# Patient Record
Sex: MALE | Race: WHITE | Employment: OTHER | URBAN - METROPOLITAN AREA
[De-identification: names, ages, dates, MRNs, and addresses within clinical notes are randomized per-mention and may not be internally consistent; named-entity substitution may affect disease eponyms.]

---

## 2017-04-26 PROBLEM — R07.9 CHEST PAIN: Status: ACTIVE | Noted: 2017-04-26

## 2017-11-14 PROBLEM — R94.31 ABNORMAL ECG: Status: ACTIVE | Noted: 2017-11-14

## 2018-04-26 LAB — DIABETIC RETINOPATHY: NORMAL

## 2018-09-18 LAB
AVERAGE GLUCOSE: NORMAL
CHOLESTEROL, TOTAL: NORMAL
CHOLESTEROL/HDL RATIO: NORMAL
HBA1C MFR BLD: 8.7 %
HDLC SERPL-MCNC: NORMAL MG/DL
LDL CHOLESTEROL CALCULATED: NORMAL
TRIGL SERPL-MCNC: NORMAL MG/DL
VLDLC SERPL CALC-MCNC: NORMAL MG/DL

## 2018-09-26 ENCOUNTER — HOSPITAL ENCOUNTER (EMERGENCY)
Age: 68
Discharge: HOME | End: 2018-09-26
Payer: MEDICARE

## 2018-09-26 VITALS
HEART RATE: 97 BPM | WEIGHT: 290 LBS | HEIGHT: 73 IN | OXYGEN SATURATION: 95 % | SYSTOLIC BLOOD PRESSURE: 135 MMHG | DIASTOLIC BLOOD PRESSURE: 89 MMHG | RESPIRATION RATE: 14 BRPM | TEMPERATURE: 98.7 F | BODY MASS INDEX: 38.43 KG/M2

## 2018-09-26 DIAGNOSIS — S61.209A AVULSION OF SKIN OF FINGER, INITIAL ENCOUNTER: Primary | ICD-10-CM

## 2018-09-26 PROCEDURE — 6360000002 HC RX W HCPCS: Performed by: PHYSICIAN ASSISTANT

## 2018-09-26 PROCEDURE — 90471 IMMUNIZATION ADMIN: CPT | Performed by: PHYSICIAN ASSISTANT

## 2018-09-26 PROCEDURE — 90715 TDAP VACCINE 7 YRS/> IM: CPT | Performed by: PHYSICIAN ASSISTANT

## 2018-09-26 PROCEDURE — 99282 EMERGENCY DEPT VISIT SF MDM: CPT

## 2018-09-26 RX ORDER — CEPHALEXIN 500 MG/1
500 CAPSULE ORAL 3 TIMES DAILY
Qty: 30 CAPSULE | Refills: 0 | Status: SHIPPED | OUTPATIENT
Start: 2018-09-26 | End: 2018-11-29 | Stop reason: ALTCHOICE

## 2018-09-26 RX ADMIN — TETANUS TOXOID, REDUCED DIPHTHERIA TOXOID AND ACELLULAR PERTUSSIS VACCINE, ADSORBED 0.5 ML: 5; 2.5; 8; 8; 2.5 SUSPENSION INTRAMUSCULAR at 18:53

## 2018-09-26 ASSESSMENT — PAIN DESCRIPTION - ORIENTATION: ORIENTATION: LEFT

## 2018-09-26 NOTE — ED PROVIDER NOTES
HPI:  9/26/18, Time: 6:21 PM         Toby Wright is a 80 y.o. male presenting to the ED for right 3rd fingertip avulsion from a mandolin, beginning just prior to arrival.  Patient states that this occurred approximately 30 minutes prior to arrival. Patient's tetanus status is not current. Patient really denies any pain in the finger, and certainly in the finger, dizziness, or any other acute change change of condition. Review of Systems:   Pertinent positives and negatives are stated within HPI, all other systems reviewed and are negative.          --------------------------------------------- PAST HISTORY ---------------------------------------------  Past Medical History:  has a past medical history of Diabetes mellitus ([facility] [address] Hyperlipidemia; Hypertension; Sleep apnea; Type 2 diabetes mellitus without complication ([facility] [address] and Ulceration. Past Surgical History:  has a past surgical history that includes hernia repair; Colonoscopy; and joint replacement (Left). Social History:  reports that he has quit smoking. His smoking use included Cigarettes. He has a 14.00 pack-year smoking history. He has never used smokeless tobacco. He reports that he drinks alcohol. He reports that he does not use drugs. Family History: family history includes Cancer in his mother and sister. The patients home medications have been reviewed. Allergies: Patient has no known allergies. -------------------------------------------------- RESULTS -------------------------------------------------  All laboratory and radiology results have been personally reviewed by myself   LABS:  No results found for this visit on 09/26/18. RADIOLOGY:  Interpreted by Radiologist.  No orders to display       ------------------------- NURSING NOTES AND VITALS REVIEWED ---------------------------   The nursing notes within the ED encounter and vital signs as below have been reviewed.    /89   Pulse 97   Temp 98.7 °F and prognosis. Questions are answered at this time and they are agreeable with the plan.      --------------------------------- IMPRESSION AND DISPOSITION ---------------------------------    IMPRESSION  1. Avulsion of skin of finger, initial encounter        DISPOSITION  Disposition: Discharge to home  Patient condition is good      NOTE: This report was transcribed using voice recognition software.  Every effort was made to ensure accuracy; however, inadvertent computerized transcription errors may be present       Brandon Mosley  09/26/18 1829

## 2018-11-13 ENCOUNTER — HOSPITAL ENCOUNTER (OUTPATIENT)
Dept: DIABETES SERVICES | Age: 68
Setting detail: THERAPIES SERIES
Discharge: HOME | End: 2018-11-13
Payer: MEDICARE

## 2018-11-13 PROCEDURE — G0109 DIAB MANAGE TRN IND/GROUP: HCPCS

## 2018-11-13 ASSESSMENT — PATIENT HEALTH QUESTIONNAIRE - PHQ9: SUM OF ALL RESPONSES TO PHQ QUESTIONS 1-9: 11

## 2018-11-14 ENCOUNTER — HOSPITAL ENCOUNTER (OUTPATIENT)
Dept: DIABETES SERVICES | Age: 68
Setting detail: THERAPIES SERIES
Discharge: HOME | End: 2018-11-14
Payer: MEDICARE

## 2018-11-14 PROCEDURE — G0109 DIAB MANAGE TRN IND/GROUP: HCPCS | Performed by: DIETITIAN, REGISTERED

## 2018-11-14 NOTE — PROGRESS NOTES
Diabetes Self-Management Education Record    Participant Name: Stephany Baugh  Referring Provider: Thaddeus Gunter DO  Assessment/Evaluation Ratings:  1=Needs Instruction   4=Demonstrates Understanding/Competency  2=Needs Review   NC=Not Covered    3=Comprehends Key Points  N/A=Not Applicable  Topics/Learning Objectives Pre-session Assess Date:   Instr. Date Reinforce Date Post- session Eval Comments   Diabetes disease process & Treatment process: Define diabetes & pre-diabetes; Identify own type of diabetes; role of the pancreas; signs/symptoms; diagnostic criteria; prevention & treatment options; contributing factors. Incorporating nutritional management into lifestyle: Describe effect of type, amount & timing of food on blood glucose; Describe basic meal planning techniques & current nutrition guidelines;Correctly read food labels & demonstrate CHO counting & portion control with personalized meal plan. Identify dining out strategies, & dietary sick day guidelines. 1 11/14/18 RREM  4 Pt attended Session 2. Participated in group activities on Diabetes Plate Method and Better for You food choices. Demonstrated understanding of carb counting using serving sizes and reading food labels correctly. Followed along and took notes. Incorporating physical activity into lifestyle:   Verbalize effect of exercise on blood glucose levels; benefits of regular exercise; safety considerations; contraindications; maintenance of activity. Using medications safely:  Identify effects of diabetes medicines on blood glucose levels; List diabetes medication taken, action & side effects; appropriate injection sites; proper storage; supplies needed; proper technique; safe needle disposal guidelines. Monitoring blood glucose, interpreting and using results:  Identify recommended & personal blood glucose targets; importance of testing; testing supplies; HgbA1C target levels;  Factors affecting blood glucose;

## 2018-11-15 ENCOUNTER — HOSPITAL ENCOUNTER (OUTPATIENT)
Dept: DIABETES SERVICES | Age: 68
Setting detail: THERAPIES SERIES
Discharge: HOME | End: 2018-11-15
Payer: MEDICARE

## 2018-11-15 PROCEDURE — 97804 MEDICAL NUTRITION GROUP: CPT

## 2018-11-15 RX ORDER — INSULIN ASPART 100 [IU]/ML
INJECTION, SOLUTION INTRAVENOUS; SUBCUTANEOUS
COMMUNITY

## 2018-11-15 NOTE — LETTER
[address] - Diabetes Education    11/15/2018       Re:     Zuhair Lowry         :  1950  Dear Dr. Garrett Gay:                    Thank you for referring your patient, Zuhair Lowry, for diabetes education sessions.     Your patient attended classes the week of 18, that addressed the following topics:    Nursing/Medical [x]     Nutrition [x]  · Describing the diabetes disease process/ treatment options  · Incorporating physical activity into lifestyle  · Using medication safely & for maximum therapeutic effectiveness  · Monitoring blood glucose & other parameters:  Interpreting and using results for self-management & decision making  · Preventing, detecting, & treating acute complications  · Preventing, detecting & treating chronic complications  · Developing personal strategies to address psychosocial issues and concerns  · Developing personal strategies to promote health & behavior change (risk reduction) · Incorporating nutrition management into lifestyle    · Nutrition for diabetes prevention & diabetes  · Relationship among nutrition, exercise, medication & blood glucose levels  · Food Groups/carbohydrate & non- carbohydrate foods  · Whole grain/high fiber foods & needs  · Fluid needs   · Label reading  · Carbohydrate consistent meal planning/ techniques  · Weighing/measuring portions  · Heart healthy guidelines: fat, sodium & cholesterol  · Alcohol  · Free foods/nutritive and non-nutritive sweeteners  · Dining out tips and recipe modification guidelines  · Sick day guidelines     [x]  Instructed on carbohydrate consistent meal plan with  2100 calories, and the following number of carbohydrate servings per meal or snack (15 gm/serving):   Breakfast:  4,  Lunch: 4, Dinner:  4     AM Snack:  1,  PM Snack:  0,  HS Snack:  2    Upon completion of these sessions the diabetes team made the following evaluation of your patients progress:  [x] Attended class alone

## 2018-11-15 NOTE — PROGRESS NOTES
Diabetes Self-Management Education Record    Participant Name: Phil Carlson  Referring Provider: Lona Carreno 117, DO  Assessment/Evaluation Ratings:  1=Needs Instruction   4=Demonstrates Understanding/Competency  2=Needs Review   NC=Not Covered    3=Comprehends Key Points  N/A=Not Applicable  Topics/Learning Objectives Pre-session Assess Date:  11/13/18 BDK Instr. Date Reinforce Date Post- session Eval Comments   Diabetes disease process & Treatment process: Define diabetes & pre-diabetes; Identify own type of diabetes; role of the pancreas; signs/symptoms; diagnostic criteria; prevention & treatment options; contributing factors. 1 11/13/18  BDK  3 Type 2   Incorporating nutritional management into lifestyle: Describe effect of type, amount & timing of food on blood glucose; Describe basic meal planning techniques & current nutrition guidelines;Correctly read food labels & demonstrate CHO counting & portion control with personalized meal plan. Identify dining out strategies, & dietary sick day guidelines. 1 11/14/18 RREM  4 Pt attended Session 2. Participated in group activities on Diabetes Plate Method and Better for You food choices. Demonstrated understanding of carb counting using serving sizes and reading food labels correctly. Followed along and took notes. Incorporating physical activity into lifestyle:   Verbalize effect of exercise on blood glucose levels; benefits of regular exercise; safety considerations; contraindications; maintenance of activity. 1 11/13/18  BDK  3 none   Using medications safely:  Identify effects of diabetes medicines on blood glucose levels; List diabetes medication taken, action & side effects; appropriate injection sites; proper storage; supplies needed; proper technique; safe needle disposal guidelines.  3 11/13/18  BDK  4 Metformin  Glipizide  novolog  basaglar   Monitoring blood glucose, interpreting and using results:  Identify recommended & personal blood glucose End Time Comments No Show Dates   Assessment 11/13/18 BDK 0900 0930   [x]In Person  []Telephone    Session 1 11/13/18 BDK 0930 1200      Session 2 11/14/18 REM  11/14/18 BDK 0945  0900 1130  0945     Session 3         Individual MNT         Gestational MNT         Shared Med Appt         Yearly Follow-up        Meter Instrx        Insulin Instrx      []Pen  []Vial & Syringe      Additional Comments: 11/13/18  Lisa Sampson attended session one alone. Attentive to class materials. He signed registration papers to not be able to contact him for a follow up. He stated that was \"okay\". When I said we would not be able to see how he is doing after classes, he again stated that was \"okay\". 11/15/18  Lisa Sampson has now changed his mind about contacting him after classes. New registration papers signed to go along with his change of mind.  [address] [facility]    DSMS Support Plan:  Follow-up plan/Date: 2/2019  Contact Post Class Regarding:   Goal Attainment   Fasting Blood Sugar   HgbA1C   Weight   Hypertension/Follow-up with Physician   Self-Foot Exam Frequency   Monitoring Frequency   Exercise Routine  Post Education Referrals:      []WIC   []PAP   []Wound Care   []Social Service   [] [facility] [address] Specialist   [][facility]   []Sleep Lab   []Other

## 2018-11-29 ENCOUNTER — OFFICE VISIT (OUTPATIENT)
Dept: CARDIOLOGY CLINIC | Age: 68
End: 2018-11-29
Payer: MEDICARE

## 2018-11-29 VITALS
HEART RATE: 94 BPM | SYSTOLIC BLOOD PRESSURE: 128 MMHG | WEIGHT: 301 LBS | DIASTOLIC BLOOD PRESSURE: 78 MMHG | HEIGHT: 73 IN | RESPIRATION RATE: 12 BRPM | BODY MASS INDEX: 39.89 KG/M2

## 2018-11-29 DIAGNOSIS — R06.09 DOE (DYSPNEA ON EXERTION): ICD-10-CM

## 2018-11-29 DIAGNOSIS — R94.31 ABNORMAL ECG: Primary | ICD-10-CM

## 2018-11-29 DIAGNOSIS — Z01.810 PREOPERATIVE CARDIOVASCULAR EXAMINATION: ICD-10-CM

## 2018-11-29 PROCEDURE — 93000 ELECTROCARDIOGRAM COMPLETE: CPT | Performed by: INTERNAL MEDICINE

## 2018-11-29 PROCEDURE — 4040F PNEUMOC VAC/ADMIN/RCVD: CPT | Performed by: INTERNAL MEDICINE

## 2018-11-29 PROCEDURE — 1036F TOBACCO NON-USER: CPT | Performed by: INTERNAL MEDICINE

## 2018-11-29 PROCEDURE — G8417 CALC BMI ABV UP PARAM F/U: HCPCS | Performed by: INTERNAL MEDICINE

## 2018-11-29 PROCEDURE — G8484 FLU IMMUNIZE NO ADMIN: HCPCS | Performed by: INTERNAL MEDICINE

## 2018-11-29 PROCEDURE — G8427 DOCREV CUR MEDS BY ELIG CLIN: HCPCS | Performed by: INTERNAL MEDICINE

## 2018-11-29 PROCEDURE — 1101F PT FALLS ASSESS-DOCD LE1/YR: CPT | Performed by: INTERNAL MEDICINE

## 2018-11-29 PROCEDURE — 3017F COLORECTAL CA SCREEN DOC REV: CPT | Performed by: INTERNAL MEDICINE

## 2018-11-29 PROCEDURE — 99214 OFFICE O/P EST MOD 30 MIN: CPT | Performed by: INTERNAL MEDICINE

## 2018-11-29 PROCEDURE — 1123F ACP DISCUSS/DSCN MKR DOCD: CPT | Performed by: INTERNAL MEDICINE

## 2018-11-29 RX ORDER — FENOFIBRATE 54 MG/1
54 TABLET ORAL DAILY
COMMUNITY
End: 2019-02-18

## 2018-12-26 ENCOUNTER — APPOINTMENT (OUTPATIENT)
Dept: GENERAL RADIOLOGY | Age: 68
End: 2018-12-26
Payer: MEDICARE

## 2018-12-26 ENCOUNTER — HOSPITAL ENCOUNTER (EMERGENCY)
Age: 68
Discharge: HOME | End: 2018-12-26
Attending: EMERGENCY MEDICINE
Payer: MEDICARE

## 2018-12-26 VITALS
TEMPERATURE: 98.9 F | BODY MASS INDEX: 39.1 KG/M2 | HEART RATE: 83 BPM | HEIGHT: 73 IN | WEIGHT: 295 LBS | DIASTOLIC BLOOD PRESSURE: 51 MMHG | RESPIRATION RATE: 16 BRPM | OXYGEN SATURATION: 97 % | SYSTOLIC BLOOD PRESSURE: 126 MMHG

## 2018-12-26 DIAGNOSIS — R07.9 CHEST PAIN, UNSPECIFIED TYPE: Primary | ICD-10-CM

## 2018-12-26 LAB
ANION GAP SERPL CALCULATED.3IONS-SCNC: 11 MMOL/L (ref 7–16)
BUN BLDV-MCNC: 19 MG/DL (ref 8–23)
CALCIUM SERPL-MCNC: 9.3 MG/DL (ref 8.6–10.2)
CHLORIDE BLD-SCNC: 102 MMOL/L (ref 98–107)
CO2: 26 MMOL/L (ref 22–29)
CREAT SERPL-MCNC: 0.7 MG/DL (ref 0.7–1.2)
EKG ATRIAL RATE: 90 BPM
EKG P AXIS: 37 DEGREES
EKG P-R INTERVAL: 226 MS
EKG Q-T INTERVAL: 394 MS
EKG QRS DURATION: 94 MS
EKG QTC CALCULATION (BAZETT): 481 MS
EKG R AXIS: -49 DEGREES
EKG T AXIS: 50 DEGREES
EKG VENTRICULAR RATE: 90 BPM
GFR AFRICAN AMERICAN: >60
GFR NON-AFRICAN AMERICAN: >60 ML/MIN/1.73
GLUCOSE BLD-MCNC: 253 MG/DL (ref 74–99)
HCT VFR BLD CALC: 26.5 % (ref 37–54)
HEMOGLOBIN: 8.8 G/DL (ref 12.5–16.5)
MCH RBC QN AUTO: 30.3 PG (ref 26–35)
MCHC RBC AUTO-ENTMCNC: 33.2 % (ref 32–34.5)
MCV RBC AUTO: 91.4 FL (ref 80–99.9)
PDW BLD-RTO: 13.7 FL (ref 11.5–15)
PLATELET # BLD: 431 E9/L (ref 130–450)
PMV BLD AUTO: 8.6 FL (ref 7–12)
POTASSIUM SERPL-SCNC: 4 MMOL/L (ref 3.5–5)
RBC # BLD: 2.9 E12/L (ref 3.8–5.8)
SODIUM BLD-SCNC: 139 MMOL/L (ref 132–146)
TROPONIN: <0.01 NG/ML (ref 0–0.03)
TROPONIN: <0.01 NG/ML (ref 0–0.03)
WBC # BLD: 10.2 E9/L (ref 4.5–11.5)

## 2018-12-26 PROCEDURE — 99285 EMERGENCY DEPT VISIT HI MDM: CPT

## 2018-12-26 PROCEDURE — 80048 BASIC METABOLIC PNL TOTAL CA: CPT

## 2018-12-26 PROCEDURE — 36415 COLL VENOUS BLD VENIPUNCTURE: CPT

## 2018-12-26 PROCEDURE — 71045 X-RAY EXAM CHEST 1 VIEW: CPT

## 2018-12-26 PROCEDURE — 85027 COMPLETE CBC AUTOMATED: CPT

## 2018-12-26 PROCEDURE — 84484 ASSAY OF TROPONIN QUANT: CPT

## 2018-12-26 ASSESSMENT — PAIN DESCRIPTION - ORIENTATION
ORIENTATION: RIGHT
ORIENTATION: LEFT

## 2018-12-26 ASSESSMENT — PAIN DESCRIPTION - LOCATION: LOCATION: CHEST

## 2018-12-26 ASSESSMENT — PAIN DESCRIPTION - PAIN TYPE
TYPE: ACUTE PAIN
TYPE: ACUTE PAIN

## 2018-12-26 ASSESSMENT — PAIN DESCRIPTION - FREQUENCY
FREQUENCY: CONTINUOUS
FREQUENCY: CONTINUOUS

## 2018-12-26 ASSESSMENT — PAIN SCALES - GENERAL
PAINLEVEL_OUTOF10: 1
PAINLEVEL_OUTOF10: 7

## 2018-12-26 ASSESSMENT — PAIN DESCRIPTION - DESCRIPTORS: DESCRIPTORS: PATIENT UNABLE TO DESCRIBE

## 2018-12-26 ASSESSMENT — PAIN DESCRIPTION - PROGRESSION: CLINICAL_PROGRESSION: NOT CHANGED

## 2018-12-27 NOTE — ED PROVIDER NOTES
parasthesia  Psych: no hallucinations, no depression  Heme: no bruising, no bleeding  Other relevant systems reviewed and negative    Physical brief exam: See HPI for other details  Details in electronic record may supersede details below    Vital signs reviewed, please refer to nurses note  Constitutionall: No distress, warm, dry, well nourished, nontoxic  HENT:  NC AT, no deformity, no bleeding of gums  Eyes:  EOMI, nl lids and conjunctiva   Resp:  normal resp rate, no resp distress, no stridor  CVS:  no JVD, no visible heave  GI:  no outward sign peritonitis or splinting, non distended  Musc-Skel:  full range of motion, normal appearing, no deformities/edema/ ecchymosis  Skin:  warm and dry, normal turgor, no rashes   Neurologic: awake and alert, cooperative, Cranial Nerves II-XII grossly intact, motor& sensory grossly intact x4   Psychiatric: orientated x3, answers questions appropriately, judgment & affect grossly appropriate  Heme/ Lymph: no visible adenopathy, ecchymosis, pettichiae    Procedures/Radiology:  CXR negative    ------------------------ NURSING NOTES AND VITALS REVIEWED ---------------------------  Date / Time Roomed:  12/26/2018  5:27 PM  ED Bed Assignment:  16/16    The nursing notes within the ED encounter and vital signs as below have been reviewed. Patient Vitals for the past 24 hrs:   BP Temp Temp src Pulse Resp SpO2 Height Weight   12/26/18 1910 (!) 126/51 - - 83 16 97 % - -   12/26/18 1735 (!) 141/78 98.9 °F (37.2 °C) Oral 90 14 97 % 6' 1\" (1.854 m) 295 lb (133.8 kg)       Oxygen Saturation Interpretation: Normal      Assessment and Plan: Pt is asymptomatic here, CE x 2 negative, pt to [place] home and f/u with pcp, given strict return precautions for any new or worsening symptoms      Impression: Chest Pain      I have reviewed the patient's medications, vital signs, and diagnostic studies available to me in the medical record at the time of the patient encounter.     12/26/18, 8:19 PM. This note is prepared by Adela Chew, acting as Scribe for Ally Dunlap MD.    Ally Dunlap MD:  The scribe's documentation has been prepared under my direction and personally reviewed by me in its entirety. I confirm that the note above accurately reflects all work, treatment, procedures, and medical decision making performed by me.          Ally Dunlap MD  12/26/18 2028

## 2019-02-19 ENCOUNTER — HOSPITAL ENCOUNTER (OUTPATIENT)
Dept: DIABETES SERVICES | Age: 69
Setting detail: THERAPIES SERIES
Discharge: HOME | End: 2019-02-19
Payer: MEDICARE

## 2019-03-11 ENCOUNTER — HOSPITAL ENCOUNTER (OUTPATIENT)
Age: 69
Discharge: HOME | End: 2019-03-13
Payer: MEDICARE

## 2019-03-11 ENCOUNTER — HOSPITAL ENCOUNTER (OUTPATIENT)
Dept: GENERAL RADIOLOGY | Age: 69
Discharge: HOME | End: 2019-03-13
Payer: MEDICARE

## 2019-03-11 DIAGNOSIS — S33.5XXA SPRAIN OF LIGAMENTS OF LUMBAR SPINE, INITIAL ENCOUNTER: ICD-10-CM

## 2019-03-11 PROCEDURE — 72110 X-RAY EXAM L-2 SPINE 4/>VWS: CPT

## 2019-04-22 ENCOUNTER — HOSPITAL ENCOUNTER (OUTPATIENT)
Dept: MRI IMAGING | Age: 69
Discharge: HOME | End: 2019-04-24
Payer: MEDICARE

## 2019-04-22 DIAGNOSIS — M51.16 INTERVERTEBRAL DISC DISORDER WITH RADICULOPATHY OF LUMBAR REGION: ICD-10-CM

## 2019-04-22 PROCEDURE — 72148 MRI LUMBAR SPINE W/O DYE: CPT

## 2019-05-03 ENCOUNTER — APPOINTMENT (OUTPATIENT)
Dept: CT IMAGING | Age: 69
End: 2019-05-03
Payer: MEDICARE

## 2019-05-03 ENCOUNTER — HOSPITAL ENCOUNTER (EMERGENCY)
Age: 69
Discharge: HOME | End: 2019-05-03
Attending: EMERGENCY MEDICINE
Payer: MEDICARE

## 2019-05-03 VITALS
SYSTOLIC BLOOD PRESSURE: 161 MMHG | HEART RATE: 72 BPM | TEMPERATURE: 97.7 F | DIASTOLIC BLOOD PRESSURE: 73 MMHG | OXYGEN SATURATION: 92 % | RESPIRATION RATE: 16 BRPM

## 2019-05-03 DIAGNOSIS — R42 VERTIGO: Primary | ICD-10-CM

## 2019-05-03 LAB
ALBUMIN SERPL-MCNC: 4.6 G/DL (ref 3.5–5.2)
ALP BLD-CCNC: 55 U/L (ref 40–129)
ALT SERPL-CCNC: 49 U/L (ref 0–40)
ANION GAP SERPL CALCULATED.3IONS-SCNC: 13 MMOL/L (ref 7–16)
AST SERPL-CCNC: 42 U/L (ref 0–39)
BASOPHILS ABSOLUTE: 0.03 E9/L (ref 0–0.2)
BASOPHILS RELATIVE PERCENT: 0.5 % (ref 0–2)
BILIRUB SERPL-MCNC: 0.4 MG/DL (ref 0–1.2)
BUN BLDV-MCNC: 26 MG/DL (ref 8–23)
CALCIUM SERPL-MCNC: 9.5 MG/DL (ref 8.6–10.2)
CHLORIDE BLD-SCNC: 104 MMOL/L (ref 98–107)
CO2: 24 MMOL/L (ref 22–29)
CREAT SERPL-MCNC: 0.7 MG/DL (ref 0.7–1.2)
EKG ATRIAL RATE: 73 BPM
EKG P AXIS: 43 DEGREES
EKG P-R INTERVAL: 232 MS
EKG Q-T INTERVAL: 422 MS
EKG QRS DURATION: 98 MS
EKG QTC CALCULATION (BAZETT): 464 MS
EKG R AXIS: -69 DEGREES
EKG T AXIS: 66 DEGREES
EKG VENTRICULAR RATE: 73 BPM
EOSINOPHILS ABSOLUTE: 0.06 E9/L (ref 0.05–0.5)
EOSINOPHILS RELATIVE PERCENT: 1 % (ref 0–6)
GFR AFRICAN AMERICAN: >60
GFR NON-AFRICAN AMERICAN: >60 ML/MIN/1.73
GLUCOSE BLD-MCNC: 229 MG/DL (ref 74–99)
HCT VFR BLD CALC: 39.9 % (ref 37–54)
HEMOGLOBIN: 13.4 G/DL (ref 12.5–16.5)
IMMATURE GRANULOCYTES #: 0.03 E9/L
IMMATURE GRANULOCYTES %: 0.5 % (ref 0–5)
LYMPHOCYTES ABSOLUTE: 1.44 E9/L (ref 1.5–4)
LYMPHOCYTES RELATIVE PERCENT: 22.9 % (ref 20–42)
MCH RBC QN AUTO: 28.8 PG (ref 26–35)
MCHC RBC AUTO-ENTMCNC: 33.6 % (ref 32–34.5)
MCV RBC AUTO: 85.6 FL (ref 80–99.9)
MONOCYTES ABSOLUTE: 0.48 E9/L (ref 0.1–0.95)
MONOCYTES RELATIVE PERCENT: 7.6 % (ref 2–12)
NEUTROPHILS ABSOLUTE: 4.25 E9/L (ref 1.8–7.3)
NEUTROPHILS RELATIVE PERCENT: 67.5 % (ref 43–80)
PDW BLD-RTO: 16 FL (ref 11.5–15)
PLATELET # BLD: 238 E9/L (ref 130–450)
PMV BLD AUTO: 9.2 FL (ref 7–12)
POTASSIUM SERPL-SCNC: 4.6 MMOL/L (ref 3.5–5)
RBC # BLD: 4.66 E12/L (ref 3.8–5.8)
SODIUM BLD-SCNC: 141 MMOL/L (ref 132–146)
TOTAL PROTEIN: 7.6 G/DL (ref 6.4–8.3)
TROPONIN: <0.01 NG/ML (ref 0–0.03)
WBC # BLD: 6.3 E9/L (ref 4.5–11.5)

## 2019-05-03 PROCEDURE — 84484 ASSAY OF TROPONIN QUANT: CPT

## 2019-05-03 PROCEDURE — 99284 EMERGENCY DEPT VISIT MOD MDM: CPT

## 2019-05-03 PROCEDURE — 36415 COLL VENOUS BLD VENIPUNCTURE: CPT

## 2019-05-03 PROCEDURE — 93005 ELECTROCARDIOGRAM TRACING: CPT | Performed by: EMERGENCY MEDICINE

## 2019-05-03 PROCEDURE — 80053 COMPREHEN METABOLIC PANEL: CPT

## 2019-05-03 PROCEDURE — 85025 COMPLETE CBC W/AUTO DIFF WBC: CPT

## 2019-05-03 PROCEDURE — 70450 CT HEAD/BRAIN W/O DYE: CPT

## 2019-05-03 PROCEDURE — 6370000000 HC RX 637 (ALT 250 FOR IP): Performed by: STUDENT IN AN ORGANIZED HEALTH CARE EDUCATION/TRAINING PROGRAM

## 2019-05-03 PROCEDURE — 93010 ELECTROCARDIOGRAM REPORT: CPT | Performed by: INTERNAL MEDICINE

## 2019-05-03 RX ORDER — MECLIZINE HCL 12.5 MG/1
12.5 TABLET ORAL ONCE
Status: COMPLETED | OUTPATIENT
Start: 2019-05-03 | End: 2019-05-03

## 2019-05-03 RX ORDER — MECLIZINE HYDROCHLORIDE 25 MG/1
25 TABLET ORAL 3 TIMES DAILY PRN
Qty: 30 TABLET | Refills: 0 | Status: SHIPPED | OUTPATIENT
Start: 2019-05-03 | End: 2019-05-13

## 2019-05-03 RX ADMIN — MECLIZINE 12.5 MG: 12.5 TABLET ORAL at 13:27

## 2019-05-03 ASSESSMENT — ENCOUNTER SYMPTOMS
COUGH: 0
VOMITING: 0
WHEEZING: 0
SORE THROAT: 0
EYE PAIN: 0
DIARRHEA: 0
TROUBLE SWALLOWING: 0
ABDOMINAL DISTENTION: 0
BLOOD IN STOOL: 0
PHOTOPHOBIA: 0
EYE REDNESS: 0
CHEST TIGHTNESS: 0
NAUSEA: 1
ABDOMINAL PAIN: 0
SINUS PRESSURE: 0
CONSTIPATION: 0
BACK PAIN: 0
RHINORRHEA: 0
SHORTNESS OF BREATH: 0

## 2019-05-03 ASSESSMENT — PAIN DESCRIPTION - PAIN TYPE: TYPE: ACUTE PAIN

## 2019-05-03 ASSESSMENT — PAIN DESCRIPTION - LOCATION: LOCATION: HEAD

## 2019-05-03 ASSESSMENT — PAIN DESCRIPTION - FREQUENCY: FREQUENCY: CONTINUOUS

## 2019-05-03 ASSESSMENT — PAIN SCALES - GENERAL: PAINLEVEL_OUTOF10: 7

## 2019-05-03 ASSESSMENT — PAIN DESCRIPTION - DESCRIPTORS: DESCRIPTORS: THROBBING;SQUEEZING;STABBING

## 2019-05-03 NOTE — ED NOTES
FIRST PROVIDER CONTACT ASSESSMENT NOTE      Department of Emergency Medicine   5/3/19  12:29 PM    Chief Complaint: Dizziness (states he is experiencing vertigo) and Nausea (denies emesis, diabetic,  at home)      History of Present Illness:    Domonique Maguire is a 80 y.o. male who presents to the ED by private car for dizziness has had vertigo in the past.  Headache in the back of his head. Focused Screening Exam:  Constitutional:  Alert, appears stated age and is in no distress.       *ALLERGIES*     Lisinopril     ED Triage Vitals   BP Temp Temp src Pulse Resp SpO2 Height Weight   -- -- -- -- -- -- -- --        Initial Plan of Care:  Initiate Treatment-Testing, Proceed toTreatment Area When Bed Available for ED Attending/MLP to Continue Care    -----------------END OF FIRST PROVIDER CONTACT ASSESSMENT NOTE--------------  Electronically signed by JOSSUE Woods   DD: 5/3/19       JOSSUE Huerta  05/03/19 1543

## 2019-05-03 NOTE — ED PROVIDER NOTES
Patient is a 20-year-old male who presents to ED for evaluation of dizziness. Onset of symptoms was last p.m. Patient notes that symptoms worsened with movement of the head and improved with rest. Patient notes a significant history of BPPV, previously prescribed meclizine outpatient. Most recent episode approximately one year prior to arrival that resolved with medications-- meclizine. Patient denies unilateral weakness, numbness, paresthesias. Patient denies facial asymmetry or dysarthria. Patient was accompanied by wife, who states the patient has been acting appropriately. Patient has associated nausea without emesis when symptoms are present. Patient denies previously being evaluated by ENT. Review of Systems   Constitutional: Negative for chills, diaphoresis, fatigue and fever. HENT: Negative for congestion, ear pain, rhinorrhea, sinus pressure, sneezing, sore throat and trouble swallowing. Eyes: Negative for photophobia, pain and redness. Respiratory: Negative for cough, chest tightness, shortness of breath and wheezing. Cardiovascular: Negative for chest pain and palpitations. Gastrointestinal: Positive for nausea. Negative for abdominal distention, abdominal pain, blood in stool, constipation, diarrhea and vomiting. Endocrine: Negative for polydipsia and polyuria. Genitourinary: Negative for difficulty urinating, dysuria, flank pain, hematuria and urgency. Musculoskeletal: Negative for arthralgias, back pain, myalgias and neck pain. Skin: Negative for rash and wound. Neurological: Positive for dizziness. Negative for weakness, light-headedness, numbness and headaches. Psychiatric/Behavioral: Negative for agitation and confusion. The patient is not nervous/anxious and is not hyperactive. Physical Exam   Constitutional: He is oriented to person, place, and time. He appears well-developed and well-nourished. No distress. HENT:   Head: Normocephalic and atraumatic. Right Ear: External ear normal.   Left Ear: External ear normal.   Mouth/Throat: Oropharynx is clear and moist. No oropharyngeal exudate. Eyes: Pupils are equal, round, and reactive to light. Conjunctivae and EOM are normal. Right eye exhibits no discharge. Left eye exhibits no discharge. Neck: Normal range of motion. Neck supple. No thyromegaly present. Cardiovascular: Normal rate, regular rhythm and normal heart sounds. No murmur heard. Pulmonary/Chest: Effort normal and breath sounds normal. No respiratory distress. He has no wheezes. He has no rales. He exhibits no tenderness. Abdominal: Soft. He exhibits no distension and no mass. There is no tenderness. There is no rebound and no guarding. Musculoskeletal: Normal range of motion. He exhibits no tenderness. Neurological: He is alert and oriented to person, place, and time. No focal motor or sensory deficits to the upper or lower extremities. No facial asymmetry or dysarthria. Unilateral fatigable nystagmus noted with head impulse. Skin: Skin is warm and dry. No rash noted. He is not diaphoretic. Psychiatric: He has a normal mood and affect. His behavior is normal. Judgment and thought content normal.       Procedures    MDM  Number of Diagnoses or Management Options  Vertigo:   Diagnosis management comments: Patient is a 41-year-old male who presented to ED for evaluation of dizziness described as vertigo, with associated nausea. On arrival to ED, physical exam significant for no focal motor or sensory deficits noted to the upper or lower extremities, HINTS exam negative for central vertigo, unilateral fatigable nystagmus seen on exam. Mike-Hallpike maneuver was performed with improvement in symptoms. CT of the head with no acute intracranial abnormality. Labs reviewed and were unremarkable. Decision was made to discharge patient home with instruction to follow up with primary care physician calling their office repeat evaluation.  Strict return instructions to ED with given the patient prior to discharge. On repeat evaluation prior to discharge, patient asymptomatic, resting comfortably in bed with wife at bedside and in no apparent distress. No new complaints prior to discharge and patient is agreeable to plan.      --------------------------------------------- PAST HISTORY ---------------------------------------------  Past Medical History:  has a past medical history of Diabetes mellitus ([facility] [address] Hyperlipidemia, Hypertension, Sleep apnea, Type 2 diabetes mellitus without complication ([facility] [address] and Ulceration. Past Surgical History:  has a past surgical history that includes hernia repair; Colonoscopy; joint replacement (Left); and Revision total knee arthroplasty (Right, 12/14/2018). Social History:  reports that he quit smoking about 39 years ago. His smoking use included cigarettes. He has a 14.00 pack-year smoking history. He has never used smokeless tobacco. He reports that he drinks alcohol. He reports that he does not use drugs. Family History: family history includes Cancer in his mother and sister. The patients home medications have been reviewed.     Allergies: Lisinopril    -------------------------------------------------- RESULTS -------------------------------------------------  Labs:  Results for orders placed or performed during the hospital encounter of 05/03/19   CBC Auto Differential   Result Value Ref Range    WBC 6.3 4.5 - 11.5 E9/L    RBC 4.66 3.80 - 5.80 E12/L    Hemoglobin 13.4 12.5 - 16.5 g/dL    Hematocrit 39.9 37.0 - 54.0 %    MCV 85.6 80.0 - 99.9 fL    MCH 28.8 26.0 - 35.0 pg    MCHC 33.6 32.0 - 34.5 %    RDW 16.0 (H) 11.5 - 15.0 fL    Platelets 644 574 - 903 E9/L    MPV 9.2 7.0 - 12.0 fL    Neutrophils % 67.5 43.0 - 80.0 %    Immature Granulocytes % 0.5 0.0 - 5.0 %    Lymphocytes % 22.9 20.0 - 42.0 %    Monocytes % 7.6 2.0 - 12.0 %    Eosinophils % 1.0 0.0 - 6.0 %    Basophils % 0.5 0.0 - 2.0 %    Neutrophils # 4. 25 1.80 - 7.30 E9/L    Immature Granulocytes # 0.03 E9/L    Lymphocytes # 1.44 (L) 1.50 - 4.00 E9/L    Monocytes # 0.48 0.10 - 0.95 E9/L    Eosinophils # 0.06 0.05 - 0.50 E9/L    Basophils # 0.03 0.00 - 0.20 E9/L   Comprehensive Metabolic Panel   Result Value Ref Range    Sodium 141 132 - 146 mmol/L    Potassium 4.6 3.5 - 5.0 mmol/L    Chloride 104 98 - 107 mmol/L    CO2 24 22 - 29 mmol/L    Anion Gap 13 7 - 16 mmol/L    Glucose 229 (H) 74 - 99 mg/dL    BUN 26 (H) 8 - 23 mg/dL    CREATININE 0.7 0.7 - 1.2 mg/dL    GFR Non-African American >60 >=60 mL/min/1.73    GFR African American >60     Calcium 9.5 8.6 - 10.2 mg/dL    Total Protein 7.6 6.4 - 8.3 g/dL    Alb 4.6 3.5 - 5.2 g/dL    Total Bilirubin 0.4 0.0 - 1.2 mg/dL    Alkaline Phosphatase 55 40 - 129 U/L    ALT 49 (H) 0 - 40 U/L    AST 42 (H) 0 - 39 U/L   Troponin   Result Value Ref Range    Troponin <0.01 0.00 - 0.03 ng/mL   EKG 12 Lead   Result Value Ref Range    Ventricular Rate 73 BPM    Atrial Rate 73 BPM    P-R Interval 232 ms    QRS Duration 98 ms    Q-T Interval 422 ms    QTc Calculation (Bazett) 464 ms    P Axis 43 degrees    R Axis -69 degrees    T Axis 66 degrees       Radiology:  CT Head WO Contrast   Final Result   Negative noncontrast CT study for acute or interval findings. Landy Boateng Specifically, there is no evidence of intracranial hemorrhage or mass   effect, and no calvarial traumatic findings are noted. A subcentimeter   old lacunar infarct in the insular region of the right basal nuclei is   unchanged. ------------------------- NURSING NOTES AND VITALS REVIEWED ---------------------------  Date / Time Roomed:  5/3/2019 12:45 PM  ED Bed Assignment:  14/14    The nursing notes within the ED encounter and vital signs as below have been reviewed.    BP (!) 161/73   Pulse 72   Temp 97.7 °F (36.5 °C) (Oral)   Resp 16   SpO2 92%   Oxygen Saturation Interpretation: Normal      ------------------------------------------ PROGRESS NOTES ------------------------------------------  3:04 PM  I have spoken with the patient and discussed todays results, in addition to providing specific details for the plan of care and counseling regarding the diagnosis and prognosis. Their questions are answered at this time and they are agreeable with the plan. I discussed at length with them reasons for immediate return here for re evaluation. They will followup with their primary care physician by calling their office on Monday.      --------------------------------- ADDITIONAL PROVIDER NOTES ---------------------------------  At this time the patient is without objective evidence of an acute process requiring hospitalization or inpatient management. They have remained hemodynamically stable throughout their entire ED visit and are stable for discharge with outpatient follow-up. The plan has been discussed in detail and they are aware of the specific conditions for emergent return, as well as the importance of follow-up. New Prescriptions    MECLIZINE (ANTIVERT) 25 MG TABLET    Take 1 tablet by mouth 3 times daily as needed for Dizziness       Diagnosis:  1. Vertigo        Disposition:  Patient's disposition: Discharge to home  Patient's condition is stable.          Noah Fragoso DO  Resident  05/03/19 8673

## 2019-07-16 ENCOUNTER — HOSPITAL ENCOUNTER (OUTPATIENT)
Dept: GENERAL RADIOLOGY | Age: 69
Discharge: HOME | End: 2019-07-18
Payer: MEDICARE

## 2019-07-16 ENCOUNTER — HOSPITAL ENCOUNTER (OUTPATIENT)
Age: 69
Discharge: HOME | End: 2019-07-18
Payer: MEDICARE

## 2019-07-16 DIAGNOSIS — M25.561 RIGHT KNEE PAIN, UNSPECIFIED CHRONICITY: ICD-10-CM

## 2019-07-16 PROCEDURE — 73564 X-RAY EXAM KNEE 4 OR MORE: CPT

## 2019-07-24 ENCOUNTER — HOSPITAL ENCOUNTER (OUTPATIENT)
Dept: NUCLEAR MEDICINE | Age: 69
Discharge: HOME | End: 2019-07-24
Payer: MEDICARE

## 2019-07-24 DIAGNOSIS — M25.461 KNEE EFFUSION, RIGHT: ICD-10-CM

## 2019-07-24 PROCEDURE — A9503 TC99M MEDRONATE: HCPCS | Performed by: INTERNAL MEDICINE

## 2019-07-24 PROCEDURE — 3430000000 HC RX DIAGNOSTIC RADIOPHARMACEUTICAL: Performed by: INTERNAL MEDICINE

## 2019-07-24 PROCEDURE — 78306 BONE IMAGING WHOLE BODY: CPT

## 2019-07-24 RX ORDER — TC 99M MEDRONATE 20 MG/10ML
25 INJECTION, POWDER, LYOPHILIZED, FOR SOLUTION INTRAVENOUS
Status: COMPLETED | OUTPATIENT
Start: 2019-07-24 | End: 2019-07-24

## 2019-07-24 RX ADMIN — TC 99M MEDRONATE 25 MILLICURIE: 20 INJECTION, POWDER, LYOPHILIZED, FOR SOLUTION INTRAVENOUS at 13:53

## 2019-08-08 RX ORDER — FENOFIBRATE 54 MG/1
TABLET ORAL
COMMUNITY
Start: 2018-07-23 | End: 2019-08-08 | Stop reason: SDUPTHER

## 2019-08-09 RX ORDER — FENOFIBRATE 54 MG/1
TABLET ORAL
Qty: 90 TABLET | Refills: 5 | Status: SHIPPED | OUTPATIENT
Start: 2019-08-09

## 2019-12-09 ENCOUNTER — HOSPITAL ENCOUNTER (EMERGENCY)
Age: 69
Discharge: HOME | End: 2019-12-09
Payer: MEDICARE

## 2019-12-09 ENCOUNTER — APPOINTMENT (OUTPATIENT)
Dept: GENERAL RADIOLOGY | Age: 69
End: 2019-12-09
Payer: MEDICARE

## 2019-12-09 VITALS
DIASTOLIC BLOOD PRESSURE: 65 MMHG | OXYGEN SATURATION: 95 % | HEIGHT: 73 IN | RESPIRATION RATE: 16 BRPM | BODY MASS INDEX: 37.77 KG/M2 | WEIGHT: 285 LBS | SYSTOLIC BLOOD PRESSURE: 123 MMHG | TEMPERATURE: 98 F | HEART RATE: 97 BPM

## 2019-12-09 DIAGNOSIS — M54.12 CERVICAL RADICULOPATHY: Primary | ICD-10-CM

## 2019-12-09 DIAGNOSIS — M47.22 OSTEOARTHRITIS OF SPINE WITH RADICULOPATHY, CERVICAL REGION: ICD-10-CM

## 2019-12-09 LAB
ALBUMIN SERPL-MCNC: 4.6 G/DL (ref 3.5–5.2)
ALP BLD-CCNC: 61 U/L (ref 40–129)
ALT SERPL-CCNC: 28 U/L (ref 0–40)
ANION GAP SERPL CALCULATED.3IONS-SCNC: 14 MMOL/L (ref 7–16)
AST SERPL-CCNC: 26 U/L (ref 0–39)
BASOPHILS ABSOLUTE: 0.05 E9/L (ref 0–0.2)
BASOPHILS RELATIVE PERCENT: 0.7 % (ref 0–2)
BILIRUB SERPL-MCNC: 0.3 MG/DL (ref 0–1.2)
BUN BLDV-MCNC: 23 MG/DL (ref 8–23)
CALCIUM SERPL-MCNC: 9.8 MG/DL (ref 8.6–10.2)
CHLORIDE BLD-SCNC: 102 MMOL/L (ref 98–107)
CO2: 23 MMOL/L (ref 22–29)
CREAT SERPL-MCNC: 0.8 MG/DL (ref 0.7–1.2)
EKG ATRIAL RATE: 95 BPM
EKG P AXIS: 63 DEGREES
EKG P-R INTERVAL: 228 MS
EKG Q-T INTERVAL: 356 MS
EKG QRS DURATION: 96 MS
EKG QTC CALCULATION (BAZETT): 447 MS
EKG R AXIS: -78 DEGREES
EKG T AXIS: 75 DEGREES
EKG VENTRICULAR RATE: 95 BPM
EOSINOPHILS ABSOLUTE: 0.17 E9/L (ref 0.05–0.5)
EOSINOPHILS RELATIVE PERCENT: 2.2 % (ref 0–6)
GFR AFRICAN AMERICAN: >60
GFR NON-AFRICAN AMERICAN: >60 ML/MIN/1.73
GLUCOSE BLD-MCNC: 112 MG/DL (ref 74–99)
HCT VFR BLD CALC: 42.1 % (ref 37–54)
HEMOGLOBIN: 14.1 G/DL (ref 12.5–16.5)
IMMATURE GRANULOCYTES #: 0.03 E9/L
IMMATURE GRANULOCYTES %: 0.4 % (ref 0–5)
LYMPHOCYTES ABSOLUTE: 2.46 E9/L (ref 1.5–4)
LYMPHOCYTES RELATIVE PERCENT: 32.2 % (ref 20–42)
MCH RBC QN AUTO: 30.2 PG (ref 26–35)
MCHC RBC AUTO-ENTMCNC: 33.5 % (ref 32–34.5)
MCV RBC AUTO: 90.1 FL (ref 80–99.9)
MONOCYTES ABSOLUTE: 0.65 E9/L (ref 0.1–0.95)
MONOCYTES RELATIVE PERCENT: 8.5 % (ref 2–12)
NEUTROPHILS ABSOLUTE: 4.28 E9/L (ref 1.8–7.3)
NEUTROPHILS RELATIVE PERCENT: 56 % (ref 43–80)
PDW BLD-RTO: 14.7 FL (ref 11.5–15)
PLATELET # BLD: 275 E9/L (ref 130–450)
PMV BLD AUTO: 9.3 FL (ref 7–12)
POTASSIUM SERPL-SCNC: 4 MMOL/L (ref 3.5–5)
RBC # BLD: 4.67 E12/L (ref 3.8–5.8)
SODIUM BLD-SCNC: 139 MMOL/L (ref 132–146)
TOTAL PROTEIN: 7.8 G/DL (ref 6.4–8.3)
TROPONIN: <0.01 NG/ML (ref 0–0.03)
WBC # BLD: 7.6 E9/L (ref 4.5–11.5)

## 2019-12-09 PROCEDURE — 73030 X-RAY EXAM OF SHOULDER: CPT

## 2019-12-09 PROCEDURE — 99284 EMERGENCY DEPT VISIT MOD MDM: CPT

## 2019-12-09 PROCEDURE — 93005 ELECTROCARDIOGRAM TRACING: CPT | Performed by: PHYSICIAN ASSISTANT

## 2019-12-09 PROCEDURE — 85025 COMPLETE CBC W/AUTO DIFF WBC: CPT

## 2019-12-09 PROCEDURE — 72040 X-RAY EXAM NECK SPINE 2-3 VW: CPT

## 2019-12-09 PROCEDURE — 84484 ASSAY OF TROPONIN QUANT: CPT

## 2019-12-09 PROCEDURE — 71046 X-RAY EXAM CHEST 2 VIEWS: CPT

## 2019-12-09 PROCEDURE — 80053 COMPREHEN METABOLIC PANEL: CPT

## 2019-12-09 RX ORDER — METHYLPREDNISOLONE 4 MG/1
TABLET ORAL
Qty: 21 TABLET | Status: SHIPPED | OUTPATIENT
Start: 2019-12-09 | End: 2019-12-15

## 2019-12-09 RX ORDER — CYCLOBENZAPRINE HCL 10 MG
10 TABLET ORAL 3 TIMES DAILY PRN
Qty: 12 TABLET | Refills: 0 | Status: SHIPPED | OUTPATIENT
Start: 2019-12-09 | End: 2019-12-13

## 2019-12-09 ASSESSMENT — PAIN - FUNCTIONAL ASSESSMENT: PAIN_FUNCTIONAL_ASSESSMENT: ACTIVITIES ARE NOT PREVENTED

## 2019-12-09 ASSESSMENT — PAIN DESCRIPTION - LOCATION: LOCATION: SHOULDER

## 2019-12-09 ASSESSMENT — PAIN SCALES - GENERAL: PAINLEVEL_OUTOF10: 4

## 2019-12-09 ASSESSMENT — PAIN DESCRIPTION - PAIN TYPE: TYPE: ACUTE PAIN

## 2019-12-09 ASSESSMENT — PAIN DESCRIPTION - FREQUENCY: FREQUENCY: CONTINUOUS

## 2019-12-09 ASSESSMENT — PAIN DESCRIPTION - ORIENTATION: ORIENTATION: LEFT

## 2019-12-09 ASSESSMENT — PAIN DESCRIPTION - DESCRIPTORS: DESCRIPTORS: DULL

## 2019-12-23 RX ORDER — ALBUTEROL SULFATE 90 UG/1
2 AEROSOL, METERED RESPIRATORY (INHALATION) 4 TIMES DAILY PRN
COMMUNITY

## 2020-02-18 ENCOUNTER — HOSPITAL ENCOUNTER (OUTPATIENT)
Dept: MRI IMAGING | Age: 70
Discharge: HOME | End: 2020-02-20
Payer: MEDICARE

## 2020-02-18 PROCEDURE — 72141 MRI NECK SPINE W/O DYE: CPT

## 2020-10-05 ENCOUNTER — HOSPITAL ENCOUNTER (OUTPATIENT)
Dept: DIABETES SERVICES | Age: 70
Setting detail: THERAPIES SERIES
Discharge: HOME | End: 2020-10-05
Payer: MEDICARE

## 2020-10-05 NOTE — LETTER
[facility]) - Diabetes Education    10/5/2020    Re:     Claudette Holm  :  1950    Dear Dr. Destinee Farmer    Thank you for referring your patient, Claudette Holm, to Diabetes Education. We were unable to provide the prescribed diabetes education due to the following reason:    []  They refused to make an appointment at this time, stating:   []  They have not called us back after 3 phone attempts. []  They cancelled their scheduled appointment and did not call back to reschedule. []  They did not show up for their appointment on the following date:       [x]  Other:  Pt came to appointment today,( states he already attended education in 2018, which he did), but thought it was an appointment for an endocrinologist, declined appointment today, Pt wants appointment with Dr Rebecca Perez    This letter is for your records. If we can be of any further assistance with this patient, please contact us at:  ? Shai Herring [facility]:  [phone]    ? Mu:  [phone]   ?  [address] [place] [address]:  [phone]        Sincerely,    Diabetes Educators:     [] Amanda Sorto RN CDCES      [] Quinn Morgan RN BSN CDCES    [x] RAJWINDER Brown CDCES         [] RAJWINDER Myers CDCES  [] Carrie Davila, MS RAJWINDER LD  [] RAJWINDER Rubio

## 2020-10-05 NOTE — PROGRESS NOTES
[facility]) - Diabetes Education    10/5/2020    Re:     Sue Dinero  :  1950    Dear Dr. Mallika Rosenbaum; Thank you for referring your patient, Sue Dinero, to Diabetes Education. We were unable to provide the prescribed diabetes education due to the following reason:    []  They refused to make an appointment at this time, stating:   []  They have not called us back after 3 phone attempts. []  They cancelled their scheduled appointment and did not call back to reschedule. []  They did not show up for their appointment on the following date:       [x]  Other: Pt came into appointment,but thought it was an appointment for an endocrinologist, he already attended diabetes education in 2018, declined appointment today, wants an appointment with Dr Brandan Pastrana    This letter is for your records.     If we can be of any further assistance with this patient, please contact us at:  [facility]:  [address] [place] [address]:  [place] [facility]. 285:  [phone]        Sincerely,    Diabetes Educators:     [] Alondra Manley RN CDCES      [] Tony Alonso RN BSN CDCES    [x] RAJWINDER Villeda CDCES         [] RAJWINDER Pacheco CDCES  [] Sandip Landrum, MS RAJWINDER LD  [] RAJWINDER Rizzo

## 2020-11-04 ENCOUNTER — HOSPITAL ENCOUNTER (OUTPATIENT)
Age: 70
Discharge: HOME | End: 2020-11-06

## 2020-11-04 LAB
ABO/RH: NORMAL
ANTIBODY SCREEN: NORMAL

## 2020-11-04 PROCEDURE — 86850 RBC ANTIBODY SCREEN: CPT

## 2020-11-04 PROCEDURE — 86901 BLOOD TYPING SEROLOGIC RH(D): CPT

## 2020-11-04 PROCEDURE — 86900 BLOOD TYPING SEROLOGIC ABO: CPT

## 2020-11-09 ENCOUNTER — HOSPITAL ENCOUNTER (OUTPATIENT)
Age: 70
Discharge: HOME | End: 2020-11-11

## 2020-11-09 LAB
ANION GAP SERPL CALCULATED.3IONS-SCNC: 11 MMOL/L (ref 7–16)
BUN BLDV-MCNC: 19 MG/DL (ref 8–23)
CALCIUM SERPL-MCNC: 8.6 MG/DL (ref 8.6–10.2)
CHLORIDE BLD-SCNC: 107 MMOL/L (ref 98–107)
CO2: 21 MMOL/L (ref 22–29)
CREAT SERPL-MCNC: 0.8 MG/DL (ref 0.7–1.2)
GFR AFRICAN AMERICAN: >60
GFR NON-AFRICAN AMERICAN: >60 ML/MIN/1.73
GLUCOSE BLD-MCNC: 152 MG/DL (ref 74–99)
HCT VFR BLD CALC: 41.2 % (ref 37–54)
HEMOGLOBIN: 13.6 G/DL (ref 12.5–16.5)
MAGNESIUM: 2.1 MG/DL (ref 1.6–2.6)
MCH RBC QN AUTO: 30.3 PG (ref 26–35)
MCHC RBC AUTO-ENTMCNC: 33 % (ref 32–34.5)
MCV RBC AUTO: 91.8 FL (ref 80–99.9)
PDW BLD-RTO: 13.7 FL (ref 11.5–15)
PLATELET # BLD: 282 E9/L (ref 130–450)
PMV BLD AUTO: 9.7 FL (ref 7–12)
POTASSIUM SERPL-SCNC: 4.5 MMOL/L (ref 3.5–5)
RBC # BLD: 4.49 E12/L (ref 3.8–5.8)
SODIUM BLD-SCNC: 139 MMOL/L (ref 132–146)
TROPONIN: <0.01 NG/ML (ref 0–0.03)
WBC # BLD: 13.2 E9/L (ref 4.5–11.5)

## 2020-11-09 PROCEDURE — 84484 ASSAY OF TROPONIN QUANT: CPT

## 2020-11-09 PROCEDURE — 85027 COMPLETE CBC AUTOMATED: CPT

## 2020-11-09 PROCEDURE — 80048 BASIC METABOLIC PNL TOTAL CA: CPT

## 2020-11-09 PROCEDURE — 83735 ASSAY OF MAGNESIUM: CPT

## 2020-11-10 ENCOUNTER — HOSPITAL ENCOUNTER (OUTPATIENT)
Age: 70
Discharge: HOME | End: 2020-11-12

## 2020-11-10 LAB
ANION GAP SERPL CALCULATED.3IONS-SCNC: 12 MMOL/L (ref 7–16)
BUN BLDV-MCNC: 18 MG/DL (ref 8–23)
CALCIUM SERPL-MCNC: 8.6 MG/DL (ref 8.6–10.2)
CHLORIDE BLD-SCNC: 108 MMOL/L (ref 98–107)
CO2: 22 MMOL/L (ref 22–29)
CREAT SERPL-MCNC: 0.7 MG/DL (ref 0.7–1.2)
GFR AFRICAN AMERICAN: >60
GFR NON-AFRICAN AMERICAN: >60 ML/MIN/1.73
GLUCOSE BLD-MCNC: 144 MG/DL (ref 74–99)
HCT VFR BLD CALC: 39.1 % (ref 37–54)
HEMOGLOBIN: 12.6 G/DL (ref 12.5–16.5)
MCH RBC QN AUTO: 30.1 PG (ref 26–35)
MCHC RBC AUTO-ENTMCNC: 32.2 % (ref 32–34.5)
MCV RBC AUTO: 93.3 FL (ref 80–99.9)
PDW BLD-RTO: 13.8 FL (ref 11.5–15)
PLATELET # BLD: 256 E9/L (ref 130–450)
PMV BLD AUTO: 9.7 FL (ref 7–12)
POTASSIUM SERPL-SCNC: 4.1 MMOL/L (ref 3.5–5)
RBC # BLD: 4.19 E12/L (ref 3.8–5.8)
SODIUM BLD-SCNC: 142 MMOL/L (ref 132–146)
WBC # BLD: 11.3 E9/L (ref 4.5–11.5)

## 2020-11-10 PROCEDURE — 85027 COMPLETE CBC AUTOMATED: CPT

## 2020-11-10 PROCEDURE — 80048 BASIC METABOLIC PNL TOTAL CA: CPT

## 2020-11-11 ENCOUNTER — HOSPITAL ENCOUNTER (OUTPATIENT)
Age: 70
Discharge: HOME | End: 2020-11-13

## 2020-11-11 LAB
ANION GAP SERPL CALCULATED.3IONS-SCNC: 10 MMOL/L (ref 7–16)
BUN BLDV-MCNC: 22 MG/DL (ref 8–23)
CALCIUM SERPL-MCNC: 8.8 MG/DL (ref 8.6–10.2)
CHLORIDE BLD-SCNC: 108 MMOL/L (ref 98–107)
CO2: 25 MMOL/L (ref 22–29)
CREAT SERPL-MCNC: 0.8 MG/DL (ref 0.7–1.2)
GFR AFRICAN AMERICAN: >60
GFR NON-AFRICAN AMERICAN: >60 ML/MIN/1.73
GLUCOSE BLD-MCNC: 114 MG/DL (ref 74–99)
HCT VFR BLD CALC: 37.6 % (ref 37–54)
HEMOGLOBIN: 12 G/DL (ref 12.5–16.5)
MCH RBC QN AUTO: 30.5 PG (ref 26–35)
MCHC RBC AUTO-ENTMCNC: 31.9 % (ref 32–34.5)
MCV RBC AUTO: 95.4 FL (ref 80–99.9)
PDW BLD-RTO: 14.3 FL (ref 11.5–15)
PLATELET # BLD: 256 E9/L (ref 130–450)
PMV BLD AUTO: 9.9 FL (ref 7–12)
POTASSIUM SERPL-SCNC: 3.9 MMOL/L (ref 3.5–5)
RBC # BLD: 3.94 E12/L (ref 3.8–5.8)
SODIUM BLD-SCNC: 143 MMOL/L (ref 132–146)
WBC # BLD: 10.4 E9/L (ref 4.5–11.5)

## 2020-11-11 PROCEDURE — 80048 BASIC METABOLIC PNL TOTAL CA: CPT

## 2020-11-11 PROCEDURE — 85027 COMPLETE CBC AUTOMATED: CPT

## 2020-11-12 ENCOUNTER — HOSPITAL ENCOUNTER (OUTPATIENT)
Age: 70
Discharge: HOME | End: 2020-11-14

## 2020-11-12 LAB
ANION GAP SERPL CALCULATED.3IONS-SCNC: 11 MMOL/L (ref 7–16)
BUN BLDV-MCNC: 20 MG/DL (ref 8–23)
CALCIUM SERPL-MCNC: 8.6 MG/DL (ref 8.6–10.2)
CHLORIDE BLD-SCNC: 108 MMOL/L (ref 98–107)
CO2: 25 MMOL/L (ref 22–29)
CREAT SERPL-MCNC: 0.7 MG/DL (ref 0.7–1.2)
GFR AFRICAN AMERICAN: >60
GFR NON-AFRICAN AMERICAN: >60 ML/MIN/1.73
GLUCOSE BLD-MCNC: 77 MG/DL (ref 74–99)
HCT VFR BLD CALC: 38 % (ref 37–54)
HEMOGLOBIN: 12.5 G/DL (ref 12.5–16.5)
MCH RBC QN AUTO: 30.9 PG (ref 26–35)
MCHC RBC AUTO-ENTMCNC: 32.9 % (ref 32–34.5)
MCV RBC AUTO: 94.1 FL (ref 80–99.9)
PDW BLD-RTO: 14.2 FL (ref 11.5–15)
PLATELET # BLD: 252 E9/L (ref 130–450)
PMV BLD AUTO: 9.6 FL (ref 7–12)
POTASSIUM SERPL-SCNC: 3.8 MMOL/L (ref 3.5–5)
RBC # BLD: 4.04 E12/L (ref 3.8–5.8)
SODIUM BLD-SCNC: 144 MMOL/L (ref 132–146)
WBC # BLD: 7.7 E9/L (ref 4.5–11.5)

## 2020-11-12 PROCEDURE — 80048 BASIC METABOLIC PNL TOTAL CA: CPT

## 2020-11-12 PROCEDURE — 85027 COMPLETE CBC AUTOMATED: CPT

## 2021-02-11 ENCOUNTER — OFFICE VISIT (OUTPATIENT)
Dept: ENDOCRINOLOGY | Age: 71
End: 2021-02-11
Payer: MEDICARE

## 2021-02-11 VITALS
HEIGHT: 73 IN | OXYGEN SATURATION: 98 % | WEIGHT: 289 LBS | HEART RATE: 92 BPM | SYSTOLIC BLOOD PRESSURE: 142 MMHG | DIASTOLIC BLOOD PRESSURE: 78 MMHG | BODY MASS INDEX: 38.3 KG/M2 | TEMPERATURE: 97.5 F

## 2021-02-11 DIAGNOSIS — E55.9 VITAMIN D DEFICIENCY: ICD-10-CM

## 2021-02-11 DIAGNOSIS — E11.69 TYPE 2 DIABETES MELLITUS WITH OTHER SPECIFIED COMPLICATION, UNSPECIFIED WHETHER LONG TERM INSULIN USE (HCC): Primary | ICD-10-CM

## 2021-02-11 LAB — HBA1C MFR BLD: 5.7 %

## 2021-02-11 PROCEDURE — 99204 OFFICE O/P NEW MOD 45 MIN: CPT | Performed by: INTERNAL MEDICINE

## 2021-02-11 PROCEDURE — 2022F DILAT RTA XM EVC RTNOPTHY: CPT | Performed by: INTERNAL MEDICINE

## 2021-02-11 PROCEDURE — G8427 DOCREV CUR MEDS BY ELIG CLIN: HCPCS | Performed by: INTERNAL MEDICINE

## 2021-02-11 PROCEDURE — 3044F HG A1C LEVEL LT 7.0%: CPT | Performed by: INTERNAL MEDICINE

## 2021-02-11 PROCEDURE — 1123F ACP DISCUSS/DSCN MKR DOCD: CPT | Performed by: INTERNAL MEDICINE

## 2021-02-11 PROCEDURE — G8484 FLU IMMUNIZE NO ADMIN: HCPCS | Performed by: INTERNAL MEDICINE

## 2021-02-11 PROCEDURE — 4040F PNEUMOC VAC/ADMIN/RCVD: CPT | Performed by: INTERNAL MEDICINE

## 2021-02-11 PROCEDURE — 83036 HEMOGLOBIN GLYCOSYLATED A1C: CPT | Performed by: INTERNAL MEDICINE

## 2021-02-11 PROCEDURE — 3017F COLORECTAL CA SCREEN DOC REV: CPT | Performed by: INTERNAL MEDICINE

## 2021-02-11 PROCEDURE — 1036F TOBACCO NON-USER: CPT | Performed by: INTERNAL MEDICINE

## 2021-02-11 PROCEDURE — G8417 CALC BMI ABV UP PARAM F/U: HCPCS | Performed by: INTERNAL MEDICINE

## 2021-02-11 RX ORDER — EMPAGLIFLOZIN 10 MG/1
TABLET, FILM COATED ORAL
COMMUNITY
Start: 2020-08-06 | End: 2021-02-11

## 2021-02-11 RX ORDER — LANCETS 28 GAUGE
EACH MISCELLANEOUS
COMMUNITY
Start: 2021-01-28 | End: 2021-10-19

## 2021-02-11 RX ORDER — CELECOXIB 100 MG/1
CAPSULE ORAL
COMMUNITY
End: 2022-05-23 | Stop reason: ALTCHOICE

## 2021-02-11 RX ORDER — GLIPIZIDE 10 MG/1
10 TABLET ORAL
Qty: 180 TABLET | Refills: 3
Start: 2021-02-11

## 2021-02-11 RX ORDER — CYCLOBENZAPRINE HCL 10 MG
TABLET ORAL
COMMUNITY
End: 2022-05-23 | Stop reason: ALTCHOICE

## 2021-02-11 RX ORDER — PEN NEEDLE, DIABETIC 32GX 5/32"
NEEDLE, DISPOSABLE MISCELLANEOUS
COMMUNITY
Start: 2021-02-04 | End: 2021-10-19

## 2021-02-11 RX ORDER — DIAZEPAM 5 MG/1
TABLET ORAL
COMMUNITY
Start: 2020-12-03 | End: 2022-05-23 | Stop reason: ALTCHOICE

## 2021-02-11 RX ORDER — DEXAMETHASONE 2 MG/1
TABLET ORAL
COMMUNITY
Start: 2020-12-03 | End: 2022-05-23 | Stop reason: ALTCHOICE

## 2021-02-11 RX ORDER — OXYCODONE HYDROCHLORIDE 5 MG/1
TABLET ORAL
COMMUNITY
End: 2022-05-23 | Stop reason: ALTCHOICE

## 2021-02-11 RX ORDER — BLOOD SUGAR DIAGNOSTIC
STRIP MISCELLANEOUS
COMMUNITY
Start: 2021-02-03 | End: 2021-10-19

## 2021-02-11 RX ORDER — HYDROCODONE BITARTRATE AND ACETAMINOPHEN 10; 325 MG/1; MG/1
TABLET ORAL
COMMUNITY
Start: 2021-01-28 | End: 2022-05-23 | Stop reason: ALTCHOICE

## 2021-02-11 NOTE — PROGRESS NOTES
[address] [place] [address] Department of Endocrinology Diabetes and Metabolism   [address] [facility] [address]   Phone: [phone]  Fax: [phone]    Date of Service: 2/11/2021    Primary Care Physician: Kj Jack MD  Provider: Zion Haro MD     Reason for the visit:  DM type 2     History of Present Illness: The history is provided by the patient. No  was used. Accuracy of the patient data is excellent.   Jaime Byrnes is a very pleasant 72 y.o. male seen today for diabetes management     Jaime Byrnes was diagnosed with type 2 DM > 10 years ago and currently on Basaglar 60 units BID, Novolog 8u:50>110, Glyxambi 10/5 mg daily, Metformin 1000 mg BID, Glipizide 10 mg BID   The patient has been checking blood sugar 3 times a day and most readings at goal   A1c today was 5.7% and he denied any hypoglycemia   The patient has been mindful of what has been eating and following diabetic diet as encouraged  I reviewed current medications and the patient has no issues with diabetes medications  Jaime Byrnes is up to date with eye exam and denied any history of diabetic retinopathy   The patient performs his own feet care  Microvascular complications:  No Retinopathy, Nephropathy + Neuropathy   Macrovascular complications: no CAD, PVD, or Stroke    PAST MEDICAL HISTORY   Past Medical History:   Diagnosis Date    Anxiety     Depression     Diabetes mellitus ([address]     Hyperlipidemia     Hypertension     Obesity     Sleep apnea     Type 2 diabetes mellitus without complication ([address]     Ulceration 5/2012    right foot       PAST SURGICAL HISTORY   Past Surgical History:   Procedure Laterality Date    BACK SURGERY  10/2019    COLONOSCOPY      HERNIA REPAIR      JOINT REPLACEMENT Left     left knee surgery x5    REVISION TOTAL KNEE ARTHROPLASTY Right 12/14/2018       SOCIAL HISTORY Tobacco:   reports that he quit smoking about 41 years ago. His smoking use included cigarettes. He has a 14.00 pack-year smoking history. He has never used smokeless tobacco.  Alcohol:   reports current alcohol use. Drugs:   reports no history of drug use.     FAMILY HISTORY   Family History   Problem Relation Age of Onset    Cancer Mother     Cancer Sister        ALLERGIES AND DRUG REACTIONS   Allergies   Allergen Reactions    Seasonal     Lisinopril        CURRENT MEDICATIONS   Current Outpatient Medications   Medication Sig Dispense Refill    BD PEN NEEDLE DIAMANTE 2ND GEN 32G X 4 MM MISC       glipiZIDE (GLUCOTROL) 10 MG tablet Take 1 tablet by mouth 2 times daily (before meals) 180 tablet 3    albuterol sulfate HFA (PROAIR HFA) 108 (90 Base) MCG/ACT inhaler Inhale 2 puffs into the lungs 4 times daily as needed for Wheezing      Empagliflozin-linaGLIPtin (GLYXAMBI) 10-5 MG TABS Take 10 mg by mouth daily      fenofibrate (TRICOR) 54 MG tablet Take one daily 90 tablet 5    insulin aspart (NOVOLOG FLEXPEN) 100 UNIT/ML injection pen Inject into the skin 3 times daily (before meals)       BASAGLAR KWIKPEN 100 UNIT/ML injection pen 2 times daily 66 units BiD      metFORMIN (GLUCOPHAGE) 1000 MG tablet Take 1,000 mg by mouth 2 times daily (with meals)      DULoxetine (CYMBALTA) 30 MG extended release capsule Take 30 mg by mouth daily      Cholecalciferol (VITAMIN D3) 2000 UNITS CAPS Take 2,000 Units by mouth daily      tamsulosin (FLOMAX) 0.4 MG capsule Take 0.4 mg by mouth daily Indications: take 2 /daily      fluticasone (FLONASE) 50 MCG/ACT nasal spray 1 spray by Nasal route 2 times daily as needed for Rhinitis      budesonide-formoterol (SYMBICORT) 160-4.5 MCG/ACT AERO Inhale 2 puffs into the lungs 2 times daily      losartan (COZAAR) 50 MG tablet Take 50 mg by mouth daily      Coenzyme Q10 (CO Q 10) 100 MG CAPS Take by mouth daily  celecoxib (CELEBREX) 100 MG capsule celecoxib 100 mg capsule      cyclobenzaprine (FLEXERIL) 10 MG tablet cyclobenzaprine 10 mg tablet      dexamethasone (DECADRON) 2 MG tablet take 1 tablet by mouth four times a day for 3 days then 1 tablet . ..  (REFER TO PRESCRIPTION NOTES).  diazePAM (VALIUM) 5 MG tablet take 1 tablet by mouth every 8 hours if needed for SPASM      PRODIGY NO CODING BLOOD GLUC strip use 1 TEST STRIP to TEST BLOOD SUGAR twice a day      HYDROcodone-acetaminophen (NORCO)  MG per tablet take 1 to 2 tablets by mouth every 4 hours if needed for pain      hydrocortisone 2.5 % cream hydrocortisone 2.5 % topical cream      FreeStyle Lancets MISC use 1 LANCET to TEST BLOOD SUGAR three times a day      oxyCODONE (ROXICODONE) 5 MG immediate release tablet oxycodone 5 mg tablet      gabapentin (NEURONTIN) 100 MG capsule Take 100 mg by mouth 3 times daily.  Multiple Vitamin (MULTI VITAMIN PO) Take by mouth daily      pravastatin (PRAVACHOL) 80 MG tablet Take 80 mg by mouth daily      aspirin 81 MG chewable tablet Take 81 mg by mouth daily. No current facility-administered medications for this visit. Review of Systems  Constitutional: No fever, no chills, no diaphoresis, no generalized weakness. HEENT: No blurred vision, No sore throat, no ear pain, no hair loss  Neck: denied any neck swelling, difficulty swallowing,   Cardio-pulmonary: No CP, SOB or palpitation, No orthopnea or PND. No cough or wheezing. GI: No N/V/D, no constipation, No abdominal pain, no melena or hematochezia   : Denied any dysuria, hematuria, flank pain, discharge, or incontinence. Skin: denied any rash, ulcer, Hirsute, or hyperpigmentation. MSK: denied any joint deformity, joint pain/swelling, muscle pain, or back pain.   Neuro: no numbness, no tingling, no weakness, _    OBJECTIVE BP (!) 142/78   Pulse 92   Temp 97.5 °F (36.4 °C) (Temporal)   Ht 6' 1\" (1.854 m)   Wt 289 lb (131.1 kg)   SpO2 98%   BMI 38.13 kg/m²   BP Readings from Last 4 Encounters:   02/11/21 (!) 142/78   02/19/20 121/78   12/09/19 123/65   08/20/19 (!) 143/73     Wt Readings from Last 6 Encounters:   02/11/21 289 lb (131.1 kg)   02/19/20 296 lb 12.8 oz (134.6 kg)   12/09/19 285 lb (129.3 kg)   08/20/19 290 lb 6.4 oz (131.7 kg)   02/18/19 294 lb 9.6 oz (133.6 kg)   12/26/18 295 lb (133.8 kg)       Physical examination:  General: awake alert, oriented x3, no abnormal position or movements. HEENT: normocephalic non-traumatic, no exophthalmos   Neck: supple, no thyroid tenderness  Pulm: Clear equal air entry no added sounds, no wheezing or rhonchi    CVS: S1 + S2, no murmur, no heave  Abd: soft lax, no tenderness, no organomegaly, audible bowel sounds.    Skin: warm, no lesions, no rash  Musculoskeletal: No back tenderness, no kyphosis/scoliosis    Neuro: CN intact, muscle power normal  Psych: normal mood, and affect    Review of Laboratory Data:  I personally reviewed the following lab:  Lab Results   Component Value Date/Time    WBC 7.7 11/12/2020 03:30 AM    RBC 4.04 11/12/2020 03:30 AM    HGB 12.5 11/12/2020 03:30 AM    HCT 38.0 11/12/2020 03:30 AM    MCV 94.1 11/12/2020 03:30 AM    MCH 30.9 11/12/2020 03:30 AM    MCHC 32.9 11/12/2020 03:30 AM    RDW 14.2 11/12/2020 03:30 AM     11/12/2020 03:30 AM    MPV 9.6 11/12/2020 03:30 AM      Lab Results   Component Value Date/Time     11/12/2020 03:30 AM    K 3.8 11/12/2020 03:30 AM    CO2 25 11/12/2020 03:30 AM    BUN 20 11/12/2020 03:30 AM    CREATININE 0.7 11/12/2020 03:30 AM    CALCIUM 8.6 11/12/2020 03:30 AM    LABGLOM >60 11/12/2020 03:30 AM    GFRAA >60 11/12/2020 03:30 AM      No results found for: TSH, T4FREE, T5BWHFZ, FT3, I2FIJWH, TSI, TPOABS, THGAB  Lab Results   Component Value Date    LABA1C 5.7 02/11/2021    GLUCOSE 77 11/12/2020 1. Type 2 diabetes mellitus with other specified complication, unspecified whether long term insulin use (HCC)  POCT glycosylated hemoglobin (Hb A1C)    Basic Metabolic Panel    Hemoglobin A1C    Lipid Panel    Microalbumin / Creatinine Urine Ratio   2. Vitamin D deficiency  Vitamin D 25 Hydroxy   Liliane Moran MD  Endocrinologist, [facility]   [address] [place], [place] [address]   Phone: [phone]  Fax: [phone]  --------------------------------------------  An electronic signature was used to authenticate this note.  Robin Tiwari MD on 2/11/2021 at 11:05 AM

## 2021-02-24 RX ORDER — FLASH GLUCOSE SENSOR
KIT MISCELLANEOUS
Qty: 3 EACH | Refills: 11 | Status: SHIPPED | OUTPATIENT
Start: 2021-02-24 | End: 2021-03-16 | Stop reason: SDUPTHER

## 2021-03-16 DIAGNOSIS — E11.69 TYPE 2 DIABETES MELLITUS WITH OTHER SPECIFIED COMPLICATION, UNSPECIFIED WHETHER LONG TERM INSULIN USE (HCC): Primary | ICD-10-CM

## 2021-03-16 RX ORDER — FLASH GLUCOSE SENSOR
KIT MISCELLANEOUS
Qty: 3 EACH | Refills: 11 | Status: SHIPPED
Start: 2021-03-16 | End: 2021-10-19

## 2021-03-29 ENCOUNTER — HOSPITAL ENCOUNTER (OUTPATIENT)
Dept: GENERAL RADIOLOGY | Age: 71
Discharge: HOME | End: 2021-03-31
Payer: MEDICARE

## 2021-03-29 ENCOUNTER — HOSPITAL ENCOUNTER (OUTPATIENT)
Age: 71
Discharge: HOME | End: 2021-03-31
Payer: MEDICARE

## 2021-03-29 DIAGNOSIS — R10.32 LLQ ABDOMINAL PAIN: ICD-10-CM

## 2021-03-29 PROCEDURE — 74018 RADEX ABDOMEN 1 VIEW: CPT

## 2021-04-06 ENCOUNTER — TELEPHONE (OUTPATIENT)
Dept: ENDOCRINOLOGY | Age: 71
End: 2021-04-06

## 2021-06-22 ENCOUNTER — HOSPITAL ENCOUNTER (OUTPATIENT)
Dept: INTERVENTIONAL RADIOLOGY/VASCULAR | Age: 71
Discharge: HOME | End: 2021-06-24
Payer: MEDICARE

## 2021-06-22 DIAGNOSIS — I99.9 UNSPECIFIED DISORDER OF CIRCULATORY SYSTEM: ICD-10-CM

## 2021-06-22 DIAGNOSIS — I70.229 EXTREMITY ATHEROSCLEROSIS WITH RESTING PAIN (HCC): ICD-10-CM

## 2021-06-22 DIAGNOSIS — I77.1 STRICTURE OF ARTERY (HCC): ICD-10-CM

## 2021-06-22 DIAGNOSIS — I70.219 EXTREMITY ATHEROSCLEROSIS WITH INTERMITTENT CLAUDICATION (HCC): ICD-10-CM

## 2021-06-22 PROCEDURE — 93923 UPR/LXTR ART STDY 3+ LVLS: CPT

## 2021-08-03 ENCOUNTER — TELEPHONE (OUTPATIENT)
Dept: CARDIOLOGY | Age: 71
End: 2021-08-03

## 2021-08-03 NOTE — TELEPHONE ENCOUNTER
08/03/21 4629  Spoke with Rochelle Mark Reminder Call for Maurice Dumas stress test @ 08/05/21  included Pre procedure stress instructions and COVID check list.   Mary Kay Zamora not to take Diabetic medications morning of the stress test, check glucose and bring inhalers. He acknowledged understanding.

## 2021-08-17 ENCOUNTER — TELEPHONE (OUTPATIENT)
Dept: CARDIOLOGY | Age: 71
End: 2021-08-17

## 2021-08-17 NOTE — TELEPHONE ENCOUNTER
Spoke with patient and confirmed Lexiscan stress test appointment on August 19, 2021 at 1000. Instructions for test and COVID-19 preprocedure checklist reviewed with patient, questions answered.

## 2021-08-18 ENCOUNTER — HOSPITAL ENCOUNTER (OUTPATIENT)
Dept: CARDIOLOGY | Age: 71
Discharge: HOME | End: 2021-08-18
Payer: MEDICARE

## 2021-08-18 VITALS
HEART RATE: 80 BPM | BODY MASS INDEX: 38.3 KG/M2 | WEIGHT: 289 LBS | SYSTOLIC BLOOD PRESSURE: 114 MMHG | HEIGHT: 73 IN | DIASTOLIC BLOOD PRESSURE: 72 MMHG | RESPIRATION RATE: 16 BRPM

## 2021-08-18 PROCEDURE — 93017 CV STRESS TEST TRACING ONLY: CPT

## 2021-08-18 PROCEDURE — 6360000002 HC RX W HCPCS: Performed by: FAMILY MEDICINE

## 2021-08-18 PROCEDURE — A9500 TC99M SESTAMIBI: HCPCS | Performed by: INTERNAL MEDICINE

## 2021-08-18 PROCEDURE — 3430000000 HC RX DIAGNOSTIC RADIOPHARMACEUTICAL: Performed by: INTERNAL MEDICINE

## 2021-08-18 PROCEDURE — 78452 HT MUSCLE IMAGE SPECT MULT: CPT

## 2021-08-18 PROCEDURE — 2580000003 HC RX 258: Performed by: INTERNAL MEDICINE

## 2021-08-18 RX ORDER — SODIUM CHLORIDE 0.9 % (FLUSH) 0.9 %
10 SYRINGE (ML) INJECTION PRN
Status: DISCONTINUED | OUTPATIENT
Start: 2021-08-18 | End: 2021-08-19 | Stop reason: HOSPADM

## 2021-08-18 RX ADMIN — Medication 30.9 MILLICURIE: at 13:04

## 2021-08-18 RX ADMIN — SODIUM CHLORIDE, PRESERVATIVE FREE 10 ML: 5 INJECTION INTRAVENOUS at 13:05

## 2021-08-18 RX ADMIN — REGADENOSON 0.4 MG: 0.08 INJECTION, SOLUTION INTRAVENOUS at 13:04

## 2021-08-18 RX ADMIN — SODIUM CHLORIDE, PRESERVATIVE FREE 10 ML: 5 INJECTION INTRAVENOUS at 09:57

## 2021-08-18 RX ADMIN — Medication 10 MILLICURIE: at 09:58

## 2021-08-18 RX ADMIN — SODIUM CHLORIDE, PRESERVATIVE FREE 10 ML: 5 INJECTION INTRAVENOUS at 13:04

## 2021-08-18 NOTE — PROCEDURES
[address] and [facility] [address] [address] [address]  [phone]                Pharmacologic Stress Nuclear Gated SPECT Study    Name: Tasia Barba miguel Account Number: [de-identified]    :  1950          Sex: male         Date of Study:  2021    Height: 6' 1\" (185.4 cm)         Weight: 289 lb (131.1 kg)     Ordering Provider:Vy Moreno MD       PCP: Fahad Gonzalez MD      Cardiologist: None             Interpreting Physician: Farnaz Henderson MD  _________________________________________________________________________________    Indication:   Detecting the presence and location of coronary artery disease    Clinical History:   Patient has no known history of coronary artery disease. Procedure:   Pharmacologic stress testing was performed with regadenoson 0.4 mg for 15 seconds. The heart rate was 86 at baseline and jayde to 115 beats during the infusion. This corresponds to 77% of maximum predicted heart rate. The blood pressure at baseline was 114/72 and blood pressure at the end of infusion was 120/68. Blood pressure response was normal during the stress procedure. The patient tolerated the infusion well. ECG during the infusion did not change. IMAGING: Myocardial perfusion imaging was performed at rest 30-35 minutes following the intravenous injection of 10.0 mCi of (Tc-Sestamibi) followed by 10 ml of Normal Saline. As per infusion protocol, the patient was injected intravenously with 30.9 mCi of (Tc-Sestamibi) followed by 10 ml of Normal Saline. Gated post-stress tomographic imaging was performed 20-25 minutes after stress. FINDINGS: The overall quality of the study was excellent. Left ventricular cavity size was noted to be normal.    Rotational analog analysis demonstrated no patient motion or abnormal extracardiac radioactivity.     The gated SPECT stress imaging in the short, vertical long, and horizontal long axis demonstrated normal homogeneous tracer distribution throughout the myocardium. The resting images show no change. Gated SPECT left ventricular ejection fraction was calculated to be 69%, with normal myocardial thickening and wall motion. Impression:    1. ECG during the infusion did not change. 2. The myocardial perfusion imaging was normal.    3. Overall left ventricular systolic function was normal without regional wall motion abnormalities. 4. Low risk general pharmacologic nuclear stress test.    Thank you for sending your patient to this [employer].      Electronically signed by Dianna Salamanca MD on 8/18/21 at 2:01 PM EDT

## 2021-10-19 PROBLEM — R26.2 DISABILITY OF WALKING: Status: ACTIVE | Noted: 2017-10-23

## 2022-07-28 ENCOUNTER — HOSPITAL ENCOUNTER (OUTPATIENT)
Dept: GENERAL RADIOLOGY | Age: 72
Discharge: HOME | End: 2022-07-30
Payer: MEDICARE

## 2022-07-28 DIAGNOSIS — K21.9 GASTROESOPHAGEAL REFLUX DISEASE WITHOUT ESOPHAGITIS: ICD-10-CM

## 2022-07-28 DIAGNOSIS — R13.10 DYSPHAGIA, UNSPECIFIED TYPE: ICD-10-CM

## 2022-07-28 PROCEDURE — 74240 X-RAY XM UPR GI TRC 1CNTRST: CPT

## 2022-10-07 ENCOUNTER — HOSPITAL ENCOUNTER (OUTPATIENT)
Age: 72
Discharge: HOME | End: 2022-10-09
Payer: MEDICARE

## 2022-10-07 ENCOUNTER — HOSPITAL ENCOUNTER (OUTPATIENT)
Dept: GENERAL RADIOLOGY | Age: 72
Discharge: HOME | End: 2022-10-09
Payer: MEDICARE

## 2022-10-07 DIAGNOSIS — M25.552 LEFT HIP PAIN: ICD-10-CM

## 2022-10-07 DIAGNOSIS — M25.551 RIGHT HIP PAIN: ICD-10-CM

## 2022-10-07 PROCEDURE — 73502 X-RAY EXAM HIP UNI 2-3 VIEWS: CPT

## 2022-10-28 ENCOUNTER — HOSPITAL ENCOUNTER (OUTPATIENT)
Age: 72
Discharge: HOME | End: 2022-10-30

## 2022-10-28 PROCEDURE — 87081 CULTURE SCREEN ONLY: CPT

## 2022-10-28 PROCEDURE — 88305 TISSUE EXAM BY PATHOLOGIST: CPT

## 2022-10-29 LAB — CLOTEST: NORMAL

## 2022-11-09 ENCOUNTER — HOSPITAL ENCOUNTER (OUTPATIENT)
Age: 72
Discharge: HOME | End: 2022-11-11

## 2022-11-09 LAB
ABO/RH: NORMAL
ANTIBODY SCREEN: NORMAL

## 2022-11-09 PROCEDURE — 86901 BLOOD TYPING SEROLOGIC RH(D): CPT

## 2022-11-09 PROCEDURE — 86900 BLOOD TYPING SEROLOGIC ABO: CPT

## 2022-11-09 PROCEDURE — 86850 RBC ANTIBODY SCREEN: CPT

## 2022-11-15 ENCOUNTER — HOSPITAL ENCOUNTER (OUTPATIENT)
Age: 72
Discharge: HOME | End: 2022-11-17

## 2022-11-15 LAB
ANION GAP SERPL CALCULATED.3IONS-SCNC: 11 MMOL/L (ref 7–16)
BUN BLDV-MCNC: 19 MG/DL (ref 6–23)
CALCIUM SERPL-MCNC: 8.7 MG/DL (ref 8.6–10.2)
CHLORIDE BLD-SCNC: 105 MMOL/L (ref 98–107)
CO2: 27 MMOL/L (ref 22–29)
CREAT SERPL-MCNC: 0.9 MG/DL (ref 0.7–1.2)
GFR SERPL CREATININE-BSD FRML MDRD: >60 ML/MIN/1.73
GLUCOSE BLD-MCNC: 160 MG/DL (ref 74–99)
HCT VFR BLD CALC: 38 % (ref 37–54)
HEMOGLOBIN: 12.3 G/DL (ref 12.5–16.5)
MCH RBC QN AUTO: 31.1 PG (ref 26–35)
MCHC RBC AUTO-ENTMCNC: 32.4 % (ref 32–34.5)
MCV RBC AUTO: 96 FL (ref 80–99.9)
PDW BLD-RTO: 13.8 FL (ref 11.5–15)
PLATELET # BLD: 250 E9/L (ref 130–450)
PMV BLD AUTO: 9.8 FL (ref 7–12)
POTASSIUM SERPL-SCNC: 4.2 MMOL/L (ref 3.5–5)
RBC # BLD: 3.96 E12/L (ref 3.8–5.8)
SODIUM BLD-SCNC: 143 MMOL/L (ref 132–146)
WBC # BLD: 9.7 E9/L (ref 4.5–11.5)

## 2022-11-15 PROCEDURE — 80048 BASIC METABOLIC PNL TOTAL CA: CPT

## 2022-11-15 PROCEDURE — 85027 COMPLETE CBC AUTOMATED: CPT

## 2022-11-16 ENCOUNTER — HOSPITAL ENCOUNTER (OUTPATIENT)
Age: 72
Discharge: HOME | End: 2022-11-18

## 2022-11-16 LAB
ANION GAP SERPL CALCULATED.3IONS-SCNC: 8 MMOL/L (ref 7–16)
BUN BLDV-MCNC: 25 MG/DL (ref 6–23)
CALCIUM SERPL-MCNC: 8.8 MG/DL (ref 8.6–10.2)
CHLORIDE BLD-SCNC: 104 MMOL/L (ref 98–107)
CO2: 29 MMOL/L (ref 22–29)
CREAT SERPL-MCNC: 0.9 MG/DL (ref 0.7–1.2)
GFR SERPL CREATININE-BSD FRML MDRD: >60 ML/MIN/1.73
GLUCOSE BLD-MCNC: 163 MG/DL (ref 74–99)
HCT VFR BLD CALC: 34.4 % (ref 37–54)
HEMOGLOBIN: 10.8 G/DL (ref 12.5–16.5)
MCH RBC QN AUTO: 29.8 PG (ref 26–35)
MCHC RBC AUTO-ENTMCNC: 31.4 % (ref 32–34.5)
MCV RBC AUTO: 95 FL (ref 80–99.9)
PDW BLD-RTO: 14.1 FL (ref 11.5–15)
PLATELET # BLD: 254 E9/L (ref 130–450)
PMV BLD AUTO: 10 FL (ref 7–12)
POTASSIUM SERPL-SCNC: 4.4 MMOL/L (ref 3.5–5)
RBC # BLD: 3.62 E12/L (ref 3.8–5.8)
SODIUM BLD-SCNC: 141 MMOL/L (ref 132–146)
WBC # BLD: 8.5 E9/L (ref 4.5–11.5)

## 2022-11-16 PROCEDURE — 80048 BASIC METABOLIC PNL TOTAL CA: CPT

## 2022-11-16 PROCEDURE — 85027 COMPLETE CBC AUTOMATED: CPT

## 2022-11-17 ENCOUNTER — HOSPITAL ENCOUNTER (OUTPATIENT)
Age: 72
Discharge: HOME | End: 2022-11-19

## 2022-11-17 LAB
ANION GAP SERPL CALCULATED.3IONS-SCNC: 11 MMOL/L (ref 7–16)
BASOPHILS ABSOLUTE: 0.04 E9/L (ref 0–0.2)
BASOPHILS RELATIVE PERCENT: 0.5 % (ref 0–2)
BUN BLDV-MCNC: 18 MG/DL (ref 6–23)
CALCIUM SERPL-MCNC: 8.8 MG/DL (ref 8.6–10.2)
CHLORIDE BLD-SCNC: 104 MMOL/L (ref 98–107)
CO2: 26 MMOL/L (ref 22–29)
CREAT SERPL-MCNC: 0.8 MG/DL (ref 0.7–1.2)
EOSINOPHILS ABSOLUTE: 0.11 E9/L (ref 0.05–0.5)
EOSINOPHILS RELATIVE PERCENT: 1.4 % (ref 0–6)
GFR SERPL CREATININE-BSD FRML MDRD: >60 ML/MIN/1.73
GLUCOSE BLD-MCNC: 84 MG/DL (ref 74–99)
HCT VFR BLD CALC: 35.6 % (ref 37–54)
HEMOGLOBIN: 11.7 G/DL (ref 12.5–16.5)
IMMATURE GRANULOCYTES #: 0.05 E9/L
IMMATURE GRANULOCYTES %: 0.7 % (ref 0–5)
LYMPHOCYTES ABSOLUTE: 2.37 E9/L (ref 1.5–4)
LYMPHOCYTES RELATIVE PERCENT: 30.9 % (ref 20–42)
MCH RBC QN AUTO: 31 PG (ref 26–35)
MCHC RBC AUTO-ENTMCNC: 32.9 % (ref 32–34.5)
MCV RBC AUTO: 94.2 FL (ref 80–99.9)
MONOCYTES ABSOLUTE: 1.03 E9/L (ref 0.1–0.95)
MONOCYTES RELATIVE PERCENT: 13.4 % (ref 2–12)
NEUTROPHILS ABSOLUTE: 4.07 E9/L (ref 1.8–7.3)
NEUTROPHILS RELATIVE PERCENT: 53.1 % (ref 43–80)
PDW BLD-RTO: 14 FL (ref 11.5–15)
PLATELET # BLD: 262 E9/L (ref 130–450)
PMV BLD AUTO: 9.8 FL (ref 7–12)
POTASSIUM SERPL-SCNC: 3.8 MMOL/L (ref 3.5–5)
RBC # BLD: 3.78 E12/L (ref 3.8–5.8)
SODIUM BLD-SCNC: 141 MMOL/L (ref 132–146)
WBC # BLD: 7.7 E9/L (ref 4.5–11.5)

## 2022-11-17 PROCEDURE — 85025 COMPLETE CBC W/AUTO DIFF WBC: CPT

## 2022-11-17 PROCEDURE — 80048 BASIC METABOLIC PNL TOTAL CA: CPT

## 2022-11-18 ENCOUNTER — HOSPITAL ENCOUNTER (OUTPATIENT)
Age: 72
Discharge: HOME | End: 2022-11-20

## 2022-11-18 LAB
ANION GAP SERPL CALCULATED.3IONS-SCNC: 10 MMOL/L (ref 7–16)
BUN BLDV-MCNC: 17 MG/DL (ref 6–23)
CALCIUM SERPL-MCNC: 9 MG/DL (ref 8.6–10.2)
CHLORIDE BLD-SCNC: 104 MMOL/L (ref 98–107)
CO2: 25 MMOL/L (ref 22–29)
CREAT SERPL-MCNC: 0.8 MG/DL (ref 0.7–1.2)
GFR SERPL CREATININE-BSD FRML MDRD: >60 ML/MIN/1.73
GLUCOSE BLD-MCNC: 156 MG/DL (ref 74–99)
HCT VFR BLD CALC: 35.6 % (ref 37–54)
HEMOGLOBIN: 11.4 G/DL (ref 12.5–16.5)
MCH RBC QN AUTO: 29.9 PG (ref 26–35)
MCHC RBC AUTO-ENTMCNC: 32 % (ref 32–34.5)
MCV RBC AUTO: 93.4 FL (ref 80–99.9)
PDW BLD-RTO: 13.6 FL (ref 11.5–15)
PLATELET # BLD: 268 E9/L (ref 130–450)
PMV BLD AUTO: 9.7 FL (ref 7–12)
POTASSIUM SERPL-SCNC: 3.7 MMOL/L (ref 3.5–5)
RBC # BLD: 3.81 E12/L (ref 3.8–5.8)
SODIUM BLD-SCNC: 139 MMOL/L (ref 132–146)
WBC # BLD: 6.9 E9/L (ref 4.5–11.5)

## 2022-11-18 PROCEDURE — 80048 BASIC METABOLIC PNL TOTAL CA: CPT

## 2022-11-18 PROCEDURE — 85027 COMPLETE CBC AUTOMATED: CPT

## 2022-11-23 ENCOUNTER — APPOINTMENT (OUTPATIENT)
Dept: GENERAL RADIOLOGY | Age: 72
End: 2022-11-23
Payer: MEDICARE

## 2022-11-23 ENCOUNTER — HOSPITAL ENCOUNTER (EMERGENCY)
Age: 72
Discharge: HOME | End: 2022-11-23
Attending: EMERGENCY MEDICINE
Payer: MEDICARE

## 2022-11-23 ENCOUNTER — APPOINTMENT (OUTPATIENT)
Dept: CT IMAGING | Age: 72
End: 2022-11-23
Payer: MEDICARE

## 2022-11-23 VITALS
WEIGHT: 295 LBS | BODY MASS INDEX: 39.1 KG/M2 | DIASTOLIC BLOOD PRESSURE: 68 MMHG | HEART RATE: 78 BPM | TEMPERATURE: 98 F | OXYGEN SATURATION: 94 % | RESPIRATION RATE: 16 BRPM | SYSTOLIC BLOOD PRESSURE: 115 MMHG | HEIGHT: 73 IN

## 2022-11-23 DIAGNOSIS — T50.905A MEDICATION SIDE EFFECT, INITIAL ENCOUNTER: Primary | ICD-10-CM

## 2022-11-23 DIAGNOSIS — R41.0 CONFUSION: ICD-10-CM

## 2022-11-23 DIAGNOSIS — G89.18 POSTOPERATIVE PAIN: ICD-10-CM

## 2022-11-23 LAB
ALBUMIN SERPL-MCNC: 4 G/DL (ref 3.5–5.2)
ALP BLD-CCNC: 52 U/L (ref 40–129)
ALT SERPL-CCNC: 42 U/L (ref 0–40)
ANION GAP SERPL CALCULATED.3IONS-SCNC: 11 MMOL/L (ref 7–16)
AST SERPL-CCNC: 41 U/L (ref 0–39)
BACTERIA: ABNORMAL /HPF
BASOPHILS ABSOLUTE: 0.01 E9/L (ref 0–0.2)
BASOPHILS RELATIVE PERCENT: 0.1 % (ref 0–2)
BILIRUB SERPL-MCNC: 0.4 MG/DL (ref 0–1.2)
BILIRUBIN URINE: NEGATIVE
BLOOD, URINE: ABNORMAL
BUN BLDV-MCNC: 19 MG/DL (ref 6–23)
CALCIUM SERPL-MCNC: 9.4 MG/DL (ref 8.6–10.2)
CHLORIDE BLD-SCNC: 103 MMOL/L (ref 98–107)
CLARITY: CLEAR
CO2: 25 MMOL/L (ref 22–29)
COLOR: YELLOW
CREAT SERPL-MCNC: 0.8 MG/DL (ref 0.7–1.2)
EKG ATRIAL RATE: 91 BPM
EKG P AXIS: 56 DEGREES
EKG P-R INTERVAL: 218 MS
EKG Q-T INTERVAL: 376 MS
EKG QRS DURATION: 86 MS
EKG QTC CALCULATION (BAZETT): 462 MS
EKG R AXIS: -54 DEGREES
EKG T AXIS: 21 DEGREES
EKG VENTRICULAR RATE: 91 BPM
EOSINOPHILS ABSOLUTE: 0.19 E9/L (ref 0.05–0.5)
EOSINOPHILS RELATIVE PERCENT: 2.4 % (ref 0–6)
GFR SERPL CREATININE-BSD FRML MDRD: >60 ML/MIN/1.73
GLUCOSE BLD-MCNC: 103 MG/DL (ref 74–99)
GLUCOSE URINE: >=1000 MG/DL
HCT VFR BLD CALC: 36.6 % (ref 37–54)
HEMOGLOBIN: 12.2 G/DL (ref 12.5–16.5)
IMMATURE GRANULOCYTES #: 0.03 E9/L
IMMATURE GRANULOCYTES %: 0.4 % (ref 0–5)
KETONES, URINE: NEGATIVE MG/DL
LACTIC ACID, SEPSIS: 1.3 MMOL/L (ref 0.5–1.9)
LEUKOCYTE ESTERASE, URINE: NEGATIVE
LYMPHOCYTES ABSOLUTE: 1.33 E9/L (ref 1.5–4)
LYMPHOCYTES RELATIVE PERCENT: 16.6 % (ref 20–42)
MAGNESIUM: 2 MG/DL (ref 1.6–2.6)
MCH RBC QN AUTO: 30.6 PG (ref 26–35)
MCHC RBC AUTO-ENTMCNC: 33.3 % (ref 32–34.5)
MCV RBC AUTO: 91.7 FL (ref 80–99.9)
MONOCYTES ABSOLUTE: 0.92 E9/L (ref 0.1–0.95)
MONOCYTES RELATIVE PERCENT: 11.5 % (ref 2–12)
NEUTROPHILS ABSOLUTE: 5.51 E9/L (ref 1.8–7.3)
NEUTROPHILS RELATIVE PERCENT: 69 % (ref 43–80)
NITRITE, URINE: NEGATIVE
PDW BLD-RTO: 13.5 FL (ref 11.5–15)
PH UA: 6 (ref 5–9)
PLATELET # BLD: 328 E9/L (ref 130–450)
PMV BLD AUTO: 9.1 FL (ref 7–12)
POTASSIUM SERPL-SCNC: 4.1 MMOL/L (ref 3.5–5)
PROTEIN UA: NEGATIVE MG/DL
RBC # BLD: 3.99 E12/L (ref 3.8–5.8)
RBC UA: ABNORMAL /HPF (ref 0–2)
RENAL EPITHELIAL, UA: ABNORMAL /HPF
SODIUM BLD-SCNC: 139 MMOL/L (ref 132–146)
SPECIFIC GRAVITY UA: 1.02 (ref 1–1.03)
TOTAL PROTEIN: 6.8 G/DL (ref 6.4–8.3)
UROBILINOGEN, URINE: 1 E.U./DL
WBC # BLD: 8 E9/L (ref 4.5–11.5)
WBC UA: ABNORMAL /HPF (ref 0–5)

## 2022-11-23 PROCEDURE — 80053 COMPREHEN METABOLIC PANEL: CPT

## 2022-11-23 PROCEDURE — 83605 ASSAY OF LACTIC ACID: CPT

## 2022-11-23 PROCEDURE — 70450 CT HEAD/BRAIN W/O DYE: CPT

## 2022-11-23 PROCEDURE — 93005 ELECTROCARDIOGRAM TRACING: CPT | Performed by: EMERGENCY MEDICINE

## 2022-11-23 PROCEDURE — 87040 BLOOD CULTURE FOR BACTERIA: CPT

## 2022-11-23 PROCEDURE — 2580000003 HC RX 258: Performed by: EMERGENCY MEDICINE

## 2022-11-23 PROCEDURE — 36415 COLL VENOUS BLD VENIPUNCTURE: CPT

## 2022-11-23 PROCEDURE — 93010 ELECTROCARDIOGRAM REPORT: CPT | Performed by: INTERNAL MEDICINE

## 2022-11-23 PROCEDURE — 85025 COMPLETE CBC W/AUTO DIFF WBC: CPT

## 2022-11-23 PROCEDURE — 83735 ASSAY OF MAGNESIUM: CPT

## 2022-11-23 PROCEDURE — 81001 URINALYSIS AUTO W/SCOPE: CPT

## 2022-11-23 PROCEDURE — 71045 X-RAY EXAM CHEST 1 VIEW: CPT

## 2022-11-23 PROCEDURE — 99285 EMERGENCY DEPT VISIT HI MDM: CPT

## 2022-11-23 RX ORDER — TRAMADOL HYDROCHLORIDE 50 MG/1
50 TABLET ORAL EVERY 6 HOURS PRN
Qty: 12 TABLET | Refills: 0 | Status: SHIPPED | OUTPATIENT
Start: 2022-11-23 | End: 2022-11-26

## 2022-11-23 RX ORDER — 0.9 % SODIUM CHLORIDE 0.9 %
500 INTRAVENOUS SOLUTION INTRAVENOUS ONCE
Status: COMPLETED | OUTPATIENT
Start: 2022-11-23 | End: 2022-11-23

## 2022-11-23 RX ADMIN — SODIUM CHLORIDE 500 ML: 9 INJECTION, SOLUTION INTRAVENOUS at 13:21

## 2022-11-23 NOTE — ED PROVIDER NOTES
Department of Emergency Medicine   ED  Provider Note  Admit Date/RoomTime: 11/23/2022 11:44 AM  ED Room: 03/03          History of Present Illness:  11/23/22, Time: 2:15 PM EST  Chief Complaint   Patient presents with    Altered Mental Status     States had back surgery last week, had a \"fall, if you can even call it that\" yesterday, and \"the doctor thinks I should be here because I don't know what day it is tomorrow\"                Toyin Florian is a 71 y.o. male presenting to the ED for altered mentation and weakness, beginning approximately 1 week ago. The complaint has been intermittent, moderate in severity, and worsened by nothing. Patient had surgery last week with neurosurgery for L5/S1 fusion. Patient was placed on Percocet for pain wife states that patient has been having episodes where he will appear confused, sometimes somewhat aggressive. Patient had a slip and fall yesterday, states he did not strike his head. Patient is having ongoing confusion and wife made appointment with his neurosurgeon, evaluated today. Patient appeared confused in the office and was directed to ED for evaluation. Patient denies any lower back pain. Wife states that the incision has been looking well, no bleeding, no purulence. Patient states he is urinating more frequently than normal, denies any abdominal pain. There has been no nausea/vomiting/diarrhea. Patient denies any headaches or lightheadedness, no chest pain or shortness of breath.     Review of Systems:   Pertinent positives and negatives are stated within HPI, all other systems reviewed and are negative.        --------------------------------------------- PAST HISTORY ---------------------------------------------  Past Medical History:  has a past medical history of Anxiety, Depression, Diabetes mellitus ([address] Hyperlipidemia, Hypertension, Moderate persistent asthma without complication, Obesity, Sleep apnea, Type 2 diabetes mellitus without complication ([address] and Ulceration. Past Surgical History:  has a past surgical history that includes hernia repair; Colonoscopy; joint replacement (Left); Revision total knee arthroplasty (Right, 12/14/2018); and back surgery (10/2019). Social History:  reports that he quit smoking about 43 years ago. His smoking use included cigarettes. He started smoking about 50 years ago. He has a 25.00 pack-year smoking history. He has never used smokeless tobacco. He reports current alcohol use. He reports that he does not use drugs. Family History: family history includes Cancer in his mother and sister. . Unless otherwise noted, family history is non contributory    The patients home medications have been reviewed. Allergies: Seasonal and Lisinopril        ---------------------------------------------------PHYSICAL EXAM--------------------------------------    Constitutional/General: Awake and alert, no acute distress  Head: Normocephalic and atraumatic  Eyes: PERRL, EOMI, sclera non icteric  Mouth: Oropharynx clear, handling secretions, no trismus, no asymmetry of the posterior oropharynx or uvular edema  Neck: Supple, full ROM, no stridor, no meningeal signs  Respiratory: Lungs clear to auscultation bilaterally, no wheezes, rales, or rhonchi. Not in respiratory distress  Cardiovascular:  Regular rate. Regular rhythm. No murmurs, no aortic murmurs, no gallops, or rubs. 2+ distal pulses. Equal extremity pulses. Chest: No chest wall tenderness  GI:  Abdomen Soft, Non tender, Non distended. No rebound, guarding, or rigidity. No pulsatile masses. Musculoskeletal: Moves all extremities x 4. Warm and well perfused, no clubbing, cyanosis, or edema. Capillary refill <3 seconds. Incision to the lower midline back is clean and dry, no surrounding erythema, no fluctuance, no bleeding or purulence  Integument: skin warm and dry. No rashes.    Neurologic: GCS 14, no focal deficits, symmetric strength 5/5 in the upper and lower extremities bilaterally  Psychiatric: Normal Affect          -------------------------------------------------- RESULTS -------------------------------------------------  I have personally reviewed all laboratory and imaging results for this patient. Results are listed below.      LABS: (Lab results interpreted by me)  Results for orders placed or performed during the hospital encounter of 11/23/22   Lactate, Sepsis   Result Value Ref Range    Lactic Acid, Sepsis 1.3 0.5 - 1.9 mmol/L   Urinalysis with Microscopic   Result Value Ref Range    Color, UA Yellow Straw/Yellow    Clarity, UA Clear Clear    Glucose, Ur >=1000 (A) Negative mg/dL    Bilirubin Urine Negative Negative    Ketones, Urine Negative Negative mg/dL    Specific Gravity, UA 1.025 1.005 - 1.030    Blood, Urine LARGE (A) Negative    pH, UA 6.0 5.0 - 9.0    Protein, UA Negative Negative mg/dL    Urobilinogen, Urine 1.0 <2.0 E.U./dL    Nitrite, Urine Negative Negative    Leukocyte Esterase, Urine Negative Negative    WBC, UA NONE 0 - 5 /HPF    RBC, UA 5-10 (A) 0 - 2 /HPF    Renal Epithelial, UA NONE SEEN /HPF    Bacteria, UA NONE SEEN None Seen /HPF   CBC with Auto Differential   Result Value Ref Range    WBC 8.0 4.5 - 11.5 E9/L    RBC 3.99 3.80 - 5.80 E12/L    Hemoglobin 12.2 (L) 12.5 - 16.5 g/dL    Hematocrit 36.6 (L) 37.0 - 54.0 %    MCV 91.7 80.0 - 99.9 fL    MCH 30.6 26.0 - 35.0 pg    MCHC 33.3 32.0 - 34.5 %    RDW 13.5 11.5 - 15.0 fL    Platelets 463 830 - 163 E9/L    MPV 9.1 7.0 - 12.0 fL    Neutrophils % 69.0 43.0 - 80.0 %    Immature Granulocytes % 0.4 0.0 - 5.0 %    Lymphocytes % 16.6 (L) 20.0 - 42.0 %    Monocytes % 11.5 2.0 - 12.0 %    Eosinophils % 2.4 0.0 - 6.0 %    Basophils % 0.1 0.0 - 2.0 %    Neutrophils Absolute 5.51 1.80 - 7.30 E9/L    Immature Granulocytes # 0.03 E9/L    Lymphocytes Absolute 1.33 (L) 1.50 - 4.00 E9/L    Monocytes Absolute 0.92 0.10 - 0.95 E9/L    Eosinophils Absolute 0.19 0.05 - 0.50 E9/L    Basophils Absolute 0. 01 0.00 - 0.20 E9/L   CMP   Result Value Ref Range    Sodium 139 132 - 146 mmol/L    Potassium 4.1 3.5 - 5.0 mmol/L    Chloride 103 98 - 107 mmol/L    CO2 25 22 - 29 mmol/L    Anion Gap 11 7 - 16 mmol/L    Glucose 103 (H) 74 - 99 mg/dL    BUN 19 6 - 23 mg/dL    Creatinine 0.8 0.7 - 1.2 mg/dL    Est, Glom Filt Rate >60 >=60 mL/min/1.73    Calcium 9.4 8.6 - 10.2 mg/dL    Total Protein 6.8 6.4 - 8.3 g/dL    Albumin 4.0 3.5 - 5.2 g/dL    Total Bilirubin 0.4 0.0 - 1.2 mg/dL    Alkaline Phosphatase 52 40 - 129 U/L    ALT 42 (H) 0 - 40 U/L    AST 41 (H) 0 - 39 U/L   Magnesium   Result Value Ref Range    Magnesium 2.0 1.6 - 2.6 mg/dL   EKG 12 Lead   Result Value Ref Range    Ventricular Rate 91 BPM    Atrial Rate 91 BPM    P-R Interval 218 ms    QRS Duration 86 ms    Q-T Interval 376 ms    QTc Calculation (Bazett) 462 ms    P Axis 56 degrees    R Axis -54 degrees    T Axis 21 degrees   ,       RADIOLOGY:  Interpreted by Radiologist unless otherwise specified  CT Head W/O Contrast   Final Result   No acute intracranial abnormality. XR CHEST PORTABLE   Final Result   No acute process.                EKG Interpretation  Interpreted by emergency department physician, Dr. Tj Garcia    Date of EK22  Time: 1306    Rhythm: normal sinus   Rate: 91  Axis: left  Conduction: 1st degree AV block  ST Segments: no acute change  T Waves: no acute change    Clinical Impression: No findings suggestive of acute ischemia or injury  Comparison to prior EKG: stable as compared to patient's most recent EKG      ------------------------- NURSING NOTES AND VITALS REVIEWED ---------------------------   The nursing notes within the ED encounter and vital signs as below have been reviewed by myself  /68   Pulse 78   Temp 98 °F (36.7 °C) (Oral)   Resp 16   Ht 6' 1\" (1.854 m)   Wt 295 lb (133.8 kg)   SpO2 94%   BMI 38.92 kg/m²     Oxygen Saturation Interpretation: Normal    The patients available past medical records and past encounters were reviewed. ------------------------------ ED COURSE/MEDICAL DECISION MAKING----------------------  Medications   0.9 % sodium chloride bolus (500 mLs IntraVENous New Bag 11/23/22 1321)           The cardiac monitor revealed sinus rhythm with a heart rate in the 80s as interpreted by me. The cardiac monitor was ordered secondary to the patient's chest pain and to monitor for patient for dysrhythmia. CPT [id number]           Medical Decision Making:     I, Dr. Jagjit Mccormick, am the primary provider of record    71-year-old male presenting with episodes of confusion since surgery last week. Patient is on Percocet. He has been urinating more frequently. He arrives awake and alert, disoriented to place and time. He is otherwise neurologically intact, hemodynamically stable with no increased work of breathing or hypoxia. Concern for possible infection such as UTI, would also have concern for side effects of Percocet. Patient is 6 days postop, would have low suspicion of any lingering effects of anesthesia. EKG shows no findings suggestive of acute ischemia or injury, unchanged from prior EKG. CT head is unremarkable. Chest x-ray shows no acute process. No leukocytosis or anemia. Lactic acid 1.3. Urinalysis shows no signs of infection. Metabolic panel is within acceptable limits. Patient was reassessed with complete resolution of confusion. Wife states that he is acting normally, all symptoms that she was concerned about have resolved. Patient has not had a Percocet since very early this morning. This may be related to the medication use. He agreed to discontinue its use. Patient was encouraged to use ibuprofen or Tylenol for pain, will be given a short course of Ultram with the understanding that this may also cause delirium and confusion and if it does so, discontinue this use as well. Patient agreed to follow-up with PCP as well as neurosurgery as scheduled.   Instruction to return for any changes in condition or new symptoms. Re-Evaluations:  ED Course as of 11/23/22 1622   Wed Nov 23, 2022   1618 Patient is sitting in bed, completely oriented. Wife states that all of the concerning symptoms that she has been witnessing have resolved. Patient has not had a Percocet since 8 AM this morning. With his reassuring evaluation in the ED, this may be be related to Percocet use. He agreed to discontinue this use. [PB]      ED Course User Index  [PB] Truman Zamora DO           This patient's ED course included: a personal history and physicial examination, re-evaluation prior to disposition, multiple bedside re-evaluations, cardiac monitoring, and continuous pulse oximetry    This patient has remained hemodynamically stable, improved, and been closely monitored during their ED course. Counseling: The emergency provider has spoken with the patient and spouse/SO and discussed todays results, in addition to providing specific details for the plan of care and counseling regarding the diagnosis and prognosis. Questions are answered at this time and they are agreeable with the plan.       --------------------------------- IMPRESSION AND DISPOSITION ---------------------------------    IMPRESSION  1. Medication side effect, initial encounter    2. Confusion    3. Postoperative pain        DISPOSITION  Disposition: Discharge to home  Patient condition is stable        NOTE: This report was transcribed using voice recognition software.  Every effort was made to ensure accuracy; however, inadvertent computerized transcription errors may be present        Truman Zamora DO  11/23/22 1622

## 2022-11-23 NOTE — ED NOTES
Patient okay to have ginger ale pr Dr. Misa Seay. Patient given ginger ale.      Umang Quinones RN  11/23/22 [id number]

## 2023-07-24 ENCOUNTER — APPOINTMENT (OUTPATIENT)
Dept: GENERAL RADIOLOGY | Age: 73
End: 2023-07-24
Payer: MEDICARE

## 2023-07-24 ENCOUNTER — HOSPITAL ENCOUNTER (OUTPATIENT)
Age: 73
Setting detail: OBSERVATION
Discharge: ANOTHER HOSPITAL | End: 2023-07-26
Attending: EMERGENCY MEDICINE | Admitting: INTERNAL MEDICINE
Payer: MEDICARE

## 2023-07-24 DIAGNOSIS — R07.9 CHEST PAIN, UNSPECIFIED TYPE: Primary | ICD-10-CM

## 2023-07-24 LAB
ALBUMIN SERPL-MCNC: 4.2 G/DL (ref 3.5–5.2)
ALP SERPL-CCNC: 50 U/L (ref 40–129)
ALT SERPL-CCNC: 42 U/L (ref 0–40)
ANION GAP SERPL CALCULATED.3IONS-SCNC: 8 MMOL/L (ref 7–16)
AST SERPL-CCNC: 39 U/L (ref 0–39)
BASOPHILS # BLD: 0.04 K/UL (ref 0–0.2)
BASOPHILS NFR BLD: 1 % (ref 0–2)
BILIRUB SERPL-MCNC: 0.5 MG/DL (ref 0–1.2)
BUN SERPL-MCNC: 17 MG/DL (ref 6–23)
CALCIUM SERPL-MCNC: 9.5 MG/DL (ref 8.6–10.2)
CHLORIDE SERPL-SCNC: 107 MMOL/L (ref 98–107)
CO2 SERPL-SCNC: 28 MMOL/L (ref 22–29)
CREAT SERPL-MCNC: 0.7 MG/DL (ref 0.7–1.2)
EOSINOPHIL # BLD: 0.15 K/UL (ref 0.05–0.5)
EOSINOPHILS RELATIVE PERCENT: 3 % (ref 0–6)
ERYTHROCYTE [DISTWIDTH] IN BLOOD BY AUTOMATED COUNT: 13.2 % (ref 11.5–15)
GFR SERPL CREATININE-BSD FRML MDRD: >60 ML/MIN/1.73M2
GLUCOSE SERPL-MCNC: 134 MG/DL (ref 74–99)
HCT VFR BLD AUTO: 37.6 % (ref 37–54)
HGB BLD-MCNC: 13 G/DL (ref 12.5–16.5)
IMM GRANULOCYTES # BLD AUTO: <0.03 K/UL (ref 0–0.58)
IMM GRANULOCYTES NFR BLD: 0 % (ref 0–5)
LYMPHOCYTES NFR BLD: 1.62 K/UL (ref 1.5–4)
LYMPHOCYTES RELATIVE PERCENT: 31 % (ref 20–42)
MCH RBC QN AUTO: 30.9 PG (ref 26–35)
MCHC RBC AUTO-ENTMCNC: 34.6 G/DL (ref 32–34.5)
MCV RBC AUTO: 89.3 FL (ref 80–99.9)
METER GLUCOSE: 108 MG/DL (ref 74–99)
METER GLUCOSE: 98 MG/DL (ref 74–99)
MONOCYTES NFR BLD: 0.65 K/UL (ref 0.1–0.95)
MONOCYTES NFR BLD: 12 % (ref 2–12)
NEUTROPHILS NFR BLD: 54 % (ref 43–80)
NEUTS SEG NFR BLD: 2.84 K/UL (ref 1.8–7.3)
PLATELET # BLD AUTO: 254 K/UL (ref 130–450)
PMV BLD AUTO: 9.2 FL (ref 7–12)
POTASSIUM SERPL-SCNC: 4.1 MMOL/L (ref 3.5–5)
PROT SERPL-MCNC: 6.8 G/DL (ref 6.4–8.3)
RBC # BLD AUTO: 4.21 M/UL (ref 3.8–5.8)
SARS-COV-2 RDRP RESP QL NAA+PROBE: NOT DETECTED
SODIUM SERPL-SCNC: 143 MMOL/L (ref 132–146)
SPECIMEN DESCRIPTION: NORMAL
TROPONIN I SERPL HS-MCNC: 27 NG/L (ref 0–11)
TROPONIN I SERPL HS-MCNC: 27 NG/L (ref 0–11)
TROPONIN I SERPL HS-MCNC: 29 NG/L (ref 0–11)
TROPONIN I SERPL HS-MCNC: 29 NG/L (ref 0–11)
WBC OTHER # BLD: 5.3 K/UL (ref 4.5–11.5)

## 2023-07-24 PROCEDURE — 82947 ASSAY GLUCOSE BLOOD QUANT: CPT

## 2023-07-24 PROCEDURE — G0378 HOSPITAL OBSERVATION PER HR: HCPCS

## 2023-07-24 PROCEDURE — 93005 ELECTROCARDIOGRAM TRACING: CPT | Performed by: FAMILY MEDICINE

## 2023-07-24 PROCEDURE — 71045 X-RAY EXAM CHEST 1 VIEW: CPT

## 2023-07-24 PROCEDURE — 6370000000 HC RX 637 (ALT 250 FOR IP): Performed by: FAMILY MEDICINE

## 2023-07-24 PROCEDURE — 87635 SARS-COV-2 COVID-19 AMP PRB: CPT

## 2023-07-24 PROCEDURE — 2580000003 HC RX 258: Performed by: FAMILY MEDICINE

## 2023-07-24 PROCEDURE — 99285 EMERGENCY DEPT VISIT HI MDM: CPT

## 2023-07-24 PROCEDURE — 93005 ELECTROCARDIOGRAM TRACING: CPT | Performed by: EMERGENCY MEDICINE

## 2023-07-24 PROCEDURE — 80053 COMPREHEN METABOLIC PANEL: CPT

## 2023-07-24 PROCEDURE — 84484 ASSAY OF TROPONIN QUANT: CPT

## 2023-07-24 PROCEDURE — 85027 COMPLETE CBC AUTOMATED: CPT

## 2023-07-24 RX ORDER — INSULIN LISPRO 100 [IU]/ML
0-8 INJECTION, SOLUTION INTRAVENOUS; SUBCUTANEOUS
Status: DISCONTINUED | OUTPATIENT
Start: 2023-07-24 | End: 2023-07-26 | Stop reason: HOSPADM

## 2023-07-24 RX ORDER — GLIPIZIDE 5 MG/1
10 TABLET ORAL
Status: DISCONTINUED | OUTPATIENT
Start: 2023-07-24 | End: 2023-07-26 | Stop reason: HOSPADM

## 2023-07-24 RX ORDER — ALBUTEROL SULFATE 0.63 MG/3ML
0.63 SOLUTION RESPIRATORY (INHALATION) 4 TIMES DAILY PRN
Status: DISCONTINUED | OUTPATIENT
Start: 2023-07-24 | End: 2023-07-26 | Stop reason: HOSPADM

## 2023-07-24 RX ORDER — PRAVASTATIN SODIUM 20 MG
80 TABLET ORAL DAILY
Status: DISCONTINUED | OUTPATIENT
Start: 2023-07-24 | End: 2023-07-25

## 2023-07-24 RX ORDER — DULOXETIN HYDROCHLORIDE 30 MG/1
30 CAPSULE, DELAYED RELEASE ORAL DAILY
Status: DISCONTINUED | OUTPATIENT
Start: 2023-07-24 | End: 2023-07-26 | Stop reason: HOSPADM

## 2023-07-24 RX ORDER — INSULIN LISPRO 100 [IU]/ML
0-4 INJECTION, SOLUTION INTRAVENOUS; SUBCUTANEOUS NIGHTLY
Status: DISCONTINUED | OUTPATIENT
Start: 2023-07-24 | End: 2023-07-26 | Stop reason: HOSPADM

## 2023-07-24 RX ORDER — LOSARTAN POTASSIUM 50 MG/1
50 TABLET ORAL DAILY
Status: DISCONTINUED | OUTPATIENT
Start: 2023-07-24 | End: 2023-07-25

## 2023-07-24 RX ORDER — DEXTROSE MONOHYDRATE 100 MG/ML
INJECTION, SOLUTION INTRAVENOUS CONTINUOUS PRN
Status: DISCONTINUED | OUTPATIENT
Start: 2023-07-24 | End: 2023-07-26 | Stop reason: HOSPADM

## 2023-07-24 RX ORDER — ACETAMINOPHEN 650 MG/1
650 SUPPOSITORY RECTAL EVERY 6 HOURS PRN
Status: DISCONTINUED | OUTPATIENT
Start: 2023-07-24 | End: 2023-07-26 | Stop reason: HOSPADM

## 2023-07-24 RX ORDER — ONDANSETRON 4 MG/1
4 TABLET, ORALLY DISINTEGRATING ORAL EVERY 8 HOURS PRN
Status: DISCONTINUED | OUTPATIENT
Start: 2023-07-24 | End: 2023-07-26 | Stop reason: HOSPADM

## 2023-07-24 RX ORDER — ACETAMINOPHEN 325 MG/1
650 TABLET ORAL EVERY 6 HOURS PRN
Status: DISCONTINUED | OUTPATIENT
Start: 2023-07-24 | End: 2023-07-26 | Stop reason: HOSPADM

## 2023-07-24 RX ORDER — ONDANSETRON 2 MG/ML
4 INJECTION INTRAMUSCULAR; INTRAVENOUS EVERY 6 HOURS PRN
Status: DISCONTINUED | OUTPATIENT
Start: 2023-07-24 | End: 2023-07-26 | Stop reason: HOSPADM

## 2023-07-24 RX ORDER — TAMSULOSIN HYDROCHLORIDE 0.4 MG/1
0.4 CAPSULE ORAL DAILY
Status: DISCONTINUED | OUTPATIENT
Start: 2023-07-24 | End: 2023-07-26 | Stop reason: HOSPADM

## 2023-07-24 RX ORDER — ALBUTEROL SULFATE 90 UG/1
2 AEROSOL, METERED RESPIRATORY (INHALATION) 4 TIMES DAILY PRN
Status: DISCONTINUED | OUTPATIENT
Start: 2023-07-24 | End: 2023-07-24 | Stop reason: CLARIF

## 2023-07-24 RX ORDER — INSULIN GLARGINE 100 [IU]/ML
50 INJECTION, SOLUTION SUBCUTANEOUS 2 TIMES DAILY
Status: DISCONTINUED | OUTPATIENT
Start: 2023-07-24 | End: 2023-07-26 | Stop reason: HOSPADM

## 2023-07-24 RX ORDER — SODIUM CHLORIDE 9 MG/ML
INJECTION, SOLUTION INTRAVENOUS PRN
Status: DISCONTINUED | OUTPATIENT
Start: 2023-07-24 | End: 2023-07-26 | Stop reason: HOSPADM

## 2023-07-24 RX ORDER — SODIUM CHLORIDE 0.9 % (FLUSH) 0.9 %
10 SYRINGE (ML) INJECTION PRN
Status: DISCONTINUED | OUTPATIENT
Start: 2023-07-24 | End: 2023-07-26 | Stop reason: HOSPADM

## 2023-07-24 RX ORDER — SODIUM CHLORIDE 0.9 % (FLUSH) 0.9 %
5-40 SYRINGE (ML) INJECTION EVERY 12 HOURS SCHEDULED
Status: DISCONTINUED | OUTPATIENT
Start: 2023-07-24 | End: 2023-07-26 | Stop reason: HOSPADM

## 2023-07-24 RX ORDER — ENOXAPARIN SODIUM 100 MG/ML
30 INJECTION SUBCUTANEOUS 2 TIMES DAILY
Status: DISCONTINUED | OUTPATIENT
Start: 2023-07-24 | End: 2023-07-26 | Stop reason: HOSPADM

## 2023-07-24 RX ORDER — FLUTICASONE PROPIONATE 50 MCG
1 SPRAY, SUSPENSION (ML) NASAL 2 TIMES DAILY PRN
Status: DISCONTINUED | OUTPATIENT
Start: 2023-07-24 | End: 2023-07-26 | Stop reason: HOSPADM

## 2023-07-24 RX ORDER — FENOFIBRATE 54 MG/1
54 TABLET ORAL DAILY
Status: DISCONTINUED | OUTPATIENT
Start: 2023-07-24 | End: 2023-07-26 | Stop reason: HOSPADM

## 2023-07-24 RX ORDER — PANTOPRAZOLE SODIUM 40 MG/1
40 TABLET, DELAYED RELEASE ORAL DAILY
Status: ON HOLD | COMMUNITY
Start: 2023-05-16

## 2023-07-24 RX ORDER — GABAPENTIN 300 MG/1
300 CAPSULE ORAL 3 TIMES DAILY
Status: DISCONTINUED | OUTPATIENT
Start: 2023-07-24 | End: 2023-07-26 | Stop reason: HOSPADM

## 2023-07-24 RX ORDER — REGADENOSON 0.08 MG/ML
0.4 INJECTION, SOLUTION INTRAVENOUS
Status: COMPLETED | OUTPATIENT
Start: 2023-07-24 | End: 2023-07-25

## 2023-07-24 RX ORDER — ASPIRIN 81 MG/1
81 TABLET, CHEWABLE ORAL DAILY
Status: DISCONTINUED | OUTPATIENT
Start: 2023-07-25 | End: 2023-07-26 | Stop reason: HOSPADM

## 2023-07-24 RX ADMIN — METFORMIN HYDROCHLORIDE 1000 MG: 1000 TABLET ORAL at 17:28

## 2023-07-24 RX ADMIN — FENOFIBRATE 54 MG: 54 TABLET ORAL at 20:43

## 2023-07-24 RX ADMIN — TAMSULOSIN HYDROCHLORIDE 0.4 MG: 0.4 CAPSULE ORAL at 20:43

## 2023-07-24 RX ADMIN — INSULIN GLARGINE 50 UNITS: 100 INJECTION, SOLUTION SUBCUTANEOUS at 20:54

## 2023-07-24 RX ADMIN — GABAPENTIN 300 MG: 300 CAPSULE ORAL at 20:43

## 2023-07-24 RX ADMIN — PRAVASTATIN SODIUM 80 MG: 20 TABLET ORAL at 20:43

## 2023-07-24 RX ADMIN — GLIPIZIDE 10 MG: 5 TABLET ORAL at 17:28

## 2023-07-24 RX ADMIN — SODIUM CHLORIDE, PRESERVATIVE FREE 10 ML: 5 INJECTION INTRAVENOUS at 20:43

## 2023-07-24 ASSESSMENT — ENCOUNTER SYMPTOMS
SHORTNESS OF BREATH: 0
DIARRHEA: 0
ABDOMINAL PAIN: 0
CONSTIPATION: 0
VOMITING: 0
SORE THROAT: 0
WHEEZING: 0
NAUSEA: 0

## 2023-07-24 ASSESSMENT — PAIN DESCRIPTION - LOCATION: LOCATION: CHEST

## 2023-07-24 ASSESSMENT — PAIN DESCRIPTION - DESCRIPTORS: DESCRIPTORS: DISCOMFORT

## 2023-07-24 ASSESSMENT — PAIN DESCRIPTION - ORIENTATION: ORIENTATION: MID

## 2023-07-24 ASSESSMENT — PAIN SCALES - GENERAL: PAINLEVEL_OUTOF10: 5

## 2023-07-24 ASSESSMENT — LIFESTYLE VARIABLES
HOW OFTEN DO YOU HAVE A DRINK CONTAINING ALCOHOL: NEVER
HOW MANY STANDARD DRINKS CONTAINING ALCOHOL DO YOU HAVE ON A TYPICAL DAY: PATIENT DOES NOT DRINK

## 2023-07-24 NOTE — ED PROVIDER NOTES
[address] ENCOUNTER        Pt Name: Rosario Joel  MRN: [id number]  [address] [facility] [place] 1950  Date of evaluation: 7/24/2023  Provider: Ronny Fabian MD  PCP: Teresa Tripp DO  Note Started: 11:14 AM EDT 7/24/23    CHIEF COMPLAINT       Chief Complaint   Patient presents with    Chest Pain     Mid sternal chest pain since 830am. radiating to back, shoulder and arm. HISTORY OF PRESENT ILLNESS: 1 or more Elements   History From: Patient    Limitations to history : None    Rosario Joel is a 68 y.o. male who presents with left-sided chest pain since 830 this morning pain radiates from his left side of chest across his shoulder down and down to his arm as well as his back. He has not had anything like this before. He describes the pain is burning. There was some partial relief with the aspirin and nitro that was given in the EMS. He denies lifting or thing out of the ordinary yesterday. Nursing Notes were all reviewed and agreed with or any disagreements were addressed in the HPI. REVIEW OF SYSTEMS :      Review of Systems   Constitutional:  Negative for chills and fever. HENT:  Negative for sneezing and sore throat. Respiratory:  Negative for shortness of breath and wheezing. Cardiovascular:  Positive for chest pain. Negative for palpitations. Gastrointestinal:  Negative for abdominal pain, constipation, diarrhea, nausea and vomiting. Genitourinary:  Negative for difficulty urinating and dysuria. Neurological:  Negative for dizziness and headaches.        SURGICAL HISTORY     Past Surgical History:   Procedure Laterality Date    BACK SURGERY  10/2019    COLONOSCOPY      HERNIA REPAIR      JOINT REPLACEMENT Left     left knee surgery x5    REVISION TOTAL KNEE ARTHROPLASTY Right 12/14/2018       CURRENTMEDICATIONS       Previous Medications    ALBUTEROL SULFATE HFA (PROAIR HFA) 108 (90 BASE)

## 2023-07-24 NOTE — PROGRESS NOTES
4 Eyes Skin Assessment     NAME:  Lorena Wilson  YOB: 1950  MEDICAL RECORD NUMBER:  [id number]    The patient is being assessed for  Admission    I agree that at least one RN has performed a thorough Head to Toe Skin Assessment on the patient. ALL assessment sites listed below have been assessed. Areas assessed by both nurses:    Head, Face, Ears, Shoulders, Back, Chest, Arms, Elbows, Hands, Sacrum. Buttock, Coccyx, Ischium, Legs. Feet and Heels, and Under Medical Devices         Does the Patient have a Wound?  No noted wound(s) scabs on BLE       Bienvenido Prevention initiated by RN: No  Wound Care Orders initiated by RN: No    Pressure Injury (Stage 3,4, Unstageable, DTI, NWPT, and Complex wounds) if present, place Wound referral order by RN under : No    New Ostomies, if present place, Ostomy referral order under : No     Nurse 1 eSignature: Electronically signed by Carito Andersen RN on 7/24/23 at 5:03 PM EDT    **SHARE this note so that the co-signing nurse can place an eSignature**    Nurse 2 eSignature: Electronically signed by Ronal Elizondo RN on 7/24/23 at [address] PM EDT

## 2023-07-24 NOTE — PROGRESS NOTES
Database initiated pharmacy and medications verified with the patient. He is A&O from home with wife. He uses a walker and is RA at baseline but wears a c-pap at night. He has fallen a lot recently. He has been having diarrhea.

## 2023-07-25 ENCOUNTER — APPOINTMENT (OUTPATIENT)
Dept: NUCLEAR MEDICINE | Age: 73
End: 2023-07-25
Payer: MEDICARE

## 2023-07-25 ENCOUNTER — APPOINTMENT (OUTPATIENT)
Dept: NON INVASIVE DIAGNOSTICS | Age: 73
End: 2023-07-25
Payer: MEDICARE

## 2023-07-25 LAB
ALBUMIN SERPL-MCNC: 4 G/DL (ref 3.5–5.2)
ALP SERPL-CCNC: 49 U/L (ref 40–129)
ALT SERPL-CCNC: 41 U/L (ref 0–40)
ANION GAP SERPL CALCULATED.3IONS-SCNC: 10 MMOL/L (ref 7–16)
AST SERPL-CCNC: 42 U/L (ref 0–39)
BILIRUB SERPL-MCNC: 0.6 MG/DL (ref 0–1.2)
BNP SERPL-MCNC: 92 PG/ML (ref 0–125)
BUN SERPL-MCNC: 15 MG/DL (ref 6–23)
CALCIUM SERPL-MCNC: 9.2 MG/DL (ref 8.6–10.2)
CHLORIDE SERPL-SCNC: 103 MMOL/L (ref 98–107)
CHOLEST SERPL-MCNC: 148 MG/DL
CO2 SERPL-SCNC: 27 MMOL/L (ref 22–29)
CREAT SERPL-MCNC: 0.8 MG/DL (ref 0.7–1.2)
ERYTHROCYTE [DISTWIDTH] IN BLOOD BY AUTOMATED COUNT: 13.4 % (ref 11.5–15)
GFR SERPL CREATININE-BSD FRML MDRD: >60 ML/MIN/1.73M2
GLUCOSE SERPL-MCNC: 106 MG/DL (ref 74–99)
HBA1C MFR BLD: 6.7 % (ref 4–5.6)
HCT VFR BLD AUTO: 37.8 % (ref 37–54)
HDLC SERPL-MCNC: 31 MG/DL
HGB BLD-MCNC: 13 G/DL (ref 12.5–16.5)
LDLC SERPL CALC-MCNC: 69 MG/DL
LV EF: 71 %
LVEF MODALITY: NORMAL
MCH RBC QN AUTO: 31 PG (ref 26–35)
MCHC RBC AUTO-ENTMCNC: 34.4 G/DL (ref 32–34.5)
MCV RBC AUTO: 90.2 FL (ref 80–99.9)
METER GLUCOSE: 114 MG/DL (ref 74–99)
METER GLUCOSE: 122 MG/DL (ref 74–99)
METER GLUCOSE: 147 MG/DL (ref 74–99)
METER GLUCOSE: 176 MG/DL (ref 74–99)
METER GLUCOSE: 93 MG/DL (ref 74–99)
PLATELET # BLD AUTO: 262 K/UL (ref 130–450)
PMV BLD AUTO: 9.6 FL (ref 7–12)
POTASSIUM SERPL-SCNC: 3.7 MMOL/L (ref 3.5–5)
PROT SERPL-MCNC: 6.7 G/DL (ref 6.4–8.3)
RBC # BLD AUTO: 4.19 M/UL (ref 3.8–5.8)
SODIUM SERPL-SCNC: 140 MMOL/L (ref 132–146)
TRIGL SERPL-MCNC: 238 MG/DL
VLDLC SERPL CALC-MCNC: 48 MG/DL
WBC OTHER # BLD: 5.4 K/UL (ref 4.5–11.5)

## 2023-07-25 PROCEDURE — 97535 SELF CARE MNGMENT TRAINING: CPT

## 2023-07-25 PROCEDURE — 93017 CV STRESS TEST TRACING ONLY: CPT

## 2023-07-25 PROCEDURE — 80053 COMPREHEN METABOLIC PANEL: CPT

## 2023-07-25 PROCEDURE — 78452 HT MUSCLE IMAGE SPECT MULT: CPT

## 2023-07-25 PROCEDURE — 2580000003 HC RX 258: Performed by: FAMILY MEDICINE

## 2023-07-25 PROCEDURE — 6360000002 HC RX W HCPCS: Performed by: FAMILY MEDICINE

## 2023-07-25 PROCEDURE — 96372 THER/PROPH/DIAG INJ SC/IM: CPT

## 2023-07-25 PROCEDURE — 6370000000 HC RX 637 (ALT 250 FOR IP): Performed by: NURSE PRACTITIONER

## 2023-07-25 PROCEDURE — 97165 OT EVAL LOW COMPLEX 30 MIN: CPT

## 2023-07-25 PROCEDURE — 93018 CV STRESS TEST I&R ONLY: CPT | Performed by: INTERNAL MEDICINE

## 2023-07-25 PROCEDURE — 93016 CV STRESS TEST SUPVJ ONLY: CPT | Performed by: INTERNAL MEDICINE

## 2023-07-25 PROCEDURE — G0378 HOSPITAL OBSERVATION PER HR: HCPCS

## 2023-07-25 PROCEDURE — 99223 1ST HOSP IP/OBS HIGH 75: CPT | Performed by: INTERNAL MEDICINE

## 2023-07-25 PROCEDURE — 83880 ASSAY OF NATRIURETIC PEPTIDE: CPT

## 2023-07-25 PROCEDURE — 6370000000 HC RX 637 (ALT 250 FOR IP): Performed by: FAMILY MEDICINE

## 2023-07-25 PROCEDURE — 82947 ASSAY GLUCOSE BLOOD QUANT: CPT

## 2023-07-25 PROCEDURE — APPSS60 APP SPLIT SHARED TIME 46-60 MINUTES: Performed by: NURSE PRACTITIONER

## 2023-07-25 PROCEDURE — 80061 LIPID PANEL: CPT

## 2023-07-25 PROCEDURE — 3430000000 HC RX DIAGNOSTIC RADIOPHARMACEUTICAL: Performed by: RADIOLOGY

## 2023-07-25 PROCEDURE — 83036 HEMOGLOBIN GLYCOSYLATED A1C: CPT

## 2023-07-25 PROCEDURE — 85027 COMPLETE CBC AUTOMATED: CPT

## 2023-07-25 PROCEDURE — 78452 HT MUSCLE IMAGE SPECT MULT: CPT | Performed by: INTERNAL MEDICINE

## 2023-07-25 PROCEDURE — A9500 TC99M SESTAMIBI: HCPCS | Performed by: RADIOLOGY

## 2023-07-25 RX ORDER — TETRAKIS(2-METHOXYISOBUTYLISOCYANIDE)COPPER(I) TETRAFLUOROBORATE 1 MG/ML
10 INJECTION, POWDER, LYOPHILIZED, FOR SOLUTION INTRAVENOUS
Status: COMPLETED | OUTPATIENT
Start: 2023-07-25 | End: 2023-07-25

## 2023-07-25 RX ORDER — ATORVASTATIN CALCIUM 40 MG/1
40 TABLET, FILM COATED ORAL NIGHTLY
Status: DISCONTINUED | OUTPATIENT
Start: 2023-07-26 | End: 2023-07-26 | Stop reason: HOSPADM

## 2023-07-25 RX ORDER — TETRAKIS(2-METHOXYISOBUTYLISOCYANIDE)COPPER(I) TETRAFLUOROBORATE 1 MG/ML
30 INJECTION, POWDER, LYOPHILIZED, FOR SOLUTION INTRAVENOUS
Status: COMPLETED | OUTPATIENT
Start: 2023-07-25 | End: 2023-07-25

## 2023-07-25 RX ADMIN — INSULIN GLARGINE 50 UNITS: 100 INJECTION, SOLUTION SUBCUTANEOUS at 22:25

## 2023-07-25 RX ADMIN — METOPROLOL TARTRATE 25 MG: 25 TABLET, FILM COATED ORAL at 14:26

## 2023-07-25 RX ADMIN — GABAPENTIN 300 MG: 300 CAPSULE ORAL at 14:26

## 2023-07-25 RX ADMIN — ENOXAPARIN SODIUM 30 MG: 100 INJECTION SUBCUTANEOUS at 11:15

## 2023-07-25 RX ADMIN — DULOXETINE HYDROCHLORIDE 30 MG: 30 CAPSULE, DELAYED RELEASE ORAL at 11:14

## 2023-07-25 RX ADMIN — FENOFIBRATE 54 MG: 54 TABLET ORAL at 11:14

## 2023-07-25 RX ADMIN — SODIUM CHLORIDE, PRESERVATIVE FREE 10 ML: 5 INJECTION INTRAVENOUS at 11:19

## 2023-07-25 RX ADMIN — ENOXAPARIN SODIUM 30 MG: 100 INJECTION SUBCUTANEOUS at 22:25

## 2023-07-25 RX ADMIN — GLIPIZIDE 10 MG: 5 TABLET ORAL at 15:53

## 2023-07-25 RX ADMIN — GABAPENTIN 300 MG: 300 CAPSULE ORAL at 11:14

## 2023-07-25 RX ADMIN — PRAVASTATIN SODIUM 80 MG: 20 TABLET ORAL at 11:14

## 2023-07-25 RX ADMIN — Medication 30 MILLICURIE: at 08:30

## 2023-07-25 RX ADMIN — METOPROLOL TARTRATE 25 MG: 25 TABLET, FILM COATED ORAL at 22:25

## 2023-07-25 RX ADMIN — REGADENOSON 0.4 MG: 0.08 INJECTION, SOLUTION INTRAVENOUS at 10:10

## 2023-07-25 RX ADMIN — ASPIRIN 81 MG CHEWABLE TABLET 81 MG: 81 TABLET CHEWABLE at 11:14

## 2023-07-25 RX ADMIN — TAMSULOSIN HYDROCHLORIDE 0.4 MG: 0.4 CAPSULE ORAL at 11:14

## 2023-07-25 RX ADMIN — Medication 10 MILLICURIE: at 08:30

## 2023-07-25 RX ADMIN — LOSARTAN POTASSIUM 50 MG: 50 TABLET, FILM COATED ORAL at 11:14

## 2023-07-25 RX ADMIN — SODIUM CHLORIDE, PRESERVATIVE FREE 10 ML: 5 INJECTION INTRAVENOUS at 22:26

## 2023-07-25 RX ADMIN — ACETAMINOPHEN 650 MG: 325 TABLET ORAL at 00:57

## 2023-07-25 RX ADMIN — GABAPENTIN 300 MG: 300 CAPSULE ORAL at 22:25

## 2023-07-25 RX ADMIN — INSULIN GLARGINE 50 UNITS: 100 INJECTION, SOLUTION SUBCUTANEOUS at 11:16

## 2023-07-25 RX ADMIN — METFORMIN HYDROCHLORIDE 1000 MG: 1000 TABLET ORAL at 11:14

## 2023-07-25 ASSESSMENT — PAIN - FUNCTIONAL ASSESSMENT: PAIN_FUNCTIONAL_ASSESSMENT: PREVENTS OR INTERFERES SOME ACTIVE ACTIVITIES AND ADLS

## 2023-07-25 ASSESSMENT — PAIN DESCRIPTION - LOCATION: LOCATION: HEAD

## 2023-07-25 ASSESSMENT — PAIN DESCRIPTION - DESCRIPTORS: DESCRIPTORS: ACHING;DISCOMFORT;NAGGING

## 2023-07-25 ASSESSMENT — PAIN SCALES - GENERAL: PAINLEVEL_OUTOF10: 9

## 2023-07-25 NOTE — PROGRESS NOTES
Physical Therapy    PT order received and medical chart reviewed 7/25. Pt off unit for stress test. Will re-attempt as able. Thank you.     Darlean Nageotte, PT, DPT  [id number]

## 2023-07-25 NOTE — PLAN OF CARE
Problem: Discharge Planning  Goal: Discharge to home or other facility with appropriate resources  7/25/2023 1527 by Erinn Galarza RN  Outcome: Progressing  Flowsheets (Taken 7/25/2023 1200)  Discharge to home or other facility with appropriate resources:   Identify barriers to discharge with patient and caregiver   Arrange for needed discharge resources and transportation as appropriate   Identify discharge learning needs (meds, wound care, etc)     Problem: Pain  Goal: Verbalizes/displays adequate comfort level or baseline comfort level  7/25/2023 1527 by Erinn Galarza RN  Outcome: Progressing     Problem: Safety - Adult  Goal: Free from fall injury  7/25/2023 1527 by Erinn Galarza RN  Outcome: Progressing     Problem: Cardiovascular - Adult  Goal: Maintains optimal cardiac output and hemodynamic stability  7/25/2023 1527 by Erinn Galarza RN  Outcome: Progressing  Flowsheets (Taken 7/25/2023 1200)  Maintains optimal cardiac output and hemodynamic stability:   Monitor blood pressure and heart rate   Monitor urine output and notify Licensed Independent Practitioner for values outside of normal range   Administer fluid and/or volume expanders as ordered   Assess for signs of decreased cardiac output     Problem: Cardiovascular - Adult  Goal: Absence of cardiac dysrhythmias or at baseline  7/25/2023 1527 by Erinn Galarza RN  Outcome: Progressing  Flowsheets (Taken 7/25/2023 1200)  Absence of cardiac dysrhythmias or at baseline:   Monitor cardiac rate and rhythm   Assess for signs of decreased cardiac output   Administer antiarrhythmia medication and electrolyte replacement as ordered

## 2023-07-25 NOTE — PROCEDURES
[facility] Nuclear Stress Test:    Cardiologist: Dr. Jarad Gallardo MD    Date: 7/25/2023    Indications for study: Chest pain    No chest pain  No new arrhythmias  No EKG changes suggestive of stress induced ischemia  Nuclear images pending    Kyra Skiff, MD  [facility]) Cardiology

## 2023-07-25 NOTE — PROGRESS NOTES
OCCUPATIONAL THERAPY INITIAL EVALUATION    [address]   [address] [facility], [address] [place] [address] Drive        Date:2023                                                  Patient Name: Esmeralda Gitelman    MRN: [id number]    : 1950    Room: [address] [place] [address]     Evaluating OT: Aki Baeza OTR/L #[id number]    Referring Provider: Ivelisse Nance DO    Specific Provider Orders/Date: OT Eval and Treat, 23     Diagnosis:   1.  Chest pain, unspecified type         Surgery: None       Pertinent Medical History:       Past Medical History:   Diagnosis Date    Anxiety     Depression     Diabetes mellitus ([address])     Hyperlipidemia     Hypertension     Moderate persistent asthma without complication     Obesity     Sleep apnea     Type 2 diabetes mellitus without complication ([address])     Ulceration 2012    right foot         Past Surgical History:   Procedure Laterality Date    BACK SURGERY  10/2019    COLONOSCOPY      HERNIA REPAIR      JOINT REPLACEMENT Left     left knee surgery x5    REVISION TOTAL KNEE ARTHROPLASTY Right 2018      Precautions:  Fall Risk, falls and alarm     Assessment of current deficits    [x] Functional mobility  [x]ADLs  [x] Strength               []Cognition    [x] Functional transfers   [x] IADLs         [x] Safety Awareness   [x]Endurance    [] Fine Coordination              [x] Balance      [] Vision/perception   [x]Sensation     []Gross Motor Coordination  [] ROM  [] Delirium                   [] Motor Control     OT PLAN OF CARE   OT POC based on physician orders, patient diagnosis and results of clinical assessment    Frequency/Duration 1-3 days/wk for 2 weeks PRN     Specific OT Treatment Interventions to include:   * Instruction/training on adapted ADL techniques and AE recommendations to increase functional independence within precautions       * Training on energy conservation strategies, correct breathing pattern and

## 2023-07-25 NOTE — PLAN OF CARE
Problem: Discharge Planning  Goal: Discharge to home or other facility with appropriate resources  Outcome: Progressing  Flowsheets (Taken 7/24/2023 2050)  Discharge to home or other facility with appropriate resources:   Identify barriers to discharge with patient and caregiver   Arrange for needed discharge resources and transportation as appropriate   Refer to discharge planning if patient needs post-hospital services based on physician order or complex needs related to functional status, cognitive ability or social support system     Problem: Pain  Goal: Verbalizes/displays adequate comfort level or baseline comfort level  Outcome: Progressing     Problem: Safety - Adult  Goal: Free from fall injury  Outcome: Progressing     Problem: Cardiovascular - Adult  Goal: Maintains optimal cardiac output and hemodynamic stability  Outcome: Progressing     Problem: Cardiovascular - Adult  Goal: Absence of cardiac dysrhythmias or at baseline  Outcome: Progressing

## 2023-07-25 NOTE — PROGRESS NOTES
[facility] Quality Flow/Interdisciplinary Rounds Progress Note        Quality Flow Rounds held on July 25, 2023    Disciplines Attending:  Bedside Nurse, , , and Nursing Unit Leadership    Doretha Abebe was admitted on 7/24/2023 11:05 AM    Anticipated Discharge Date:       Disposition:    Bienevnido Score:  Bienvenido Scale Score: 20    Readmission Risk              Risk of Unplanned Readmission:  0           Discussed patient goal for the day, patient clinical progression, and barriers to discharge. The following Goal(s) of the Day/Commitment(s) have been identified:   Stress test completed- waiting on results, new cardiology consult, PT/OT evaluations, and discharge planning home vs [facility].       Omi Haas RN  July 25, 2023

## 2023-07-25 NOTE — H&P
was normal with   regional wall motion abnormalities. 5.  No transient ischemic dilatation. 6.  Intermediate risk general pharmacologic stress test.      Thank you for sending your patient to this [employer]. Jose Wilson MD.,[facility], FASE, [address] [facility] cardiology         XR CHEST PORTABLE   Final Result   No acute process. ASSESSMENT:    Active Hospital Problems    Diagnosis Date Noted    Chest pain [R07.9] 04/26/2017   II DM   HTN   NEUROPATHY      PLAN:  CARD   SSI  STRESS      DVT Prophylaxis: LOVENOX  Diet: ADULT DIET; Regular; 4 carb choices (60 gm/meal); Low Fat/Low Chol/High Fiber/BRAYDEN; No Caffeine  Diet NPO Exceptions are: Sips of Water with Meds  Code Status: Full Code    PT/OT Eval Status:  ORDERED    Dispo -  HOME    Electronically signed by Haley Bai DO on 7/25/2023 at 7:56 PM [address]       [facility] you [facility] for the opportunity to be involved in this patient's care.  If you have any questions or concerns please feel free to contact me at [phone]

## 2023-07-26 ENCOUNTER — HOSPITAL ENCOUNTER (INPATIENT)
Dept: CARDIAC CATH/INVASIVE PROCEDURES | Age: 73
LOS: 6 days | Discharge: NURSING, REHAB OR LONG-TERM CARE | DRG: 246 | End: 2023-08-01
Attending: INTERNAL MEDICINE | Admitting: INTERNAL MEDICINE
Payer: MEDICARE

## 2023-07-26 ENCOUNTER — APPOINTMENT (OUTPATIENT)
Dept: CT IMAGING | Age: 73
DRG: 246 | End: 2023-07-26
Attending: INTERNAL MEDICINE
Payer: MEDICARE

## 2023-07-26 ENCOUNTER — APPOINTMENT (OUTPATIENT)
Dept: GENERAL RADIOLOGY | Age: 73
DRG: 246 | End: 2023-07-26
Attending: INTERNAL MEDICINE
Payer: MEDICARE

## 2023-07-26 VITALS
WEIGHT: 297.1 LBS | HEART RATE: 72 BPM | TEMPERATURE: 98 F | SYSTOLIC BLOOD PRESSURE: 134 MMHG | RESPIRATION RATE: 18 BRPM | DIASTOLIC BLOOD PRESSURE: 72 MMHG | BODY MASS INDEX: 39.37 KG/M2 | OXYGEN SATURATION: 94 % | HEIGHT: 73 IN

## 2023-07-26 PROBLEM — I63.9 ACUTE STROKE DUE TO ISCHEMIA (HCC): Status: ACTIVE | Noted: 2023-07-26

## 2023-07-26 PROBLEM — I25.10 CAD IN NATIVE ARTERY: Status: ACTIVE | Noted: 2023-07-26

## 2023-07-26 PROBLEM — J96.01 ACUTE RESPIRATORY FAILURE WITH HYPOXIA (HCC): Status: ACTIVE | Noted: 2023-07-26

## 2023-07-26 LAB
AADO2: 368.1 MMHG
ABO + RH BLD: NORMAL
ACTIVATED CLOTTING TIME, LOW RANGE: 302 SEC
ANION GAP SERPL CALCULATED.3IONS-SCNC: 12 MMOL/L (ref 7–16)
ARM BAND NUMBER: NORMAL
B.E.: -0.8 MMOL/L (ref -3–3)
BASOPHILS # BLD: 0.03 K/UL (ref 0–0.2)
BASOPHILS NFR BLD: 1 % (ref 0–2)
BLOOD BANK SAMPLE EXPIRATION: NORMAL
BLOOD GROUP ANTIBODIES SERPL: NEGATIVE
BUN SERPL-MCNC: 15 MG/DL (ref 6–23)
CALCIUM SERPL-MCNC: 9.1 MG/DL (ref 8.6–10.2)
CHLORIDE SERPL-SCNC: 103 MMOL/L (ref 98–107)
CO2 SERPL-SCNC: 25 MMOL/L (ref 22–29)
COHB: 0.4 % (ref 0–1.5)
CREAT SERPL-MCNC: 0.9 MG/DL (ref 0.7–1.2)
CRITICAL: ABNORMAL
DATE ANALYZED: ABNORMAL
DATE OF COLLECTION: ABNORMAL
EKG ATRIAL RATE: 65 BPM
EKG ATRIAL RATE: 69 BPM
EKG ATRIAL RATE: 82 BPM
EKG P AXIS: 39 DEGREES
EKG P AXIS: 45 DEGREES
EKG P AXIS: 55 DEGREES
EKG P-R INTERVAL: 226 MS
EKG P-R INTERVAL: 246 MS
EKG P-R INTERVAL: 256 MS
EKG Q-T INTERVAL: 390 MS
EKG Q-T INTERVAL: 428 MS
EKG Q-T INTERVAL: 432 MS
EKG QRS DURATION: 100 MS
EKG QRS DURATION: 90 MS
EKG QRS DURATION: 96 MS
EKG QTC CALCULATION (BAZETT): 449 MS
EKG QTC CALCULATION (BAZETT): 455 MS
EKG QTC CALCULATION (BAZETT): 458 MS
EKG R AXIS: -44 DEGREES
EKG R AXIS: -45 DEGREES
EKG R AXIS: -46 DEGREES
EKG T AXIS: 48 DEGREES
EKG T AXIS: 57 DEGREES
EKG T AXIS: 65 DEGREES
EKG VENTRICULAR RATE: 65 BPM
EKG VENTRICULAR RATE: 69 BPM
EKG VENTRICULAR RATE: 82 BPM
EOSINOPHIL # BLD: 0.22 K/UL (ref 0.05–0.5)
EOSINOPHILS RELATIVE PERCENT: 4 % (ref 0–6)
ERYTHROCYTE [DISTWIDTH] IN BLOOD BY AUTOMATED COUNT: 13.3 % (ref 11.5–15)
FIO2: 100 %
GFR SERPL CREATININE-BSD FRML MDRD: >60 ML/MIN/1.73M2
GLUCOSE SERPL-MCNC: 114 MG/DL (ref 74–99)
HBA1C MFR BLD: 7 % (ref 4–5.6)
HCO3: 24.3 MMOL/L (ref 22–26)
HCT VFR BLD AUTO: 37.7 % (ref 37–54)
HGB BLD-MCNC: 13 G/DL (ref 12.5–16.5)
HHB: 0.9 % (ref 0–5)
IMM GRANULOCYTES # BLD AUTO: <0.03 K/UL (ref 0–0.58)
IMM GRANULOCYTES NFR BLD: 0 % (ref 0–5)
LAB: ABNORMAL
LYMPHOCYTES NFR BLD: 2.19 K/UL (ref 1.5–4)
LYMPHOCYTES RELATIVE PERCENT: 41 % (ref 20–42)
Lab: ABNORMAL
MCH RBC QN AUTO: 31 PG (ref 26–35)
MCHC RBC AUTO-ENTMCNC: 34.5 G/DL (ref 32–34.5)
MCV RBC AUTO: 89.8 FL (ref 80–99.9)
METER GLUCOSE: 100 MG/DL (ref 74–99)
METER GLUCOSE: 124 MG/DL (ref 74–99)
METER GLUCOSE: 125 MG/DL (ref 74–99)
METER GLUCOSE: 181 MG/DL (ref 74–99)
METHB: 0.5 % (ref 0–1.5)
MODE: AC
MONOCYTES NFR BLD: 0.53 K/UL (ref 0.1–0.95)
MONOCYTES NFR BLD: 10 % (ref 2–12)
NEUTROPHILS NFR BLD: 44 % (ref 43–80)
NEUTS SEG NFR BLD: 2.36 K/UL (ref 1.8–7.3)
O2 CONTENT: 22 ML/DL
O2 SATURATION: 99.1 % (ref 92–98.5)
O2HB: 98.2 % (ref 94–97)
OPERATOR ID: ABNORMAL
PARTIAL THROMBOPLASTIN TIME: 38.4 SEC (ref 24.5–35.1)
PATIENT TEMP: 37 C
PCO2: 41.9 MMHG (ref 35–45)
PEEP/CPAP: 8 CMH2O
PFO2: 2.78 MMHG/%
PH BLOOD GAS: 7.38 (ref 7.35–7.45)
PLATELET # BLD AUTO: 258 K/UL (ref 130–450)
PMV BLD AUTO: 9.4 FL (ref 7–12)
PO2: 278 MMHG (ref 75–100)
POTASSIUM SERPL-SCNC: 3.5 MMOL/L (ref 3.5–5)
RBC # BLD AUTO: 4.2 M/UL (ref 3.8–5.8)
RI(T): 1.32
RR MECHANICAL: 16 B/MIN
SODIUM SERPL-SCNC: 140 MMOL/L (ref 132–146)
SOURCE, BLOOD GAS: ABNORMAL
THB: 15.5 G/DL (ref 11.5–16.5)
TIME ANALYZED: 1900
VT MECHANICAL: 500 ML
WBC OTHER # BLD: 5.3 K/UL (ref 4.5–11.5)

## 2023-07-26 PROCEDURE — 6360000002 HC RX W HCPCS

## 2023-07-26 PROCEDURE — 2709999900 HC NON-CHARGEABLE SUPPLY

## 2023-07-26 PROCEDURE — 6370000000 HC RX 637 (ALT 250 FOR IP): Performed by: INTERNAL MEDICINE

## 2023-07-26 PROCEDURE — 6370000000 HC RX 637 (ALT 250 FOR IP)

## 2023-07-26 PROCEDURE — 85347 COAGULATION TIME ACTIVATED: CPT

## 2023-07-26 PROCEDURE — 99223 1ST HOSP IP/OBS HIGH 75: CPT | Performed by: PSYCHIATRY & NEUROLOGY

## 2023-07-26 PROCEDURE — 83036 HEMOGLOBIN GLYCOSYLATED A1C: CPT

## 2023-07-26 PROCEDURE — 2500000003 HC RX 250 WO HCPCS

## 2023-07-26 PROCEDURE — 82805 BLOOD GASES W/O2 SATURATION: CPT

## 2023-07-26 PROCEDURE — 2500000003 HC RX 250 WO HCPCS: Performed by: STUDENT IN AN ORGANIZED HEALTH CARE EDUCATION/TRAINING PROGRAM

## 2023-07-26 PROCEDURE — 93010 ELECTROCARDIOGRAM REPORT: CPT | Performed by: INTERNAL MEDICINE

## 2023-07-26 PROCEDURE — 6370000000 HC RX 637 (ALT 250 FOR IP): Performed by: STUDENT IN AN ORGANIZED HEALTH CARE EDUCATION/TRAINING PROGRAM

## 2023-07-26 PROCEDURE — 85730 THROMBOPLASTIN TIME PARTIAL: CPT

## 2023-07-26 PROCEDURE — C1874 STENT, COATED/COV W/DEL SYS: HCPCS

## 2023-07-26 PROCEDURE — 31500 INSERT EMERGENCY AIRWAY: CPT

## 2023-07-26 PROCEDURE — 92928 PRQ TCAT PLMT NTRAC ST 1 LES: CPT | Performed by: INTERNAL MEDICINE

## 2023-07-26 PROCEDURE — C1725 CATH, TRANSLUMIN NON-LASER: HCPCS

## 2023-07-26 PROCEDURE — 5A1945Z RESPIRATORY VENTILATION, 24-96 CONSECUTIVE HOURS: ICD-10-PCS | Performed by: INTERNAL MEDICINE

## 2023-07-26 PROCEDURE — 027034Z DILATION OF CORONARY ARTERY, ONE ARTERY WITH DRUG-ELUTING INTRALUMINAL DEVICE, PERCUTANEOUS APPROACH: ICD-10-PCS | Performed by: INTERNAL MEDICINE

## 2023-07-26 PROCEDURE — C9600 PERC DRUG-EL COR STENT SING: HCPCS

## 2023-07-26 PROCEDURE — 86850 RBC ANTIBODY SCREEN: CPT

## 2023-07-26 PROCEDURE — 70496 CT ANGIOGRAPHY HEAD: CPT

## 2023-07-26 PROCEDURE — 0042T CT BRAIN PERFUSION: CPT

## 2023-07-26 PROCEDURE — C1894 INTRO/SHEATH, NON-LASER: HCPCS

## 2023-07-26 PROCEDURE — 4A023N7 MEASUREMENT OF CARDIAC SAMPLING AND PRESSURE, LEFT HEART, PERCUTANEOUS APPROACH: ICD-10-PCS | Performed by: INTERNAL MEDICINE

## 2023-07-26 PROCEDURE — 2580000003 HC RX 258: Performed by: INTERNAL MEDICINE

## 2023-07-26 PROCEDURE — 85027 COMPLETE CBC AUTOMATED: CPT

## 2023-07-26 PROCEDURE — 82947 ASSAY GLUCOSE BLOOD QUANT: CPT

## 2023-07-26 PROCEDURE — B2111ZZ FLUOROSCOPY OF MULTIPLE CORONARY ARTERIES USING LOW OSMOLAR CONTRAST: ICD-10-PCS | Performed by: INTERNAL MEDICINE

## 2023-07-26 PROCEDURE — 93005 ELECTROCARDIOGRAM TRACING: CPT

## 2023-07-26 PROCEDURE — 36415 COLL VENOUS BLD VENIPUNCTURE: CPT

## 2023-07-26 PROCEDURE — C1887 CATHETER, GUIDING: HCPCS

## 2023-07-26 PROCEDURE — 6360000004 HC RX CONTRAST MEDICATION: Performed by: RADIOLOGY

## 2023-07-26 PROCEDURE — 94002 VENT MGMT INPAT INIT DAY: CPT

## 2023-07-26 PROCEDURE — 70450 CT HEAD/BRAIN W/O DYE: CPT

## 2023-07-26 PROCEDURE — G0378 HOSPITAL OBSERVATION PER HR: HCPCS

## 2023-07-26 PROCEDURE — 74018 RADEX ABDOMEN 1 VIEW: CPT

## 2023-07-26 PROCEDURE — C1769 GUIDE WIRE: HCPCS

## 2023-07-26 PROCEDURE — 93458 L HRT ARTERY/VENTRICLE ANGIO: CPT

## 2023-07-26 PROCEDURE — 86900 BLOOD TYPING SEROLOGIC ABO: CPT

## 2023-07-26 PROCEDURE — 51702 INSERT TEMP BLADDER CATH: CPT

## 2023-07-26 PROCEDURE — 80048 BASIC METABOLIC PNL TOTAL CA: CPT

## 2023-07-26 PROCEDURE — 70498 CT ANGIOGRAPHY NECK: CPT

## 2023-07-26 PROCEDURE — 93005 ELECTROCARDIOGRAM TRACING: CPT | Performed by: INTERNAL MEDICINE

## 2023-07-26 PROCEDURE — 99291 CRITICAL CARE FIRST HOUR: CPT | Performed by: SURGERY

## 2023-07-26 PROCEDURE — 86901 BLOOD TYPING SEROLOGIC RH(D): CPT

## 2023-07-26 PROCEDURE — 93458 L HRT ARTERY/VENTRICLE ANGIO: CPT | Performed by: INTERNAL MEDICINE

## 2023-07-26 PROCEDURE — 2000000000 HC ICU R&B

## 2023-07-26 PROCEDURE — 92950 HEART/LUNG RESUSCITATION CPR: CPT

## 2023-07-26 PROCEDURE — 2580000003 HC RX 258: Performed by: STUDENT IN AN ORGANIZED HEALTH CARE EDUCATION/TRAINING PROGRAM

## 2023-07-26 PROCEDURE — A4216 STERILE WATER/SALINE, 10 ML: HCPCS | Performed by: STUDENT IN AN ORGANIZED HEALTH CARE EDUCATION/TRAINING PROGRAM

## 2023-07-26 PROCEDURE — 0BH17EZ INSERTION OF ENDOTRACHEAL AIRWAY INTO TRACHEA, VIA NATURAL OR ARTIFICIAL OPENING: ICD-10-PCS | Performed by: INTERNAL MEDICINE

## 2023-07-26 RX ORDER — MINERAL OIL AND WHITE PETROLATUM 150; 830 MG/G; MG/G
OINTMENT OPHTHALMIC EVERY 4 HOURS
Status: DISCONTINUED | OUTPATIENT
Start: 2023-07-26 | End: 2023-07-28

## 2023-07-26 RX ORDER — MIDAZOLAM HYDROCHLORIDE 1 MG/ML
4 INJECTION INTRAMUSCULAR; INTRAVENOUS ONCE
Status: COMPLETED | OUTPATIENT
Start: 2023-07-26 | End: 2023-07-26

## 2023-07-26 RX ORDER — ASPIRIN 81 MG/1
81 TABLET ORAL DAILY
Status: DISCONTINUED | OUTPATIENT
Start: 2023-07-27 | End: 2023-08-01 | Stop reason: HOSPADM

## 2023-07-26 RX ORDER — ATORVASTATIN CALCIUM 40 MG/1
40 TABLET, FILM COATED ORAL NIGHTLY
Status: DISCONTINUED | OUTPATIENT
Start: 2023-07-26 | End: 2023-08-01 | Stop reason: HOSPADM

## 2023-07-26 RX ORDER — SODIUM CHLORIDE 0.9 % (FLUSH) 0.9 %
5-40 SYRINGE (ML) INJECTION PRN
Status: DISCONTINUED | OUTPATIENT
Start: 2023-07-26 | End: 2023-08-01 | Stop reason: HOSPADM

## 2023-07-26 RX ORDER — SODIUM CHLORIDE 9 MG/ML
INJECTION, SOLUTION INTRAVENOUS CONTINUOUS
Status: ACTIVE | OUTPATIENT
Start: 2023-07-26 | End: 2023-07-27

## 2023-07-26 RX ORDER — POTASSIUM CHLORIDE 20 MEQ/1
40 TABLET, EXTENDED RELEASE ORAL ONCE
Status: DISCONTINUED | OUTPATIENT
Start: 2023-07-26 | End: 2023-07-26 | Stop reason: HOSPADM

## 2023-07-26 RX ORDER — DEXTROSE MONOHYDRATE 100 MG/ML
INJECTION, SOLUTION INTRAVENOUS CONTINUOUS PRN
Status: DISCONTINUED | OUTPATIENT
Start: 2023-07-26 | End: 2023-08-01 | Stop reason: HOSPADM

## 2023-07-26 RX ORDER — POLYVINYL ALCOHOL 14 MG/ML
1 SOLUTION/ DROPS OPHTHALMIC EVERY 4 HOURS
Status: DISCONTINUED | OUTPATIENT
Start: 2023-07-26 | End: 2023-07-28

## 2023-07-26 RX ORDER — INSULIN LISPRO 100 [IU]/ML
0-8 INJECTION, SOLUTION INTRAVENOUS; SUBCUTANEOUS EVERY 4 HOURS
Status: DISCONTINUED | OUTPATIENT
Start: 2023-07-26 | End: 2023-07-30

## 2023-07-26 RX ORDER — SODIUM CHLORIDE 9 MG/ML
INJECTION, SOLUTION INTRAVENOUS PRN
Status: DISCONTINUED | OUTPATIENT
Start: 2023-07-26 | End: 2023-08-01 | Stop reason: HOSPADM

## 2023-07-26 RX ORDER — ACETAMINOPHEN 325 MG/1
650 TABLET ORAL EVERY 4 HOURS PRN
Status: DISCONTINUED | OUTPATIENT
Start: 2023-07-26 | End: 2023-08-01 | Stop reason: HOSPADM

## 2023-07-26 RX ORDER — LEVETIRACETAM 5 MG/ML
500 INJECTION INTRAVASCULAR 2 TIMES DAILY
Status: DISCONTINUED | OUTPATIENT
Start: 2023-07-26 | End: 2023-07-26 | Stop reason: CLARIF

## 2023-07-26 RX ORDER — CHLORHEXIDINE GLUCONATE 0.12 MG/ML
15 RINSE ORAL 2 TIMES DAILY
Status: DISCONTINUED | OUTPATIENT
Start: 2023-07-26 | End: 2023-07-28

## 2023-07-26 RX ORDER — SODIUM CHLORIDE 0.9 % (FLUSH) 0.9 %
5-40 SYRINGE (ML) INJECTION EVERY 12 HOURS SCHEDULED
Status: DISCONTINUED | OUTPATIENT
Start: 2023-07-26 | End: 2023-08-01 | Stop reason: HOSPADM

## 2023-07-26 RX ORDER — ASPIRIN 81 MG/1
81 TABLET, CHEWABLE ORAL ONCE
Status: COMPLETED | OUTPATIENT
Start: 2023-07-26 | End: 2023-07-26

## 2023-07-26 RX ADMIN — MINERAL OIL, PETROLATUM: 425; 568 OINTMENT OPHTHALMIC at 21:05

## 2023-07-26 RX ADMIN — SODIUM CHLORIDE, PRESERVATIVE FREE 20 MG: 5 INJECTION INTRAVENOUS at 21:04

## 2023-07-26 RX ADMIN — ATORVASTATIN CALCIUM 40 MG: 40 TABLET, FILM COATED ORAL at 21:05

## 2023-07-26 RX ADMIN — ASPIRIN 81 MG: 81 TABLET, CHEWABLE ORAL at 07:43

## 2023-07-26 RX ADMIN — IOPAMIDOL 120 ML: 755 INJECTION, SOLUTION INTRAVENOUS at 14:09

## 2023-07-26 RX ADMIN — SODIUM CHLORIDE, PRESERVATIVE FREE 10 ML: 5 INJECTION INTRAVENOUS at 21:05

## 2023-07-26 RX ADMIN — SODIUM CHLORIDE: 9 INJECTION, SOLUTION INTRAVENOUS at 21:22

## 2023-07-26 RX ADMIN — TICAGRELOR 90 MG: 90 TABLET ORAL at 21:05

## 2023-07-26 RX ADMIN — MIDAZOLAM HYDROCHLORIDE 4 MG: 1 INJECTION, SOLUTION INTRAMUSCULAR; INTRAVENOUS at 17:15

## 2023-07-26 RX ADMIN — SODIUM CHLORIDE: 9 INJECTION, SOLUTION INTRAVENOUS at 13:04

## 2023-07-26 RX ADMIN — CHLORHEXIDINE GLUCONATE 15 ML: 1.2 RINSE ORAL at 21:05

## 2023-07-26 RX ADMIN — ACETAMINOPHEN 650 MG: 325 TABLET ORAL at 21:24

## 2023-07-26 ASSESSMENT — PULMONARY FUNCTION TESTS
PIF_VALUE: 23
PIF_VALUE: 19
PIF_VALUE: 13
PIF_VALUE: 18
PIF_VALUE: 32
PIF_VALUE: 19
PIF_VALUE: 41
PIF_VALUE: 22
PIF_VALUE: 18

## 2023-07-26 ASSESSMENT — PAIN SCALES - GENERAL
PAINLEVEL_OUTOF10: 0

## 2023-07-26 NOTE — FLOWSHEET NOTE
4 Eyes Skin Assessment     NAME:  Navya Newsome  YOB: 1950  MEDICAL RECORD NUMBER:  [id number]    The patient is being assessed for  Transfer to New Unit    I agree that at least one RN has performed a thorough Head to Toe Skin Assessment on the patient. ALL assessment sites listed below have been assessed. Areas assessed by both nurses:    Head, Face, Ears, Shoulders, Back, Chest, Arms, Elbows, Hands, Sacrum. Buttock, Coccyx, Ischium, Legs. Feet and Heels, and Under Medical Devices         Does the Patient have a Wound?  No noted wound(s)       Bienvenido Prevention initiated by RN: Yes  Wound Care Orders initiated by RN: No    Pressure Injury (Stage 3,4, Unstageable, DTI, NWPT, and Complex wounds) if present, place Wound referral order by RN under : No    New Ostomies, if present place, Ostomy referral order under : No     Nurse 1 eSignature: Electronically signed by Loma Habermann, RN on 7/26/23 at 6:10 PM EDT    **SHARE this note so that the co-signing nurse can place an eSignature**    Nurse 2 eSignature: Electronically signed by Luigi Munoz RN on 7/26/23 at 6:10 PM EDT

## 2023-07-26 NOTE — CARE COORDINATION
07/26/23 Transition of Care: Patient is from [facility] for a heart cath. Cath completed with PCI/NOLA. He went to floor from [facility]vl and stroke alert called due to neuro changes on assessment. To CT scan for stat scans. Neurology at the bedside. He is on telemetry. He resides with his wife in a one story home. He has a wheeled walker, cane, shower chair, and elevated commode. He does drive. He does need assistance with ADLs. He uses the wheeled walker to ambulate. He has been falling over last couple months. He follows with Dr Kemar Oconnor and his pharmacy is [facility]. Will follow for plan of care. Will need PT. OT chon. Will speak to the wife when able.  Electronically signed by Sabrina Brothers RN CM on 7/26/2023 at 2:35 PM

## 2023-07-26 NOTE — ACP (ADVANCE CARE PLANNING)
Advance Care Planning   Healthcare Decision Maker:    Primary Decision Maker: Mir Lynch [facility] - [phone]

## 2023-07-26 NOTE — SIGNIFICANT EVENT
Code Blue Note    Code blue was called at 8520    On arrival, patient was breathing spontaneously and had ROSC    Initial rhythm was noted to be unable to be determined. Per staff, patient altered, had seizure like activity, pulses were then lost and not breathing spontaneously, board was placed under the patient to start CPR, but noted patient had ROSC. Patient was evaluated and not protecting airway initially, therefore he was intubated, total of 8 mg of versed administered. Shortly after intubation, patient opened eyes and started moving spontaneously although non purposefully, trying to reach for ETT, restraints were placed. Patient intubated and transferred to intensive care for further stabilization. Family, PCP, Neurology, and Intensivist were updated.     Kana Joel MD, PGY-3  7/26/2023  5:34 PM

## 2023-07-26 NOTE — FLOWSHEET NOTE
Santos catheter inserted using sterile technique per physician's order. Santos Catheter Indication: Critical Care Fluid Volume Management Upon insertion, 250 mL of urine returned. Securing device applied. Santos bag is hanging below the level of the bladder, safety clip attached to the bed sheet, tamper seal is intact, drainage bag is not on the floor, lack of dependent loop in tubing, and the drainage bag is less than half full.

## 2023-07-26 NOTE — PROCEDURES
[address], [address] [place] [address]                            CARDIAC CATHETERIZATION    PATIENT NAME: Weston Brown               :        1950  MED REC NO:   [id number]                            ROOM:       1362  ACCOUNT NO:   [de-identified]                           ADMIT DATE: 2023  PROVIDER:     Jeanette Hu MD    DATE OF PROCEDURE:  2023    REFERRING PHYSICIAN:  Claudell Antu, MD    PROCEDURE:  Left heart catheterization and PCI to OM1. INDICATION:  Chest pain and positive Lexiscan. DESCRIPTION OF PROCEDURE:  The patient was transferred from [facility] to undergo left heart catheterization. After obtaining a  signed consent from the patient, he was brought to the cardiac cath lab  in the usual fasting state. Under sterile condition and under local  anesthetic and using conscious sedation, a 6-[place] Terumo slender  sheath was inserted in his right radial artery. The patient received  5000 units of intravenous heparin. He also received 200 mcg of  intraarterial nitroglycerin and 2.5 mg of diluted verapamil via the  right radial sheath. Then a 5-[place] thick diagnostic catheter was  advanced over a J-tip wire to the ascending aorta with some difficulty  due to very angulated right subclavian artery. This catheter was able  to engage the left main coronary artery and angiogram was done. This  catheter failed to engage the right coronary. It was exchanged over a  guidewire to a 5-[place] AR-1 diagnostic catheter, which was able to  engage the right coronary artery and angiogram was done. This catheter  was exchanged over a guidewire to a 5-[place] pigtail, which was advanced  into the left ventricle without difficulty. The left ventricular  end-diastolic pressure was measured. Left ventriculogram was done.    There was no significant gradient across the aortic

## 2023-07-26 NOTE — FLOWSHEET NOTE
Patient attempting to pull on ET tube, unable to redirect at this time. Soft bilateral wrist restraints started for patients safety. Wife, Hugo Swan, notified. Will continue to reassess.

## 2023-07-26 NOTE — FLOWSHEET NOTE
Patient admitted to [facility] with the following belongings:  Pants, Shirt, Socks, Shoes, and Other CPAP machine . The following belongings admitted with the patient, None, were sent home with the patient's family.

## 2023-07-27 ENCOUNTER — APPOINTMENT (OUTPATIENT)
Dept: GENERAL RADIOLOGY | Age: 73
DRG: 246 | End: 2023-07-27
Attending: INTERNAL MEDICINE
Payer: MEDICARE

## 2023-07-27 PROBLEM — Z95.5 S/P PRIMARY ANGIOPLASTY WITH CORONARY STENT: Status: ACTIVE | Noted: 2023-07-27

## 2023-07-27 PROBLEM — I25.10 CORONARY ARTERY DISEASE DUE TO LIPID RICH PLAQUE: Status: ACTIVE | Noted: 2023-07-27

## 2023-07-27 PROBLEM — I25.83 CORONARY ARTERY DISEASE DUE TO LIPID RICH PLAQUE: Status: ACTIVE | Noted: 2023-07-27

## 2023-07-27 PROBLEM — R94.39 ABNORMAL NUCLEAR STRESS TEST: Status: ACTIVE | Noted: 2023-07-27

## 2023-07-27 LAB
AADO2: 241.6 MMHG
ALBUMIN SERPL-MCNC: 3.8 G/DL (ref 3.5–5.2)
ALP SERPL-CCNC: 46 U/L (ref 40–129)
ALT SERPL-CCNC: 57 U/L (ref 0–40)
ANION GAP SERPL CALCULATED.3IONS-SCNC: 16 MMOL/L (ref 7–16)
AST SERPL-CCNC: 72 U/L (ref 0–39)
B.E.: -1.3 MMOL/L (ref -3–3)
BASOPHILS # BLD: 0.03 K/UL (ref 0–0.2)
BASOPHILS NFR BLD: 0 % (ref 0–2)
BILIRUB SERPL-MCNC: 0.7 MG/DL (ref 0–1.2)
BUN SERPL-MCNC: 15 MG/DL (ref 6–23)
CA-I BLD-SCNC: 1.08 MMOL/L (ref 1.15–1.33)
CA-I BLD-SCNC: 1.22 MMOL/L (ref 1.15–1.33)
CALCIUM SERPL-MCNC: 8.4 MG/DL (ref 8.6–10.2)
CHLORIDE SERPL-SCNC: 106 MMOL/L (ref 98–107)
CO2 SERPL-SCNC: 19 MMOL/L (ref 22–29)
COHB: 0.2 % (ref 0–1.5)
CREAT SERPL-MCNC: 0.7 MG/DL (ref 0.7–1.2)
CRITICAL: ABNORMAL
DATE ANALYZED: ABNORMAL
DATE OF COLLECTION: ABNORMAL
EKG ATRIAL RATE: 90 BPM
EKG P AXIS: 57 DEGREES
EKG P-R INTERVAL: 208 MS
EKG Q-T INTERVAL: 396 MS
EKG QRS DURATION: 100 MS
EKG QTC CALCULATION (BAZETT): 484 MS
EKG R AXIS: -58 DEGREES
EKG T AXIS: 60 DEGREES
EKG VENTRICULAR RATE: 90 BPM
EOSINOPHIL # BLD: 0 K/UL (ref 0.05–0.5)
EOSINOPHILS RELATIVE PERCENT: 0 % (ref 0–6)
ERYTHROCYTE [DISTWIDTH] IN BLOOD BY AUTOMATED COUNT: 13.3 % (ref 11.5–15)
FIO2: 80 %
GFR SERPL CREATININE-BSD FRML MDRD: >60 ML/MIN/1.73M2
GLUCOSE SERPL-MCNC: 175 MG/DL (ref 74–99)
HCO3: 22 MMOL/L (ref 22–26)
HCT VFR BLD AUTO: 37.2 % (ref 37–54)
HGB BLD-MCNC: 12.7 G/DL (ref 12.5–16.5)
HHB: 0.9 % (ref 0–5)
IMM GRANULOCYTES # BLD AUTO: 0.05 K/UL (ref 0–0.58)
IMM GRANULOCYTES NFR BLD: 1 % (ref 0–5)
LAB: ABNORMAL
LYMPHOCYTES NFR BLD: 0.81 K/UL (ref 1.5–4)
LYMPHOCYTES RELATIVE PERCENT: 8 % (ref 20–42)
Lab: ABNORMAL
MAGNESIUM SERPL-MCNC: 1.9 MG/DL (ref 1.6–2.6)
MAGNESIUM SERPL-MCNC: 2.5 MG/DL (ref 1.6–2.6)
MCH RBC QN AUTO: 30.7 PG (ref 26–35)
MCHC RBC AUTO-ENTMCNC: 34.1 G/DL (ref 32–34.5)
MCV RBC AUTO: 89.9 FL (ref 80–99.9)
METER GLUCOSE: 166 MG/DL (ref 74–99)
METER GLUCOSE: 175 MG/DL (ref 74–99)
METER GLUCOSE: 176 MG/DL (ref 74–99)
METER GLUCOSE: 184 MG/DL (ref 74–99)
METER GLUCOSE: 204 MG/DL (ref 74–99)
METER GLUCOSE: 207 MG/DL (ref 74–99)
METHB: 0.3 % (ref 0–1.5)
MODE: AC
MONOCYTES NFR BLD: 0.83 K/UL (ref 0.1–0.95)
MONOCYTES NFR BLD: 8 % (ref 2–12)
NEUTROPHILS NFR BLD: 84 % (ref 43–80)
NEUTS SEG NFR BLD: 8.76 K/UL (ref 1.8–7.3)
O2 CONTENT: 20.9 ML/DL
O2 SATURATION: 99.1 % (ref 92–98.5)
O2HB: 98.6 % (ref 94–97)
OPERATOR ID: 7221
PATIENT TEMP: 37 C
PCO2: 33.2 MMHG (ref 35–45)
PEEP/CPAP: 8 CMH2O
PFO2: 3.42 MMHG/%
PH BLOOD GAS: 7.44 (ref 7.35–7.45)
PHOSPHATE SERPL-MCNC: 4.4 MG/DL (ref 2.5–4.5)
PLATELET # BLD AUTO: 250 K/UL (ref 130–450)
PMV BLD AUTO: 9.6 FL (ref 7–12)
PO2: 273.9 MMHG (ref 75–100)
POTASSIUM SERPL-SCNC: 3.9 MMOL/L (ref 3.5–5)
POTASSIUM SERPL-SCNC: 3.9 MMOL/L (ref 3.5–5)
PROT SERPL-MCNC: 6.7 G/DL (ref 6.4–8.3)
RBC # BLD AUTO: 4.14 M/UL (ref 3.8–5.8)
RI(T): 0.88
RR MECHANICAL: 16 B/MIN
SODIUM SERPL-SCNC: 141 MMOL/L (ref 132–146)
SOURCE, BLOOD GAS: ABNORMAL
THB: 14.6 G/DL (ref 11.5–16.5)
TIME ANALYZED: 410
VT MECHANICAL: 500 ML
WBC OTHER # BLD: 10.5 K/UL (ref 4.5–11.5)

## 2023-07-27 PROCEDURE — 6370000000 HC RX 637 (ALT 250 FOR IP): Performed by: INTERNAL MEDICINE

## 2023-07-27 PROCEDURE — 84132 ASSAY OF SERUM POTASSIUM: CPT

## 2023-07-27 PROCEDURE — 80053 COMPREHEN METABOLIC PANEL: CPT

## 2023-07-27 PROCEDURE — 6370000000 HC RX 637 (ALT 250 FOR IP): Performed by: STUDENT IN AN ORGANIZED HEALTH CARE EDUCATION/TRAINING PROGRAM

## 2023-07-27 PROCEDURE — 6360000002 HC RX W HCPCS: Performed by: NURSE PRACTITIONER

## 2023-07-27 PROCEDURE — 2580000003 HC RX 258

## 2023-07-27 PROCEDURE — 85027 COMPLETE CBC AUTOMATED: CPT

## 2023-07-27 PROCEDURE — 71045 X-RAY EXAM CHEST 1 VIEW: CPT

## 2023-07-27 PROCEDURE — 99233 SBSQ HOSP IP/OBS HIGH 50: CPT | Performed by: NURSE PRACTITIONER

## 2023-07-27 PROCEDURE — 2500000003 HC RX 250 WO HCPCS: Performed by: STUDENT IN AN ORGANIZED HEALTH CARE EDUCATION/TRAINING PROGRAM

## 2023-07-27 PROCEDURE — 99232 SBSQ HOSP IP/OBS MODERATE 35: CPT | Performed by: INTERNAL MEDICINE

## 2023-07-27 PROCEDURE — 82947 ASSAY GLUCOSE BLOOD QUANT: CPT

## 2023-07-27 PROCEDURE — 84100 ASSAY OF PHOSPHORUS: CPT

## 2023-07-27 PROCEDURE — 2500000003 HC RX 250 WO HCPCS

## 2023-07-27 PROCEDURE — 36591 DRAW BLOOD OFF VENOUS DEVICE: CPT

## 2023-07-27 PROCEDURE — 94664 DEMO&/EVAL PT USE INHALER: CPT

## 2023-07-27 PROCEDURE — 99291 CRITICAL CARE FIRST HOUR: CPT | Performed by: SURGERY

## 2023-07-27 PROCEDURE — 2580000003 HC RX 258: Performed by: INTERNAL MEDICINE

## 2023-07-27 PROCEDURE — 2580000003 HC RX 258: Performed by: STUDENT IN AN ORGANIZED HEALTH CARE EDUCATION/TRAINING PROGRAM

## 2023-07-27 PROCEDURE — 82330 ASSAY OF CALCIUM: CPT

## 2023-07-27 PROCEDURE — 83735 ASSAY OF MAGNESIUM: CPT

## 2023-07-27 PROCEDURE — 82805 BLOOD GASES W/O2 SATURATION: CPT

## 2023-07-27 PROCEDURE — A4216 STERILE WATER/SALINE, 10 ML: HCPCS | Performed by: STUDENT IN AN ORGANIZED HEALTH CARE EDUCATION/TRAINING PROGRAM

## 2023-07-27 PROCEDURE — 94640 AIRWAY INHALATION TREATMENT: CPT

## 2023-07-27 PROCEDURE — 6360000002 HC RX W HCPCS: Performed by: STUDENT IN AN ORGANIZED HEALTH CARE EDUCATION/TRAINING PROGRAM

## 2023-07-27 PROCEDURE — 94003 VENT MGMT INPAT SUBQ DAY: CPT

## 2023-07-27 PROCEDURE — 93010 ELECTROCARDIOGRAM REPORT: CPT | Performed by: INTERNAL MEDICINE

## 2023-07-27 PROCEDURE — APPSS30 APP SPLIT SHARED TIME 16-30 MINUTES: Performed by: CLINICAL NURSE SPECIALIST

## 2023-07-27 PROCEDURE — 2000000000 HC ICU R&B

## 2023-07-27 RX ORDER — ALBUTEROL SULFATE 2.5 MG/3ML
2.5 SOLUTION RESPIRATORY (INHALATION) 4 TIMES DAILY PRN
Status: DISCONTINUED | OUTPATIENT
Start: 2023-07-27 | End: 2023-08-01 | Stop reason: HOSPADM

## 2023-07-27 RX ORDER — POTASSIUM CHLORIDE 7.45 MG/ML
10 INJECTION INTRAVENOUS
Status: COMPLETED | OUTPATIENT
Start: 2023-07-27 | End: 2023-07-27

## 2023-07-27 RX ORDER — GABAPENTIN 300 MG/1
600 CAPSULE ORAL 4 TIMES DAILY
Status: DISCONTINUED | OUTPATIENT
Start: 2023-07-27 | End: 2023-08-01 | Stop reason: HOSPADM

## 2023-07-27 RX ORDER — FLUTICASONE PROPIONATE 50 MCG
1 SPRAY, SUSPENSION (ML) NASAL 2 TIMES DAILY PRN
Status: DISCONTINUED | OUTPATIENT
Start: 2023-07-27 | End: 2023-08-01 | Stop reason: HOSPADM

## 2023-07-27 RX ORDER — LEVETIRACETAM 500 MG/5ML
500 INJECTION, SOLUTION, CONCENTRATE INTRAVENOUS EVERY 12 HOURS
Status: DISCONTINUED | OUTPATIENT
Start: 2023-07-27 | End: 2023-07-27

## 2023-07-27 RX ORDER — LOSARTAN POTASSIUM 50 MG/1
50 TABLET ORAL DAILY
Status: DISCONTINUED | OUTPATIENT
Start: 2023-07-27 | End: 2023-07-28

## 2023-07-27 RX ORDER — MAGNESIUM SULFATE IN WATER 40 MG/ML
2000 INJECTION, SOLUTION INTRAVENOUS ONCE
Status: COMPLETED | OUTPATIENT
Start: 2023-07-27 | End: 2023-07-27

## 2023-07-27 RX ORDER — PANTOPRAZOLE SODIUM 40 MG/1
40 TABLET, DELAYED RELEASE ORAL DAILY
Status: DISCONTINUED | OUTPATIENT
Start: 2023-07-27 | End: 2023-08-01 | Stop reason: HOSPADM

## 2023-07-27 RX ORDER — ARFORMOTEROL TARTRATE 15 UG/2ML
15 SOLUTION RESPIRATORY (INHALATION)
Status: DISCONTINUED | OUTPATIENT
Start: 2023-07-27 | End: 2023-08-01 | Stop reason: HOSPADM

## 2023-07-27 RX ORDER — BUDESONIDE 0.5 MG/2ML
0.5 INHALANT ORAL
Status: DISCONTINUED | OUTPATIENT
Start: 2023-07-27 | End: 2023-08-01 | Stop reason: HOSPADM

## 2023-07-27 RX ORDER — PRAVASTATIN SODIUM 20 MG
80 TABLET ORAL NIGHTLY
Status: DISCONTINUED | OUTPATIENT
Start: 2023-07-27 | End: 2023-07-27 | Stop reason: SDUPTHER

## 2023-07-27 RX ORDER — LEVETIRACETAM 500 MG/5ML
500 INJECTION, SOLUTION, CONCENTRATE INTRAVENOUS EVERY 12 HOURS
Status: DISCONTINUED | OUTPATIENT
Start: 2023-07-27 | End: 2023-08-01

## 2023-07-27 RX ADMIN — BUDESONIDE INHALATION 500 MCG: 0.5 SUSPENSION RESPIRATORY (INHALATION) at 20:08

## 2023-07-27 RX ADMIN — SODIUM CHLORIDE, PRESERVATIVE FREE 10 ML: 5 INJECTION INTRAVENOUS at 20:39

## 2023-07-27 RX ADMIN — MINERAL OIL, PETROLATUM: 425; 568 OINTMENT OPHTHALMIC at 08:21

## 2023-07-27 RX ADMIN — ASPIRIN 81 MG: 81 TABLET, COATED ORAL at 07:53

## 2023-07-27 RX ADMIN — CHLORHEXIDINE GLUCONATE 15 ML: 1.2 RINSE ORAL at 07:53

## 2023-07-27 RX ADMIN — TICAGRELOR 90 MG: 90 TABLET ORAL at 20:39

## 2023-07-27 RX ADMIN — MINERAL OIL, PETROLATUM: 425; 568 OINTMENT OPHTHALMIC at 20:25

## 2023-07-27 RX ADMIN — ARFORMOTEROL TARTRATE 15 MCG: 15 SOLUTION RESPIRATORY (INHALATION) at 20:08

## 2023-07-27 RX ADMIN — SODIUM CHLORIDE, PRESERVATIVE FREE 20 MG: 5 INJECTION INTRAVENOUS at 07:53

## 2023-07-27 RX ADMIN — INSULIN LISPRO 2 UNITS: 100 INJECTION, SOLUTION INTRAVENOUS; SUBCUTANEOUS at 08:24

## 2023-07-27 RX ADMIN — LEVETIRACETAM 500 MG: 100 INJECTION INTRAVENOUS at 11:30

## 2023-07-27 RX ADMIN — DEXMEDETOMIDINE 0.2 MCG/KG/HR: 100 INJECTION, SOLUTION INTRAVENOUS at 01:04

## 2023-07-27 RX ADMIN — GABAPENTIN 600 MG: 300 CAPSULE ORAL at 17:29

## 2023-07-27 RX ADMIN — MAGNESIUM SULFATE HEPTAHYDRATE 2000 MG: 40 INJECTION, SOLUTION INTRAVENOUS at 08:32

## 2023-07-27 RX ADMIN — SODIUM CHLORIDE, PRESERVATIVE FREE 10 ML: 5 INJECTION INTRAVENOUS at 08:25

## 2023-07-27 RX ADMIN — MINERAL OIL, PETROLATUM: 425; 568 OINTMENT OPHTHALMIC at 16:13

## 2023-07-27 RX ADMIN — SODIUM CHLORIDE, PRESERVATIVE FREE 20 MG: 5 INJECTION INTRAVENOUS at 20:39

## 2023-07-27 RX ADMIN — GABAPENTIN 600 MG: 300 CAPSULE ORAL at 20:39

## 2023-07-27 RX ADMIN — CHLORHEXIDINE GLUCONATE 15 ML: 1.2 RINSE ORAL at 20:40

## 2023-07-27 RX ADMIN — INSULIN LISPRO 2 UNITS: 100 INJECTION, SOLUTION INTRAVENOUS; SUBCUTANEOUS at 10:20

## 2023-07-27 RX ADMIN — SODIUM CHLORIDE: 9 INJECTION, SOLUTION INTRAVENOUS at 07:58

## 2023-07-27 RX ADMIN — ATORVASTATIN CALCIUM 40 MG: 40 TABLET, FILM COATED ORAL at 20:39

## 2023-07-27 RX ADMIN — POTASSIUM CHLORIDE 10 MEQ: 7.46 INJECTION, SOLUTION INTRAVENOUS at 09:33

## 2023-07-27 RX ADMIN — CALCIUM GLUCONATE 2000 MG: 98 INJECTION, SOLUTION INTRAVENOUS at 08:27

## 2023-07-27 RX ADMIN — GABAPENTIN 600 MG: 300 CAPSULE ORAL at 13:18

## 2023-07-27 RX ADMIN — DEXMEDETOMIDINE 0.4 MCG/KG/HR: 100 INJECTION, SOLUTION INTRAVENOUS at 22:42

## 2023-07-27 RX ADMIN — TICAGRELOR 90 MG: 90 TABLET ORAL at 07:53

## 2023-07-27 RX ADMIN — POTASSIUM CHLORIDE 10 MEQ: 7.46 INJECTION, SOLUTION INTRAVENOUS at 08:29

## 2023-07-27 ASSESSMENT — PULMONARY FUNCTION TESTS
PIF_VALUE: 17
PIF_VALUE: 18
PIF_VALUE: 18
PIF_VALUE: 16
PIF_VALUE: 19
PIF_VALUE: 19
PIF_VALUE: 18
PIF_VALUE: 19
PIF_VALUE: 19
PIF_VALUE: 18
PIF_VALUE: 16
PIF_VALUE: 19
PIF_VALUE: 19
PIF_VALUE: 22
PIF_VALUE: 16
PIF_VALUE: 15
PIF_VALUE: 19
PIF_VALUE: 20
PIF_VALUE: 19
PIF_VALUE: 18
PIF_VALUE: 17
PIF_VALUE: 19
PIF_VALUE: 18
PIF_VALUE: 20
PIF_VALUE: 19
PIF_VALUE: 18
PIF_VALUE: 18
PIF_VALUE: 22
PIF_VALUE: 17

## 2023-07-27 ASSESSMENT — PAIN - FUNCTIONAL ASSESSMENT
PAIN_FUNCTIONAL_ASSESSMENT: PREVENTS OR INTERFERES SOME ACTIVE ACTIVITIES AND ADLS

## 2023-07-27 ASSESSMENT — PAIN DESCRIPTION - DESCRIPTORS
DESCRIPTORS: ACHING

## 2023-07-27 ASSESSMENT — PAIN SCALES - GENERAL
PAINLEVEL_OUTOF10: 0

## 2023-07-27 ASSESSMENT — PAIN DESCRIPTION - ORIENTATION
ORIENTATION: MID

## 2023-07-27 ASSESSMENT — PAIN DESCRIPTION - LOCATION
LOCATION: HEAD

## 2023-07-27 NOTE — DISCHARGE SUMMARY
cerebral artery. No significant stenosis of the vertebral, basilar, or the left posterior cerebral arteries. No aneurysm. OTHER: No dural venous sinus thrombosis on this non-dedicated study. CT PERFUSION: EXAM QUALITY: The examination is diagnostic with appropriate arterial inflow and venous outflow curves, and diagnostic perfusion maps. CORE INFARCT: The total area of ischemic core is 0 mL (CBF<30% volume). TOTAL HYPOPERFUSION: The total area of hypoperfusion is 2 mL (Tmax>6s volume). PENUMBRA: The penumbra (mismatch) volume is 2 mL and is located in the left temporal lobe. The mismatch ratio is n/a. NONCONTRAST CT OF THE HEAD: 1.  No acute intracranial abnormality. CT PERFUSION OF THE HEAD: 1. Small, 2 cc focus of ischemia versus artifact in the left temporal lobe. 2. No evidence of core infarction. CTA OF THE HEAD: 1. Multiple focal moderate to severe grade stenoses in the proximal P2 segment of the right posterior cerebral artery. 2. The remainder of the major intracranial arteries are widely patent. CTA OF THE NECK: 1. No significant stenosis in the carotid or vertebral arteries. XR CHEST PORTABLE    Result Date: 7/24/2023  EXAMINATION: ONE XRAY VIEW OF THE CHEST 7/24/2023 12:48 pm COMPARISON: 07/23/2022 HISTORY: ORDERING SYSTEM PROVIDED HISTORY: cp TECHNOLOGIST PROVIDED HISTORY: Reason for exam:->cp FINDINGS: The lungs are without acute focal process. There is no effusion or pneumothorax. The cardiomediastinal silhouette is without acute process. The osseous structures are without acute process. Stable elevation right hemidiaphragm. No acute process.      CTA NECK W CONTRAST    Addendum Date: 7/26/2023    ADDENDUM: Findings of the study were conveyed to Dr. Justin Bellamy 2:43 p.m. on 7/26/2023by a member of the Z core support team.     Result Date: 7/26/2023  EXAMINATION: CTA OF THE HEAD WITH CONTRAST WITH PERFUSION; CTA OF THE HEAD WITH CONTRAST; CTA OF THE NECK; CT OF THE HEAD WITHOUT

## 2023-07-27 NOTE — H&P
[facility]  History and Physical      CHIEF COMPLAINT:    No chief complaint on file. Patient of [facility], DO presents with:  <principal problem not specified>    History of Present Illness:   Patient is a 69-year-old male with a past medical history of anxiety, DM, HLD, HTN who initially presented to [facility] for chest pain. Patient underwent a stress test that was abnormal and given his symptoms cardiology recommended transfer to [facility] for cardiac catheterization. Patient underwent cardiac catheterization on 7/26 in which PCI with NOLA to the proximal OM1 however shortly after procedure patient developed slurred speech with right facial droop in which a stroke alert was called. Patient underwent a series of CTs revealing a small 2 cc focus of ischemia in the left temporal lobe versus artifact with multiple foci of severe stenosis in the proximal P2 segment of the right posterior cerebral artery. Neurology was consulted at this time and after return from CAT scan patient began to have seizure-like activity and was found to be not breathing and pulseless in which a CODE BLUE was called. Patient spontaneously began breathing on his own and was found to have a pulse however was not protecting his airway and was intubated for airway protection. Patient was accepted to CVICU for closer monitoring and further work-up and treatment with neurology evaluation. On evaluation, he remains intubated and sedated on Precedex in ICU setting. No family is currently at bedside on my evaluation. He is off pressor support. Remains on Keppra IV along with dual antiplatelet therapy. REVIEW OF SYSTEMS:  Pertinent negatives are above in HPI. 10 point ROS otherwise negative.       Past Medical History:   Diagnosis Date    Acute stroke due to ischemia ([address]) 7/26/2023    Anxiety     CAD in native artery 7/26/2023    Depression     Diabetes mellitus ([address])     Hyperlipidemia

## 2023-07-27 NOTE — CARE COORDINATION
7/26 Care Coordination: Pt in [facility] after Stroke Alert. Patient is from [facility] for a heart cath. Cath completed with PCI/NOLA. He went to floor from [facility] and stroke alert called due to neuro changes. A CODE BLUE was called later in the afternoon with some reported of \"seizure-like\" activity. Dr. Delroy Doty did discuss with the rapid response team; they are planning to intubate and transfer patient to [address] [place] [address]. Pt remains Intubated. With Current status unclear on discharge needs. Prior to admit per previous CM He resides with his wife in a one story home. He has a wheeled walker, cane, shower chair, and elevated commode. He does drive. He does need assistance with ADLs. He uses the wheeled walker to ambulate. CM/SW will continue to follow for discharge planning.    Stacy MARTINEZ,RN--BC  [phone]

## 2023-07-28 ENCOUNTER — APPOINTMENT (OUTPATIENT)
Dept: GENERAL RADIOLOGY | Age: 73
DRG: 246 | End: 2023-07-28
Attending: INTERNAL MEDICINE
Payer: MEDICARE

## 2023-07-28 ENCOUNTER — APPOINTMENT (OUTPATIENT)
Dept: MRI IMAGING | Age: 73
DRG: 246 | End: 2023-07-28
Attending: INTERNAL MEDICINE
Payer: MEDICARE

## 2023-07-28 LAB
AADO2: 157.9 MMHG
ALBUMIN SERPL-MCNC: 3.8 G/DL (ref 3.5–5.2)
ALP SERPL-CCNC: 48 U/L (ref 40–129)
ALT SERPL-CCNC: 42 U/L (ref 0–40)
ANION GAP SERPL CALCULATED.3IONS-SCNC: 11 MMOL/L (ref 7–16)
AST SERPL-CCNC: 33 U/L (ref 0–39)
B.E.: 0.3 MMOL/L (ref -3–3)
BASOPHILS # BLD: 0.03 K/UL (ref 0–0.2)
BASOPHILS NFR BLD: 0 % (ref 0–2)
BILIRUB SERPL-MCNC: 1.5 MG/DL (ref 0–1.2)
BUN SERPL-MCNC: 21 MG/DL (ref 6–23)
CA-I BLD-SCNC: 1.2 MMOL/L (ref 1.15–1.33)
CALCIUM SERPL-MCNC: 9 MG/DL (ref 8.6–10.2)
CHLORIDE SERPL-SCNC: 104 MMOL/L (ref 98–107)
CO2 SERPL-SCNC: 22 MMOL/L (ref 22–29)
COHB: 0.7 % (ref 0–1.5)
CREAT SERPL-MCNC: 0.7 MG/DL (ref 0.7–1.2)
CRITICAL: ABNORMAL
DATE ANALYZED: ABNORMAL
DATE OF COLLECTION: ABNORMAL
EOSINOPHIL # BLD: 0.1 K/UL (ref 0.05–0.5)
EOSINOPHILS RELATIVE PERCENT: 1 % (ref 0–6)
ERYTHROCYTE [DISTWIDTH] IN BLOOD BY AUTOMATED COUNT: 13.4 % (ref 11.5–15)
FIO2: 50 %
GFR SERPL CREATININE-BSD FRML MDRD: >60 ML/MIN/1.73M2
GLUCOSE SERPL-MCNC: 198 MG/DL (ref 74–99)
HCO3: 24.2 MMOL/L (ref 22–26)
HCT VFR BLD AUTO: 37.8 % (ref 37–54)
HGB BLD-MCNC: 12.8 G/DL (ref 12.5–16.5)
HHB: 1.5 % (ref 0–5)
IMM GRANULOCYTES # BLD AUTO: 0.04 K/UL (ref 0–0.58)
IMM GRANULOCYTES NFR BLD: 1 % (ref 0–5)
LAB: ABNORMAL
LV EF: 63 %
LVEF MODALITY: NORMAL
LYMPHOCYTES NFR BLD: 1.36 K/UL (ref 1.5–4)
LYMPHOCYTES RELATIVE PERCENT: 16 % (ref 20–42)
Lab: ABNORMAL
MAGNESIUM SERPL-MCNC: 2.1 MG/DL (ref 1.6–2.6)
MCH RBC QN AUTO: 30.7 PG (ref 26–35)
MCHC RBC AUTO-ENTMCNC: 33.9 G/DL (ref 32–34.5)
MCV RBC AUTO: 90.6 FL (ref 80–99.9)
METER GLUCOSE: 139 MG/DL (ref 74–99)
METER GLUCOSE: 169 MG/DL (ref 74–99)
METER GLUCOSE: 177 MG/DL (ref 74–99)
METER GLUCOSE: 186 MG/DL (ref 74–99)
METER GLUCOSE: 190 MG/DL (ref 74–99)
METHB: 0.3 % (ref 0–1.5)
MODE: AC
MONOCYTES NFR BLD: 0.9 K/UL (ref 0.1–0.95)
MONOCYTES NFR BLD: 11 % (ref 2–12)
NEUTROPHILS NFR BLD: 71 % (ref 43–80)
NEUTS SEG NFR BLD: 6.07 K/UL (ref 1.8–7.3)
O2 CONTENT: 19.4 ML/DL
O2 SATURATION: 98.5 % (ref 92–98.5)
O2HB: 97.5 % (ref 94–97)
OPERATOR ID: ABNORMAL
PATIENT TEMP: 37 C
PCO2: 36.8 MMHG (ref 35–45)
PEEP/CPAP: 8 CMH2O
PFO2: 2.89 MMHG/%
PH BLOOD GAS: 7.44 (ref 7.35–7.45)
PHOSPHATE SERPL-MCNC: 2.7 MG/DL (ref 2.5–4.5)
PLATELET # BLD AUTO: 241 K/UL (ref 130–450)
PMV BLD AUTO: 9.6 FL (ref 7–12)
PO2: 144.7 MMHG (ref 75–100)
POTASSIUM SERPL-SCNC: 4.1 MMOL/L (ref 3.5–5)
PROT SERPL-MCNC: 6.7 G/DL (ref 6.4–8.3)
RBC # BLD AUTO: 4.17 M/UL (ref 3.8–5.8)
RI(T): 1.09
RR MECHANICAL: 16 B/MIN
SODIUM SERPL-SCNC: 137 MMOL/L (ref 132–146)
SOURCE, BLOOD GAS: ABNORMAL
THB: 14 G/DL (ref 11.5–16.5)
TIME ANALYZED: 436
VT MECHANICAL: 500 ML
WBC OTHER # BLD: 8.5 K/UL (ref 4.5–11.5)

## 2023-07-28 PROCEDURE — 2700000000 HC OXYGEN THERAPY PER DAY

## 2023-07-28 PROCEDURE — 94640 AIRWAY INHALATION TREATMENT: CPT

## 2023-07-28 PROCEDURE — 70551 MRI BRAIN STEM W/O DYE: CPT

## 2023-07-28 PROCEDURE — 6370000000 HC RX 637 (ALT 250 FOR IP): Performed by: INTERNAL MEDICINE

## 2023-07-28 PROCEDURE — 99232 SBSQ HOSP IP/OBS MODERATE 35: CPT | Performed by: INTERNAL MEDICINE

## 2023-07-28 PROCEDURE — 2580000003 HC RX 258: Performed by: STUDENT IN AN ORGANIZED HEALTH CARE EDUCATION/TRAINING PROGRAM

## 2023-07-28 PROCEDURE — 6360000002 HC RX W HCPCS: Performed by: STUDENT IN AN ORGANIZED HEALTH CARE EDUCATION/TRAINING PROGRAM

## 2023-07-28 PROCEDURE — 93306 TTE W/DOPPLER COMPLETE: CPT

## 2023-07-28 PROCEDURE — 82947 ASSAY GLUCOSE BLOOD QUANT: CPT

## 2023-07-28 PROCEDURE — 80053 COMPREHEN METABOLIC PANEL: CPT

## 2023-07-28 PROCEDURE — APPSS30 APP SPLIT SHARED TIME 16-30 MINUTES: Performed by: CLINICAL NURSE SPECIALIST

## 2023-07-28 PROCEDURE — 6370000000 HC RX 637 (ALT 250 FOR IP): Performed by: CLINICAL NURSE SPECIALIST

## 2023-07-28 PROCEDURE — 83735 ASSAY OF MAGNESIUM: CPT

## 2023-07-28 PROCEDURE — 2500000003 HC RX 250 WO HCPCS: Performed by: STUDENT IN AN ORGANIZED HEALTH CARE EDUCATION/TRAINING PROGRAM

## 2023-07-28 PROCEDURE — 6360000002 HC RX W HCPCS: Performed by: NURSE PRACTITIONER

## 2023-07-28 PROCEDURE — 82805 BLOOD GASES W/O2 SATURATION: CPT

## 2023-07-28 PROCEDURE — A4216 STERILE WATER/SALINE, 10 ML: HCPCS | Performed by: STUDENT IN AN ORGANIZED HEALTH CARE EDUCATION/TRAINING PROGRAM

## 2023-07-28 PROCEDURE — 94003 VENT MGMT INPAT SUBQ DAY: CPT

## 2023-07-28 PROCEDURE — 93308 TTE F-UP OR LMTD: CPT

## 2023-07-28 PROCEDURE — 85027 COMPLETE CBC AUTOMATED: CPT

## 2023-07-28 PROCEDURE — 6370000000 HC RX 637 (ALT 250 FOR IP): Performed by: NURSE PRACTITIONER

## 2023-07-28 PROCEDURE — 82330 ASSAY OF CALCIUM: CPT

## 2023-07-28 PROCEDURE — 6370000000 HC RX 637 (ALT 250 FOR IP): Performed by: STUDENT IN AN ORGANIZED HEALTH CARE EDUCATION/TRAINING PROGRAM

## 2023-07-28 PROCEDURE — 2000000000 HC ICU R&B

## 2023-07-28 PROCEDURE — 6360000004 HC RX CONTRAST MEDICATION

## 2023-07-28 PROCEDURE — 6360000002 HC RX W HCPCS: Performed by: CLINICAL NURSE SPECIALIST

## 2023-07-28 PROCEDURE — 84100 ASSAY OF PHOSPHORUS: CPT

## 2023-07-28 PROCEDURE — 6360000004 HC RX CONTRAST MEDICATION: Performed by: INTERNAL MEDICINE

## 2023-07-28 PROCEDURE — 2580000003 HC RX 258: Performed by: INTERNAL MEDICINE

## 2023-07-28 PROCEDURE — 2580000003 HC RX 258: Performed by: CLINICAL NURSE SPECIALIST

## 2023-07-28 PROCEDURE — 99291 CRITICAL CARE FIRST HOUR: CPT | Performed by: SURGERY

## 2023-07-28 PROCEDURE — 71045 X-RAY EXAM CHEST 1 VIEW: CPT

## 2023-07-28 PROCEDURE — 93005 ELECTROCARDIOGRAM TRACING: CPT | Performed by: STUDENT IN AN ORGANIZED HEALTH CARE EDUCATION/TRAINING PROGRAM

## 2023-07-28 RX ORDER — SODIUM CHLORIDE 9 MG/ML
INJECTION, SOLUTION INTRAVENOUS CONTINUOUS
Status: DISCONTINUED | OUTPATIENT
Start: 2023-07-28 | End: 2023-07-29

## 2023-07-28 RX ORDER — 0.9 % SODIUM CHLORIDE 0.9 %
500 INTRAVENOUS SOLUTION INTRAVENOUS ONCE
Status: COMPLETED | OUTPATIENT
Start: 2023-07-28 | End: 2023-07-28

## 2023-07-28 RX ORDER — LORAZEPAM 2 MG/ML
2 INJECTION INTRAMUSCULAR ONCE
Status: COMPLETED | OUTPATIENT
Start: 2023-07-28 | End: 2023-07-28

## 2023-07-28 RX ORDER — SENNOSIDES A AND B 8.6 MG/1
1 TABLET, FILM COATED ORAL NIGHTLY
Status: DISCONTINUED | OUTPATIENT
Start: 2023-07-28 | End: 2023-08-01 | Stop reason: HOSPADM

## 2023-07-28 RX ORDER — MIDAZOLAM HYDROCHLORIDE 2 MG/2ML
2 INJECTION, SOLUTION INTRAMUSCULAR; INTRAVENOUS ONCE
Status: DISCONTINUED | OUTPATIENT
Start: 2023-07-28 | End: 2023-07-28

## 2023-07-28 RX ORDER — LOSARTAN POTASSIUM 50 MG/1
25 TABLET ORAL DAILY
Status: DISCONTINUED | OUTPATIENT
Start: 2023-07-28 | End: 2023-07-30

## 2023-07-28 RX ADMIN — LOSARTAN POTASSIUM 25 MG: 50 TABLET, FILM COATED ORAL at 14:38

## 2023-07-28 RX ADMIN — GABAPENTIN 600 MG: 300 CAPSULE ORAL at 20:41

## 2023-07-28 RX ADMIN — ARFORMOTEROL TARTRATE 15 MCG: 15 SOLUTION RESPIRATORY (INHALATION) at 07:33

## 2023-07-28 RX ADMIN — GABAPENTIN 600 MG: 300 CAPSULE ORAL at 10:10

## 2023-07-28 RX ADMIN — MINERAL OIL, PETROLATUM: 425; 568 OINTMENT OPHTHALMIC at 04:38

## 2023-07-28 RX ADMIN — ACETAMINOPHEN 650 MG: 325 TABLET ORAL at 20:41

## 2023-07-28 RX ADMIN — PERFLUTREN 2 ML: 6.52 INJECTION, SUSPENSION INTRAVENOUS at 11:15

## 2023-07-28 RX ADMIN — MINERAL OIL, PETROLATUM: 425; 568 OINTMENT OPHTHALMIC at 12:26

## 2023-07-28 RX ADMIN — ASPIRIN 81 MG: 81 TABLET, COATED ORAL at 10:10

## 2023-07-28 RX ADMIN — RACEPINEPHRINE HYDROCHLORIDE 0.5 ML: 11.25 SOLUTION RESPIRATORY (INHALATION) at 13:52

## 2023-07-28 RX ADMIN — GABAPENTIN 600 MG: 300 CAPSULE ORAL at 16:52

## 2023-07-28 RX ADMIN — TICAGRELOR 90 MG: 90 TABLET ORAL at 10:10

## 2023-07-28 RX ADMIN — LORAZEPAM 2 MG: 2 INJECTION INTRAMUSCULAR; INTRAVENOUS at 08:38

## 2023-07-28 RX ADMIN — SODIUM CHLORIDE, PRESERVATIVE FREE 10 ML: 5 INJECTION INTRAVENOUS at 21:50

## 2023-07-28 RX ADMIN — TICAGRELOR 90 MG: 90 TABLET ORAL at 20:41

## 2023-07-28 RX ADMIN — SODIUM CHLORIDE: 9 INJECTION, SOLUTION INTRAVENOUS at 18:54

## 2023-07-28 RX ADMIN — MINERAL OIL, PETROLATUM: 425; 568 OINTMENT OPHTHALMIC at 00:27

## 2023-07-28 RX ADMIN — MINERAL OIL, PETROLATUM: 425; 568 OINTMENT OPHTHALMIC at 08:25

## 2023-07-28 RX ADMIN — ATORVASTATIN CALCIUM 40 MG: 40 TABLET, FILM COATED ORAL at 20:41

## 2023-07-28 RX ADMIN — CHLORHEXIDINE GLUCONATE 15 ML: 1.2 RINSE ORAL at 10:10

## 2023-07-28 RX ADMIN — ARFORMOTEROL TARTRATE 15 MCG: 15 SOLUTION RESPIRATORY (INHALATION) at 19:59

## 2023-07-28 RX ADMIN — BUDESONIDE INHALATION 500 MCG: 0.5 SUSPENSION RESPIRATORY (INHALATION) at 19:59

## 2023-07-28 RX ADMIN — SODIUM CHLORIDE, PRESERVATIVE FREE 20 MG: 5 INJECTION INTRAVENOUS at 10:10

## 2023-07-28 RX ADMIN — SODIUM CHLORIDE, PRESERVATIVE FREE 10 ML: 5 INJECTION INTRAVENOUS at 10:09

## 2023-07-28 RX ADMIN — LEVETIRACETAM 500 MG: 100 INJECTION INTRAVENOUS at 23:26

## 2023-07-28 RX ADMIN — SODIUM CHLORIDE 500 ML: 9 INJECTION, SOLUTION INTRAVENOUS at 10:09

## 2023-07-28 RX ADMIN — SODIUM CHLORIDE: 9 INJECTION, SOLUTION INTRAVENOUS at 12:15

## 2023-07-28 RX ADMIN — GABAPENTIN 600 MG: 300 CAPSULE ORAL at 12:47

## 2023-07-28 RX ADMIN — LEVETIRACETAM 500 MG: 100 INJECTION INTRAVENOUS at 00:23

## 2023-07-28 RX ADMIN — BUDESONIDE INHALATION 500 MCG: 0.5 SUSPENSION RESPIRATORY (INHALATION) at 07:33

## 2023-07-28 RX ADMIN — LEVETIRACETAM 500 MG: 100 INJECTION INTRAVENOUS at 12:16

## 2023-07-28 ASSESSMENT — PULMONARY FUNCTION TESTS
PIF_VALUE: 25
PIF_VALUE: 14
PIF_VALUE: 19
PIF_VALUE: 19
PIF_VALUE: 15
PIF_VALUE: 19
PIF_VALUE: 19
PIF_VALUE: 23
PIF_VALUE: 15
PIF_VALUE: 15
PIF_VALUE: 20
PIF_VALUE: 16
PIF_VALUE: 19
PIF_VALUE: 24
PIF_VALUE: 19
PIF_VALUE: 15
PIF_VALUE: 29

## 2023-07-28 ASSESSMENT — PAIN SCALES - GENERAL
PAINLEVEL_OUTOF10: 0

## 2023-07-28 NOTE — FLOWSHEET NOTE
Patient reaches for endotracheal tube when unrestrained. Remains in two point soft wrist restraints for patient safety.

## 2023-07-28 NOTE — FLOWSHEET NOTE
Patient reaches for endotracheal tube when unrestrained.  Remains in two point soft wrist restraints for patient safety

## 2023-07-28 NOTE — FLOWSHEET NOTE
When restraints released, pt reaches for ETT despite verbal redirection. 2 pt soft wrist restraints cont'd for pt safety.

## 2023-07-29 PROBLEM — R56.9 SEIZURE-LIKE ACTIVITY (HCC): Status: ACTIVE | Noted: 2023-07-29

## 2023-07-29 LAB
ALBUMIN SERPL-MCNC: 3.6 G/DL (ref 3.5–5.2)
ALP SERPL-CCNC: 48 U/L (ref 40–129)
ALT SERPL-CCNC: 31 U/L (ref 0–40)
ANION GAP SERPL CALCULATED.3IONS-SCNC: 13 MMOL/L (ref 7–16)
ANION GAP SERPL CALCULATED.3IONS-SCNC: 9 MMOL/L (ref 7–16)
AST SERPL-CCNC: 24 U/L (ref 0–39)
BASOPHILS # BLD: 0.02 K/UL (ref 0–0.2)
BASOPHILS NFR BLD: 0 % (ref 0–2)
BILIRUB SERPL-MCNC: 1.3 MG/DL (ref 0–1.2)
BUN SERPL-MCNC: 14 MG/DL (ref 6–23)
BUN SERPL-MCNC: 14 MG/DL (ref 6–23)
CA-I BLD-SCNC: 1.15 MMOL/L (ref 1.15–1.33)
CA-I BLD-SCNC: 1.17 MMOL/L (ref 1.15–1.33)
CALCIUM SERPL-MCNC: 8.6 MG/DL (ref 8.6–10.2)
CALCIUM SERPL-MCNC: 9.1 MG/DL (ref 8.6–10.2)
CHLORIDE SERPL-SCNC: 104 MMOL/L (ref 98–107)
CHLORIDE SERPL-SCNC: 108 MMOL/L (ref 98–107)
CO2 SERPL-SCNC: 21 MMOL/L (ref 22–29)
CO2 SERPL-SCNC: 26 MMOL/L (ref 22–29)
CREAT SERPL-MCNC: 0.8 MG/DL (ref 0.7–1.2)
CREAT SERPL-MCNC: 0.8 MG/DL (ref 0.7–1.2)
EOSINOPHIL # BLD: 0.09 K/UL (ref 0.05–0.5)
EOSINOPHILS RELATIVE PERCENT: 1 % (ref 0–6)
ERYTHROCYTE [DISTWIDTH] IN BLOOD BY AUTOMATED COUNT: 13.2 % (ref 11.5–15)
GFR SERPL CREATININE-BSD FRML MDRD: >60 ML/MIN/1.73M2
GFR SERPL CREATININE-BSD FRML MDRD: >60 ML/MIN/1.73M2
GLUCOSE SERPL-MCNC: 154 MG/DL (ref 74–99)
GLUCOSE SERPL-MCNC: 174 MG/DL (ref 74–99)
HCT VFR BLD AUTO: 37.5 % (ref 37–54)
HGB BLD-MCNC: 12.5 G/DL (ref 12.5–16.5)
IMM GRANULOCYTES # BLD AUTO: <0.03 K/UL (ref 0–0.58)
IMM GRANULOCYTES NFR BLD: 0 % (ref 0–5)
LYMPHOCYTES NFR BLD: 1.19 K/UL (ref 1.5–4)
LYMPHOCYTES RELATIVE PERCENT: 18 % (ref 20–42)
MAGNESIUM SERPL-MCNC: 2 MG/DL (ref 1.6–2.6)
MAGNESIUM SERPL-MCNC: 2 MG/DL (ref 1.6–2.6)
MCH RBC QN AUTO: 30.5 PG (ref 26–35)
MCHC RBC AUTO-ENTMCNC: 33.3 G/DL (ref 32–34.5)
MCV RBC AUTO: 91.5 FL (ref 80–99.9)
METER GLUCOSE: 163 MG/DL (ref 74–99)
METER GLUCOSE: 174 MG/DL (ref 74–99)
METER GLUCOSE: 190 MG/DL (ref 74–99)
METER GLUCOSE: 207 MG/DL (ref 74–99)
METER GLUCOSE: 218 MG/DL (ref 74–99)
MONOCYTES NFR BLD: 0.76 K/UL (ref 0.1–0.95)
MONOCYTES NFR BLD: 11 % (ref 2–12)
NEUTROPHILS NFR BLD: 69 % (ref 43–80)
NEUTS SEG NFR BLD: 4.72 K/UL (ref 1.8–7.3)
PHOSPHATE SERPL-MCNC: 3.1 MG/DL (ref 2.5–4.5)
PHOSPHATE SERPL-MCNC: 3.5 MG/DL (ref 2.5–4.5)
PLATELET # BLD AUTO: 255 K/UL (ref 130–450)
PMV BLD AUTO: 9.5 FL (ref 7–12)
POTASSIUM SERPL-SCNC: 3.8 MMOL/L (ref 3.5–5)
POTASSIUM SERPL-SCNC: 4.2 MMOL/L (ref 3.5–5)
PROT SERPL-MCNC: 6.5 G/DL (ref 6.4–8.3)
RBC # BLD AUTO: 4.1 M/UL (ref 3.8–5.8)
SODIUM SERPL-SCNC: 139 MMOL/L (ref 132–146)
SODIUM SERPL-SCNC: 142 MMOL/L (ref 132–146)
WBC OTHER # BLD: 6.8 K/UL (ref 4.5–11.5)

## 2023-07-29 PROCEDURE — 80053 COMPREHEN METABOLIC PANEL: CPT

## 2023-07-29 PROCEDURE — 82947 ASSAY GLUCOSE BLOOD QUANT: CPT

## 2023-07-29 PROCEDURE — 2580000003 HC RX 258: Performed by: INTERNAL MEDICINE

## 2023-07-29 PROCEDURE — 82330 ASSAY OF CALCIUM: CPT

## 2023-07-29 PROCEDURE — APPSS30 APP SPLIT SHARED TIME 16-30 MINUTES: Performed by: CLINICAL NURSE SPECIALIST

## 2023-07-29 PROCEDURE — 6370000000 HC RX 637 (ALT 250 FOR IP): Performed by: INTERNAL MEDICINE

## 2023-07-29 PROCEDURE — 80048 BASIC METABOLIC PNL TOTAL CA: CPT

## 2023-07-29 PROCEDURE — 2700000000 HC OXYGEN THERAPY PER DAY

## 2023-07-29 PROCEDURE — 83735 ASSAY OF MAGNESIUM: CPT

## 2023-07-29 PROCEDURE — 94640 AIRWAY INHALATION TREATMENT: CPT

## 2023-07-29 PROCEDURE — 84100 ASSAY OF PHOSPHORUS: CPT

## 2023-07-29 PROCEDURE — 99291 CRITICAL CARE FIRST HOUR: CPT | Performed by: SURGERY

## 2023-07-29 PROCEDURE — 6360000002 HC RX W HCPCS: Performed by: STUDENT IN AN ORGANIZED HEALTH CARE EDUCATION/TRAINING PROGRAM

## 2023-07-29 PROCEDURE — 6370000000 HC RX 637 (ALT 250 FOR IP): Performed by: NURSE PRACTITIONER

## 2023-07-29 PROCEDURE — 2000000000 HC ICU R&B

## 2023-07-29 PROCEDURE — 99233 SBSQ HOSP IP/OBS HIGH 50: CPT | Performed by: NURSE PRACTITIONER

## 2023-07-29 PROCEDURE — 2500000003 HC RX 250 WO HCPCS: Performed by: CLINICAL NURSE SPECIALIST

## 2023-07-29 PROCEDURE — 6360000002 HC RX W HCPCS: Performed by: NURSE PRACTITIONER

## 2023-07-29 PROCEDURE — 6370000000 HC RX 637 (ALT 250 FOR IP): Performed by: STUDENT IN AN ORGANIZED HEALTH CARE EDUCATION/TRAINING PROGRAM

## 2023-07-29 PROCEDURE — 6370000000 HC RX 637 (ALT 250 FOR IP): Performed by: CLINICAL NURSE SPECIALIST

## 2023-07-29 PROCEDURE — 85027 COMPLETE CBC AUTOMATED: CPT

## 2023-07-29 RX ORDER — METOPROLOL TARTRATE 5 MG/5ML
INJECTION INTRAVENOUS
Status: DISPENSED
Start: 2023-07-29 | End: 2023-07-29

## 2023-07-29 RX ORDER — METOPROLOL TARTRATE 5 MG/5ML
5 INJECTION INTRAVENOUS ONCE
Status: COMPLETED | OUTPATIENT
Start: 2023-07-29 | End: 2023-07-29

## 2023-07-29 RX ADMIN — LEVETIRACETAM 500 MG: 100 INJECTION INTRAVENOUS at 22:46

## 2023-07-29 RX ADMIN — GABAPENTIN 600 MG: 300 CAPSULE ORAL at 17:31

## 2023-07-29 RX ADMIN — METOPROLOL TARTRATE 12.5 MG: 25 TABLET, FILM COATED ORAL at 19:40

## 2023-07-29 RX ADMIN — INSULIN LISPRO 2 UNITS: 100 INJECTION, SOLUTION INTRAVENOUS; SUBCUTANEOUS at 22:45

## 2023-07-29 RX ADMIN — ARFORMOTEROL TARTRATE 15 MCG: 15 SOLUTION RESPIRATORY (INHALATION) at 19:45

## 2023-07-29 RX ADMIN — TICAGRELOR 90 MG: 90 TABLET ORAL at 19:41

## 2023-07-29 RX ADMIN — GABAPENTIN 600 MG: 300 CAPSULE ORAL at 08:50

## 2023-07-29 RX ADMIN — LOSARTAN POTASSIUM 25 MG: 50 TABLET, FILM COATED ORAL at 08:50

## 2023-07-29 RX ADMIN — BUDESONIDE INHALATION 500 MCG: 0.5 SUSPENSION RESPIRATORY (INHALATION) at 07:54

## 2023-07-29 RX ADMIN — METOPROLOL TARTRATE 5 MG: 1 INJECTION, SOLUTION INTRAVENOUS at 10:29

## 2023-07-29 RX ADMIN — PANTOPRAZOLE SODIUM 40 MG: 40 TABLET, DELAYED RELEASE ORAL at 08:50

## 2023-07-29 RX ADMIN — GABAPENTIN 600 MG: 300 CAPSULE ORAL at 13:44

## 2023-07-29 RX ADMIN — ATORVASTATIN CALCIUM 40 MG: 40 TABLET, FILM COATED ORAL at 19:41

## 2023-07-29 RX ADMIN — ARFORMOTEROL TARTRATE 15 MCG: 15 SOLUTION RESPIRATORY (INHALATION) at 07:54

## 2023-07-29 RX ADMIN — SODIUM CHLORIDE, PRESERVATIVE FREE 10 ML: 5 INJECTION INTRAVENOUS at 19:42

## 2023-07-29 RX ADMIN — TICAGRELOR 90 MG: 90 TABLET ORAL at 08:50

## 2023-07-29 RX ADMIN — ASPIRIN 81 MG: 81 TABLET, COATED ORAL at 08:50

## 2023-07-29 RX ADMIN — BUDESONIDE INHALATION 500 MCG: 0.5 SUSPENSION RESPIRATORY (INHALATION) at 19:45

## 2023-07-29 RX ADMIN — LEVETIRACETAM 500 MG: 100 INJECTION INTRAVENOUS at 12:41

## 2023-07-29 RX ADMIN — GABAPENTIN 600 MG: 300 CAPSULE ORAL at 19:40

## 2023-07-29 RX ADMIN — INSULIN LISPRO 2 UNITS: 100 INJECTION, SOLUTION INTRAVENOUS; SUBCUTANEOUS at 12:40

## 2023-07-29 RX ADMIN — SODIUM CHLORIDE, PRESERVATIVE FREE 10 ML: 5 INJECTION INTRAVENOUS at 08:50

## 2023-07-29 RX ADMIN — SENNOSIDES 8.6 MG: 8.6 TABLET, FILM COATED ORAL at 19:41

## 2023-07-29 NOTE — FLOWSHEET NOTE
Patient heart rate 's, Mirna NP at bedside. Vagal maneuver performed, heart rate 80s NSR. See MAR for orders. Bilobed Flap Text: The defect edges were debeveled with a #15 scalpel blade.  Given the location of the defect and the proximity to free margins a bilobe flap was deemed most appropriate.  Using a sterile surgical marker, an appropriate bilobe flap drawn around the defect.    The area thus outlined was incised deep to adipose tissue with a #15 scalpel blade.  The skin margins were undermined to an appropriate distance in all directions utilizing iris scissors.

## 2023-07-30 LAB
ALBUMIN SERPL-MCNC: 4 G/DL (ref 3.5–5.2)
ALP SERPL-CCNC: 56 U/L (ref 40–129)
ALT SERPL-CCNC: 30 U/L (ref 0–40)
ANION GAP SERPL CALCULATED.3IONS-SCNC: 15 MMOL/L (ref 7–16)
AST SERPL-CCNC: 24 U/L (ref 0–39)
BASOPHILS # BLD: 0.03 K/UL (ref 0–0.2)
BASOPHILS NFR BLD: 0 % (ref 0–2)
BILIRUB SERPL-MCNC: 0.9 MG/DL (ref 0–1.2)
BUN SERPL-MCNC: 13 MG/DL (ref 6–23)
CA-I BLD-SCNC: 1.3 MMOL/L (ref 1.15–1.33)
CALCIUM SERPL-MCNC: 9.2 MG/DL (ref 8.6–10.2)
CHLORIDE SERPL-SCNC: 106 MMOL/L (ref 98–107)
CO2 SERPL-SCNC: 21 MMOL/L (ref 22–29)
CREAT SERPL-MCNC: 0.7 MG/DL (ref 0.7–1.2)
EOSINOPHIL # BLD: 0.2 K/UL (ref 0.05–0.5)
EOSINOPHILS RELATIVE PERCENT: 3 % (ref 0–6)
ERYTHROCYTE [DISTWIDTH] IN BLOOD BY AUTOMATED COUNT: 13.2 % (ref 11.5–15)
GFR SERPL CREATININE-BSD FRML MDRD: >60 ML/MIN/1.73M2
GLUCOSE SERPL-MCNC: 210 MG/DL (ref 74–99)
HCT VFR BLD AUTO: 39.5 % (ref 37–54)
HGB BLD-MCNC: 13.1 G/DL (ref 12.5–16.5)
IMM GRANULOCYTES # BLD AUTO: <0.03 K/UL (ref 0–0.58)
IMM GRANULOCYTES NFR BLD: 0 % (ref 0–5)
LYMPHOCYTES NFR BLD: 1.69 K/UL (ref 1.5–4)
LYMPHOCYTES RELATIVE PERCENT: 25 % (ref 20–42)
MAGNESIUM SERPL-MCNC: 1.9 MG/DL (ref 1.6–2.6)
MCH RBC QN AUTO: 30.5 PG (ref 26–35)
MCHC RBC AUTO-ENTMCNC: 33.2 G/DL (ref 32–34.5)
MCV RBC AUTO: 91.9 FL (ref 80–99.9)
METER GLUCOSE: 207 MG/DL (ref 74–99)
METER GLUCOSE: 225 MG/DL (ref 74–99)
METER GLUCOSE: 227 MG/DL (ref 74–99)
METER GLUCOSE: 243 MG/DL (ref 74–99)
METER GLUCOSE: 243 MG/DL (ref 74–99)
METER GLUCOSE: 248 MG/DL (ref 74–99)
METER GLUCOSE: 277 MG/DL (ref 74–99)
MONOCYTES NFR BLD: 0.86 K/UL (ref 0.1–0.95)
MONOCYTES NFR BLD: 13 % (ref 2–12)
NEUTROPHILS NFR BLD: 59 % (ref 43–80)
NEUTS SEG NFR BLD: 4.03 K/UL (ref 1.8–7.3)
PHOSPHATE SERPL-MCNC: 3.5 MG/DL (ref 2.5–4.5)
PLATELET # BLD AUTO: 274 K/UL (ref 130–450)
PMV BLD AUTO: 9.4 FL (ref 7–12)
POTASSIUM SERPL-SCNC: 3.6 MMOL/L (ref 3.5–5)
PROT SERPL-MCNC: 7.1 G/DL (ref 6.4–8.3)
RBC # BLD AUTO: 4.3 M/UL (ref 3.8–5.8)
SODIUM SERPL-SCNC: 142 MMOL/L (ref 132–146)
WBC OTHER # BLD: 6.8 K/UL (ref 4.5–11.5)

## 2023-07-30 PROCEDURE — S5553 INSULIN LONG ACTING 5 U: HCPCS | Performed by: SURGERY

## 2023-07-30 PROCEDURE — 84100 ASSAY OF PHOSPHORUS: CPT

## 2023-07-30 PROCEDURE — 6370000000 HC RX 637 (ALT 250 FOR IP): Performed by: INTERNAL MEDICINE

## 2023-07-30 PROCEDURE — 6360000002 HC RX W HCPCS: Performed by: NURSE PRACTITIONER

## 2023-07-30 PROCEDURE — 2580000003 HC RX 258: Performed by: INTERNAL MEDICINE

## 2023-07-30 PROCEDURE — 94640 AIRWAY INHALATION TREATMENT: CPT

## 2023-07-30 PROCEDURE — 6360000002 HC RX W HCPCS: Performed by: STUDENT IN AN ORGANIZED HEALTH CARE EDUCATION/TRAINING PROGRAM

## 2023-07-30 PROCEDURE — APPSS30 APP SPLIT SHARED TIME 16-30 MINUTES: Performed by: CLINICAL NURSE SPECIALIST

## 2023-07-30 PROCEDURE — 6370000000 HC RX 637 (ALT 250 FOR IP): Performed by: SURGERY

## 2023-07-30 PROCEDURE — 82947 ASSAY GLUCOSE BLOOD QUANT: CPT

## 2023-07-30 PROCEDURE — 82330 ASSAY OF CALCIUM: CPT

## 2023-07-30 PROCEDURE — 2000000000 HC ICU R&B

## 2023-07-30 PROCEDURE — 6370000000 HC RX 637 (ALT 250 FOR IP): Performed by: STUDENT IN AN ORGANIZED HEALTH CARE EDUCATION/TRAINING PROGRAM

## 2023-07-30 PROCEDURE — 99291 CRITICAL CARE FIRST HOUR: CPT | Performed by: SURGERY

## 2023-07-30 PROCEDURE — 6370000000 HC RX 637 (ALT 250 FOR IP): Performed by: CLINICAL NURSE SPECIALIST

## 2023-07-30 PROCEDURE — 80053 COMPREHEN METABOLIC PANEL: CPT

## 2023-07-30 PROCEDURE — 83735 ASSAY OF MAGNESIUM: CPT

## 2023-07-30 PROCEDURE — 85027 COMPLETE CBC AUTOMATED: CPT

## 2023-07-30 RX ORDER — INSULIN GLARGINE-YFGN 100 [IU]/ML
10 INJECTION, SOLUTION SUBCUTANEOUS NIGHTLY
Status: DISCONTINUED | OUTPATIENT
Start: 2023-07-30 | End: 2023-07-31

## 2023-07-30 RX ORDER — METOPROLOL TARTRATE 5 MG/5ML
5 INJECTION INTRAVENOUS ONCE
Status: DISCONTINUED | OUTPATIENT
Start: 2023-07-30 | End: 2023-08-01

## 2023-07-30 RX ORDER — INSULIN LISPRO 100 [IU]/ML
0-16 INJECTION, SOLUTION INTRAVENOUS; SUBCUTANEOUS EVERY 4 HOURS
Status: DISCONTINUED | OUTPATIENT
Start: 2023-07-30 | End: 2023-08-01 | Stop reason: HOSPADM

## 2023-07-30 RX ORDER — LOSARTAN POTASSIUM 50 MG/1
50 TABLET ORAL DAILY
Status: DISCONTINUED | OUTPATIENT
Start: 2023-07-30 | End: 2023-08-01 | Stop reason: HOSPADM

## 2023-07-30 RX ADMIN — INSULIN LISPRO 2 UNITS: 100 INJECTION, SOLUTION INTRAVENOUS; SUBCUTANEOUS at 06:53

## 2023-07-30 RX ADMIN — METOPROLOL TARTRATE 12.5 MG: 25 TABLET, FILM COATED ORAL at 09:18

## 2023-07-30 RX ADMIN — SENNOSIDES 8.6 MG: 8.6 TABLET, FILM COATED ORAL at 19:55

## 2023-07-30 RX ADMIN — INSULIN LISPRO 4 UNITS: 100 INJECTION, SOLUTION INTRAVENOUS; SUBCUTANEOUS at 14:20

## 2023-07-30 RX ADMIN — GABAPENTIN 600 MG: 300 CAPSULE ORAL at 15:25

## 2023-07-30 RX ADMIN — INSULIN LISPRO 2 UNITS: 100 INJECTION, SOLUTION INTRAVENOUS; SUBCUTANEOUS at 03:27

## 2023-07-30 RX ADMIN — ATORVASTATIN CALCIUM 40 MG: 40 TABLET, FILM COATED ORAL at 19:52

## 2023-07-30 RX ADMIN — TICAGRELOR 90 MG: 90 TABLET ORAL at 19:55

## 2023-07-30 RX ADMIN — TICAGRELOR 90 MG: 90 TABLET ORAL at 09:19

## 2023-07-30 RX ADMIN — INSULIN LISPRO 4 UNITS: 100 INJECTION, SOLUTION INTRAVENOUS; SUBCUTANEOUS at 21:36

## 2023-07-30 RX ADMIN — GABAPENTIN 600 MG: 300 CAPSULE ORAL at 09:19

## 2023-07-30 RX ADMIN — ARFORMOTEROL TARTRATE 15 MCG: 15 SOLUTION RESPIRATORY (INHALATION) at 07:46

## 2023-07-30 RX ADMIN — INSULIN GLARGINE-YFGN 10 UNITS: 100 INJECTION, SOLUTION SUBCUTANEOUS at 19:52

## 2023-07-30 RX ADMIN — BUDESONIDE INHALATION 500 MCG: 0.5 SUSPENSION RESPIRATORY (INHALATION) at 07:46

## 2023-07-30 RX ADMIN — SODIUM CHLORIDE, PRESERVATIVE FREE 10 ML: 5 INJECTION INTRAVENOUS at 09:19

## 2023-07-30 RX ADMIN — ASPIRIN 81 MG: 81 TABLET, COATED ORAL at 09:19

## 2023-07-30 RX ADMIN — METOPROLOL TARTRATE 12.5 MG: 25 TABLET, FILM COATED ORAL at 09:35

## 2023-07-30 RX ADMIN — METOPROLOL TARTRATE 25 MG: 25 TABLET, FILM COATED ORAL at 19:55

## 2023-07-30 RX ADMIN — LEVETIRACETAM 500 MG: 100 INJECTION INTRAVENOUS at 12:32

## 2023-07-30 RX ADMIN — INSULIN LISPRO 4 UNITS: 100 INJECTION, SOLUTION INTRAVENOUS; SUBCUTANEOUS at 23:28

## 2023-07-30 RX ADMIN — LEVETIRACETAM 500 MG: 100 INJECTION INTRAVENOUS at 23:21

## 2023-07-30 RX ADMIN — GABAPENTIN 600 MG: 300 CAPSULE ORAL at 19:52

## 2023-07-30 RX ADMIN — LOSARTAN POTASSIUM 50 MG: 50 TABLET, FILM COATED ORAL at 09:19

## 2023-07-30 RX ADMIN — INSULIN LISPRO 8 UNITS: 100 INJECTION, SOLUTION INTRAVENOUS; SUBCUTANEOUS at 18:59

## 2023-07-30 RX ADMIN — GABAPENTIN 600 MG: 300 CAPSULE ORAL at 18:59

## 2023-07-30 RX ADMIN — PANTOPRAZOLE SODIUM 40 MG: 40 TABLET, DELAYED RELEASE ORAL at 09:19

## 2023-07-30 RX ADMIN — SODIUM CHLORIDE, PRESERVATIVE FREE 10 ML: 5 INJECTION INTRAVENOUS at 19:52

## 2023-07-30 RX ADMIN — ARFORMOTEROL TARTRATE 15 MCG: 15 SOLUTION RESPIRATORY (INHALATION) at 20:04

## 2023-07-30 RX ADMIN — BUDESONIDE INHALATION 500 MCG: 0.5 SUSPENSION RESPIRATORY (INHALATION) at 20:04

## 2023-07-30 ASSESSMENT — PAIN SCALES - GENERAL
PAINLEVEL_OUTOF10: 0
PAINLEVEL_OUTOF10: 0

## 2023-07-30 NOTE — CONSULTS
Met with patient and discussed that their physician has ordered a referral to our outpatient Phase II Cardiac Rehabilitation program. Reviewed the benefits of cardiac rehabilitation based on their diagnosis and personal risk factors. Patient demonstrates mild interest in Cardiac Rehabilitation at this time. Cardiac Rehabilitation brochure provided to patient/family. The Cardiac Rehabilitation Program has been provided the patient's referral information and pertinent patient details and history. The patient may call [address] [place] [address] at [phone] for additional information or questions. Contact information for [address] [place] [address] and other choices close to the patient's residence have been provided in the discharge instructions so that the patient may call and schedule an appointment when cleared by their physician.  Thank you for the referral.

## 2023-07-30 NOTE — DISCHARGE INSTRUCTIONS
Cardiac Rehabilitation: Discharge instructions        Cardiac rehabilitation is a program for people who have a heart problem, such as a heart attack, coronary stent placed, heart failure, or a heart valve disease. The program includes exercise, lifestyle changes, education, and emotional support. Cardiac rehab can help you improve the quality of your life through better overall health. It can help you lose weight and feel better about yourself. On your cardiac rehab team, you may have your doctor, a nurse specialist, an exercise physiologist, and a dietitian. They will design your cardiac rehab program specifically for you. You will learn how to reduce your risk for heart problems, how to manage stress, and how to eat a heart-healthy diet. By the end of the program, you will be ready to maintain a healthier lifestyle on your own. Follow-up care is a key part of your treatment and safety. Be sure to make and go to all appointments, and call your doctor if you are having problems. It's also a good idea to know your test results and keep a list of the medicines you take. Please call to schedule your first appointment once you have been cleared by your cardiologist to attend Phase II Outpatient Cardiac Rehabilitation. Cardiac Rehabilitation Options:  [facility]  [address] [facility].                                                                                           Cardiology Services  [facility], [address]  Hours: M/W/F 7am-7pm & T/Th 7am-12pm                                                  [phone]

## 2023-07-31 LAB
ALBUMIN SERPL-MCNC: 4 G/DL (ref 3.5–5.2)
ALP SERPL-CCNC: 55 U/L (ref 40–129)
ALT SERPL-CCNC: 30 U/L (ref 0–40)
ANION GAP SERPL CALCULATED.3IONS-SCNC: 13 MMOL/L (ref 7–16)
AST SERPL-CCNC: 28 U/L (ref 0–39)
BASOPHILS # BLD: 0.04 K/UL (ref 0–0.2)
BASOPHILS NFR BLD: 1 % (ref 0–2)
BILIRUB SERPL-MCNC: 0.8 MG/DL (ref 0–1.2)
BUN SERPL-MCNC: 14 MG/DL (ref 6–23)
CA-I BLD-SCNC: 1.28 MMOL/L (ref 1.15–1.33)
CALCIUM SERPL-MCNC: 9.2 MG/DL (ref 8.6–10.2)
CHLORIDE SERPL-SCNC: 105 MMOL/L (ref 98–107)
CO2 SERPL-SCNC: 20 MMOL/L (ref 22–29)
CREAT SERPL-MCNC: 0.7 MG/DL (ref 0.7–1.2)
EKG ATRIAL RATE: 60 BPM
EKG Q-T INTERVAL: 330 MS
EKG QRS DURATION: 86 MS
EKG QTC CALCULATION (BAZETT): 488 MS
EKG R AXIS: -48 DEGREES
EKG T AXIS: 27 DEGREES
EKG VENTRICULAR RATE: 132 BPM
EOSINOPHIL # BLD: 0.27 K/UL (ref 0.05–0.5)
EOSINOPHILS RELATIVE PERCENT: 4 % (ref 0–6)
ERYTHROCYTE [DISTWIDTH] IN BLOOD BY AUTOMATED COUNT: 13.2 % (ref 11.5–15)
GFR SERPL CREATININE-BSD FRML MDRD: >60 ML/MIN/1.73M2
GLUCOSE SERPL-MCNC: 245 MG/DL (ref 74–99)
HCT VFR BLD AUTO: 41.3 % (ref 37–54)
HGB BLD-MCNC: 13.7 G/DL (ref 12.5–16.5)
IMM GRANULOCYTES # BLD AUTO: 0.03 K/UL (ref 0–0.58)
IMM GRANULOCYTES NFR BLD: 0 % (ref 0–5)
LYMPHOCYTES NFR BLD: 1.67 K/UL (ref 1.5–4)
LYMPHOCYTES RELATIVE PERCENT: 24 % (ref 20–42)
MAGNESIUM SERPL-MCNC: 1.9 MG/DL (ref 1.6–2.6)
MCH RBC QN AUTO: 30.4 PG (ref 26–35)
MCHC RBC AUTO-ENTMCNC: 33.2 G/DL (ref 32–34.5)
MCV RBC AUTO: 91.8 FL (ref 80–99.9)
METER GLUCOSE: 203 MG/DL (ref 74–99)
METER GLUCOSE: 226 MG/DL (ref 74–99)
METER GLUCOSE: 247 MG/DL (ref 74–99)
METER GLUCOSE: 248 MG/DL (ref 74–99)
METER GLUCOSE: 251 MG/DL (ref 74–99)
MONOCYTES NFR BLD: 0.76 K/UL (ref 0.1–0.95)
MONOCYTES NFR BLD: 11 % (ref 2–12)
NEUTROPHILS NFR BLD: 61 % (ref 43–80)
NEUTS SEG NFR BLD: 4.26 K/UL (ref 1.8–7.3)
PHOSPHATE SERPL-MCNC: 3.9 MG/DL (ref 2.5–4.5)
PLATELET # BLD AUTO: 294 K/UL (ref 130–450)
PMV BLD AUTO: 9.5 FL (ref 7–12)
POTASSIUM SERPL-SCNC: 4.1 MMOL/L (ref 3.5–5)
PROT SERPL-MCNC: 7.1 G/DL (ref 6.4–8.3)
RBC # BLD AUTO: 4.5 M/UL (ref 3.8–5.8)
SODIUM SERPL-SCNC: 138 MMOL/L (ref 132–146)
WBC OTHER # BLD: 7 K/UL (ref 4.5–11.5)

## 2023-07-31 PROCEDURE — 82947 ASSAY GLUCOSE BLOOD QUANT: CPT

## 2023-07-31 PROCEDURE — 6370000000 HC RX 637 (ALT 250 FOR IP): Performed by: CLINICAL NURSE SPECIALIST

## 2023-07-31 PROCEDURE — 6360000002 HC RX W HCPCS: Performed by: STUDENT IN AN ORGANIZED HEALTH CARE EDUCATION/TRAINING PROGRAM

## 2023-07-31 PROCEDURE — 97162 PT EVAL MOD COMPLEX 30 MIN: CPT

## 2023-07-31 PROCEDURE — 6370000000 HC RX 637 (ALT 250 FOR IP): Performed by: INTERNAL MEDICINE

## 2023-07-31 PROCEDURE — 97129 THER IVNTJ 1ST 15 MIN: CPT

## 2023-07-31 PROCEDURE — 80053 COMPREHEN METABOLIC PANEL: CPT

## 2023-07-31 PROCEDURE — 94640 AIRWAY INHALATION TREATMENT: CPT

## 2023-07-31 PROCEDURE — 85027 COMPLETE CBC AUTOMATED: CPT

## 2023-07-31 PROCEDURE — 84100 ASSAY OF PHOSPHORUS: CPT

## 2023-07-31 PROCEDURE — 6370000000 HC RX 637 (ALT 250 FOR IP): Performed by: STUDENT IN AN ORGANIZED HEALTH CARE EDUCATION/TRAINING PROGRAM

## 2023-07-31 PROCEDURE — 83735 ASSAY OF MAGNESIUM: CPT

## 2023-07-31 PROCEDURE — 97166 OT EVAL MOD COMPLEX 45 MIN: CPT

## 2023-07-31 PROCEDURE — 82330 ASSAY OF CALCIUM: CPT

## 2023-07-31 PROCEDURE — S5553 INSULIN LONG ACTING 5 U: HCPCS | Performed by: NURSE PRACTITIONER

## 2023-07-31 PROCEDURE — 99232 SBSQ HOSP IP/OBS MODERATE 35: CPT | Performed by: NURSE PRACTITIONER

## 2023-07-31 PROCEDURE — 6360000002 HC RX W HCPCS: Performed by: NURSE PRACTITIONER

## 2023-07-31 PROCEDURE — 97530 THERAPEUTIC ACTIVITIES: CPT

## 2023-07-31 PROCEDURE — 6370000000 HC RX 637 (ALT 250 FOR IP): Performed by: NURSE PRACTITIONER

## 2023-07-31 PROCEDURE — 2580000003 HC RX 258: Performed by: INTERNAL MEDICINE

## 2023-07-31 PROCEDURE — 97130 THER IVNTJ EA ADDL 15 MIN: CPT

## 2023-07-31 PROCEDURE — 2000000000 HC ICU R&B

## 2023-07-31 PROCEDURE — 93010 ELECTROCARDIOGRAM REPORT: CPT | Performed by: INTERNAL MEDICINE

## 2023-07-31 RX ORDER — METOPROLOL TARTRATE 50 MG/1
50 TABLET, FILM COATED ORAL 2 TIMES DAILY
Status: DISCONTINUED | OUTPATIENT
Start: 2023-07-31 | End: 2023-08-01 | Stop reason: HOSPADM

## 2023-07-31 RX ORDER — INSULIN GLARGINE-YFGN 100 [IU]/ML
15 INJECTION, SOLUTION SUBCUTANEOUS NIGHTLY
Status: DISCONTINUED | OUTPATIENT
Start: 2023-07-31 | End: 2023-08-01 | Stop reason: HOSPADM

## 2023-07-31 RX ADMIN — PANTOPRAZOLE SODIUM 40 MG: 40 TABLET, DELAYED RELEASE ORAL at 08:52

## 2023-07-31 RX ADMIN — GABAPENTIN 600 MG: 300 CAPSULE ORAL at 13:29

## 2023-07-31 RX ADMIN — INSULIN LISPRO 4 UNITS: 100 INJECTION, SOLUTION INTRAVENOUS; SUBCUTANEOUS at 23:42

## 2023-07-31 RX ADMIN — SODIUM CHLORIDE, PRESERVATIVE FREE 10 ML: 5 INJECTION INTRAVENOUS at 09:06

## 2023-07-31 RX ADMIN — TICAGRELOR 90 MG: 90 TABLET ORAL at 20:07

## 2023-07-31 RX ADMIN — SENNOSIDES 8.6 MG: 8.6 TABLET, FILM COATED ORAL at 20:07

## 2023-07-31 RX ADMIN — GABAPENTIN 600 MG: 300 CAPSULE ORAL at 20:07

## 2023-07-31 RX ADMIN — INSULIN LISPRO 4 UNITS: 100 INJECTION, SOLUTION INTRAVENOUS; SUBCUTANEOUS at 20:07

## 2023-07-31 RX ADMIN — INSULIN LISPRO 4 UNITS: 100 INJECTION, SOLUTION INTRAVENOUS; SUBCUTANEOUS at 14:02

## 2023-07-31 RX ADMIN — METOPROLOL TARTRATE 50 MG: 50 TABLET ORAL at 20:07

## 2023-07-31 RX ADMIN — INSULIN GLARGINE-YFGN 15 UNITS: 100 INJECTION, SOLUTION SUBCUTANEOUS at 20:07

## 2023-07-31 RX ADMIN — LEVETIRACETAM 500 MG: 100 INJECTION INTRAVENOUS at 13:29

## 2023-07-31 RX ADMIN — ARFORMOTEROL TARTRATE 15 MCG: 15 SOLUTION RESPIRATORY (INHALATION) at 07:50

## 2023-07-31 RX ADMIN — LOSARTAN POTASSIUM 50 MG: 50 TABLET, FILM COATED ORAL at 09:06

## 2023-07-31 RX ADMIN — METOPROLOL TARTRATE 25 MG: 25 TABLET, FILM COATED ORAL at 08:52

## 2023-07-31 RX ADMIN — GABAPENTIN 600 MG: 300 CAPSULE ORAL at 08:52

## 2023-07-31 RX ADMIN — GABAPENTIN 600 MG: 300 CAPSULE ORAL at 18:25

## 2023-07-31 RX ADMIN — LEVETIRACETAM 500 MG: 100 INJECTION INTRAVENOUS at 23:40

## 2023-07-31 RX ADMIN — TICAGRELOR 90 MG: 90 TABLET ORAL at 08:52

## 2023-07-31 RX ADMIN — ATORVASTATIN CALCIUM 40 MG: 40 TABLET, FILM COATED ORAL at 20:07

## 2023-07-31 RX ADMIN — SODIUM CHLORIDE, PRESERVATIVE FREE 10 ML: 5 INJECTION INTRAVENOUS at 20:13

## 2023-07-31 RX ADMIN — ASPIRIN 81 MG: 81 TABLET, COATED ORAL at 08:52

## 2023-07-31 RX ADMIN — INSULIN LISPRO 4 UNITS: 100 INJECTION, SOLUTION INTRAVENOUS; SUBCUTANEOUS at 18:25

## 2023-07-31 RX ADMIN — INSULIN LISPRO 8 UNITS: 100 INJECTION, SOLUTION INTRAVENOUS; SUBCUTANEOUS at 09:05

## 2023-07-31 RX ADMIN — BUDESONIDE INHALATION 500 MCG: 0.5 SUSPENSION RESPIRATORY (INHALATION) at 21:18

## 2023-07-31 RX ADMIN — INSULIN LISPRO 4 UNITS: 100 INJECTION, SOLUTION INTRAVENOUS; SUBCUTANEOUS at 05:02

## 2023-07-31 RX ADMIN — BUDESONIDE INHALATION 500 MCG: 0.5 SUSPENSION RESPIRATORY (INHALATION) at 07:50

## 2023-07-31 RX ADMIN — ARFORMOTEROL TARTRATE 15 MCG: 15 SOLUTION RESPIRATORY (INHALATION) at 21:18

## 2023-07-31 NOTE — CARE COORDINATION
7/31 Care Coordination:Pt had a Heart Cath 7/25. Following arrival to floor, patient had speech difficulties and confusion. Stroke alert called. Patient admitted to ICU following code blue with reported seizure like activity. 7/29 extubated. Spoke with Pt and his wife in the room. Discussed discharge needs. Pt has a Hx of PIER[facility] at [facility]. Reviewed discharge options, ARU vs PIERCE. Pt and wife want to go back to [facility], Referral called to [facility]. Hens started, Envelope in Soft Chart, facility can transport. CM/SW will continue to follow for discharge planning.    Swetha WILDERN,RN-CV-BC  [phone]

## 2023-07-31 NOTE — DISCHARGE INSTR - COC
Continuity of Care Form    Patient Name: Sugar Darden   :  1950  MRN:  [id number]    Admit date:  2023  Discharge date:  2023    Code Status Order: Full Code   Advance Directives:     Admitting Physician:  Yahaira Colon MD  PCP: Charles Roy DO    Discharging Nurse: Nirmala Farley, [facility] Unit/Room#: 7195/7929-Q  Discharging Unit Phone Number: [phone]    Emergency Contact:   Extended Emergency Contact Information  Primary Emergency Contact: Fariba Mark  Address: [address] [place] [address] [address] Phone: [phone]  Relation: Spouse    Past Surgical History:  Past Surgical History:   Procedure Laterality Date    BACK SURGERY  10/2019    COLONOSCOPY      HERNIA REPAIR      JOINT REPLACEMENT Left     left knee surgery x5    REVISION TOTAL KNEE ARTHROPLASTY Right 2018       Immunization History:   Immunization History   Administered Date(s) Administered    Influenza Vaccine, unspecified formulation 10/01/2012, 2013, 2014, 10/06/2015, 2016, 10/15/2017    Influenza Virus Vaccine 10/01/2012, 2013, 2014, 10/01/2015, 2016, 10/09/2017, 10/18/2018    Influenza, AFLURIA (age 1 yrs+), FLUZONE, (age 10 mo+), MDV, 0.5mL 10/01/2012, 2013, 10/06/2015    Influenza, FLUARIX, FLULAVAL, FLUZONE (age 10 mo+) AND AFLURIA, (age 1 y+), PF, 0.5mL 10/15/2017, 2020, 10/14/2021    Influenza, FLUZONE (age 72 y+), High Dose, 0.7mL 10/14/2021    Influenza, High Dose (Fluzone 65 yrs and older) 2018    Pneumococcal, PCV-13, PREVNAR 15, (age 6w+), IM, 0.5mL 2014, 2016    Pneumococcal, PPSV23, PNEUMOVAX 23, (age 2y+), SC/IM, 0.5mL 2020, 2022    TDaP, ADACEL (age 6y-58y), BOOSTRIX (age 10y+), IM, 0.5mL 04/10/2012, 2018       Active Problems:  Patient Active Problem List   Diagnosis Code    Obstructive sleep apnea syndrome G47.33    Chest pain R07.9    LANGFORD (dyspnea on

## 2023-08-01 ENCOUNTER — HOSPITAL ENCOUNTER (INPATIENT)
Age: 73
LOS: 17 days | Discharge: HOME | End: 2023-08-18
Attending: PHYSICAL MEDICINE & REHABILITATION | Admitting: PHYSICAL MEDICINE & REHABILITATION
Payer: MEDICARE

## 2023-08-01 VITALS
TEMPERATURE: 97 F | BODY MASS INDEX: 39.62 KG/M2 | WEIGHT: 298.94 LBS | RESPIRATION RATE: 16 BRPM | HEART RATE: 95 BPM | DIASTOLIC BLOOD PRESSURE: 90 MMHG | OXYGEN SATURATION: 94 % | HEIGHT: 73 IN | SYSTOLIC BLOOD PRESSURE: 148 MMHG

## 2023-08-01 PROBLEM — I63.9 ACUTE ISCHEMIC STROKE (HCC): Status: ACTIVE | Noted: 2023-08-01

## 2023-08-01 LAB
ALBUMIN SERPL-MCNC: 4 G/DL (ref 3.5–5.2)
ALP SERPL-CCNC: 64 U/L (ref 40–129)
ALT SERPL-CCNC: 42 U/L (ref 0–40)
ANION GAP SERPL CALCULATED.3IONS-SCNC: 14 MMOL/L (ref 7–16)
AST SERPL-CCNC: 47 U/L (ref 0–39)
BASOPHILS # BLD: 0.06 K/UL (ref 0–0.2)
BASOPHILS NFR BLD: 1 % (ref 0–2)
BILIRUB SERPL-MCNC: 0.8 MG/DL (ref 0–1.2)
BUN SERPL-MCNC: 16 MG/DL (ref 6–23)
CA-I BLD-SCNC: 1.22 MMOL/L (ref 1.15–1.33)
CALCIUM SERPL-MCNC: 9.4 MG/DL (ref 8.6–10.2)
CHLORIDE SERPL-SCNC: 105 MMOL/L (ref 98–107)
CO2 SERPL-SCNC: 20 MMOL/L (ref 22–29)
CREAT SERPL-MCNC: 0.7 MG/DL (ref 0.7–1.2)
EOSINOPHIL # BLD: 0.31 K/UL (ref 0.05–0.5)
EOSINOPHILS RELATIVE PERCENT: 5 % (ref 0–6)
ERYTHROCYTE [DISTWIDTH] IN BLOOD BY AUTOMATED COUNT: 13 % (ref 11.5–15)
GFR SERPL CREATININE-BSD FRML MDRD: >60 ML/MIN/1.73M2
GLUCOSE SERPL-MCNC: 278 MG/DL (ref 74–99)
HCT VFR BLD AUTO: 42.3 % (ref 37–54)
HGB BLD-MCNC: 14.4 G/DL (ref 12.5–16.5)
IMM GRANULOCYTES # BLD AUTO: 0.03 K/UL (ref 0–0.58)
IMM GRANULOCYTES NFR BLD: 0 % (ref 0–5)
LYMPHOCYTES NFR BLD: 1.5 K/UL (ref 1.5–4)
LYMPHOCYTES RELATIVE PERCENT: 22 % (ref 20–42)
MAGNESIUM SERPL-MCNC: 2 MG/DL (ref 1.6–2.6)
MCH RBC QN AUTO: 30.4 PG (ref 26–35)
MCHC RBC AUTO-ENTMCNC: 34 G/DL (ref 32–34.5)
MCV RBC AUTO: 89.4 FL (ref 80–99.9)
METER GLUCOSE: 247 MG/DL (ref 74–99)
METER GLUCOSE: 267 MG/DL (ref 74–99)
METER GLUCOSE: 333 MG/DL (ref 74–99)
METER GLUCOSE: 358 MG/DL (ref 74–99)
MONOCYTES NFR BLD: 0.78 K/UL (ref 0.1–0.95)
MONOCYTES NFR BLD: 11 % (ref 2–12)
NEUTROPHILS NFR BLD: 61 % (ref 43–80)
NEUTS SEG NFR BLD: 4.26 K/UL (ref 1.8–7.3)
PHOSPHATE SERPL-MCNC: 3.7 MG/DL (ref 2.5–4.5)
PLATELET # BLD AUTO: 332 K/UL (ref 130–450)
PMV BLD AUTO: 9.5 FL (ref 7–12)
POTASSIUM SERPL-SCNC: 4 MMOL/L (ref 3.5–5)
PROT SERPL-MCNC: 7.5 G/DL (ref 6.4–8.3)
RBC # BLD AUTO: 4.73 M/UL (ref 3.8–5.8)
SODIUM SERPL-SCNC: 139 MMOL/L (ref 132–146)
WBC OTHER # BLD: 6.9 K/UL (ref 4.5–11.5)

## 2023-08-01 PROCEDURE — 84100 ASSAY OF PHOSPHORUS: CPT

## 2023-08-01 PROCEDURE — 97535 SELF CARE MNGMENT TRAINING: CPT

## 2023-08-01 PROCEDURE — 6370000000 HC RX 637 (ALT 250 FOR IP): Performed by: NURSE PRACTITIONER

## 2023-08-01 PROCEDURE — 97130 THER IVNTJ EA ADDL 15 MIN: CPT

## 2023-08-01 PROCEDURE — 80053 COMPREHEN METABOLIC PANEL: CPT

## 2023-08-01 PROCEDURE — 97530 THERAPEUTIC ACTIVITIES: CPT

## 2023-08-01 PROCEDURE — 82947 ASSAY GLUCOSE BLOOD QUANT: CPT

## 2023-08-01 PROCEDURE — 6370000000 HC RX 637 (ALT 250 FOR IP): Performed by: CLINICAL NURSE SPECIALIST

## 2023-08-01 PROCEDURE — 83735 ASSAY OF MAGNESIUM: CPT

## 2023-08-01 PROCEDURE — 97129 THER IVNTJ 1ST 15 MIN: CPT

## 2023-08-01 PROCEDURE — 6360000002 HC RX W HCPCS: Performed by: PHYSICAL MEDICINE & REHABILITATION

## 2023-08-01 PROCEDURE — S5553 INSULIN LONG ACTING 5 U: HCPCS | Performed by: NURSE PRACTITIONER

## 2023-08-01 PROCEDURE — 6360000002 HC RX W HCPCS: Performed by: NURSE PRACTITIONER

## 2023-08-01 PROCEDURE — 6370000000 HC RX 637 (ALT 250 FOR IP): Performed by: INTERNAL MEDICINE

## 2023-08-01 PROCEDURE — 94640 AIRWAY INHALATION TREATMENT: CPT

## 2023-08-01 PROCEDURE — 82330 ASSAY OF CALCIUM: CPT

## 2023-08-01 PROCEDURE — 2580000003 HC RX 258: Performed by: NURSE PRACTITIONER

## 2023-08-01 PROCEDURE — 1280000000 HC REHAB R&B

## 2023-08-01 PROCEDURE — 85025 COMPLETE CBC W/AUTO DIFF WBC: CPT

## 2023-08-01 RX ORDER — LOSARTAN POTASSIUM 50 MG/1
50 TABLET ORAL DAILY
Status: DISCONTINUED | OUTPATIENT
Start: 2023-08-02 | End: 2023-08-18 | Stop reason: HOSPADM

## 2023-08-01 RX ORDER — LEVETIRACETAM 500 MG/1
500 TABLET ORAL 2 TIMES DAILY
Status: DISCONTINUED | OUTPATIENT
Start: 2023-08-01 | End: 2023-08-01 | Stop reason: HOSPADM

## 2023-08-01 RX ORDER — ASPIRIN 81 MG/1
81 TABLET ORAL DAILY
Status: DISCONTINUED | OUTPATIENT
Start: 2023-08-02 | End: 2023-08-18 | Stop reason: HOSPADM

## 2023-08-01 RX ORDER — METOPROLOL TARTRATE 50 MG/1
50 TABLET, FILM COATED ORAL 2 TIMES DAILY
Status: DISCONTINUED | OUTPATIENT
Start: 2023-08-01 | End: 2023-08-18 | Stop reason: HOSPADM

## 2023-08-01 RX ORDER — SENNOSIDES A AND B 8.6 MG/1
1 TABLET, FILM COATED ORAL NIGHTLY
Status: CANCELLED | OUTPATIENT
Start: 2023-08-01

## 2023-08-01 RX ORDER — ALBUTEROL SULFATE 2.5 MG/3ML
2.5 SOLUTION RESPIRATORY (INHALATION) 4 TIMES DAILY PRN
Status: CANCELLED | OUTPATIENT
Start: 2023-08-01

## 2023-08-01 RX ORDER — GABAPENTIN 300 MG/1
600 CAPSULE ORAL 4 TIMES DAILY
Status: DISCONTINUED | OUTPATIENT
Start: 2023-08-02 | End: 2023-08-18 | Stop reason: HOSPADM

## 2023-08-01 RX ORDER — PANTOPRAZOLE SODIUM 40 MG/1
40 TABLET, DELAYED RELEASE ORAL DAILY
Status: CANCELLED | OUTPATIENT
Start: 2023-08-02

## 2023-08-01 RX ORDER — ACETAMINOPHEN 325 MG/1
650 TABLET ORAL EVERY 4 HOURS PRN
Status: DISCONTINUED | OUTPATIENT
Start: 2023-08-01 | End: 2023-08-18 | Stop reason: HOSPADM

## 2023-08-01 RX ORDER — LOSARTAN POTASSIUM 50 MG/1
50 TABLET ORAL DAILY
Status: CANCELLED | OUTPATIENT
Start: 2023-08-02

## 2023-08-01 RX ORDER — INSULIN GLARGINE-YFGN 100 [IU]/ML
15 INJECTION, SOLUTION SUBCUTANEOUS NIGHTLY
Status: CANCELLED | OUTPATIENT
Start: 2023-08-01

## 2023-08-01 RX ORDER — BUDESONIDE 0.5 MG/2ML
0.5 INHALANT ORAL
Status: CANCELLED | OUTPATIENT
Start: 2023-08-01

## 2023-08-01 RX ORDER — LEVETIRACETAM 500 MG/1
500 TABLET ORAL 2 TIMES DAILY
Status: CANCELLED | OUTPATIENT
Start: 2023-08-01

## 2023-08-01 RX ORDER — GABAPENTIN 300 MG/1
600 CAPSULE ORAL 4 TIMES DAILY
Status: CANCELLED | OUTPATIENT
Start: 2023-08-01

## 2023-08-01 RX ORDER — INSULIN GLARGINE-YFGN 100 [IU]/ML
15 INJECTION, SOLUTION SUBCUTANEOUS NIGHTLY
Status: DISCONTINUED | OUTPATIENT
Start: 2023-08-01 | End: 2023-08-18 | Stop reason: HOSPADM

## 2023-08-01 RX ORDER — ARFORMOTEROL TARTRATE 15 UG/2ML
15 SOLUTION RESPIRATORY (INHALATION)
Status: CANCELLED | OUTPATIENT
Start: 2023-08-01

## 2023-08-01 RX ORDER — ENOXAPARIN SODIUM 100 MG/ML
30 INJECTION SUBCUTANEOUS 2 TIMES DAILY
Status: DISCONTINUED | OUTPATIENT
Start: 2023-08-01 | End: 2023-08-18 | Stop reason: HOSPADM

## 2023-08-01 RX ORDER — FLUTICASONE PROPIONATE 50 MCG
1 SPRAY, SUSPENSION (ML) NASAL 2 TIMES DAILY PRN
Status: CANCELLED | OUTPATIENT
Start: 2023-08-01

## 2023-08-01 RX ORDER — INSULIN LISPRO 100 [IU]/ML
0-16 INJECTION, SOLUTION INTRAVENOUS; SUBCUTANEOUS EVERY 4 HOURS
Status: DISCONTINUED | OUTPATIENT
Start: 2023-08-01 | End: 2023-08-03

## 2023-08-01 RX ORDER — FLUTICASONE PROPIONATE 50 MCG
1 SPRAY, SUSPENSION (ML) NASAL 2 TIMES DAILY PRN
Status: DISCONTINUED | OUTPATIENT
Start: 2023-08-01 | End: 2023-08-18 | Stop reason: HOSPADM

## 2023-08-01 RX ORDER — ASPIRIN 81 MG/1
81 TABLET ORAL DAILY
Status: CANCELLED | OUTPATIENT
Start: 2023-08-02

## 2023-08-01 RX ORDER — ARFORMOTEROL TARTRATE 15 UG/2ML
15 SOLUTION RESPIRATORY (INHALATION)
Status: DISCONTINUED | OUTPATIENT
Start: 2023-08-01 | End: 2023-08-18 | Stop reason: HOSPADM

## 2023-08-01 RX ORDER — METOPROLOL TARTRATE 50 MG/1
50 TABLET, FILM COATED ORAL 2 TIMES DAILY
Status: CANCELLED | OUTPATIENT
Start: 2023-08-01

## 2023-08-01 RX ORDER — BUDESONIDE 0.5 MG/2ML
0.5 INHALANT ORAL
Status: DISCONTINUED | OUTPATIENT
Start: 2023-08-01 | End: 2023-08-18 | Stop reason: HOSPADM

## 2023-08-01 RX ORDER — POLYETHYLENE GLYCOL 3350 17 G/17G
17 POWDER, FOR SOLUTION ORAL DAILY PRN
Status: DISCONTINUED | OUTPATIENT
Start: 2023-08-01 | End: 2023-08-18 | Stop reason: HOSPADM

## 2023-08-01 RX ORDER — ALBUTEROL SULFATE 2.5 MG/3ML
2.5 SOLUTION RESPIRATORY (INHALATION) 4 TIMES DAILY PRN
Status: DISCONTINUED | OUTPATIENT
Start: 2023-08-01 | End: 2023-08-18 | Stop reason: HOSPADM

## 2023-08-01 RX ORDER — PANTOPRAZOLE SODIUM 40 MG/1
40 TABLET, DELAYED RELEASE ORAL DAILY
Status: DISCONTINUED | OUTPATIENT
Start: 2023-08-02 | End: 2023-08-18 | Stop reason: HOSPADM

## 2023-08-01 RX ORDER — ATORVASTATIN CALCIUM 40 MG/1
40 TABLET, FILM COATED ORAL NIGHTLY
Status: DISCONTINUED | OUTPATIENT
Start: 2023-08-01 | End: 2023-08-18 | Stop reason: HOSPADM

## 2023-08-01 RX ORDER — ACETAMINOPHEN 325 MG/1
650 TABLET ORAL EVERY 4 HOURS PRN
Status: CANCELLED | OUTPATIENT
Start: 2023-08-01

## 2023-08-01 RX ORDER — LEVETIRACETAM 500 MG/1
500 TABLET ORAL 2 TIMES DAILY
Status: DISCONTINUED | OUTPATIENT
Start: 2023-08-01 | End: 2023-08-03

## 2023-08-01 RX ORDER — INSULIN LISPRO 100 [IU]/ML
0-16 INJECTION, SOLUTION INTRAVENOUS; SUBCUTANEOUS EVERY 4 HOURS
Status: CANCELLED | OUTPATIENT
Start: 2023-08-01

## 2023-08-01 RX ORDER — ATORVASTATIN CALCIUM 40 MG/1
40 TABLET, FILM COATED ORAL NIGHTLY
Status: CANCELLED | OUTPATIENT
Start: 2023-08-01

## 2023-08-01 RX ORDER — SENNOSIDES A AND B 8.6 MG/1
1 TABLET, FILM COATED ORAL NIGHTLY
Status: DISCONTINUED | OUTPATIENT
Start: 2023-08-01 | End: 2023-08-07

## 2023-08-01 RX ADMIN — METOPROLOL TARTRATE 50 MG: 50 TABLET ORAL at 10:05

## 2023-08-01 RX ADMIN — GABAPENTIN 600 MG: 300 CAPSULE ORAL at 13:21

## 2023-08-01 RX ADMIN — INSULIN LISPRO 4 UNITS: 100 INJECTION, SOLUTION INTRAVENOUS; SUBCUTANEOUS at 10:05

## 2023-08-01 RX ADMIN — ASPIRIN 81 MG: 81 TABLET, COATED ORAL at 10:04

## 2023-08-01 RX ADMIN — INSULIN LISPRO 12 UNITS: 100 INJECTION, SOLUTION INTRAVENOUS; SUBCUTANEOUS at 13:22

## 2023-08-01 RX ADMIN — ARFORMOTEROL TARTRATE 15 MCG: 15 SOLUTION RESPIRATORY (INHALATION) at 07:44

## 2023-08-01 RX ADMIN — INSULIN GLARGINE-YFGN 15 UNITS: 100 INJECTION, SOLUTION SUBCUTANEOUS at 22:30

## 2023-08-01 RX ADMIN — BUDESONIDE INHALATION 500 MCG: 0.5 SUSPENSION RESPIRATORY (INHALATION) at 21:51

## 2023-08-01 RX ADMIN — LEVETIRACETAM 500 MG: 500 TABLET, FILM COATED ORAL at 10:05

## 2023-08-01 RX ADMIN — BUDESONIDE INHALATION 500 MCG: 0.5 SUSPENSION RESPIRATORY (INHALATION) at 07:44

## 2023-08-01 RX ADMIN — INSULIN LISPRO 8 UNITS: 100 INJECTION, SOLUTION INTRAVENOUS; SUBCUTANEOUS at 05:23

## 2023-08-01 RX ADMIN — GABAPENTIN 600 MG: 300 CAPSULE ORAL at 10:05

## 2023-08-01 RX ADMIN — LEVETIRACETAM 500 MG: 500 TABLET, FILM COATED ORAL at 22:35

## 2023-08-01 RX ADMIN — ENOXAPARIN SODIUM 30 MG: 100 INJECTION SUBCUTANEOUS at 22:36

## 2023-08-01 RX ADMIN — LOSARTAN POTASSIUM 50 MG: 50 TABLET, FILM COATED ORAL at 10:04

## 2023-08-01 RX ADMIN — SODIUM CHLORIDE, PRESERVATIVE FREE 10 ML: 5 INJECTION INTRAVENOUS at 10:05

## 2023-08-01 RX ADMIN — TICAGRELOR 90 MG: 90 TABLET ORAL at 10:04

## 2023-08-01 RX ADMIN — ATORVASTATIN CALCIUM 40 MG: 40 TABLET, FILM COATED ORAL at 22:35

## 2023-08-01 RX ADMIN — METOPROLOL TARTRATE 50 MG: 50 TABLET, FILM COATED ORAL at 22:35

## 2023-08-01 RX ADMIN — INSULIN LISPRO 12 UNITS: 100 INJECTION, SOLUTION INTRAVENOUS; SUBCUTANEOUS at 22:30

## 2023-08-01 RX ADMIN — ARFORMOTEROL TARTRATE 15 MCG: 15 SOLUTION RESPIRATORY (INHALATION) at 21:51

## 2023-08-01 RX ADMIN — TICAGRELOR 90 MG: 90 TABLET ORAL at 22:35

## 2023-08-01 RX ADMIN — PANTOPRAZOLE SODIUM 40 MG: 40 TABLET, DELAYED RELEASE ORAL at 10:05

## 2023-08-01 ASSESSMENT — PAIN SCALES - GENERAL
PAINLEVEL_OUTOF10: 0
PAINLEVEL_OUTOF10: 0

## 2023-08-01 NOTE — CARE COORDINATION
Spoke with patient and his wife at the bedside ands gave them ARU room number as well as visiting hours.

## 2023-08-01 NOTE — CARE COORDINATION
Met with patient at the bedside. Discussed ARU. Patient is agreeable in coming to ARU but was concerned that his wife wanted him to go to [facility] since it was closer to their home. Patient and wife live in a 1 story home with 5 stairs to enter with 1 rails. Patient was independent PTA and ambulated with a [facility] inside the home and [facility] in the community. Spoke with wife Emelia Gamble who said that she is ok with patient coming to ARU as long as patient understands that she will not be able to visit him every day as she works. Will review with Dr. Donald Davis.

## 2023-08-01 NOTE — DISCHARGE SUMMARY
[facility]   Discharge summary   Patient ID:  Yasmani Eli  [id number]  54 y.o.  1950    Admit date: 7/26/2023    Discharge date and time: 8/1/2023    Admission Diagnoses:   Patient Active Problem List   Diagnosis    Obstructive sleep apnea syndrome    Chest pain    LANGFORD (dyspnea on exertion)    Abnormal ECG    Disability of walking    Generalized osteoarthritis    Moderate persistent asthma without complication    Uncontrolled type 2 diabetes mellitus    CAD in native artery    Acute stroke due to ischemia [facility])    Acute respiratory failure with hypoxia (HCC)    S/P primary angioplasty with coronary stent    Coronary artery disease due to lipid rich plaque    Abnormal nuclear stress test    Seizure-like activity [facility])       Discharge Diagnoses: cva, seizure     Consults: cardiology, pulmonary/intensive care, and neurology    Procedures: [address] Course:     Patient is a 78-year-old male admitted to ICU for  Acute CVA following cardiac catheterization with PCI  -Intubated and sedated-Vent management per ICU team, wean ventilator as able  -Monitor labs  -Check lipid panel and A1c  -EEG pending completion-await neurology recommendations, currently remains on IV Keppra for seizure prevention  -MRI brain pending  -Neurology following  -Cardiology following  -s/p [facility] PCI with [place]OL[place] to the prox OM1-continue dual antiplatelet therapy, beta-blocker and high intensity statin  -Echo pending completion      7/29/2023  Post heart cath stroke  Continue dual antiplatelet therapy, high intensity statin and beta-blocker post PCI  Extubated, alert awake oriented x4  Wife at bedside  Echocardiogram completed with ejection fraction 60 to 65%  No focal neurological deficits on my evaluation  Okay for out of ICU setting from medicine standpoint     7/30/2023  Okay for out of ICU from medicine standpoint  Blood sugar still significantly elevated-uptitrate diabetic regimen, continue insulin sliding

## 2023-08-01 NOTE — CARE COORDINATION
Patient remains on unit  s/p heart cath  and stent placement 7/26 with a CVA and intubation after cardiac arrest. This Cm approached by therapist regarding patient requesting an acute rehab referral met with patient at bedside regarding choices and he would like a referral to [facility]. Referral called to [facility] from ARU. Will await determination. Jaqueline Jaimes from [place] continues to follow. CM/SW to continue to assist with transition of care planning.

## 2023-08-01 NOTE — PLAN OF CARE
EKG performed by [facility] staff on 7/26/2023 during code with no epic order. Celeste served Dr. Joan Barber for order placement.   Spoke to Gregory that no repeat needed at this time with this order
Patient having MRI brain completed.
Problem: Chronic Conditions and Co-morbidities  Goal: Patient's chronic conditions and co-morbidity symptoms are monitored and maintained or improved  7/27/2023 0857 by Yazan Bassett RN  Outcome: Progressing     Problem: Safety - Adult  Goal: Free from fall injury  7/27/2023 0857 by Yazan Bassett RN  Outcome: Progressing     Problem: ABCDS Injury Assessment  Goal: Absence of physical injury  7/27/2023 0857 by Yazan Bassett RN  Outcome: Progressing
Problem: Chronic Conditions and Co-morbidities  Goal: Patient's chronic conditions and co-morbidity symptoms are monitored and maintained or improved  7/29/2023 2021 by Aly Dawn RN  Outcome: Progressing  Flowsheets (Taken 7/29/2023 2021)  Care Plan - Patient's Chronic Conditions and Co-Morbidity Symptoms are Monitored and Maintained or Improved:   Monitor and assess patient's chronic conditions and comorbid symptoms for stability, deterioration, or improvement   Collaborate with multidisciplinary team to address chronic and comorbid conditions and prevent exacerbation or deterioration   Update acute care plan with appropriate goals if chronic or comorbid symptoms are exacerbated and prevent overall improvement and discharge  7/29/2023 1012 by Jyoti Sawant RN  Outcome: Progressing     Problem: Respiratory - Adult  Goal: Achieves optimal ventilation and oxygenation  7/29/2023 2021 by Aly Dawn RN  Outcome: Progressing  Flowsheets (Taken 7/29/2023 2021)  Achieves optimal ventilation and oxygenation:   Assess for changes in respiratory status   Assess for changes in mentation and behavior   Position to facilitate oxygenation and minimize respiratory effort   Oxygen supplementation based on oxygen saturation or arterial blood gases   Encourage broncho-pulmonary hygiene including cough, deep breathe, incentive spirometry   Assess the need for suctioning and aspirate as needed   Assess and instruct to report shortness of breath or any respiratory difficulty   Respiratory therapy support as indicated  7/29/2023 1012 by Jyoti Sawant RN  Outcome: Progressing
Problem: Chronic Conditions and Co-morbidities  Goal: Patient's chronic conditions and co-morbidity symptoms are monitored and maintained or improved  Outcome: Progressing     Problem: Discharge Planning  Goal: Discharge to home or other facility with appropriate resources  Outcome: Progressing     Problem: ABCDS Injury Assessment  Goal: Absence of physical injury  Outcome: Progressing     Problem: Pain  Goal: Verbalizes/displays adequate comfort level or baseline comfort level  7/29/2023 1012 by Kendy Alegria RN  Outcome: Progressing     Problem: Skin/Tissue Integrity  Goal: Absence of new skin breakdown  Description: 1. Monitor for areas of redness and/or skin breakdown  2. Assess vascular access sites hourly  3. Every 4-6 hours minimum:  Change oxygen saturation probe site  4. Every 4-6 hours:  If on nasal continuous positive airway pressure, respiratory therapy assess nares and determine need for appliance change or resting period.   7/29/2023 1012 by Kendy Alegria RN  Outcome: Progressing     Problem: Respiratory - Adult  Goal: Achieves optimal ventilation and oxygenation  7/29/2023 1012 by Kendy Alegria RN  Outcome: Progressing
Problem: Chronic Conditions and Co-morbidities  Goal: Patient's chronic conditions and co-morbidity symptoms are monitored and maintained or improved  Outcome: Progressing     Problem: Safety - Adult  Goal: Free from fall injury  Outcome: Progressing     Problem: ABCDS Injury Assessment  Goal: Absence of physical injury  Outcome: Progressing     Problem: Safety - Medical Restraint  Goal: Remains free of injury from restraints (Restraint for Interference with Medical Device)  Description: INTERVENTIONS:  1. Determine that other, less restrictive measures have been tried or would not be effective before applying the restraint  2. Evaluate the patient's condition at the time of restraint application  3. Inform patient/family regarding the reason for restraint  4.  Q2H: Monitor safety, psychosocial status, comfort, nutrition and hydration  Outcome: Progressing  Flowsheets  Taken 7/26/2023 2000 by Marimar Meyer RN  Remains free of injury from restraints (restraint for interference with medical device): Every 2 hours: Monitor safety, psychosocial status, comfort, nutrition and hydration  Taken 7/26/2023 1800 by Ya Keene RN  Remains free of injury from restraints (restraint for interference with medical device): Evaluate the patient's condition at the time of restraint application  Taken 8/46/[id number] 1707 by Ya Keene RN  Remains free of injury from restraints (restraint for interference with medical device):   Evaluate the patient's condition at the time of restraint application   Every 2 hours: Monitor safety, psychosocial status, comfort, nutrition and hydration     Problem: Pain  Goal: Verbalizes/displays adequate comfort level or baseline comfort level  Outcome: Progressing  Flowsheets (Taken 7/26/2023 2000)  Verbalizes/displays adequate comfort level or baseline comfort level: Assess pain using appropriate pain scale     Problem: Discharge Planning  Goal: Discharge to home or other facility with
Problem: Neurosensory - Adult  Goal: Achieves maximal functionality and self care  7/30/2023 0743 by Familia Ramirez RN  Outcome: Progressing  Flowsheets (Taken 7/30/2023 9136)  Achieves maximal functionality and self care:   Monitor swallowing and airway patency with patient fatigue and changes in neurological status   Encourage and assist patient to increase activity and self care with guidance from physical therapy/occupational therapy   Encourage visually impaired, hearing impaired and aphasic patients to use assistive/communication devices     Problem: Chronic Conditions and Co-morbidities  Goal: Patient's chronic conditions and co-morbidity symptoms are monitored and maintained or improved  7/30/2023 2027 by Oliver Benavides RN  Outcome: Progressing  Flowsheets (Taken 7/30/2023 2027)  Care Plan - Patient's Chronic Conditions and Co-Morbidity Symptoms are Monitored and Maintained or Improved:   Monitor and assess patient's chronic conditions and comorbid symptoms for stability, deterioration, or improvement   Collaborate with multidisciplinary team to address chronic and comorbid conditions and prevent exacerbation or deterioration   Update acute care plan with appropriate goals if chronic or comorbid symptoms are exacerbated and prevent overall improvement and discharge  7/30/2023 0742 by Familia Ramirez RN  Outcome: Progressing  Flowsheets (Taken 7/30/2023 0530)  Care Plan - Patient's Chronic Conditions and Co-Morbidity Symptoms are Monitored and Maintained or Improved:   Monitor and assess patient's chronic conditions and comorbid symptoms for stability, deterioration, or improvement   Collaborate with multidisciplinary team to address chronic and comorbid conditions and prevent exacerbation or deterioration   Update acute care plan with appropriate goals if chronic or comorbid symptoms are exacerbated and prevent overall improvement and discharge     Problem: Neurosensory - Adult  Goal: Achieves stable
Problem: Neurosensory - Adult  Goal: Achieves stable or improved neurological status  Outcome: Not Progressing  Flowsheets (Taken 7/30/2023 0743)  Achieves stable or improved neurological status:   Assess for and report changes in neurological status   Maintain blood pressure and fluid volume within ordered parameters to optimize cerebral perfusion and minimize risk of hemorrhage   Monitor temperature, glucose, and sodium.  Initiate appropriate interventions as ordered   Initiate measures to prevent increased intracranial pressure     Problem: Neurosensory - Adult  Goal: Achieves maximal functionality and self care  Outcome: Not Progressing  Flowsheets (Taken 7/30/2023 0743)  Achieves maximal functionality and self care:   Monitor swallowing and airway patency with patient fatigue and changes in neurological status   Encourage and assist patient to increase activity and self care with guidance from physical therapy/occupational therapy   Encourage visually impaired, hearing impaired and aphasic patients to use assistive/communication devices     Problem: Chronic Conditions and Co-morbidities  Goal: Patient's chronic conditions and co-morbidity symptoms are monitored and maintained or improved  7/30/2023 0742 by Will Kimbrough RN  Outcome: Progressing  Flowsheets (Taken 7/30/2023 0949)  Care Plan - Patient's Chronic Conditions and Co-Morbidity Symptoms are Monitored and Maintained or Improved:   Monitor and assess patient's chronic conditions and comorbid symptoms for stability, deterioration, or improvement   Collaborate with multidisciplinary team to address chronic and comorbid conditions and prevent exacerbation or deterioration   Update acute care plan with appropriate goals if chronic or comorbid symptoms are exacerbated and prevent overall improvement and discharge     Problem: Discharge Planning  Goal: Discharge to home or other facility with appropriate resources  Outcome: Progressing  Flowsheets (Taken
Problem: Respiratory - Adult  Goal: Achieves optimal ventilation and oxygenation  Outcome: Progressing
Problem: Safety - Adult  Goal: Free from fall injury  Outcome: Progressing     Problem: ABCDS Injury Assessment  Goal: Absence of physical injury  Outcome: Progressing     Problem: Pain  Goal: Verbalizes/displays adequate comfort level or baseline comfort level  Outcome: Progressing
Problem: Safety - Adult  Goal: Free from fall injury  Outcome: Progressing  Flowsheets (Taken 7/28/2023 2253)  Free From Fall Injury:   Instruct family/caregiver on patient safety   Based on caregiver fall risk screen, instruct family/caregiver to ask for assistance with transferring infant if caregiver noted to have fall risk factors     Problem: Pain  Goal: Verbalizes/displays adequate comfort level or baseline comfort level  Outcome: Progressing  Flowsheets (Taken 7/28/2023 2253)  Verbalizes/displays adequate comfort level or baseline comfort level:   Encourage patient to monitor pain and request assistance   Assess pain using appropriate pain scale   Administer analgesics based on type and severity of pain and evaluate response   Implement non-pharmacological measures as appropriate and evaluate response   Consider cultural and social influences on pain and pain management   Notify Licensed Independent Practitioner if interventions unsuccessful or patient reports new pain     Problem: Skin/Tissue Integrity  Goal: Absence of new skin breakdown  Description: 1. Monitor for areas of redness and/or skin breakdown  2. Assess vascular access sites hourly  3. Every 4-6 hours minimum:  Change oxygen saturation probe site  4. Every 4-6 hours:  If on nasal continuous positive airway pressure, respiratory therapy assess nares and determine need for appliance change or resting period.   Outcome: Progressing     Problem: Respiratory - Adult  Goal: Achieves optimal ventilation and oxygenation  Outcome: Progressing  Flowsheets (Taken 7/28/2023 2253)  Achieves optimal ventilation and oxygenation:   Assess for changes in respiratory status   Assess for changes in mentation and behavior   Position to facilitate oxygenation and minimize respiratory effort   Oxygen supplementation based on oxygen saturation or arterial blood gases   Encourage broncho-pulmonary hygiene including cough, deep breathe, incentive spirometry
Problem: Safety - Medical Restraint  Goal: Remains free of injury from restraints (Restraint for Interference with Medical Device)  Description: INTERVENTIONS:  1. Determine that other, less restrictive measures have been tried or would not be effective before applying the restraint  2. Evaluate the patient's condition at the time of restraint application  3. Inform patient/family regarding the reason for restraint  4.  Q2H: Monitor safety, psychosocial status, comfort, nutrition and hydration  7/27/2023 0913 by Hugh Estrella RN  Outcome: Progressing  Flowsheets  Taken 7/27/2023 0913 by Hugh Estrella RN  Remains free of injury from restraints (restraint for interference with medical device):   Every 2 hours: Monitor safety, psychosocial status, comfort, nutrition and hydration   Inform patient/family regarding the reason for restraint  Taken 7/27/2023 0600 by Jairon Rahman RN  Remains free of injury from restraints (restraint for interference with medical device): Every 2 hours: Monitor safety, psychosocial status, comfort, nutrition and hydration  Taken 7/27/2023 0400 by Jairon Rahman RN  Remains free of injury from restraints (restraint for interference with medical device): Every 2 hours: Monitor safety, psychosocial status, comfort, nutrition and hydration  Taken 7/27/2023 0200 by Jairon Rahman RN  Remains free of injury from restraints (restraint for interference with medical device): Every 2 hours: Monitor safety, psychosocial status, comfort, nutrition and hydration  Taken 7/27/2023 0000 by Jairon Rahman RN  Remains free of injury from restraints (restraint for interference with medical device): Every 2 hours: Monitor safety, psychosocial status, comfort, nutrition and hydration  Taken 7/26/2023 2200 by Jairon Rahman RN  Remains free of injury from restraints (restraint for interference with medical device): Every 2 hours: Monitor safety, psychosocial status, comfort, nutrition and hydration
Problem: Safety - Medical Restraint  Goal: Remains free of injury from restraints (Restraint for Interference with Medical Device)  Description: INTERVENTIONS:  1. Determine that other, less restrictive measures have been tried or would not be effective before applying the restraint  2. Evaluate the patient's condition at the time of restraint application  3. Inform patient/family regarding the reason for restraint  4.  Q2H: Monitor safety, psychosocial status, comfort, nutrition and hydration  7/27/2023 2048 by Loli Torres RN  Outcome: Progressing
Problem: Safety - Medical Restraint  Goal: Remains free of injury from restraints (Restraint for Interference with Medical Device)  Description: INTERVENTIONS:  1. Determine that other, less restrictive measures have been tried or would not be effective before applying the restraint  2. Evaluate the patient's condition at the time of restraint application  3. Inform patient/family regarding the reason for restraint  4.  Q2H: Monitor safety, psychosocial status, comfort, nutrition and hydration  7/28/2023 0027 by Berry Hall RN  Outcome: Progressing
Problem: Safety - Medical Restraint  Goal: Remains free of injury from restraints (Restraint for Interference with Medical Device)  Description: INTERVENTIONS:  1. Determine that other, less restrictive measures have been tried or would not be effective before applying the restraint  2. Evaluate the patient's condition at the time of restraint application  3. Inform patient/family regarding the reason for restraint  4.  Q2H: Monitor safety, psychosocial status, comfort, nutrition and hydration  7/28/2023 0932 by Eunice Olguin RN  Outcome: Progressing
Problem: Safety - Medical Restraint  Goal: Remains free of injury from restraints (Restraint for Interference with Medical Device)  Description: INTERVENTIONS:  1. Determine that other, less restrictive measures have been tried or would not be effective before applying the restraint  2. Evaluate the patient's condition at the time of restraint application  3. Inform patient/family regarding the reason for restraint  4.  Q2H: Monitor safety, psychosocial status, comfort, nutrition and hydration  7/28/2023 1615 by Rina Carrasco RN  Outcome: Completed
Problem: Safety - Medical Restraint  Goal: Remains free of injury from restraints (Restraint for Interference with Medical Device)  Description: INTERVENTIONS:  1. Determine that other, less restrictive measures have been tried or would not be effective before applying the restraint  2. Evaluate the patient's condition at the time of restraint application  3. Inform patient/family regarding the reason for restraint  4.  Q2H: Monitor safety, psychosocial status, comfort, nutrition and hydration  Recent Flowsheet Documentation  Taken 7/26/2023 1707 by Lesli Chance RN  Remains free of injury from restraints (restraint for interference with medical device):   Evaluate the patient's condition at the time of restraint application   Every 2 hours: Monitor safety, psychosocial status, comfort, nutrition and hydration
Progressing  Flowsheets (Taken 7/31/2023 0719)  Free From Fall Injury:   Based on caregiver fall risk screen, instruct family/caregiver to ask for assistance with transferring infant if caregiver noted to have fall risk factors   Instruct family/caregiver on patient safety     Problem: ABCDS Injury Assessment  Goal: Absence of physical injury  Outcome: Progressing  Flowsheets (Taken 7/30/2023 0742)  Absence of Physical Injury: Implement safety measures based on patient assessment     Problem: Pain  Goal: Verbalizes/displays adequate comfort level or baseline comfort level  Outcome: Progressing  Flowsheets (Taken 7/31/2023 0719)  Verbalizes/displays adequate comfort level or baseline comfort level:   Encourage patient to monitor pain and request assistance   Assess pain using appropriate pain scale   Administer analgesics based on type and severity of pain and evaluate response     Problem: Skin/Tissue Integrity  Goal: Absence of new skin breakdown  Description: 1. Monitor for areas of redness and/or skin breakdown  2. Assess vascular access sites hourly  3. Every 4-6 hours minimum:  Change oxygen saturation probe site  4. Every 4-6 hours:  If on nasal continuous positive airway pressure, respiratory therapy assess nares and determine need for appliance change or resting period.   Outcome: Progressing     Problem: Respiratory - Adult  Goal: Achieves optimal ventilation and oxygenation  Outcome: Progressing  Flowsheets (Taken 7/31/2023 0719)  Achieves optimal ventilation and oxygenation:   Assess for changes in respiratory status   Assess for changes in mentation and behavior   Position to facilitate oxygenation and minimize respiratory effort   Oxygen supplementation based on oxygen saturation or arterial blood gases     Problem: Neurosensory - Adult  Goal: Achieves stable or improved neurological status  7/31/2023 0719 by Thevel Traore RN  Outcome: Progressing  Flowsheets (Taken 7/31/2023 0719)  Bernie Urban

## 2023-08-02 LAB
ALBUMIN SERPL-MCNC: 3.9 G/DL (ref 3.5–5.2)
ALP SERPL-CCNC: 74 U/L (ref 40–129)
ALT SERPL-CCNC: 53 U/L (ref 0–40)
ANION GAP SERPL CALCULATED.3IONS-SCNC: 15 MMOL/L (ref 7–16)
AST SERPL-CCNC: 61 U/L (ref 0–39)
BASOPHILS # BLD: 0.07 K/UL (ref 0–0.2)
BASOPHILS NFR BLD: 1 % (ref 0–2)
BILIRUB SERPL-MCNC: 0.8 MG/DL (ref 0–1.2)
BUN SERPL-MCNC: 21 MG/DL (ref 6–23)
CALCIUM SERPL-MCNC: 9.6 MG/DL (ref 8.6–10.2)
CHLORIDE SERPL-SCNC: 104 MMOL/L (ref 98–107)
CO2 SERPL-SCNC: 20 MMOL/L (ref 22–29)
CREAT SERPL-MCNC: 0.9 MG/DL (ref 0.7–1.2)
EOSINOPHIL # BLD: 0.26 K/UL (ref 0.05–0.5)
EOSINOPHILS RELATIVE PERCENT: 4 % (ref 0–6)
ERYTHROCYTE [DISTWIDTH] IN BLOOD BY AUTOMATED COUNT: 13.2 % (ref 11.5–15)
GFR SERPL CREATININE-BSD FRML MDRD: >60 ML/MIN/1.73M2
GLUCOSE SERPL-MCNC: 270 MG/DL (ref 74–99)
HCT VFR BLD AUTO: 46.2 % (ref 37–54)
HGB BLD-MCNC: 15.3 G/DL (ref 12.5–16.5)
IMM GRANULOCYTES # BLD AUTO: 0.05 K/UL (ref 0–0.58)
IMM GRANULOCYTES NFR BLD: 1 % (ref 0–5)
INR PPP: 1.3
LYMPHOCYTES NFR BLD: 1.76 K/UL (ref 1.5–4)
LYMPHOCYTES RELATIVE PERCENT: 24 % (ref 20–42)
MCH RBC QN AUTO: 30.4 PG (ref 26–35)
MCHC RBC AUTO-ENTMCNC: 33.1 G/DL (ref 32–34.5)
MCV RBC AUTO: 91.8 FL (ref 80–99.9)
METER GLUCOSE: 124 MG/DL (ref 74–99)
METER GLUCOSE: 206 MG/DL (ref 74–99)
METER GLUCOSE: 238 MG/DL (ref 74–99)
METER GLUCOSE: 243 MG/DL (ref 74–99)
METER GLUCOSE: 264 MG/DL (ref 74–99)
METER GLUCOSE: 296 MG/DL (ref 74–99)
METER GLUCOSE: 393 MG/DL (ref 74–99)
MONOCYTES NFR BLD: 0.71 K/UL (ref 0.1–0.95)
MONOCYTES NFR BLD: 10 % (ref 2–12)
NEUTROPHILS NFR BLD: 61 % (ref 43–80)
NEUTS SEG NFR BLD: 4.4 K/UL (ref 1.8–7.3)
PLATELET # BLD AUTO: 363 K/UL (ref 130–450)
PMV BLD AUTO: 9.5 FL (ref 7–12)
POTASSIUM SERPL-SCNC: 4.7 MMOL/L (ref 3.5–5)
PROT SERPL-MCNC: 8 G/DL (ref 6.4–8.3)
PROTHROMBIN TIME: 13.6 SEC (ref 9.3–12.4)
RBC # BLD AUTO: 5.03 M/UL (ref 3.8–5.8)
SODIUM SERPL-SCNC: 139 MMOL/L (ref 132–146)
WBC OTHER # BLD: 7.3 K/UL (ref 4.5–11.5)

## 2023-08-02 PROCEDURE — 36415 COLL VENOUS BLD VENIPUNCTURE: CPT

## 2023-08-02 PROCEDURE — S5553 INSULIN LONG ACTING 5 U: HCPCS | Performed by: NURSE PRACTITIONER

## 2023-08-02 PROCEDURE — 97110 THERAPEUTIC EXERCISES: CPT

## 2023-08-02 PROCEDURE — 80053 COMPREHEN METABOLIC PANEL: CPT

## 2023-08-02 PROCEDURE — 1280000000 HC REHAB R&B

## 2023-08-02 PROCEDURE — 97535 SELF CARE MNGMENT TRAINING: CPT

## 2023-08-02 PROCEDURE — 82947 ASSAY GLUCOSE BLOOD QUANT: CPT

## 2023-08-02 PROCEDURE — 6360000002 HC RX W HCPCS: Performed by: PHYSICAL MEDICINE & REHABILITATION

## 2023-08-02 PROCEDURE — 94640 AIRWAY INHALATION TREATMENT: CPT

## 2023-08-02 PROCEDURE — 97166 OT EVAL MOD COMPLEX 45 MIN: CPT

## 2023-08-02 PROCEDURE — 6370000000 HC RX 637 (ALT 250 FOR IP): Performed by: NURSE PRACTITIONER

## 2023-08-02 PROCEDURE — 92526 ORAL FUNCTION THERAPY: CPT | Performed by: SPEECH-LANGUAGE PATHOLOGIST

## 2023-08-02 PROCEDURE — 97530 THERAPEUTIC ACTIVITIES: CPT

## 2023-08-02 PROCEDURE — 97161 PT EVAL LOW COMPLEX 20 MIN: CPT

## 2023-08-02 PROCEDURE — 85610 PROTHROMBIN TIME: CPT

## 2023-08-02 PROCEDURE — 92610 EVALUATE SWALLOWING FUNCTION: CPT | Performed by: SPEECH-LANGUAGE PATHOLOGIST

## 2023-08-02 PROCEDURE — 97130 THER IVNTJ EA ADDL 15 MIN: CPT | Performed by: SPEECH-LANGUAGE PATHOLOGIST

## 2023-08-02 PROCEDURE — 85025 COMPLETE CBC W/AUTO DIFF WBC: CPT

## 2023-08-02 PROCEDURE — 97112 NEUROMUSCULAR REEDUCATION: CPT

## 2023-08-02 PROCEDURE — 6360000002 HC RX W HCPCS: Performed by: NURSE PRACTITIONER

## 2023-08-02 PROCEDURE — 92523 SPEECH SOUND LANG COMPREHEN: CPT | Performed by: SPEECH-LANGUAGE PATHOLOGIST

## 2023-08-02 PROCEDURE — 97129 THER IVNTJ 1ST 15 MIN: CPT | Performed by: SPEECH-LANGUAGE PATHOLOGIST

## 2023-08-02 RX ADMIN — INSULIN LISPRO 4 UNITS: 100 INJECTION, SOLUTION INTRAVENOUS; SUBCUTANEOUS at 04:00

## 2023-08-02 RX ADMIN — STANDARDIZED SENNA CONCENTRATE 8.6 MG: 8.6 TABLET ORAL at 21:32

## 2023-08-02 RX ADMIN — ARFORMOTEROL TARTRATE 15 MCG: 15 SOLUTION RESPIRATORY (INHALATION) at 07:40

## 2023-08-02 RX ADMIN — ENOXAPARIN SODIUM 30 MG: 100 INJECTION SUBCUTANEOUS at 21:29

## 2023-08-02 RX ADMIN — LOSARTAN POTASSIUM 50 MG: 50 TABLET, FILM COATED ORAL at 09:38

## 2023-08-02 RX ADMIN — GABAPENTIN 600 MG: 300 CAPSULE ORAL at 17:10

## 2023-08-02 RX ADMIN — METOPROLOL TARTRATE 50 MG: 50 TABLET, FILM COATED ORAL at 21:31

## 2023-08-02 RX ADMIN — TICAGRELOR 90 MG: 90 TABLET ORAL at 21:32

## 2023-08-02 RX ADMIN — GABAPENTIN 600 MG: 300 CAPSULE ORAL at 21:31

## 2023-08-02 RX ADMIN — ARFORMOTEROL TARTRATE 15 MCG: 15 SOLUTION RESPIRATORY (INHALATION) at 20:55

## 2023-08-02 RX ADMIN — METOPROLOL TARTRATE 50 MG: 50 TABLET, FILM COATED ORAL at 09:37

## 2023-08-02 RX ADMIN — TICAGRELOR 90 MG: 90 TABLET ORAL at 09:37

## 2023-08-02 RX ADMIN — ASPIRIN 81 MG: 81 TABLET, COATED ORAL at 09:37

## 2023-08-02 RX ADMIN — ENOXAPARIN SODIUM 30 MG: 100 INJECTION SUBCUTANEOUS at 09:38

## 2023-08-02 RX ADMIN — GABAPENTIN 600 MG: 300 CAPSULE ORAL at 12:56

## 2023-08-02 RX ADMIN — LEVETIRACETAM 500 MG: 500 TABLET, FILM COATED ORAL at 09:37

## 2023-08-02 RX ADMIN — ACETAMINOPHEN 650 MG: 325 TABLET ORAL at 21:31

## 2023-08-02 RX ADMIN — ATORVASTATIN CALCIUM 40 MG: 40 TABLET, FILM COATED ORAL at 21:32

## 2023-08-02 RX ADMIN — BUDESONIDE INHALATION 500 MCG: 0.5 SUSPENSION RESPIRATORY (INHALATION) at 07:40

## 2023-08-02 RX ADMIN — GABAPENTIN 600 MG: 300 CAPSULE ORAL at 09:37

## 2023-08-02 RX ADMIN — INSULIN LISPRO 8 UNITS: 100 INJECTION, SOLUTION INTRAVENOUS; SUBCUTANEOUS at 09:38

## 2023-08-02 RX ADMIN — LEVETIRACETAM 500 MG: 500 TABLET, FILM COATED ORAL at 21:32

## 2023-08-02 RX ADMIN — INSULIN LISPRO 4 UNITS: 100 INJECTION, SOLUTION INTRAVENOUS; SUBCUTANEOUS at 21:29

## 2023-08-02 RX ADMIN — PANTOPRAZOLE SODIUM 40 MG: 40 TABLET, DELAYED RELEASE ORAL at 09:38

## 2023-08-02 RX ADMIN — INSULIN LISPRO 4 UNITS: 100 INJECTION, SOLUTION INTRAVENOUS; SUBCUTANEOUS at 17:10

## 2023-08-02 RX ADMIN — INSULIN LISPRO 16 UNITS: 100 INJECTION, SOLUTION INTRAVENOUS; SUBCUTANEOUS at 11:56

## 2023-08-02 RX ADMIN — INSULIN GLARGINE-YFGN 15 UNITS: 100 INJECTION, SOLUTION SUBCUTANEOUS at 21:29

## 2023-08-02 RX ADMIN — BUDESONIDE INHALATION 500 MCG: 0.5 SUSPENSION RESPIRATORY (INHALATION) at 20:55

## 2023-08-02 ASSESSMENT — PAIN SCALES - GENERAL: PAINLEVEL_OUTOF10: 4

## 2023-08-02 ASSESSMENT — PAIN - FUNCTIONAL ASSESSMENT: PAIN_FUNCTIONAL_ASSESSMENT: ACTIVITIES ARE NOT PREVENTED

## 2023-08-02 ASSESSMENT — PAIN DESCRIPTION - DESCRIPTORS
DESCRIPTORS: DULL;HEAVINESS;DISCOMFORT
DESCRIPTORS: PATIENT UNABLE TO DESCRIBE

## 2023-08-02 ASSESSMENT — PAIN DESCRIPTION - LOCATION
LOCATION: SHOULDER
LOCATION: HEAD

## 2023-08-02 ASSESSMENT — PAIN SCALES - WONG BAKER: WONGBAKER_NUMERICALRESPONSE: 4

## 2023-08-02 ASSESSMENT — PAIN DESCRIPTION - ORIENTATION: ORIENTATION: LEFT

## 2023-08-02 NOTE — PROGRESS NOTES
Occupational Therapy  OCCUPATIONAL THERAPY DAILY NOTE    Date:2023  Patient Name: Bob Randolph  MRN: [id number]  : 1950  Room: [facility]     Referring Practitioner: Chris Sanchez MD  Diagnosis:  s/p cardiac cath & stent 23 & post op CVA- ischemic  Additional Pertinent Hx: CAD, DM, HLD, HTN, JOSÉ MIGUEL, DM, hx falls- per pt 7 falls recently, asthma, anxiety  Restrictions/Precautions: Fall Risk, seizure, SBP<180, LLE weak, cognitive deficits, diet- 5 carb- low fat/low cholesterol/BRAYDEN & high fiber & had sitter  Discharge Recommendations: Home with assist as needed      Functional Assessment:   Date Status AE  Comments   Feeding 23 Min A     Grooming 23 Min A        Oral Care 23 Min A     Bathing 23 Max A     UB Dressing 23 Min A     LB Dressing 23 Max A        Footwear 23 Dep     Toileting 23 Dep  A x2 required with one person assisting the patient to stand due to flexion at the B knees and second person assist for all parts of hygiene and clothing management. Patient attempted hygiene seated however unable. Vc's for safety and techniques to perform. Homemaking  TBD       Functional Transfers / Balance:   Date Status DME  Comments   Sit Balance 23 Min A/CGA     Stand Balance 23 Mod A     [] Tub  [] Shower   Transfer 23 TBD     Commode   Transfer 23 Max/Mod A Grab bar Sptrf with vc's for safety and techniques to perform with increased hands on assist for lift and lower and balance. Patient utilized the grab bar for UE balance support    Functional   Mobility 23 Mod A ww    Other: sit<>stand    EOB<wc    Supine<>sit 23 Mod/Min A    Mod A    Mod A       Functional Exercises / Activity:   Patient completed BUE weighted sanderbox and flexbar exercise programs to promote strengthening and endurance training for improved core and postural stability to perform functional standing and transfers.  Patient completed 3 sets of 10 x4

## 2023-08-02 NOTE — H&P
PM&R Admission History & Physical Examination    Patient: Emmanuel Mckeon  Age/sex: 68 y.o. male  Medical Record #: [id number]    Admission to Acute Rehab Unit: Date: 8/1/2023 Diagnosis: Acute ischemic stroke [facility]) [I63.9]  Admitting Physician: Chapis Chaves MD  Consulting physician: Dr. Kameron Isabel, Dr. Cathryn Simpson  Primary care provider: [facility],     Procedures performed on/related to this admission:  Stress test 7/24/2023 - negative for chest pain, new arrhythmia or EGH changes suggestive of stress induced ischemia. [facility] 7/26/2023:  IMPRESSION:  1.  90% ostial proximal OM1 stenosis, status post successful PCI using  drug-eluting stent. 2.  50-60% stenosis in the small lower medial branch of diagonal.  3.  30-40% proximal and distal RCA stenosis. 4.  LVEDP 50 mmHg. 5.  Ejection fraction 55% with probable catheter-induced mitral  regurgitation. RECOMMENDATIONS:  1. Continue DAPT. 2.  Aggressive medical therapy. 3.  Cardiac rehab post hospital discharge. Chief complaint:   Impairments and acitivities limitations Communication, Cognitive function, ADLs, IADLs, Transfers, and Mobility    HPI:   Emmanuel Mckeon is a 68 y.o. male with past medical history as documented who presented to [facility] on 7/25/2023 with chest pain. Did rule in for MI. He was transferred to [facility] and underwent cardiac cath with results as documented. Post procedure he developed speech issues and confusion and stroke alert was called. He was taken to CT scan and on return to the floor demonstrated seizure like activity and then had an acute cardiorespiratory arrest.  He had spontaneous return of respirations and pulse but was unable to protect his airway and was intubated. He was seen by neurology and started empirically on Keppra. He was recommended to continue DAPT and statin therapy from a cardiac standpoint. He was weaned off the vent and extubated on 7/28/2023.   He had several

## 2023-08-02 NOTE — PROGRESS NOTES
Physical Therapy  Initial Evaluation   Evaluating Therapist: Evelyn Calvillo PT, DPT [id number]      ROOM: [address] [place] [address]  DIAGNOSIS: acute ischemic stroke     PRECAUTIONS:  Falls, SBP <180, seizure, cognition, bed/chair alarm, L LE weakness > R LE, sitter      HPI: Patient initially presented to the ED complaining of chest pain on 07/26/2023. Patient underwent a stress test which was abnormal and with his chest pain, cardiology decided patient should undergo a cardiac catheterization. Patient underwent the cardiac catheterization on 07/26/2023 with stent placement. Shortly after the procedure, patient developed slurred speech and right facial droop for which a stroke alert was called. Patient underwent a series of CT exams which showed ischemia in the left temporal lobe with multiple foci of severe stenosis in the proximal P2 segment of the right posterior cerebral artery. Neurology was consulted at this time and after return from CT scan patient began to have seizure-like activity and was found to be not breathing and pulseless in which a CODE BLUE was called. Patient spontaneously began breathing on his own and was found to have a pulse however was not protecting his airway and was intubated for airway protection. MRI revealed a small 3mm infarction in the left temporal lobe. Patient extubated on 07/28/2023. Patient has a history of CAD, DM, hyperlipidemia, hypertension, sleep apnea, and asthma. Social: Pt lives with wife in a 1 story home with 4- 5 stairs to enter and 1 rail. Pt ambulated with WW inside the home and [facility] in the community independently PTA. Pt's wife works full time. Pt is retired. Pt reports history of 7 falls in the last 3 months. Pt reports he has struggled with B knee/LE weakness PTA. Fall contributed to B LE buckling and unsteadiness. Initial Evaluation  Date: 8/2/23 AM     PM    Short Term Goals Long Term Goals    Was pt agreeable to Eval/treatment?  Yes        Does pt have pain? L hip and L shoulder, not rated        Bed Mobility  Rolling: Janelle  Supine to sit: Jnaelle  Sit to supine: Janelle  Scooting: Janelle   Supervision  Modified Independent   Transfers Sit to stand: ModA  Stand to sit: ModA  Stand pivot: ModA with [employer]    5xSTS: NT   SBA  5xSTS: TBD Supervision VS Modified Independent    5xSTS: TBD   Ambulation    25 feet with [employer] with ModA    WC follow     10mWT: NT  6mWT: NT   150 feet with AAD with SBA    10mWT: >0.6 m/s   6mWT: 100 m  >250 feet with AAD with supervision VS Modified Independent    10mWT: > 0.8 m/s   6mWT: >200 m    Walking 10 feet on uneven surface NT   10 feet with AAD with Janelle 10 feet with AAD with supervision    Wheel Chair Mobility NT       Car Transfers NT   Janelle Supervision    Stair negotiation: ascended and descended  NT   4 steps with 1-2 rail with Janelle 8-12 steps with 1-2 rail with supervision    Curb Step:   ascended and descended NT   4 inch step with AAD and Janelle 4 inch step with AAD and supervision    Picking up object off the floor NT   Will  cone/shoe with reacher and support from AAD Janelle  Will  cone/shoe with reacher and support from AAD Modified Independent      BLE ROM WFL       BLE Strength L LE:  Hip 3+/5   Knee 3-/5   Ankle 4/5      R LE:   Hip 4/5   Knee 3+/5   Ankle 4/5       Balance  Static and dynamic sitting: SBA    Dynamic standing: at [employer] with ModA    BBS: NT  FGA: NT     BBS: >35/56  FGA: TBD   BBS: >45/56  FGA: TBD   Date Family Teach Completed        Is additional Family Teaching Needed? Y or N Y       Hindering Progress Debility, PLOF, B LE weakness, cognition        PT recommended ELOS 3-4 weeks        Team's Discharge Plan        Therapist at Team Meeting          Pt is alert and Oriented x 3 (self, place, time) Pt reports difficulty with word finding. States \"I start a sentence and then forget the word I need to say\". Pt presents with delayed processing and lacks insight to situation. Pt able to follow 2-step commands.

## 2023-08-02 NOTE — DISCHARGE INSTRUCTIONS
Your information:  Name: Luis Manuel Escobar  : 1950    Your instructions:  No driving until follow up with your primary care provider. Primary must approve driving. What to do after you leave the hospital:    Recommended diet: diabetic diet    Recommended activity: activity as tolerated        The following personal items were collected during your admission and were returned to you:    Belongings  Dental Appliances: None  Vision - Corrective Lenses: Eyeglasses  Hearing Aid: None  Clothing: Footwear, Pants, Shirt, Undergarments  Jewelry: None  Electronic Devices: Cell Phone, , Other (Comment) (Bible)  Weapons (Notify Protective Services/Security): None  Home Medications: None  Valuables Given To: Patient  Provide Name(s) of Who Valuable(s) Were Given To: Melani Esteban    Information obtained by:  By signing below, I understand that if any problems occur once I leave the hospital I am to contact Primary Care Physician. I understand and acknowledge receipt of the instructions indicated above.

## 2023-08-02 NOTE — PROGRESS NOTES
Speech Language Pathology  ACUTE REHABILITATION--DAILY PROGRESS NOTE            SWALLOWING:      Diet:  Regular consistency solids with thin liquids (IDDSI level 0)    Pt was seen for a functional mealtime assessment at lunch. During the session, pt required cues to set up the tray and begin eating. Pt consumed meatloaf, mashed potatoes, and green beans without any clinical signs or symptoms of pen/asp. Oral-     No oral containment issues were identified. Pt demonstrated adequate A-P propulsion while swallowing. Pt cued to take smaller bites as consuming very large pieces of meatloaf. Moreover, pt exhibited pocketing of meatloaf on the left side. Pharyngeal-                           Clear vocal quality was maintained throughout. No overt clinical indicators of aspiration were apparent. Completed education with the patient regarding type of swallowing impairment. Reviewed current solid/liquid consistency diet recommendations and reinforced need for consistent use of compensatory strategies to ensure safe PO intake. Reviewed aspiration precautions. Encouraged patient to engage SLP in unstructured Q&A session relative to identified deficit areas -- patient indicated understanding of all information provided via satisfactory verbal response. LANGUAGE:    Pt responses were very latent and pt required long pauses for information processing. COGNITION:    Pt was seen in  space in the  skilled cognitive therapy. Pt was oriented to person, place, but pt's knowledge of reason for hospitalization was limited. Pt was tasked with sequencing pictures of ADL's such as washing clothes in the washing machine or sequencing pictures of someone fishing. Pt needed maximal cueing such as verbal cues and extended time to complete activity. Pt could not independently verbalize the steps for the activities.   Pt benefited from

## 2023-08-02 NOTE — PROGRESS NOTES
Copy of disclosure statement and \"Privacy Act 79-01 [place] Records\" given to patient along with signed copy of admit Medicare Important Message.

## 2023-08-02 NOTE — PROGRESS NOTES
This nurse entered the room bed alarm was alarming. Patient sitting on side of bed. This nurse asked \"Do you need help with something\" Patient stated he was looking for his underwear. This nurse assisted patient and found no underwear, she then offered briefs. Patient agreed tot he brief, after assisting the patient with the brief and then attempting to get him sat back in bed patient stated he needed to get dressed. This nurse explained that it was night time and that therapy would be in early to assess the patient that he should try and get some rest. The patient still confused thinking he is leaving. After nurse explained again patient got angry for being so confused and blamed it on turning 73. Patient stated ever since he turned 68 his life has been terrible. This nurse told the patient it will get easier with time and with therapy. This nurse gave examples of some of the things that he would experience in the next few days.  Patient threw his hands up and said what choice do I have, not like you are going to let me get out of this bed or this room

## 2023-08-02 NOTE — PROGRESS NOTES
SPEECH/LANGUAGE PATHOLOGY  CLINICAL ASSESSMENT OF SWALLOWING FUNCTION   and PLAN OF CARE    PATIENT NAME:  Jennifer Hanna  (male)     MRN:  [id number]    :  1950  (68 y.o.)  STATUS:  Inpatient: Room 5507/5507-A    TODAY'S DATE:  2023  REFERRING PROVIDER: Dr. Nani Claros: SLP swallowing-dysphagia evaluation and treatment Date of order:  23  REASON FOR REFERRAL: Assess swallow function   EVALUATING THERAPIST: Nina Mohs, SLP, student clinician  SUPERVISING THERAPIST: Pita Johnson M.S. CCC-SLP                   RESULTS:    DYSPHAGIA DIAGNOSIS:   Clinical indicators of normal swallow function      DIET RECOMMENDATIONS:  Regular consistency solids (IDDSI level 7) with  thin liquids (IDDSI level 0)     FEEDING RECOMMENDATIONS:     Assistance level:  Supervision assistance is needed during all oral intake due to AMS, pocketing on left side      Compensatory strategies recommended: cues to take Small bites/sips, Alternate solids and liquids, and Check for oral pocketing      Discussed recommendations with nursing and/or faxed report to referring provider: No secondary to no diet/liquid change recommended     SPEECH THERAPY  PLAN OF CARE   The dysphagia POC is established based on physician order, dysphagia diagnosis and results of clinical assessment     Meal time assessment for 1-2 sessions to provide diet modification and compensatory strategy implementation due to need to ensure proper implementation of compensatory strategies during PO intake     Conditions Requiring Skilled Therapeutic Intervention for dysphagia:     Oral pocketing    Specific dysphagia interventions to include:     Meal time assessment for 1-2 sessions to provide monitoring for oral pocketing, taking small bites    Specific instructions for next treatment:  not applicable   Patient Treatment Goals:    Short Term Goals:  Pt will participate in meal time assessment for 1-2 sessions to provide diet reported. EDUCATION:   The Speech Language Pathologist (SLP) completed education regarding results of evaluation and that intervention is warranted at this time. Learner: Patient  Education: Reviewed results and recommendations of this evaluation  Evaluation of Education:  Jack Murillo understanding    This plan may be re-evaluated and revised as warranted. Evaluation Time includes thorough review of current medical information, gathering information on past medical history/social history and prior level of function, completion of standardized testing/informal observation of tasks, assessment of data and education on plan of care and goals. [x]The admitting diagnosis and active problem list, have been reviewed prior to initiation of this evaluation. ACTIVE PROBLEM LIST:   Patient Active Problem List   Diagnosis    Obstructive sleep apnea syndrome    Chest pain    LANGFORD (dyspnea on exertion)    Abnormal ECG    Disability of walking    Generalized osteoarthritis    Moderate persistent asthma without complication    Uncontrolled type 2 diabetes mellitus    CAD in native artery    Acute stroke due to ischemia [facility])    Acute respiratory failure with hypoxia (HCC)    S/P primary angioplasty with coronary stent    Coronary artery disease due to lipid rich plaque    Abnormal nuclear stress test    Seizure-like activity ([address])    Acute ischemic stroke [facility])         CPT code:  97971  bedside swallow GLENROY Ramirez-  Clinician

## 2023-08-02 NOTE — PROGRESS NOTES
Patient sitting on side of bed stating he feels dizzy. And clammy. Blood pressure checked 132/78  78 resp 18  blood sugar 264. Patient sitting on side of bed with fan. Patient mood and behavior better today than last night. Nurse explained to patient again today that he would have two sessions of therapy in the morning and then he would have a break during lunch time, then he would have two sessions in the afternoon.

## 2023-08-02 NOTE — PLAN OF CARE
Problem: Discharge Planning  Goal: Discharge to home or other facility with appropriate resources  Outcome: Progressing  Flowsheets (Taken 8/2/2023 0915)  Discharge to home or other facility with appropriate resources: Identify barriers to discharge with patient and caregiver     Problem: Safety - Medical Restraint  Goal: Remains free of injury from restraints (Restraint for Interference with Medical Device)  Description: INTERVENTIONS:  1. Determine that other, less restrictive measures have been tried or would not be effective before applying the restraint  2. Evaluate the patient's condition at the time of restraint application  3. Inform patient/family regarding the reason for restraint  4. Q2H: Monitor safety, psychosocial status, comfort, nutrition and hydration  Outcome: Progressing     Problem: Safety - Adult  Goal: Free from fall injury  Outcome: Progressing     Problem: Skin/Tissue Integrity  Goal: Absence of new skin breakdown  Description: 1. Monitor for areas of redness and/or skin breakdown  2. Assess vascular access sites hourly  3. Every 4-6 hours minimum:  Change oxygen saturation probe site  4. Every 4-6 hours:  If on nasal continuous positive airway pressure, respiratory therapy assess nares and determine need for appliance change or resting period.   Outcome: Progressing     Problem: ABCDS Injury Assessment  Goal: Absence of physical injury  Outcome: Progressing  Flowsheets (Taken 8/2/2023 1802)  Absence of Physical Injury: Implement safety measures based on patient assessment     Problem: Chronic Conditions and Co-morbidities  Goal: Patient's chronic conditions and co-morbidity symptoms are monitored and maintained or improved  Outcome: Progressing     Problem: Pain  Goal: Verbalizes/displays adequate comfort level or baseline comfort level  Outcome: Progressing

## 2023-08-02 NOTE — PROGRESS NOTES
PCA came to this nurse asking for help with 5507 A bed. PCA states while making rounds A bed was trying to get out of bed. PCA asked patient if she could help him. He said \"no I am going to the bathroom. \" PCA explained that he could not transfer himself she would get some help to get him to the bathroom. Patient yelled \"I  don't need help I just need to Shit \" This nurse arrived to room with PCA to find patient on other side of room past the bathroom holding on to the wall. This nurse entered the room asking the patient where he was going. Patient replied \"to the bathroom to take a shit you dumb ass. \" This nurse explained that patient was past the bathroom and heading into the hallway. This nurse approached the patient and held him up to prevent him from falling to the ground. Patient began yelling I guess I will just Sh** right here on you. Patient then grabbed a fist full of the nurses hair and twisted and pulled it pulling her head and neck backwards. Nurse asked him to let go, so she could get him into the chair safely. This nurse and PCA assisted the patient into  a wheelchair and took him into the bathroom. Patient was angry that the staff felt he needed assistance, and stated \"You idiots are over reacting to make your job more important. This nurse stood in the doorway and explained to the patient that our goal was to keep him safe and free from falls and injury. Patient continued to yell and rant about how it was insane he could not get up on his own. The nurse offered the patient wipes to assist in cleaning him up. Patient replied if you like to wipe ass go for it. Nurse asked the patient to pull the red cord so they could have assistance in getting him up from there toilet, Patient replied \"If you want Fucking help you pull the cord. Nurse pulled the cord but the patient had already attempted to stand. Nurse assisted patient in standing and in transferring back to chair.  Once safely in the chair nurse

## 2023-08-02 NOTE — PROGRESS NOTES
Occupational Therapy  Facility/Department: [facility]  Occupational Therapy Initial Assessment    Name: Navya Newsome  : 1950  MRN: [id number]  Date of Service: 2023  Room: [facility]    Referring Practitioner: Charles Leary MD  Diagnosis:  s/p cardiac cath & stent 23 & post op CVA- ischemic  Additional Pertinent Hx: CAD, DM, HLD, HTN, JOSÉ MIGUEL, DM, hx falls- per pt 7 falls recently, asthma, anxiety  Restrictions/Precautions: Fall Risk, seizure, SBP<180, LLE weak, cognitive deficits, diet- 5 carb- low fat/low cholesterol/BRAYDEN & high fiber & had sitter  Discharge Recommendations: Home with assist as needed     Subjective   General  Chart Reviewed: Yes  Additional Pertinent Hx: CAD, DM, HLD, HTN, JOSÉ MIGUEL, DM, hx falls- per pt 7 falls recently, asthma, anxiety  Family / Caregiver Present: No  Referring Practitioner: Elpidio Anguiano MD  Diagnosis:  s/p cardiac cath & stent 23 & post op CVA- ischemic  Subjective: Pt presents supine in bed & was agreeable to OT intervention  Pain Comments: Pt did not c/o pain during OT session     Social/Functional History  Lives With: wife  [facility]  Home Layout: 1 level  Home Access: Stairs to enter with rails  Entrance Stairs - Number of Steps: 5 CATE 1HR  Bathroom Shower/Tub: Walk-in shower with hand rail per pt  Bathroom Toilet:  standard  Home Equipment: rw- used in the home, sc in community, shower chair & elevated commode  ADL Assistance: independent   [facility] [address]: independent & shared with wife  Ambulation Assistance: Modified independent- per pt used rw in home & cane in community  Transfer Assistance: Mod I  Active : Yes  Mode of Transportation: Car    IADL Comments: PLOF pt independent in all areas- ADLs, transfers, mobility, IADLs & driving  Comments- Pt reported he is a part time      Safety Devices  Type of Devices:  All fall risk precautions in place    Toilet Transfers  Toilet - Technique: Stand pivot   Equipment Used: elevated & norm is 13.00 cm  Patient goals : \"Be able to stand. \"     Therapy Time   Individual Concurrent Group Co-treatment   Time In 830-OT eval  840-OT rx         Time Out 840-OT eval  920-OT rx         Minutes 50 Min OT total  10 Min OT eval  40 Min OT rx          Carl Counts, OTR/L [id number]

## 2023-08-02 NOTE — PROGRESS NOTES
receptive and expressive language skills with adequate thought content, organization, and processing time to facilitate improved communication with moderate. Pt will improve word finding and verbal fluency with phrase/sentence level, wh-questions and open ended conversation through incorporation of preparatory and circumlocution strategies 90% accuracy    Pt will improve vocal quality and hygiene  via compensatory strategies     Patient stated goals: Agreed with above,     Rehabilitation Potential/Prognosis: good     _______________________________________________________________________  ASSESSMENT:  MOTOR SPEECH       Oral Peripheral Examination   Left labiobuccal weakness (no asymmetry)     Parameters of Speech Production  Respiration:  Adequate for speech production  Articulation:  Within functional limits  Resonance:  Within functional limits  Quality:   Strained and Breathy  Pitch: Within functional limits  Intensity: Quiet  Fluency:  Intact  Prosody Irregular fluctuation x1    Speech Intelligibility      Good given unstructured task and unknown context     RECEPTIVE LANGUAGE Understanding Verbal and Non-Verbal Content: Sometimes understands    Comprehension of Yes/No Questions: Within functional limits    Process  Simple Verbal Commands:   Within functional limits  Process Intermediate Verbal Commands:   Within functional limits  Process Complex Verbal Commands:     Within functional limits    Comprehension of Conversation:      Latent, Inconsistent and Required repetition      EXPRESSIVE LANGUAGE Expression of Ideas and Wants: Some difficulty      Serials: Impaired/ decreased awareness     Imitation:  Words   Functional   Sentences Impaired    Naming:  (Modality used:  Verbal)  Confrontation Naming:  Functional  Divergent Naming: Inconsistent  Response Naming: Functional    Conversation:      Conversation was within functional limits however significant response latency was noted.  Short MLU/ single Assistance        EDUCATION:   The Speech Language Pathologist (SLP) completed education regarding results of evaluation and that intervention is warranted at this time. Learner: Patient  Education: Reviewed results and recommendations of this evaluation  Evaluation of Education:  Verbalizes understanding    Evaluation Time includes thorough review of current medical information, gathering information on past medical history/social history and prior level of function, completion of standardized testing/informal observation of tasks, assessment of data and education on plan of care and goals. The admitting diagnosis and active problem list, as listed below have been reviewed prior to initiation of this evaluation.         ACTIVE PROBLEM LIST:   Patient Active Problem List   Diagnosis    Obstructive sleep apnea syndrome    Chest pain    LANGFORD (dyspnea on exertion)    Abnormal ECG    Disability of walking    Generalized osteoarthritis    Moderate persistent asthma without complication    Uncontrolled type 2 diabetes mellitus    CAD in native artery    Acute stroke due to ischemia [facility])    Acute respiratory failure with hypoxia (HCC)    S/P primary angioplasty with coronary stent    Coronary artery disease due to lipid rich plaque    Abnormal nuclear stress test    Seizure-like activity ([address])    Acute ischemic stroke ([address])     Christie Rubinstein., CCC-SLP  Speech-Language Pathologist  [id number]  8/2/2023

## 2023-08-02 NOTE — PROGRESS NOTES
Physical Therapy  Weekly note   Evaluating Therapist: Светлана Jimenez, PT, DPT [id number]      ROOM: [address] [place] [address]  DIAGNOSIS: acute ischemic stroke     PRECAUTIONS:  Falls, SBP <180, seizure, cognition, bed/chair alarm, L LE weakness > R LE, sitter      HPI: Patient initially presented to the ED complaining of chest pain on 07/26/2023. Patient underwent a stress test which was abnormal and with his chest pain, cardiology decided patient should undergo a cardiac catheterization. Patient underwent the cardiac catheterization on 07/26/2023 with stent placement. Shortly after the procedure, patient developed slurred speech and right facial droop for which a stroke alert was called. Patient underwent a series of CT exams which showed ischemia in the left temporal lobe with multiple foci of severe stenosis in the proximal P2 segment of the right posterior cerebral artery. Neurology was consulted at this time and after return from CT scan patient began to have seizure-like activity and was found to be not breathing and pulseless in which a CODE BLUE was called. Patient spontaneously began breathing on his own and was found to have a pulse however was not protecting his airway and was intubated for airway protection. MRI revealed a small 3mm infarction in the left temporal lobe. Patient extubated on 07/28/2023. Patient has a history of CAD, DM, hyperlipidemia, hypertension, sleep apnea, and asthma. Social: Pt lives with wife in a 1 story home with 4- 5 stairs to enter and 1 rail. Pt ambulated with WW inside the home and [facility] in the community independently PTA. Pt's wife works full time. Pt is retired. Pt reports history of 7 falls in the last 3 months. Pt reports he has struggled with B knee/LE weakness PTA. Fall contributed to B LE buckling and unsteadiness. Date: 8/2/23 Comments     Short Term Goals Long Term Goals    Was pt agreeable to Eval/treatment? Yes       Does pt have pain?  L hip and L shoulder,

## 2023-08-02 NOTE — PROGRESS NOTES
Physical Therapy  Treatment note   Evaluating Therapist: Pancho Chisholm PT, DPYUNI [id number]      ROOM: [address] [place] [address]  DIAGNOSIS: acute ischemic stroke     PRECAUTIONS:  Falls, SBP <180, seizure, cognition, bed/chair alarm, L LE weakness > R LE, sitter      HPI: Patient initially presented to the ED complaining of chest pain on 07/26/2023. Patient underwent a stress test which was abnormal and with his chest pain, cardiology decided patient should undergo a cardiac catheterization. Patient underwent the cardiac catheterization on 07/26/2023 with stent placement. Shortly after the procedure, patient developed slurred speech and right facial droop for which a stroke alert was called. Patient underwent a series of CT exams which showed ischemia in the left temporal lobe with multiple foci of severe stenosis in the proximal P2 segment of the right posterior cerebral artery. Neurology was consulted at this time and after return from CT scan patient began to have seizure-like activity and was found to be not breathing and pulseless in which a CODE BLUE was called. Patient spontaneously began breathing on his own and was found to have a pulse however was not protecting his airway and was intubated for airway protection. MRI revealed a small 3mm infarction in the left temporal lobe. Patient extubated on 07/28/2023. Patient has a history of CAD, DM, hyperlipidemia, hypertension, sleep apnea, and asthma. Social: Pt lives with wife in a 1 story home with 4- 5 stairs to enter and 1 rail. Pt ambulated with WW inside the home and [facility] in the community independently PTA. Pt's wife works full time. Pt is retired. Pt reports history of 7 falls in the last 3 months. Pt reports he has struggled with B knee/LE weakness PTA. Fall contributed to B LE buckling and unsteadiness. Initial Evaluation  Date: 8/2/23 AM     PM    Short Term Goals Long Term Goals    Was pt agreeable to Eval/treatment?  Yes  IE Yes      Does pt have

## 2023-08-02 NOTE — CARE COORDINATION
Acute Rehab Social Work Assessment     PCP: Ashley Leon    Patient Resides: Gracie Helm- wife- they have been  for 10 years- 59- healthy, drives and works full time. Home Architecture : Pt lives in a ranch home with 5 steps and 1 rail to enter. Pt's bedroom and 3 bathrooms are on the same level. Bathroom 1 is a tub/shower combo with curtains, Bathroom 2 is a 1/2 bath. Bathroom 3 is a walk in shower with curtains. Will you return to your own home? Yes, plan is to dc home       Primary Caregivers: Inna Paige- wife- 59- works full time at [employer] as a . Pt has 4 children from a previous marriage. Laura Ulloa lives in [place] , [facility] [address] lives in [place], Brenda lives in [place] , Oli lives in [place], [place]. Pt has two step children who live in [address] [place] [address] and [address] [place] [address]. Level of Function PTA : Pt graduated from [employer]. Pt was Independent in all areas. 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 Community Agency Involvement PTA : None    Family Teaching: To be scheduled    IRF JOHNNY - Transportation, Mood, Social isolation, health literacy completed under flowsheets.      NAME RELATION PRIMARY # ADDITIONAL #    Inna Paige [phone]                      Height: 6'1  Weight: 600 [address]  8/2/2023

## 2023-08-03 PROBLEM — R46.89 AGGRESSION: Status: ACTIVE | Noted: 2023-08-03

## 2023-08-03 PROBLEM — R45.1 AGITATION: Status: ACTIVE | Noted: 2023-08-03

## 2023-08-03 PROBLEM — R40.4 STARING EPISODES: Status: ACTIVE | Noted: 2023-08-03

## 2023-08-03 PROBLEM — F06.30 MOOD DISORDER DUE TO MEDICAL CONDITION: Status: ACTIVE | Noted: 2023-08-03

## 2023-08-03 LAB
METER GLUCOSE: 196 MG/DL (ref 74–99)
METER GLUCOSE: 204 MG/DL (ref 74–99)
METER GLUCOSE: 288 MG/DL (ref 74–99)
METER GLUCOSE: 313 MG/DL (ref 74–99)

## 2023-08-03 PROCEDURE — S5553 INSULIN LONG ACTING 5 U: HCPCS | Performed by: NURSE PRACTITIONER

## 2023-08-03 PROCEDURE — 94640 AIRWAY INHALATION TREATMENT: CPT

## 2023-08-03 PROCEDURE — 97110 THERAPEUTIC EXERCISES: CPT

## 2023-08-03 PROCEDURE — 82947 ASSAY GLUCOSE BLOOD QUANT: CPT

## 2023-08-03 PROCEDURE — 99232 SBSQ HOSP IP/OBS MODERATE 35: CPT | Performed by: NURSE PRACTITIONER

## 2023-08-03 PROCEDURE — 6370000000 HC RX 637 (ALT 250 FOR IP): Performed by: PHYSICAL MEDICINE & REHABILITATION

## 2023-08-03 PROCEDURE — 6360000002 HC RX W HCPCS: Performed by: NURSE PRACTITIONER

## 2023-08-03 PROCEDURE — 97535 SELF CARE MNGMENT TRAINING: CPT

## 2023-08-03 PROCEDURE — 97530 THERAPEUTIC ACTIVITIES: CPT

## 2023-08-03 PROCEDURE — 97129 THER IVNTJ 1ST 15 MIN: CPT

## 2023-08-03 PROCEDURE — 6370000000 HC RX 637 (ALT 250 FOR IP): Performed by: NURSE PRACTITIONER

## 2023-08-03 PROCEDURE — 99221 1ST HOSP IP/OBS SF/LOW 40: CPT | Performed by: NURSE PRACTITIONER

## 2023-08-03 PROCEDURE — 1280000000 HC REHAB R&B

## 2023-08-03 PROCEDURE — 92526 ORAL FUNCTION THERAPY: CPT

## 2023-08-03 PROCEDURE — 6360000002 HC RX W HCPCS: Performed by: PHYSICAL MEDICINE & REHABILITATION

## 2023-08-03 RX ORDER — LANOLIN ALCOHOL/MO/W.PET/CERES
6 CREAM (GRAM) TOPICAL EVERY EVENING
Status: DISCONTINUED | OUTPATIENT
Start: 2023-08-03 | End: 2023-08-08

## 2023-08-03 RX ORDER — LANOLIN ALCOHOL/MO/W.PET/CERES
3 CREAM (GRAM) TOPICAL DAILY
Status: DISCONTINUED | OUTPATIENT
Start: 2023-08-03 | End: 2023-08-03

## 2023-08-03 RX ORDER — DULOXETIN HYDROCHLORIDE 30 MG/1
30 CAPSULE, DELAYED RELEASE ORAL DAILY
Status: DISCONTINUED | OUTPATIENT
Start: 2023-08-03 | End: 2023-08-18 | Stop reason: HOSPADM

## 2023-08-03 RX ORDER — INSULIN LISPRO 100 [IU]/ML
0-16 INJECTION, SOLUTION INTRAVENOUS; SUBCUTANEOUS
Status: DISCONTINUED | OUTPATIENT
Start: 2023-08-03 | End: 2023-08-18 | Stop reason: HOSPADM

## 2023-08-03 RX ORDER — DIVALPROEX SODIUM 125 MG/1
250 CAPSULE, COATED PELLETS ORAL 2 TIMES DAILY
Status: DISCONTINUED | OUTPATIENT
Start: 2023-08-03 | End: 2023-08-18 | Stop reason: HOSPADM

## 2023-08-03 RX ADMIN — METFORMIN HYDROCHLORIDE 500 MG: 500 TABLET ORAL at 16:43

## 2023-08-03 RX ADMIN — INSULIN LISPRO 4 UNITS: 100 INJECTION, SOLUTION INTRAVENOUS; SUBCUTANEOUS at 21:17

## 2023-08-03 RX ADMIN — BUDESONIDE INHALATION 500 MCG: 0.5 SUSPENSION RESPIRATORY (INHALATION) at 09:07

## 2023-08-03 RX ADMIN — METFORMIN HYDROCHLORIDE 500 MG: 500 TABLET ORAL at 08:45

## 2023-08-03 RX ADMIN — ATORVASTATIN CALCIUM 40 MG: 40 TABLET, FILM COATED ORAL at 21:21

## 2023-08-03 RX ADMIN — ARFORMOTEROL TARTRATE 15 MCG: 15 SOLUTION RESPIRATORY (INHALATION) at 09:07

## 2023-08-03 RX ADMIN — DULOXETINE HYDROCHLORIDE 30 MG: 30 CAPSULE, DELAYED RELEASE ORAL at 12:44

## 2023-08-03 RX ADMIN — POLYETHYLENE GLYCOL 3350 17 G: 17 POWDER, FOR SOLUTION ORAL at 21:16

## 2023-08-03 RX ADMIN — DIVALPROEX SODIUM 250 MG: 125 CAPSULE, COATED PELLETS ORAL at 21:21

## 2023-08-03 RX ADMIN — BUDESONIDE INHALATION 500 MCG: 0.5 SUSPENSION RESPIRATORY (INHALATION) at 18:55

## 2023-08-03 RX ADMIN — MELATONIN 3 MG ORAL TABLET 6 MG: 3 TABLET ORAL at 16:43

## 2023-08-03 RX ADMIN — DIVALPROEX SODIUM 250 MG: 125 CAPSULE, COATED PELLETS ORAL at 12:44

## 2023-08-03 RX ADMIN — STANDARDIZED SENNA CONCENTRATE 8.6 MG: 8.6 TABLET ORAL at 21:22

## 2023-08-03 RX ADMIN — INSULIN LISPRO 12 UNITS: 100 INJECTION, SOLUTION INTRAVENOUS; SUBCUTANEOUS at 11:48

## 2023-08-03 RX ADMIN — GABAPENTIN 600 MG: 300 CAPSULE ORAL at 16:43

## 2023-08-03 RX ADMIN — GABAPENTIN 600 MG: 300 CAPSULE ORAL at 21:21

## 2023-08-03 RX ADMIN — LEVETIRACETAM 500 MG: 500 TABLET, FILM COATED ORAL at 08:45

## 2023-08-03 RX ADMIN — LOSARTAN POTASSIUM 50 MG: 50 TABLET, FILM COATED ORAL at 08:45

## 2023-08-03 RX ADMIN — TICAGRELOR 90 MG: 90 TABLET ORAL at 21:21

## 2023-08-03 RX ADMIN — INSULIN LISPRO 4 UNITS: 100 INJECTION, SOLUTION INTRAVENOUS; SUBCUTANEOUS at 07:38

## 2023-08-03 RX ADMIN — ARFORMOTEROL TARTRATE 15 MCG: 15 SOLUTION RESPIRATORY (INHALATION) at 18:55

## 2023-08-03 RX ADMIN — ENOXAPARIN SODIUM 30 MG: 100 INJECTION SUBCUTANEOUS at 08:45

## 2023-08-03 RX ADMIN — ASPIRIN 81 MG: 81 TABLET, COATED ORAL at 08:45

## 2023-08-03 RX ADMIN — GABAPENTIN 600 MG: 300 CAPSULE ORAL at 12:44

## 2023-08-03 RX ADMIN — ENOXAPARIN SODIUM 30 MG: 100 INJECTION SUBCUTANEOUS at 21:16

## 2023-08-03 RX ADMIN — METOPROLOL TARTRATE 50 MG: 50 TABLET, FILM COATED ORAL at 21:19

## 2023-08-03 RX ADMIN — INSULIN GLARGINE-YFGN 15 UNITS: 100 INJECTION, SOLUTION SUBCUTANEOUS at 21:17

## 2023-08-03 RX ADMIN — GABAPENTIN 600 MG: 300 CAPSULE ORAL at 08:44

## 2023-08-03 RX ADMIN — PANTOPRAZOLE SODIUM 40 MG: 40 TABLET, DELAYED RELEASE ORAL at 08:44

## 2023-08-03 RX ADMIN — TICAGRELOR 90 MG: 90 TABLET ORAL at 08:45

## 2023-08-03 RX ADMIN — METOPROLOL TARTRATE 50 MG: 50 TABLET, FILM COATED ORAL at 08:44

## 2023-08-03 NOTE — PROGRESS NOTES
Speech Language Pathology  ACUTE REHABILITATION--DAILY PROGRESS NOTE            SWALLOWING:      Diet:  Regular consistency solids with thin liquids (IDDSI level 0)    Not addressed this session. LANGUAGE:    Requires increased time for processing and response as well as repetition/clarification of directives. COGNITION:      Co-tx with OT; limited session due to patient bowel needs    Required mod v/c during execution of LE dressing task. Patient agitated with therapist attempts to provide direction on best strategies given weakness. Fair+ functional problem solving when given a 2D template and asked to build a 3D representation of the structured depicted with PVC tubing. SPEECH:    WFL      Minute Tracking:    Individual therapy:     0 minutes  Concurrent therapy:    0 minutes  Group therapy:     0 minutes  Co-treatment therapy:   15 minutes    Total minutes for 8/3/2023: 15 minutes + additional session  Variance -- patient bowel needs      SAFETY:      Fair      EDUCATION:    Patient educated on plan of care. OUTCOME:    Progress made towards goals. Will continue SP intervention as per previously established POC.     SP recommended after discharge:  TBD  Supervision recommended at discharge: TBD      FIMS SCORES                                    Swallowing                          Current Status             4--Minimal Assistance               Short Term Goal         5--Supervision               Long Term Goal          6--Modified Independent              Receptive                          Current Status             4--Minimal Assistance               Short Term Goal         5--Supervision  Long Term Goal          5--Supervision      Expressive                          Current Status             4--Minimal Assistance               Short Term Goal         5--Supervision               Long Term Goal          5--Supervision                                                                 Problem Solving                          Current Status             2--Maximal Assistance                         Short Term Goal         3--Moderate Assistance            Long Term Goal          4--Minimal Assistance                  Memory                          Current Status             3--Moderate Assistance                        Short Term Goal         4--Minimal Assistance               Long Term Goal          5--Supervision      Social Interaction              Current Status             2--Maximal Assistance                         Short Term Goal         3--Moderate Assistance                        Long Term Goal          4--Minimal Assistance             SPEECH THERAPY  PLAN OF CARE   The speech therapy  POC is established based on physician order, speech pathology diagnosis and results of clinical assessment     Short Term Goals:  Pt will participate in meal time assessment for 1-2 sessions to provide diet modification and compensatory strategy implementation due to need to ensure proper implementation of compensatory strategies during PO intake      Long Term Goals:               Pt will maintain adequate nutrition/hydration via PO intake of the least restrictive oral diet with implementation of safe swallow/ compensatory strategies     SHORT/LONG TERM GOALS    Pt will improve orientation to spatial and temporal surroundings with use of external memory aides. Pt will improve immediate, short term, recent memory during structured and unstructured tasks with 80% accuracy      Pt will improve problem solving/thought organization during structured and unstructured tasks with 80% accuracy      Pt will improve receptive and expressive language skills with adequate thought content, organization, and processing time to facilitate improved communication with moderate.      Pt will improve word finding and verbal fluency with phrase/sentence level, wh-questions and open ended conversation through incorporation of

## 2023-08-03 NOTE — PROGRESS NOTES
Speech Language Pathology  ACUTE REHABILITATION--DAILY PROGRESS NOTE            SWALLOWING:      Diet:  Regular consistency solids with thin liquids (IDDSI level 0)--rec close assist w/ PO intake at this time to regulate swallow strategy use. Pt was seen for a functional mealtime assessment at lunch. During the session, pt required minimal assist to set up the tray and begin eating. Pt consumed thin liquid from cup, deli sandwich, and potato chips. SLP reviewed need for small bites/sips and to check for oral clearance throughout meal; pt took large bites and needed reminders to swallow strategy use. Ongoing edu provided for swallow strats. Oral-     Mild to moderate oral containment issues were identified. Mildly prolonged mastication noted with varying mild to moderate pocketing/oral residue of solids on the left side, which appeared to eventually clear with liquid wash or cued lingual sweep and dry swallow. Pharyngeal-                           Difficult to assess as pt noted to present with occasional cough outside of PO intake. Thin from cup: no overt s/s of aspiration noted    Reg solids: X1 dry cough noted with deli sandwich when pt masticating and cued to speak w/ SLP and X2 instances of dry cough noted with potato chip intake--suspect d/t bolus size. Pt appeared to tolerate all other/majority of solid intake w/o overt s/s of aspiration noted. SLP discussed w/ nurse need for close assist w/ PO intake at this time to ensure swallow strategy use. Completed education with the patient regarding type of swallowing impairment. Reviewed current solid/liquid consistency diet recommendations and reinforced need for consistent use of compensatory strategies to ensure safe PO intake. Reviewed aspiration precautions.  Encouraged patient to engage SLP in unstructured Q&A session relative to identified deficit areas -- patient indicated understanding of all

## 2023-08-03 NOTE — PROGRESS NOTES
Please call Connie in regards to  therapy orders they would like to get  in home therapy orders for pt they can help patient get in home instead of pt having to go to therapy outside of her home.    seen.  3. Pattern of ventriculomegaly concerning for possible mild normal pressure  hydrocephalus. Complete echo 7/28/2023:  Technically limited study. Definity study was done. Mild concentric left ventricular hypertrophy. EF 60-65%. 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 Patient was not a candidate for thrombolytics due to recent stent placement and heparin administration. Patient did not have a LVO on imaging. MRI brain shows a small infarct in the left temporal lobe   Course of treatment complicated by subsequent cardiac arrest with report of possible seizure activity. Patient was started on Keppra to be discontinued after 7 days. Now with reports of blank stares and combativeness with staff   Patient is defensive, reluctant to exam and suspicious of this examiner as well as staff on the unit per reports. I do not think he would tolerate EEG at this time. I will change AEDs from [facility] which will not help with patient's aggressive mood to Depakote. Plan  Continue supportive care  Redirection and orientation would be helpful during the remainder of patient's rehab stay  Agree with psych consult for continued aggression and agitation  Keppra 500 mg twice daily x 7 days (7/26-8/1), however still on it currently  Will discontinue Keppra and start Depakote 250mg BID for mood and reports of staring  Continue PT/OT  Fall precautions    Neurology will sign off. Please reconsult if further needed.     Electronically signed by RAÚL Corona NP on 8/3/2023 at 11:18 AM

## 2023-08-03 NOTE — CARE COORDINATION
Per Team: Re-Eval next week (3-4 weeks). Updated the Pt and his wife Adriana Beckford. LTG: Min Assist/Mod I/Independent. Pt is going to require 24 hr care and hands on post dc. Dr. Srivastava Fu to re consult neurology. Pt has been agitated. Pt requires cues for safety,technique and insight. Pt is impulsive. Pt has bi-lateral LE weakness and instability. Pt has diabetic neuropathy. Pt lacks insight. OT is using in motion arm. Pt's wife stated that these behaviors are new and out of character for him. Pt's wife works full time and most likely will not be able to provide 24 hr care. JADIEL emailed Pt's wife a list of  [address] [facility] facilities, adult  and private duty. Along with the team meeting summary: [email or web address]    DME/Aftercare: to be discussed closer to dc    FT:  To be scheduled    Chanel DUVALL Intern  Emil Calvert, [facility]  8/3/2023

## 2023-08-03 NOTE — PROGRESS NOTES
Physical Therapy  Treatment note   Evaluating Therapist: Lubna La PT, DPT [id number]      ROOM: [address] [place] [address]  DIAGNOSIS: acute ischemic stroke     PRECAUTIONS:  Falls, SBP <180, seizure, cognition, bed/chair alarm, L LE weakness > R LE, sitter, impulsive,  behavior     HPI: Patient initially presented to the ED complaining of chest pain on 07/26/2023. Patient underwent a stress test which was abnormal and with his chest pain, cardiology decided patient should undergo a cardiac catheterization. Patient underwent the cardiac catheterization on 07/26/2023 with stent placement. Shortly after the procedure, patient developed slurred speech and right facial droop for which a stroke alert was called. Patient underwent a series of CT exams which showed ischemia in the left temporal lobe with multiple foci of severe stenosis in the proximal P2 segment of the right posterior cerebral artery. Neurology was consulted at this time and after return from CT scan patient began to have seizure-like activity and was found to be not breathing and pulseless in which a CODE BLUE was called. Patient spontaneously began breathing on his own and was found to have a pulse however was not protecting his airway and was intubated for airway protection. MRI revealed a small 3mm infarction in the left temporal lobe. Patient extubated on 07/28/2023. Patient has a history of CAD, DM, hyperlipidemia, hypertension, sleep apnea, and asthma. Social: Pt lives with wife in a 1 story home with 4- 5 stairs to enter and 1 rail. Pt ambulated with WW inside the home and [facility] in the community independently PTA. Pt's wife works full time. Pt is retired. Pt reports history of 7 falls in the last 3 months. Pt reports he has struggled with B knee/LE weakness PTA. Fall contributed to B LE buckling and unsteadiness. Initial Evaluation  Date: 8/2/23 AM     PM    Short Term Goals Long Term Goals    Was pt agreeable to Eval/treatment?  Yes  Yes Yes      Does pt have pain?  L hip and L shoulder, not rated  No c/o pain  B knee pain, not rated      Bed Mobility  Rolling: Janelle  Supine to sit: Janelle  Sit to supine: Janelle  Scooting: Janelle NT NT Supervision  Modified Independent   Transfers Sit to stand: [address]  Stand to sit: ModA  Stand pivot: ModA with [employer]    5xSTS: NT sit to stand: [address]  Stand to sit: ModA  Stand pivot: ModA with [employer] Sit to stand: [address]  Stand to sit: [address]  Stand pivot: ModA with [employer] SBA  5xSTS: TBD Supervision   5xSTS: TBD   Ambulation    25 feet with [employer] with ModA    WC follow     10mWT: NT  6mWT: NT 90 feet x1 rep with [employer] and ModA    WC follow      10mWT: 0.29 m/s ([address], [employer], chair follow, 8/2/23)  6mWT: 28 m  (3:10 minutes, WC follow, ModA, [employer], 8/3/23)  75ft x4 reps with [employer] and ModA    WC follow  150 feet with AAD with SBA    10mWT: >0.6 m/s   6mWT: 100 m  >250 feet with AAD with supervision     10mWT: > 0.8 m/s   6mWT: >200 m    Walking 10 feet on uneven surface NT NT NT 10 feet with AAD with Janelle 10 feet with AAD with SBA   Wheel Chair Mobility NT NT NT     Car Transfers NT NT NT Janelle SBA    Stair negotiation: ascended and descended  NT 4 steps with B rails with ModA     (Ascending forward leading with R, descending forward leading with L) NT 4 steps with 1-2 rail with Janelle 8-12 steps with 1-2 rail with SBA   Curb Step:   ascended and descended NT NT NT 4 inch step with AAD and Janelle 4 inch step with AAD and SBA   Picking up object off the floor NT NT NT Will  cone/shoe with reacher and support from AAD Janelle  Will  cone/shoe with reacher and support from AAD supervision       BLE ROM WFL NT NT     BLE Strength L LE:  Hip 3+/5   Knee 3-/5   Ankle 4/5     R LE:   Hip 4/5   Knee 3+/5   Ankle 4/5  NT NT     Balance  Static and dynamic sitting: SBA    Dynamic standing: at [employer] with ModA    BBS: NT  FGA: NT Static and dynamic sitting: SBA    Dynamic standing: at [employer] with ModA Static and dynamic sitting: SBA    Dynamic standing: at [employer] with [address]

## 2023-08-03 NOTE — PROGRESS NOTES
Occupational Therapy  OCCUPATIONAL THERAPY DAILY NOTE    Date:8/3/2023  Patient Name: Fanny Caba  MRN: [id number]  : 1950  Room: [facility]     Referring Practitioner: Dana Rogers MD  Diagnosis:  s/p cardiac cath & stent 23 & post op CVA- ischemic  Additional Pertinent Hx: CAD, DM, HLD, HTN, JOSÉ MIGUEL, DM, hx falls- per pt 7 falls recently, asthma, anxiety  Restrictions/Precautions: Fall Risk, seizure, SBP<180, LLE weak, cognitive deficits, diet- 5 carb- low fat/low cholesterol/BRAYDEN & high fiber & had sitter  Discharge Recommendations: Home with assist as needed & OT em health/aides      Functional Assessment:   Date Status AE  Comments   Feeding 8/3/23 Mod I  Patient required increased time however was able to open all containers and feed self without difficulty    Grooming 8/3/23 SBA wc Seated EOB level, patient performed face washing and applying of deodorant      Oral Care 8/3/23 SBA wc Seated wc level, patient performed all parts of oral care with vc's for sequencing   Bathing 8/3/23 Max A ww Patient requested to perform sponge bathing seated EOB. He had constant posterior LOB however was able to self correct with intermittent hands on assist. Poor sitting balance unsupported noted. He was able to perform parts of UB with assist for buttocks and LE's. Mod A to stand to wash henok areas with the ww. Vc's for thoroughness overall. Increased time to perform   UB Dressing 8/3/23 SBA  Seated EOB level, patient performed donning of tshirt however had LOB posteriorly and required vc's for righting self and posture to perform the task    LB Dressing 8/3/23 Max A  Patient required assistance to thread depends but patient was able to thread his B legs into pants, standing at Mod A with the ww with assistance for clothing management. Footwear 8/3/23 Dep  Patient required total assistance for socks and shoes this date, attempted shoes however unable.     In the later AM session, attempted AE training to address above problem areas  Long Term Goal 1: Pt demo  independent to eat all meals  Long Term Goal 2: Pt demo Mod I grooming seated @ sink level & demo G- safety  Long Term Goal 3: Pt demo SBA-CGA to bathe @ shower level  & or sponge bathing both seated & standing & demo G- safety & insight  Long Term Goal 4: Pt demo s/u UE & SBA LE dress to don underwear &, pants using AE as needed & s/u socks & shoes suing AE & demo G- safety & insight  Long Term Goal 5: Pt demo SBA commode trf using appropriate DME to ensure pt safety. Pt demo SBA toileting & demo G- safety & insight  Long Term Goal 6: Pt demo CGA-SBA  walk in shower using appropriate DME to esnure pt safety & pt demo G- safety & insight  Long Term Goal 7: Pt demo Min A light homemaking activity & demo G- safety & insight  Long Term Goal 8: Pt demo G- endurance for a 30 minute functional activity  Long Term Goal 9: Pt demo improved  & FMC/dexterity as evidenced by gains from evaluation on 8/2/23 . Pt demo the following  strength- R hand 19# & norm for pt age & gender is 75#, L hand 30# & norm for pt age & gender is 65#; 9-hole peg test- R hand 43.9sec & norm for pt age & gender is 25.8sec & L hand 52.1sec & norm for pt age & gender is 26.0sec  Long Term Goal 10: Pt demo the following neurological improvement using the [employer] robotic arm to improve pt ability to complete ADLs & IADLs  as evidenced by gains in the LUE. Pt demo the following during the assessment of the [employer] arm using a 14cm [place] size 95.60 cm & norm is 100.53cm, [place] symmetry 0.852 & norm is 0.834, smoothness 0.484 & norm is 0.533, target accuracy 2.39 & norm is 0.85 cm, AIM 0.83 & norm is 0.55 cm, movement duration 2.4 sec & norm is 1.4 sec, movement efficiency 72% & norm is 75%, overall isometric hold displacement 2.83 & norm is 2.40 cm & overall displacement 13.05 & norm is 13.00 cm  Patient goals : \"Be able to stand. \"  8/3/23- Pt POC & goals are updated on this

## 2023-08-03 NOTE — PATIENT CARE CONFERENCE
[address] [facility] [address] NOTE/PATIENT PLAN OF CARE    The physician was present and led this team conference    Date: 8/3/2023  Admission date: 2023  Patient Name: Nina Mercado        MRN: [id number]    : 1950  (68 y.o.)  Gender: male   Rehab diagnosis/surgery with date:  CVA of 23 (left temporal lobe infarct)  Impairment Group Code:  1.2      MEDICAL/FUNCTIONAL HISTORY/STATUS:  came to ED on 23 with chest pain, post heart cath on 23 there was noted right facial droop, went into cardiac arrest on the same day and was intubated  extubated 23  sitter at bedside due to agitation-will wait to order meds for agitation until seen again by neurology  reports history of multiple falls prior to admission    Consultations/Labs/X-rays: neurology following      MEDICATION UPDATE:    insulin lispro  0-16 Units SubCUTAneous 4x Daily AC & HS    aspirin  81 mg Oral Daily    ticagrelor  90 mg Oral BID    atorvastatin  40 mg Oral Nightly    gabapentin  600 mg Oral 4x Daily    pantoprazole  40 mg Oral Daily    budesonide  0.5 mg Nebulization BID RT     And    arformoterol tartrate  15 mcg Nebulization BID RT    senna  1 tablet Oral Nightly    losartan  50 mg Oral Daily    insulin glargine  15 Units SubCUTAneous Nightly    levETIRAcetam  500 mg Oral BID    metoprolol tartrate  50 mg Oral BID    enoxaparin  30 mg SubCUTAneous BID           NURSING :    Bowel:   Always Continent  [x]   Occasionally incontinent  []   Frequently incontinent  []   Always incontinent  []   Not occurred  []     Bladder:   Always Continent  []    Incontinent less than daily[]   Incontinent  daily [x]   Always incontinent  []   No urine output    []   Indwelling catheter [] using external cath      Toilet Hygiene:   Current level : max assist-dependent  Short term Toilet hygiene goal: max assist  Long term toilet hygiene goal:  standby assist-Contact guard assist    Skin integrity: intact  Pain: none    NUTRITION    Diet  carb controlled  Liquid consistency   thin    SOCIAL INFORMATION:  Lives with: spouse of 10 years  Prior community services:  none  Home Architecture:  ranch, 5 entry 1 rail, has 3 baths  Prior Level of function:  independent, retired  DME:  wheelchair, quad cane, straight cane, standard walker, bipap    FAMILY / PATIENT EDUCATION:  safety and self care are ongoing with patient    PHYSICAL THERAPY    Bed mobility:   Current level: min assist  Short term bed mobility goal: supervision  Long term bed mobility goal: Modified independent    Chair/bed transfers:  Current level: mod assist with a wheeled walker and verbal cues  Short term Chair/bed transfers goal:  standby assist  Long term Chair/bed transfers goal: supervision      Ambulation:   Current level: 30 ft wheeled walker  at mod assist and a wheelchair  follow, bilateral knee instability noted  Short term ambulation goal: 150 standby assist  Long term ambulation goal: 400 at supervision    Car transfers:   Current level: to be assessed    Stairs:   Current level : to be assessed    Lower Extremity Strength Issues:    L LE:  Hip 3+/5   Knee 3-/5   Ankle 4/5      R LE:   Hip 4/5   Knee 3+/5   Ankle 4/5  Other comments: easily agitated      OCCUPATIONAL THERAPY:      Tub/shower:   Current level: to be assessed      Feeding:  Current level: min assist  Short term feeding goal: setup  Long term feeding goal: independent    Grooming:   Current level: min assist  Short term grooming goal: supervision  Long term grooming goal: Modified independent    Bathing:  Current level: sit and stand max assist  Short term bathing goal: mod assist  Long term bathing goal: standby assist-Contact guard assist    Homemaking:   Current level: to be assessed    Upper body dressing:  Current level: min assist  Short term upper body dressing goal: supervision  Long term upper body dressing goal: setup    Lower body dressing:

## 2023-08-03 NOTE — PLAN OF CARE
Problem: Discharge Planning  Goal: Discharge to home or other facility with appropriate resources  Outcome: Progressing     Problem: Safety - Medical Restraint  Goal: Remains free of injury from restraints (Restraint for Interference with Medical Device)  Description: INTERVENTIONS:  1. Determine that other, less restrictive measures have been tried or would not be effective before applying the restraint  2. Evaluate the patient's condition at the time of restraint application  3. Inform patient/family regarding the reason for restraint  4. Q2H: Monitor safety, psychosocial status, comfort, nutrition and hydration  Outcome: Progressing     Problem: Safety - Adult  Goal: Free from fall injury  Outcome: Progressing     Problem: Skin/Tissue Integrity  Goal: Absence of new skin breakdown  Description: 1. Monitor for areas of redness and/or skin breakdown  2. Assess vascular access sites hourly  3. Every 4-6 hours minimum:  Change oxygen saturation probe site  4. Every 4-6 hours:  If on nasal continuous positive airway pressure, respiratory therapy assess nares and determine need for appliance change or resting period.   Outcome: Progressing     Problem: ABCDS Injury Assessment  Goal: Absence of physical injury  Outcome: Progressing     Problem: Chronic Conditions and Co-morbidities  Goal: Patient's chronic conditions and co-morbidity symptoms are monitored and maintained or improved  Outcome: Progressing     Problem: Pain  Goal: Verbalizes/displays adequate comfort level or baseline comfort level  Outcome: Progressing

## 2023-08-03 NOTE — CONSULTS
Department of Psychiatry  Behavioral Health Consult    REASON FOR CONSULT: agitation    CONSULTING PHYSICIAN: Dr Isabell Jett    History obtained from: patient interview and chart review     HISTORY OF PRESENT ILLNESS:      Patient is a 71-year-old male with a past medical history of anxiety, DM, HLD, HTN who initially presented to [facility] for chest pain. Patient underwent a stress test that was abnormal and given his symptoms cardiology recommended transfer to [facility] for cardiac catheterization. Patient underwent cardiac catheterization on 7/26 in which PCI with NOLA to the proximal OM1 however shortly after procedure patient developed slurred speech with right facial droop in which a stroke alert was called. Patient underwent a series of CTs revealing a small 2 cc focus of ischemia in the left temporal lobe versus artifact with multiple foci of severe stenosis in the proximal P2 segment of the right posterior cerebral artery. Neurology was consulted at this time and after return from CAT scan patient began to have seizure-like activity and was found to be not breathing and pulseless in which a CODE BLUE was called. Patient spontaneously began breathing on his own and was found to have a pulse however was not protecting his airway and was intubated for airway protection. Patient was accepted to CVICU for closer monitoring and further work-up and treatment with neurology evaluation. Once through acute phase of illness patient was discharged from medical services and transferred to the inpatient physical rehab. Psychiatry was consulted for agitation. I went to see the patient this afternoon with ANGELA Zacarias. He is working with PT. He tells us that he had more than a stroke, which he is not wrong. He is pleasant with us, and interacts to the best of his ability. Given his medical ordeal it is of no wonder he is having some irritability and agitation.  He does have a low threshold for TECHNOLOGIST PROVIDED HISTORY: Reason for exam:->CVA What reading provider will be dictating this exam?->CRC FINDINGS: INTRACRANIAL STRUCTURES/VENTRICLES: 3 mm focus of diffusion restriction indicating acute or early subacute infarction in the posterior left temporal lobe, mesial periventricular region. No mass effect, edema or hemorrhage is seen. Mild volume loss is seen in the cerebrum with mild chronic microvascular ischemic changes. Extent of ventriculomegaly is slightly greater than would be expected from the degree of cerebral atrophy. In addition, there is relative narrowing of the sulci along the vertex and mild prominence of the sylvian fissures. These findings raise the possibility of mild normal pressure hydrocephalus. ORBITS: The visualized portion of the orbits demonstrate no acute abnormality. SINUSES:  A mucous retention cyst is seen in the right maxillary sinus. Minimal mucosal thickening in the paranasal sinuses. Small bilateral mastoid effusions. BONES/SOFT TISSUES: The bone marrow signal intensity appears normal. The soft tissues demonstrate no acute abnormality. 1. Small, 3 mm focus of ACUTE OR EARLY SUBACUTE INFARCTION in the left temporal lobe. 2.  No mass effect, edema or hemorrhage is seen. 3. Pattern of ventriculomegaly concerning for possible mild normal pressure hydrocephalus. NM Cardiac Stress Test Nuclear Imaging    Result Date: 7/25/2023  Indication:  Chest Pain Clinical History:   Patient has no history of of coronary artery disease. Procedure:  Pharmacologic stress testing was performed with Regadenoson 0.4 mg for 15 seconds. IMAGING: Myocardial perfusion imaging was performed at rest 30-35 minutes following the intravenous injection of 10.2 mCi of (Tc-Sestamibi) followed by 10 ml of Normal Saline. As per infusion protocol, the patient was injected intravenously with 30.7 mCi of (Tc-Sestamibi) followed by 10 ml of Normal Saline.   Gated post-stress tomographic imaging was

## 2023-08-04 LAB
METER GLUCOSE: 212 MG/DL (ref 74–99)
METER GLUCOSE: 212 MG/DL (ref 74–99)
METER GLUCOSE: 217 MG/DL (ref 74–99)
METER GLUCOSE: 296 MG/DL (ref 74–99)

## 2023-08-04 PROCEDURE — 6370000000 HC RX 637 (ALT 250 FOR IP): Performed by: NURSE PRACTITIONER

## 2023-08-04 PROCEDURE — 92526 ORAL FUNCTION THERAPY: CPT

## 2023-08-04 PROCEDURE — 94640 AIRWAY INHALATION TREATMENT: CPT

## 2023-08-04 PROCEDURE — S5553 INSULIN LONG ACTING 5 U: HCPCS | Performed by: NURSE PRACTITIONER

## 2023-08-04 PROCEDURE — 97530 THERAPEUTIC ACTIVITIES: CPT

## 2023-08-04 PROCEDURE — 97130 THER IVNTJ EA ADDL 15 MIN: CPT

## 2023-08-04 PROCEDURE — 6360000002 HC RX W HCPCS: Performed by: NURSE PRACTITIONER

## 2023-08-04 PROCEDURE — 6370000000 HC RX 637 (ALT 250 FOR IP): Performed by: PHYSICAL MEDICINE & REHABILITATION

## 2023-08-04 PROCEDURE — 1280000000 HC REHAB R&B

## 2023-08-04 PROCEDURE — 97110 THERAPEUTIC EXERCISES: CPT

## 2023-08-04 PROCEDURE — 82947 ASSAY GLUCOSE BLOOD QUANT: CPT

## 2023-08-04 PROCEDURE — 97129 THER IVNTJ 1ST 15 MIN: CPT

## 2023-08-04 PROCEDURE — 6360000002 HC RX W HCPCS: Performed by: PHYSICAL MEDICINE & REHABILITATION

## 2023-08-04 RX ADMIN — DIVALPROEX SODIUM 250 MG: 125 CAPSULE, COATED PELLETS ORAL at 09:09

## 2023-08-04 RX ADMIN — INSULIN GLARGINE-YFGN 15 UNITS: 100 INJECTION, SOLUTION SUBCUTANEOUS at 21:56

## 2023-08-04 RX ADMIN — MELATONIN 3 MG ORAL TABLET 6 MG: 3 TABLET ORAL at 16:44

## 2023-08-04 RX ADMIN — GABAPENTIN 600 MG: 300 CAPSULE ORAL at 21:56

## 2023-08-04 RX ADMIN — GABAPENTIN 600 MG: 300 CAPSULE ORAL at 16:45

## 2023-08-04 RX ADMIN — GABAPENTIN 600 MG: 300 CAPSULE ORAL at 09:09

## 2023-08-04 RX ADMIN — INSULIN LISPRO 4 UNITS: 100 INJECTION, SOLUTION INTRAVENOUS; SUBCUTANEOUS at 06:41

## 2023-08-04 RX ADMIN — TICAGRELOR 90 MG: 90 TABLET ORAL at 21:56

## 2023-08-04 RX ADMIN — METFORMIN HYDROCHLORIDE 500 MG: 500 TABLET ORAL at 16:44

## 2023-08-04 RX ADMIN — ASPIRIN 81 MG: 81 TABLET, COATED ORAL at 09:09

## 2023-08-04 RX ADMIN — ARFORMOTEROL TARTRATE 15 MCG: 15 SOLUTION RESPIRATORY (INHALATION) at 18:47

## 2023-08-04 RX ADMIN — DIVALPROEX SODIUM 250 MG: 125 CAPSULE, COATED PELLETS ORAL at 21:56

## 2023-08-04 RX ADMIN — ENOXAPARIN SODIUM 30 MG: 100 INJECTION SUBCUTANEOUS at 09:09

## 2023-08-04 RX ADMIN — DULOXETINE HYDROCHLORIDE 30 MG: 30 CAPSULE, DELAYED RELEASE ORAL at 09:09

## 2023-08-04 RX ADMIN — ATORVASTATIN CALCIUM 40 MG: 40 TABLET, FILM COATED ORAL at 21:56

## 2023-08-04 RX ADMIN — ARFORMOTEROL TARTRATE 15 MCG: 15 SOLUTION RESPIRATORY (INHALATION) at 08:09

## 2023-08-04 RX ADMIN — INSULIN LISPRO 4 UNITS: 100 INJECTION, SOLUTION INTRAVENOUS; SUBCUTANEOUS at 16:44

## 2023-08-04 RX ADMIN — INSULIN LISPRO 4 UNITS: 100 INJECTION, SOLUTION INTRAVENOUS; SUBCUTANEOUS at 22:26

## 2023-08-04 RX ADMIN — LOSARTAN POTASSIUM 50 MG: 50 TABLET, FILM COATED ORAL at 09:09

## 2023-08-04 RX ADMIN — GABAPENTIN 600 MG: 300 CAPSULE ORAL at 12:44

## 2023-08-04 RX ADMIN — TICAGRELOR 90 MG: 90 TABLET ORAL at 09:09

## 2023-08-04 RX ADMIN — INSULIN LISPRO 8 UNITS: 100 INJECTION, SOLUTION INTRAVENOUS; SUBCUTANEOUS at 11:38

## 2023-08-04 RX ADMIN — BUDESONIDE INHALATION 500 MCG: 0.5 SUSPENSION RESPIRATORY (INHALATION) at 08:09

## 2023-08-04 RX ADMIN — ENOXAPARIN SODIUM 30 MG: 100 INJECTION SUBCUTANEOUS at 21:54

## 2023-08-04 RX ADMIN — BUDESONIDE INHALATION 500 MCG: 0.5 SUSPENSION RESPIRATORY (INHALATION) at 18:47

## 2023-08-04 RX ADMIN — METOPROLOL TARTRATE 50 MG: 50 TABLET, FILM COATED ORAL at 21:56

## 2023-08-04 RX ADMIN — METFORMIN HYDROCHLORIDE 500 MG: 500 TABLET ORAL at 09:09

## 2023-08-04 RX ADMIN — STANDARDIZED SENNA CONCENTRATE 8.6 MG: 8.6 TABLET ORAL at 21:56

## 2023-08-04 RX ADMIN — METOPROLOL TARTRATE 50 MG: 50 TABLET, FILM COATED ORAL at 09:11

## 2023-08-04 NOTE — PROGRESS NOTES
[x] Time Missed - 15 min  with pt for spiritual care   Third Session    [] Individual Tx-   [] Concurrent Tx -  [] Co-Tx -   [] Group Tx -   [] Time Missed -         Total Tx Time 75  min     Jacquelynn Oppenheim COTA/ANGY [id number]

## 2023-08-04 NOTE — PLAN OF CARE
Problem: Discharge Planning  Goal: Discharge to home or other facility with appropriate resources  8/4/2023 1134 by Sharifa Amaro RN  Outcome: Progressing  8/4/2023 0043 by Rustam Patel RN  Outcome: Progressing     Problem: Safety - Medical Restraint  Goal: Remains free of injury from restraints (Restraint for Interference with Medical Device)  Description: INTERVENTIONS:  1. Determine that other, less restrictive measures have been tried or would not be effective before applying the restraint  2. Evaluate the patient's condition at the time of restraint application  3. Inform patient/family regarding the reason for restraint  4. Q2H: Monitor safety, psychosocial status, comfort, nutrition and hydration  8/4/2023 1134 by Sharifa Amaro RN  Outcome: Progressing  8/4/2023 0043 by Rustam Patel RN  Outcome: Progressing     Problem: Safety - Adult  Goal: Free from fall injury  Outcome: Progressing     Problem: Skin/Tissue Integrity  Goal: Absence of new skin breakdown  Description: 1. Monitor for areas of redness and/or skin breakdown  2. Assess vascular access sites hourly  3. Every 4-6 hours minimum:  Change oxygen saturation probe site  4. Every 4-6 hours:  If on nasal continuous positive airway pressure, respiratory therapy assess nares and determine need for appliance change or resting period.   Outcome: Progressing     Problem: ABCDS Injury Assessment  Goal: Absence of physical injury  Outcome: Progressing     Problem: Chronic Conditions and Co-morbidities  Goal: Patient's chronic conditions and co-morbidity symptoms are monitored and maintained or improved  Outcome: Progressing     Problem: Pain  Goal: Verbalizes/displays adequate comfort level or baseline comfort level  Outcome: Progressing

## 2023-08-04 NOTE — PROGRESS NOTES
me to be safe\". Pt educated on benefits of continued therapy and left pt up in [facility] with sitter present. Patient would benefit from continued skilled PT to maximize functional mobility independence. PM:   Pt  was agreeable to activity upon second PM session attempt. Initial ambulation repetition with [address] characterized by frequent R knee instability due to poor stance control. Pt was given standard walker to improve stability which was successful. Occasional sequencing errors occurred but pt was able to self correct. Plan to continue mobility training with standard walker. AM:  Skin was inspected: yes   Chair/bed alarm: yes  Pt returned to sitting in room in [facility] the [facility] at end of session, and left with all needs in place. Time in: 0915  Time out: 1000    PM  Time in: 1430  Time out: 1515    Pt is making good progress toward established Physical Therapy goals. Continue with physical therapy current plan of care.     Jony Holm PT, DPT  [id number]  (AM)    Roberto oHllingsworth PT, DPT  Board Certified Clinical Specialist in Neurologic Physical Therapy  [phone] (PM)

## 2023-08-04 NOTE — PROGRESS NOTES
Physical Therapy  Treatment note   Evaluating Therapist: Beverly Traore PT, DPT [id number]      ROOM: [address] [place] [address]  DIAGNOSIS: acute ischemic stroke     PRECAUTIONS:  Falls, SBP <180, seizure, cognition, bed/chair alarm, L LE weakness > R LE, sitter, impulsive,  behavior     HPI: Patient initially presented to the ED complaining of chest pain on 07/26/2023. Patient underwent a stress test which was abnormal and with his chest pain, cardiology decided patient should undergo a cardiac catheterization. Patient underwent the cardiac catheterization on 07/26/2023 with stent placement. Shortly after the procedure, patient developed slurred speech and right facial droop for which a stroke alert was called. Patient underwent a series of CT exams which showed ischemia in the left temporal lobe with multiple foci of severe stenosis in the proximal P2 segment of the right posterior cerebral artery. Neurology was consulted at this time and after return from CT scan patient began to have seizure-like activity and was found to be not breathing and pulseless in which a CODE BLUE was called. Patient spontaneously began breathing on his own and was found to have a pulse however was not protecting his airway and was intubated for airway protection. MRI revealed a small 3mm infarction in the left temporal lobe. Patient extubated on 07/28/2023. Patient has a history of CAD, DM, hyperlipidemia, hypertension, sleep apnea, and asthma. Social: Pt lives with wife in a 1 story home with 4- 5 stairs to enter and 1 rail. Pt ambulated with WW inside the home and [facility] in the community independently PTA. Pt's wife works full time. Pt is retired. Pt reports history of 7 falls in the last 3 months. Pt reports he has struggled with B knee/LE weakness PTA. Fall contributed to B LE buckling and unsteadiness. Initial Evaluation  Date: 8/2/23 AM     PM    Short Term Goals Long Term Goals    Was pt agreeable to Eval/treatment?  Yes  Yes AM:  Skin was inspected: yes   Chair/bed alarm: yes  Pt returned to sitting in room in [facility] at end of session, and left with all needs in place. Time in: 0915  Time out: 1000    Pt is making good progress toward established Physical Therapy goals. Continue with physical therapy current plan of care.     Raudel Rea, ROSSYT  [id number]

## 2023-08-04 NOTE — PLAN OF CARE
Problem: Discharge Planning  Goal: Discharge to home or other facility with appropriate resources  8/4/2023 0043 by Leyda Haile RN  Outcome: Progressing  8/3/2023 1813 by Josephine Waterman RN  Outcome: Progressing

## 2023-08-04 NOTE — PROGRESS NOTES
Speech Language Pathology  ACUTE REHABILITATION--DAILY PROGRESS NOTE            SWALLOWING:      Diet:  Regular consistency solids with thin liquids (IDDSI level 0)--rec close assist w/ PO intake at this time to regulate swallow strategy use. Pt was seen for a functional mealtime assessment at breakfast.  Pt required minimal assist to set up the tray and begin eating. SLP reviewed need for small bites/sips and to check for oral clearance throughout meal; pt took large bites and needed reminders to swallow strategy use. Ongoing edu provided for swallow strats. Oral-     No oral containment issues were identified with items this date (mild-moderate issues were demonstrated yesterday). Pharyngeal-                           No apparent signs of aspiration noted during this session                                                 LANGUAGE:    Not formally addressed this session     COGNITION:      Not formally addressed this session    SPEECH:    WFL      Minute Tracking:    Individual therapy:     0 minutes  Concurrent therapy:  30 minutes  Group therapy:     0 minutes  Co-treatment therapy:  0 minutes    Total minutes for 8/4/2023: 30 minutes       SAFETY:  fair        EDUCATION:    Education provided regarding plan of care. OUTCOME:    Progress made towards goals. Will continue SP intervention as per previously established POC.     SP recommended after discharge:  TBD  Supervision recommended at discharge: TBD      FIMS SCORES                                    Swallowing                          Current Status             4--Minimal Assistance               Short Term Goal         5--Supervision               Long Term Goal          6--Modified Independent              Receptive                          Current Status             4--Minimal Assistance               Short Term Goal         5--Supervision  Long Term Goal          5--Supervision      Expressive

## 2023-08-05 LAB
METER GLUCOSE: 165 MG/DL (ref 74–99)
METER GLUCOSE: 205 MG/DL (ref 74–99)
METER GLUCOSE: 220 MG/DL (ref 74–99)
METER GLUCOSE: 287 MG/DL (ref 74–99)

## 2023-08-05 PROCEDURE — 97530 THERAPEUTIC ACTIVITIES: CPT

## 2023-08-05 PROCEDURE — 92526 ORAL FUNCTION THERAPY: CPT

## 2023-08-05 PROCEDURE — 6360000002 HC RX W HCPCS: Performed by: PHYSICAL MEDICINE & REHABILITATION

## 2023-08-05 PROCEDURE — 6360000002 HC RX W HCPCS: Performed by: NURSE PRACTITIONER

## 2023-08-05 PROCEDURE — 6370000000 HC RX 637 (ALT 250 FOR IP): Performed by: PHYSICAL MEDICINE & REHABILITATION

## 2023-08-05 PROCEDURE — 94640 AIRWAY INHALATION TREATMENT: CPT

## 2023-08-05 PROCEDURE — 1280000000 HC REHAB R&B

## 2023-08-05 PROCEDURE — 82947 ASSAY GLUCOSE BLOOD QUANT: CPT

## 2023-08-05 PROCEDURE — 6370000000 HC RX 637 (ALT 250 FOR IP): Performed by: NURSE PRACTITIONER

## 2023-08-05 PROCEDURE — S5553 INSULIN LONG ACTING 5 U: HCPCS | Performed by: NURSE PRACTITIONER

## 2023-08-05 RX ADMIN — DIVALPROEX SODIUM 250 MG: 125 CAPSULE, COATED PELLETS ORAL at 21:11

## 2023-08-05 RX ADMIN — METOPROLOL TARTRATE 50 MG: 50 TABLET, FILM COATED ORAL at 08:26

## 2023-08-05 RX ADMIN — GABAPENTIN 600 MG: 300 CAPSULE ORAL at 21:11

## 2023-08-05 RX ADMIN — INSULIN LISPRO 4 UNITS: 100 INJECTION, SOLUTION INTRAVENOUS; SUBCUTANEOUS at 08:26

## 2023-08-05 RX ADMIN — METFORMIN HYDROCHLORIDE 500 MG: 500 TABLET ORAL at 17:25

## 2023-08-05 RX ADMIN — BUDESONIDE INHALATION 500 MCG: 0.5 SUSPENSION RESPIRATORY (INHALATION) at 08:49

## 2023-08-05 RX ADMIN — MELATONIN 3 MG ORAL TABLET 6 MG: 3 TABLET ORAL at 21:10

## 2023-08-05 RX ADMIN — ENOXAPARIN SODIUM 30 MG: 100 INJECTION SUBCUTANEOUS at 08:26

## 2023-08-05 RX ADMIN — ATORVASTATIN CALCIUM 40 MG: 40 TABLET, FILM COATED ORAL at 21:11

## 2023-08-05 RX ADMIN — TICAGRELOR 90 MG: 90 TABLET ORAL at 21:11

## 2023-08-05 RX ADMIN — STANDARDIZED SENNA CONCENTRATE 8.6 MG: 8.6 TABLET ORAL at 21:11

## 2023-08-05 RX ADMIN — PANTOPRAZOLE SODIUM 40 MG: 40 TABLET, DELAYED RELEASE ORAL at 05:31

## 2023-08-05 RX ADMIN — TICAGRELOR 90 MG: 90 TABLET ORAL at 08:26

## 2023-08-05 RX ADMIN — ARFORMOTEROL TARTRATE 15 MCG: 15 SOLUTION RESPIRATORY (INHALATION) at 08:49

## 2023-08-05 RX ADMIN — METFORMIN HYDROCHLORIDE 500 MG: 500 TABLET ORAL at 08:26

## 2023-08-05 RX ADMIN — ARFORMOTEROL TARTRATE 15 MCG: 15 SOLUTION RESPIRATORY (INHALATION) at 20:52

## 2023-08-05 RX ADMIN — DULOXETINE HYDROCHLORIDE 30 MG: 30 CAPSULE, DELAYED RELEASE ORAL at 08:26

## 2023-08-05 RX ADMIN — GABAPENTIN 600 MG: 300 CAPSULE ORAL at 08:26

## 2023-08-05 RX ADMIN — INSULIN GLARGINE-YFGN 15 UNITS: 100 INJECTION, SOLUTION SUBCUTANEOUS at 21:26

## 2023-08-05 RX ADMIN — DIVALPROEX SODIUM 250 MG: 125 CAPSULE, COATED PELLETS ORAL at 08:27

## 2023-08-05 RX ADMIN — INSULIN LISPRO 8 UNITS: 100 INJECTION, SOLUTION INTRAVENOUS; SUBCUTANEOUS at 11:50

## 2023-08-05 RX ADMIN — GABAPENTIN 600 MG: 300 CAPSULE ORAL at 17:25

## 2023-08-05 RX ADMIN — ENOXAPARIN SODIUM 30 MG: 100 INJECTION SUBCUTANEOUS at 21:11

## 2023-08-05 RX ADMIN — LOSARTAN POTASSIUM 50 MG: 50 TABLET, FILM COATED ORAL at 08:26

## 2023-08-05 RX ADMIN — GABAPENTIN 600 MG: 300 CAPSULE ORAL at 13:28

## 2023-08-05 RX ADMIN — METOPROLOL TARTRATE 50 MG: 50 TABLET, FILM COATED ORAL at 21:11

## 2023-08-05 RX ADMIN — INSULIN LISPRO 4 UNITS: 100 INJECTION, SOLUTION INTRAVENOUS; SUBCUTANEOUS at 21:25

## 2023-08-05 RX ADMIN — ASPIRIN 81 MG: 81 TABLET, COATED ORAL at 08:26

## 2023-08-05 RX ADMIN — BUDESONIDE INHALATION 500 MCG: 0.5 SUSPENSION RESPIRATORY (INHALATION) at 20:53

## 2023-08-05 NOTE — PROGRESS NOTES
Physical Therapy  Treatment note   Evaluating Therapist: Matt Haynes PT, DPT [id number]      ROOM: [facility]/[address]  DIAGNOSIS: acute ischemic stroke     PRECAUTIONS:  Falls, SBP <180, seizure, cognition, bed/chair alarm, L LE weakness > R LE, sitter, impulsive,  behavior     HPI: Patient initially presented to the ED complaining of chest pain on 07/26/2023. Patient underwent a stress test which was abnormal and with his chest pain, cardiology decided patient should undergo a cardiac catheterization. Patient underwent the cardiac catheterization on 07/26/2023 with stent placement. Shortly after the procedure, patient developed slurred speech and right facial droop for which a stroke alert was called. Patient underwent a series of CT exams which showed ischemia in the left temporal lobe with multiple foci of severe stenosis in the proximal P2 segment of the right posterior cerebral artery. Neurology was consulted at this time and after return from CT scan patient began to have seizure-like activity and was found to be not breathing and pulseless in which a CODE BLUE was called. Patient spontaneously began breathing on his own and was found to have a pulse however was not protecting his airway and was intubated for airway protection. MRI revealed a small 3mm infarction in the left temporal lobe. Patient extubated on 07/28/2023. Patient has a history of CAD, DM, hyperlipidemia, hypertension, sleep apnea, and asthma. Social: Pt lives with wife in a 1 story home with 4- 5 stairs to enter and 1 rail. Pt ambulated with WW inside the home and [facility] in the community independently PTA. Pt's wife works full time. Pt is retired. Pt reports history of 7 falls in the last 3 months. Pt reports he has struggled with B knee/LE weakness PTA. Fall contributed to B LE buckling and unsteadiness. Initial Evaluation  Date: 8/2/23 AM     Short Term Goals Long Term Goals    Was pt agreeable to Eval/treatment?  Yes  Yes Does pt have pain? L hip and L shoulder, not rated   Denies pain       Bed Mobility  Rolling: Janelle  Supine to sit: Janelle  Sit to supine: Janelle  Scooting: Janelle NT  Supervision  Modified Independent   Transfers Sit to stand: ModA  Stand to sit: ModA  Stand pivot: ModA with [employer]    5xSTS: NT sit to stand: Janelle  Stand to sit: Janelle  Stand pivot: nt  SBA  5xSTS: TBD Supervision   5xSTS: TBD   Ambulation    25 feet with [employer] with ModA    WC follow     10mWT: NT  6mWT: NT   30, 30, 45, 35 standard walker min assist      Wc follow  See comments   10mWT: 0.29 m/s (ModA, [employer], chair follow, 8/2/23)  6mWT: 28 m  (3:10 minutes, WC follow, ModA, [employer], 8/3/23)   150 feet with AAD with SBA    10mWT: >0.6 m/s   6mWT: 100 m  >250 feet with AAD with supervision     10mWT: > 0.8 m/s   6mWT: >200 m    Walking 10 feet on uneven surface NT NT  10 feet with AAD with Janelle 10 feet with AAD with SBA   Wheel Chair Mobility NT NT      Car Transfers NT NT  Janelle SBA    Stair negotiation: ascended and descended  NT NT  4 steps with 1-2 rail with Janelle 8-12 steps with 1-2 rail with SBA   Curb Step:   ascended and descended NT NT  4 inch step with AAD and Janelle 4 inch step with AAD and SBA   Picking up object off the floor NT NT  Will  cone/shoe with reacher and support from AAD Janelle  Will  cone/shoe with reacher and support from AAD supervision       BLE ROM WFL NT      BLE Strength L LE:  Hip 3+/5   Knee 3-/5   Ankle 4/5     R LE:   Hip 4/5   Knee 3+/5   Ankle 4/5  NT      Balance  Static and dynamic sitting: SBA    Dynamic standing: at [employer] with ModA    BBS: NT  FGA: NT Static and dynamic sitting: SBA    Dynamic standing: at [employer] with ModA    BBS: >35/56  FGA: TBD   BBS: >45/56  FGA: TBD   Date Family Teach Completed        Is additional Family Teaching Needed?   Y or N Y Y      Hindering Progress Debility, PLOF, B LE weakness, cognition  Debility, PLOF, B LE weakness, cognition      PT recommended ELOS 3-4 weeks  3-4 weeks       Team's Discharge

## 2023-08-06 LAB
METER GLUCOSE: 177 MG/DL (ref 74–99)
METER GLUCOSE: 203 MG/DL (ref 74–99)
METER GLUCOSE: 205 MG/DL (ref 74–99)
METER GLUCOSE: 296 MG/DL (ref 74–99)

## 2023-08-06 PROCEDURE — 6360000002 HC RX W HCPCS: Performed by: NURSE PRACTITIONER

## 2023-08-06 PROCEDURE — 6370000000 HC RX 637 (ALT 250 FOR IP): Performed by: NURSE PRACTITIONER

## 2023-08-06 PROCEDURE — 1280000000 HC REHAB R&B

## 2023-08-06 PROCEDURE — 82947 ASSAY GLUCOSE BLOOD QUANT: CPT

## 2023-08-06 PROCEDURE — 97530 THERAPEUTIC ACTIVITIES: CPT

## 2023-08-06 PROCEDURE — 6360000002 HC RX W HCPCS: Performed by: PHYSICAL MEDICINE & REHABILITATION

## 2023-08-06 PROCEDURE — 97110 THERAPEUTIC EXERCISES: CPT

## 2023-08-06 PROCEDURE — 6370000000 HC RX 637 (ALT 250 FOR IP): Performed by: PHYSICAL MEDICINE & REHABILITATION

## 2023-08-06 PROCEDURE — S5553 INSULIN LONG ACTING 5 U: HCPCS | Performed by: NURSE PRACTITIONER

## 2023-08-06 PROCEDURE — 94640 AIRWAY INHALATION TREATMENT: CPT

## 2023-08-06 RX ADMIN — DIVALPROEX SODIUM 250 MG: 125 CAPSULE, COATED PELLETS ORAL at 08:05

## 2023-08-06 RX ADMIN — BUDESONIDE INHALATION 500 MCG: 0.5 SUSPENSION RESPIRATORY (INHALATION) at 20:24

## 2023-08-06 RX ADMIN — TICAGRELOR 90 MG: 90 TABLET ORAL at 20:44

## 2023-08-06 RX ADMIN — METOPROLOL TARTRATE 50 MG: 50 TABLET, FILM COATED ORAL at 08:06

## 2023-08-06 RX ADMIN — BUDESONIDE INHALATION 500 MCG: 0.5 SUSPENSION RESPIRATORY (INHALATION) at 07:29

## 2023-08-06 RX ADMIN — ASPIRIN 81 MG: 81 TABLET, COATED ORAL at 08:05

## 2023-08-06 RX ADMIN — LOSARTAN POTASSIUM 50 MG: 50 TABLET, FILM COATED ORAL at 08:06

## 2023-08-06 RX ADMIN — METOPROLOL TARTRATE 50 MG: 50 TABLET, FILM COATED ORAL at 20:44

## 2023-08-06 RX ADMIN — INSULIN LISPRO 8 UNITS: 100 INJECTION, SOLUTION INTRAVENOUS; SUBCUTANEOUS at 10:42

## 2023-08-06 RX ADMIN — METFORMIN HYDROCHLORIDE 850 MG: 850 TABLET ORAL at 16:30

## 2023-08-06 RX ADMIN — GABAPENTIN 600 MG: 300 CAPSULE ORAL at 17:44

## 2023-08-06 RX ADMIN — INSULIN LISPRO 4 UNITS: 100 INJECTION, SOLUTION INTRAVENOUS; SUBCUTANEOUS at 07:00

## 2023-08-06 RX ADMIN — INSULIN GLARGINE-YFGN 15 UNITS: 100 INJECTION, SOLUTION SUBCUTANEOUS at 20:58

## 2023-08-06 RX ADMIN — PANTOPRAZOLE SODIUM 40 MG: 40 TABLET, DELAYED RELEASE ORAL at 06:55

## 2023-08-06 RX ADMIN — ATORVASTATIN CALCIUM 40 MG: 40 TABLET, FILM COATED ORAL at 20:44

## 2023-08-06 RX ADMIN — ARFORMOTEROL TARTRATE 15 MCG: 15 SOLUTION RESPIRATORY (INHALATION) at 20:24

## 2023-08-06 RX ADMIN — INSULIN LISPRO 4 UNITS: 100 INJECTION, SOLUTION INTRAVENOUS; SUBCUTANEOUS at 20:58

## 2023-08-06 RX ADMIN — GABAPENTIN 600 MG: 300 CAPSULE ORAL at 08:05

## 2023-08-06 RX ADMIN — DULOXETINE HYDROCHLORIDE 30 MG: 30 CAPSULE, DELAYED RELEASE ORAL at 08:05

## 2023-08-06 RX ADMIN — DIVALPROEX SODIUM 250 MG: 125 CAPSULE, COATED PELLETS ORAL at 20:44

## 2023-08-06 RX ADMIN — TICAGRELOR 90 MG: 90 TABLET ORAL at 08:06

## 2023-08-06 RX ADMIN — STANDARDIZED SENNA CONCENTRATE 8.6 MG: 8.6 TABLET ORAL at 20:44

## 2023-08-06 RX ADMIN — GABAPENTIN 600 MG: 300 CAPSULE ORAL at 20:44

## 2023-08-06 RX ADMIN — ARFORMOTEROL TARTRATE 15 MCG: 15 SOLUTION RESPIRATORY (INHALATION) at 07:29

## 2023-08-06 RX ADMIN — ENOXAPARIN SODIUM 30 MG: 100 INJECTION SUBCUTANEOUS at 20:44

## 2023-08-06 RX ADMIN — ENOXAPARIN SODIUM 30 MG: 100 INJECTION SUBCUTANEOUS at 08:04

## 2023-08-06 RX ADMIN — METFORMIN HYDROCHLORIDE 500 MG: 500 TABLET ORAL at 08:05

## 2023-08-06 RX ADMIN — MELATONIN 3 MG ORAL TABLET 6 MG: 3 TABLET ORAL at 17:44

## 2023-08-06 RX ADMIN — GABAPENTIN 600 MG: 300 CAPSULE ORAL at 14:54

## 2023-08-06 NOTE — PLAN OF CARE
Problem: Discharge Planning  Goal: Discharge to home or other facility with appropriate resources  8/6/2023 0926 by Amanda Felipe RN  Outcome: Progressing  Flowsheets (Taken 8/6/2023 0915)  Discharge to home or other facility with appropriate resources: Identify barriers to discharge with patient and caregiver  8/5/2023 1937 by Jt Brooke RN  Outcome: Progressing     Problem: Safety - Medical Restraint  Goal: Remains free of injury from restraints (Restraint for Interference with Medical Device)  Description: INTERVENTIONS:  1. Determine that other, less restrictive measures have been tried or would not be effective before applying the restraint  2. Evaluate the patient's condition at the time of restraint application  3. Inform patient/family regarding the reason for restraint  4. Q2H: Monitor safety, psychosocial status, comfort, nutrition and hydration  8/6/2023 0926 by Amanda Felipe RN  Outcome: Progressing  8/5/2023 1937 by Jt Brooke RN  Outcome: Progressing     Problem: Safety - Adult  Goal: Free from fall injury  8/6/2023 0926 by Amanda Felipe RN  Outcome: Progressing  8/5/2023 1937 by Jt Brooke RN  Outcome: Progressing     Problem: Skin/Tissue Integrity  Goal: Absence of new skin breakdown  Description: 1. Monitor for areas of redness and/or skin breakdown  2. Assess vascular access sites hourly  3. Every 4-6 hours minimum:  Change oxygen saturation probe site  4. Every 4-6 hours:  If on nasal continuous positive airway pressure, respiratory therapy assess nares and determine need for appliance change or resting period.   8/6/2023 0926 by Amanda Felipe RN  Outcome: Progressing  8/5/2023 1937 by Jt Brooke RN  Outcome: Progressing     Problem: ABCDS Injury Assessment  Goal: Absence of physical injury  8/6/2023 0926 by Amanda Felipe RN  Outcome: Progressing  Flowsheets (Taken 8/6/2023 9914)  Absence of Physical Injury: Implement safety measures based on patient assessment  8/5/2023 1937 by Johanny Roth RN  Outcome: Progressing     Problem: Chronic Conditions and Co-morbidities  Goal: Patient's chronic conditions and co-morbidity symptoms are monitored and maintained or improved  8/6/2023 0926 by Simone Romo RN  Outcome: Progressing  8/5/2023 1937 by Johanny Roth RN  Outcome: Progressing     Problem: Pain  Goal: Verbalizes/displays adequate comfort level or baseline comfort level  8/6/2023 0926 by Simone Romo RN  Outcome: Progressing  8/5/2023 1937 by Johanny Roth RN  Outcome: Progressing

## 2023-08-06 NOTE — PROGRESS NOTES
Occupational Therapy  OCCUPATIONAL THERAPY DAILY NOTE    Date:2023  Patient Name: Esmeralda Gitelman  MRN: [id number]  : 1950  Room: [facility]-A     Referring Practitioner: Jose Mcconnell MD  Diagnosis:  s/p cardiac cath & stent 23 & post op CVA- ischemic  Additional Pertinent Hx: CAD, DM, HLD, HTN, JOSÉ MIGUEL, DM, hx falls- per pt 7 falls recently, asthma, anxiety  Restrictions/Precautions: Fall Risk, seizure, SBP<180, LLE weak, cognitive deficits, diet- 5 carb- low fat/low cholesterol/BRAYDEN & high fiber & had sitter  Discharge Recommendations: Home with assist as needed & OT [facility] health/aides      Functional Assessment:   Date Status AE  Comments   Feeding 8/3/23 Mod I     Grooming 8/3/23 SBA wc       Oral Care 8/3/23 SBA wc    Bathing 8/3/23 Max A ww    UB Dressing 8/3/23 SBA     LB Dressing 8/3/23 Max A        Footwear 8/3/23 Dep     Toileting 23 Mod A  Patient able to perform clothing management before toileting, able to perform front henok hygiene, assist for rear and assistance for balance and clothing management following toileting. Vc's for safety and techniques to perform   Homemaking  TBD       Functional Transfers / Balance:   Date Status DME  Comments   Sit Balance 8/3/23 Mod/Min A     Stand Balance 23 Mod A  Demonstrated during standing level activity and transfers   [] Tub  [] Shower   Transfer 23 TBD     Commode   Transfer 23 Min A Grab bar Sptrf with vc's for safety and techniques to perform, patient utilized the grab bar for UE support for  balance. Still has flexed knees during standing and pivoting. Functional   Mobility 23 Mod A ww    Other: sit<>stand    EOB<wc    Supine<>sit 8/6/23    8/3/23    8/3/23 MIN A     Mod A    Min A     ww See below for detials     Functional Exercises / Activity:  Patient completed BUE ergometer exercise program to promote strengthening and endurance training for improved core and postural stability.  Patient performed 2x5min of forwards and

## 2023-08-07 LAB
ALBUMIN SERPL-MCNC: 4.1 G/DL (ref 3.5–5.2)
ALP SERPL-CCNC: 66 U/L (ref 40–129)
ALT SERPL-CCNC: 52 U/L (ref 0–40)
ANION GAP SERPL CALCULATED.3IONS-SCNC: 15 MMOL/L (ref 7–16)
AST SERPL-CCNC: 40 U/L (ref 0–39)
BASOPHILS # BLD: 0.05 K/UL (ref 0–0.2)
BASOPHILS NFR BLD: 1 % (ref 0–2)
BILIRUB SERPL-MCNC: 0.6 MG/DL (ref 0–1.2)
BUN SERPL-MCNC: 22 MG/DL (ref 6–23)
CALCIUM SERPL-MCNC: 9.7 MG/DL (ref 8.6–10.2)
CHLORIDE SERPL-SCNC: 102 MMOL/L (ref 98–107)
CO2 SERPL-SCNC: 25 MMOL/L (ref 22–29)
CREAT SERPL-MCNC: 0.8 MG/DL (ref 0.7–1.2)
EOSINOPHIL # BLD: 0.14 K/UL (ref 0.05–0.5)
EOSINOPHILS RELATIVE PERCENT: 3 % (ref 0–6)
ERYTHROCYTE [DISTWIDTH] IN BLOOD BY AUTOMATED COUNT: 13.1 % (ref 11.5–15)
GFR SERPL CREATININE-BSD FRML MDRD: >60 ML/MIN/1.73M2
GLUCOSE SERPL-MCNC: 202 MG/DL (ref 74–99)
HCT VFR BLD AUTO: 42.9 % (ref 37–54)
HGB BLD-MCNC: 14.3 G/DL (ref 12.5–16.5)
IMM GRANULOCYTES # BLD AUTO: <0.03 K/UL (ref 0–0.58)
IMM GRANULOCYTES NFR BLD: 0 % (ref 0–5)
LYMPHOCYTES NFR BLD: 1.87 K/UL (ref 1.5–4)
LYMPHOCYTES RELATIVE PERCENT: 38 % (ref 20–42)
MCH RBC QN AUTO: 30.6 PG (ref 26–35)
MCHC RBC AUTO-ENTMCNC: 33.3 G/DL (ref 32–34.5)
MCV RBC AUTO: 91.9 FL (ref 80–99.9)
METER GLUCOSE: 158 MG/DL (ref 74–99)
METER GLUCOSE: 171 MG/DL (ref 74–99)
METER GLUCOSE: 207 MG/DL (ref 74–99)
METER GLUCOSE: 210 MG/DL (ref 74–99)
MONOCYTES NFR BLD: 0.52 K/UL (ref 0.1–0.95)
MONOCYTES NFR BLD: 11 % (ref 2–12)
NEUTROPHILS NFR BLD: 47 % (ref 43–80)
NEUTS SEG NFR BLD: 2.28 K/UL (ref 1.8–7.3)
PLATELET # BLD AUTO: 312 K/UL (ref 130–450)
PMV BLD AUTO: 9.7 FL (ref 7–12)
POTASSIUM SERPL-SCNC: 4.7 MMOL/L (ref 3.5–5)
PROT SERPL-MCNC: 7.4 G/DL (ref 6.4–8.3)
RBC # BLD AUTO: 4.67 M/UL (ref 3.8–5.8)
SODIUM SERPL-SCNC: 142 MMOL/L (ref 132–146)
WBC OTHER # BLD: 4.9 K/UL (ref 4.5–11.5)

## 2023-08-07 PROCEDURE — S5553 INSULIN LONG ACTING 5 U: HCPCS | Performed by: NURSE PRACTITIONER

## 2023-08-07 PROCEDURE — 6370000000 HC RX 637 (ALT 250 FOR IP): Performed by: NURSE PRACTITIONER

## 2023-08-07 PROCEDURE — 97535 SELF CARE MNGMENT TRAINING: CPT

## 2023-08-07 PROCEDURE — 6370000000 HC RX 637 (ALT 250 FOR IP): Performed by: PHYSICAL MEDICINE & REHABILITATION

## 2023-08-07 PROCEDURE — 97530 THERAPEUTIC ACTIVITIES: CPT

## 2023-08-07 PROCEDURE — 97110 THERAPEUTIC EXERCISES: CPT

## 2023-08-07 PROCEDURE — 36415 COLL VENOUS BLD VENIPUNCTURE: CPT

## 2023-08-07 PROCEDURE — 85025 COMPLETE CBC W/AUTO DIFF WBC: CPT

## 2023-08-07 PROCEDURE — 6360000002 HC RX W HCPCS: Performed by: NURSE PRACTITIONER

## 2023-08-07 PROCEDURE — 92526 ORAL FUNCTION THERAPY: CPT

## 2023-08-07 PROCEDURE — 1280000000 HC REHAB R&B

## 2023-08-07 PROCEDURE — 6360000002 HC RX W HCPCS: Performed by: PHYSICAL MEDICINE & REHABILITATION

## 2023-08-07 PROCEDURE — 80053 COMPREHEN METABOLIC PANEL: CPT

## 2023-08-07 PROCEDURE — 82947 ASSAY GLUCOSE BLOOD QUANT: CPT

## 2023-08-07 PROCEDURE — 97130 THER IVNTJ EA ADDL 15 MIN: CPT

## 2023-08-07 PROCEDURE — 94640 AIRWAY INHALATION TREATMENT: CPT

## 2023-08-07 PROCEDURE — 97129 THER IVNTJ 1ST 15 MIN: CPT

## 2023-08-07 RX ADMIN — METFORMIN HYDROCHLORIDE 850 MG: 850 TABLET ORAL at 08:29

## 2023-08-07 RX ADMIN — METFORMIN HYDROCHLORIDE 850 MG: 850 TABLET ORAL at 16:33

## 2023-08-07 RX ADMIN — MELATONIN 3 MG ORAL TABLET 6 MG: 3 TABLET ORAL at 20:16

## 2023-08-07 RX ADMIN — PANTOPRAZOLE SODIUM 40 MG: 40 TABLET, DELAYED RELEASE ORAL at 05:30

## 2023-08-07 RX ADMIN — TICAGRELOR 90 MG: 90 TABLET ORAL at 20:17

## 2023-08-07 RX ADMIN — INSULIN LISPRO 4 UNITS: 100 INJECTION, SOLUTION INTRAVENOUS; SUBCUTANEOUS at 05:39

## 2023-08-07 RX ADMIN — ARFORMOTEROL TARTRATE 15 MCG: 15 SOLUTION RESPIRATORY (INHALATION) at 21:03

## 2023-08-07 RX ADMIN — GABAPENTIN 600 MG: 300 CAPSULE ORAL at 08:29

## 2023-08-07 RX ADMIN — INSULIN GLARGINE-YFGN 15 UNITS: 100 INJECTION, SOLUTION SUBCUTANEOUS at 20:18

## 2023-08-07 RX ADMIN — GABAPENTIN 600 MG: 300 CAPSULE ORAL at 20:17

## 2023-08-07 RX ADMIN — TICAGRELOR 90 MG: 90 TABLET ORAL at 08:29

## 2023-08-07 RX ADMIN — INSULIN LISPRO 4 UNITS: 100 INJECTION, SOLUTION INTRAVENOUS; SUBCUTANEOUS at 16:21

## 2023-08-07 RX ADMIN — BUDESONIDE INHALATION 500 MCG: 0.5 SUSPENSION RESPIRATORY (INHALATION) at 21:03

## 2023-08-07 RX ADMIN — BISMUTH SUBSALICYLATE 30 ML: 525 SUSPENSION ORAL at 17:55

## 2023-08-07 RX ADMIN — LOSARTAN POTASSIUM 50 MG: 50 TABLET, FILM COATED ORAL at 08:28

## 2023-08-07 RX ADMIN — METOPROLOL TARTRATE 50 MG: 50 TABLET, FILM COATED ORAL at 20:17

## 2023-08-07 RX ADMIN — GABAPENTIN 600 MG: 300 CAPSULE ORAL at 14:38

## 2023-08-07 RX ADMIN — ASPIRIN 81 MG: 81 TABLET, COATED ORAL at 08:28

## 2023-08-07 RX ADMIN — ENOXAPARIN SODIUM 30 MG: 100 INJECTION SUBCUTANEOUS at 20:17

## 2023-08-07 RX ADMIN — BUDESONIDE INHALATION 500 MCG: 0.5 SUSPENSION RESPIRATORY (INHALATION) at 08:27

## 2023-08-07 RX ADMIN — DIVALPROEX SODIUM 250 MG: 125 CAPSULE, COATED PELLETS ORAL at 20:17

## 2023-08-07 RX ADMIN — METOPROLOL TARTRATE 50 MG: 50 TABLET, FILM COATED ORAL at 08:28

## 2023-08-07 RX ADMIN — DIVALPROEX SODIUM 250 MG: 125 CAPSULE, COATED PELLETS ORAL at 08:28

## 2023-08-07 RX ADMIN — ARFORMOTEROL TARTRATE 15 MCG: 15 SOLUTION RESPIRATORY (INHALATION) at 08:28

## 2023-08-07 RX ADMIN — ENOXAPARIN SODIUM 30 MG: 100 INJECTION SUBCUTANEOUS at 08:27

## 2023-08-07 RX ADMIN — GABAPENTIN 600 MG: 300 CAPSULE ORAL at 16:21

## 2023-08-07 RX ADMIN — ATORVASTATIN CALCIUM 40 MG: 40 TABLET, FILM COATED ORAL at 20:18

## 2023-08-07 RX ADMIN — DULOXETINE HYDROCHLORIDE 30 MG: 30 CAPSULE, DELAYED RELEASE ORAL at 08:28

## 2023-08-07 NOTE — PROGRESS NOTES
Occupational Therapy  OCCUPATIONAL THERAPY DAILY NOTE    Date:2023  Patient Name: Emmy Michael  MRN: [id number]  : 1950  Room: [address] [place] [address]-A     Referring Practitioner: Chidi Maldonado MD  Diagnosis:  s/p cardiac cath & stent 23 & post op CVA- ischemic  Additional Pertinent Hx: CAD, DM, HLD, HTN, JOSÉ MIGUEL, DM, hx falls- per pt 7 falls recently, asthma, anxiety  Restrictions/Precautions: Fall Risk, seizure, SBP<180, LLE weak, cognitive deficits, diet- 5 carb- low fat/low cholesterol/BRAYDEN & high fiber & had sitter  Discharge Recommendations: Home with assist as needed & OT [facility] health/aides      Functional Assessment:   Date Status AE  Comments   Feeding 23 Mod I  Seated EOB level, the patient was able to open all containers and feed self without difficulty. Grooming 23 S wc Patient performed shaving, face washing, hair care, and applying deodorant seated wc level. Recommending seated level at this time due to balance deficits. Intermittent vc's for safety with the razor      Oral Care 23 S wc Seated wc level to perform all parts of oral care   Bathing 23 Mod A Ww  Shower bench  HH shower  Grab bar Patient progressed to performing bathing seated/standing level in the shower. The patient performed washing of hair, washing of UB, front henok area, and tops of legs with assistance for BLE and buttocks. Standing level at Mod A due to B instability of patients knees. Vc's for safety and techniques to perform   UB Dressing 23 setup  Setup seated level to perform pullover tshirt   LB Dressing 23 Min A  Patient able to thread his depends and pants with assistance for clothing management over hips. The patient required Min A standing balance to perform management. Footwear 23 Max A  Patient required assistance for B socks, no wide sock aides available at this time. Patient was able to place his L foot into the shoe, attempted to stand to do so and required to be redirected.

## 2023-08-07 NOTE — PLAN OF CARE
Problem: Discharge Planning  Goal: Discharge to home or other facility with appropriate resources  Outcome: Progressing  Flowsheets (Taken 8/7/2023 4523)  Discharge to home or other facility with appropriate resources: Identify barriers to discharge with patient and caregiver     Problem: Safety - Medical Restraint  Goal: Remains free of injury from restraints (Restraint for Interference with Medical Device)  Description: INTERVENTIONS:  1. Determine that other, less restrictive measures have been tried or would not be effective before applying the restraint  2. Evaluate the patient's condition at the time of restraint application  3. Inform patient/family regarding the reason for restraint  4. Q2H: Monitor safety, psychosocial status, comfort, nutrition and hydration  Outcome: Progressing     Problem: Safety - Adult  Goal: Free from fall injury  Outcome: Progressing     Problem: Skin/Tissue Integrity  Goal: Absence of new skin breakdown  Description: 1. Monitor for areas of redness and/or skin breakdown  2. Assess vascular access sites hourly  3. Every 4-6 hours minimum:  Change oxygen saturation probe site  4. Every 4-6 hours:  If on nasal continuous positive airway pressure, respiratory therapy assess nares and determine need for appliance change or resting period.   Outcome: Progressing     Problem: ABCDS Injury Assessment  Goal: Absence of physical injury  Outcome: Progressing  Flowsheets (Taken 8/7/2023 1413)  Absence of Physical Injury: Implement safety measures based on patient assessment     Problem: Chronic Conditions and Co-morbidities  Goal: Patient's chronic conditions and co-morbidity symptoms are monitored and maintained or improved  Outcome: Progressing     Problem: Pain  Goal: Verbalizes/displays adequate comfort level or baseline comfort level  Outcome: Progressing

## 2023-08-07 NOTE — PROGRESS NOTES
Physical Therapy  Treatment note   Evaluating Therapist: Beverly Traore PT, DPT [id number]      ROOM: [address] [place], [place] [id number]  DIAGNOSIS: acute ischemic stroke     PRECAUTIONS:  Falls, SBP <180, seizure, cognition, bed/chair alarm, L LE weakness > R LE, sitter, impulsive,  behavior     HPI: Patient initially presented to the ED complaining of chest pain on 07/26/2023. Patient underwent a stress test which was abnormal and with his chest pain, cardiology decided patient should undergo a cardiac catheterization. Patient underwent the cardiac catheterization on 07/26/2023 with stent placement. Shortly after the procedure, patient developed slurred speech and right facial droop for which a stroke alert was called. Patient underwent a series of CT exams which showed ischemia in the left temporal lobe with multiple foci of severe stenosis in the proximal P2 segment of the right posterior cerebral artery. Neurology was consulted at this time and after return from CT scan patient began to have seizure-like activity and was found to be not breathing and pulseless in which a CODE BLUE was called. Patient spontaneously began breathing on his own and was found to have a pulse however was not protecting his airway and was intubated for airway protection. MRI revealed a small 3mm infarction in the left temporal lobe. Patient extubated on 07/28/2023. Patient has a history of CAD, DM, hyperlipidemia, hypertension, sleep apnea, and asthma. Social: Pt lives with wife in a 1 story home with 4- 5 stairs to enter and 1 rail. Pt ambulated with [facility] inside the home and [facility] in the community independently PTA. Pt's wife works full time. Pt is retired. Pt reports history of 7 falls in the last 3 months. Pt reports he has struggled with B knee/LE weakness PTA. Fall contributed to B LE buckling and unsteadiness. Initial Evaluation  Date: 8/2/23 AM     PM    Short Term Goals Long Term Goals    Was pt agreeable to Eval/treatment?  Yes  Yes

## 2023-08-07 NOTE — PROGRESS NOTES
Speech Language Pathology  ACUTE REHABILITATION--DAILY PROGRESS NOTE            SWALLOWING:      Diet:  Regular consistency solids with thin liquids (IDDSI level 0)    Patient actively participated in functionally-based mealtime assessment; required no assistance to set up meal tray and was able to feed self independently. Reviewed need for slow rate of intake and regulated bite size prior to initiation of PO intake. Oral prep/oral-     No oral containment issues were identified. Adequate oral prep and A-P propulsion of bolus were noted with good clearance of oral residuals. Pharyngeal-     Clear vocal quality was maintained throughout. No overt clinical indicators of aspiration were apparent. Patient no longer requires supervision during mealtimes. Patient would like to remain in the dining room per preference. LANGUAGE:    Requires increased time for processing as well as occasional repetition/clarification of directives. Continued difficulty with word retrieval during conversational tasks. COGNITION:      Co-tx with PT    Patient able to independently recall SLP's name and reference information provided earlier. Patient also able to recall items on lunch tray given min v/c. Patient benefited from consistent mod v/c for appropriate hand placement during stand/sit transfers. Observed patient plopping down without reaching back as well as abandoning walker and reaching for chair from a distance. Patient benefited from mod v/c for safety awareness while ascending/descending stairs. Patient able to verbally recount procedure (up with good, down with bad) when requested by SLP.     SPEECH:    WFL      Minute Tracking:    Individual therapy:     0 minutes  Concurrent therapy:  15 minutes  Group therapy:     0 minutes  Co-treatment therapy:   25 minutes    Total minutes for 8/7/2023: 40 minutes  Variance -- patient bowel issues      SAFETY:      Fair    EDUCATION:    Patient educated on

## 2023-08-08 LAB
METER GLUCOSE: 142 MG/DL (ref 74–99)
METER GLUCOSE: 186 MG/DL (ref 74–99)
METER GLUCOSE: 206 MG/DL (ref 74–99)
METER GLUCOSE: 222 MG/DL (ref 74–99)

## 2023-08-08 PROCEDURE — 97530 THERAPEUTIC ACTIVITIES: CPT

## 2023-08-08 PROCEDURE — 6360000002 HC RX W HCPCS: Performed by: NURSE PRACTITIONER

## 2023-08-08 PROCEDURE — S5553 INSULIN LONG ACTING 5 U: HCPCS | Performed by: NURSE PRACTITIONER

## 2023-08-08 PROCEDURE — 1280000000 HC REHAB R&B

## 2023-08-08 PROCEDURE — 97110 THERAPEUTIC EXERCISES: CPT

## 2023-08-08 PROCEDURE — 6370000000 HC RX 637 (ALT 250 FOR IP): Performed by: NURSE PRACTITIONER

## 2023-08-08 PROCEDURE — 82962 GLUCOSE BLOOD TEST: CPT

## 2023-08-08 PROCEDURE — 97129 THER IVNTJ 1ST 15 MIN: CPT

## 2023-08-08 PROCEDURE — 6370000000 HC RX 637 (ALT 250 FOR IP): Performed by: PHYSICAL MEDICINE & REHABILITATION

## 2023-08-08 PROCEDURE — 97130 THER IVNTJ EA ADDL 15 MIN: CPT

## 2023-08-08 PROCEDURE — 6360000002 HC RX W HCPCS: Performed by: PHYSICAL MEDICINE & REHABILITATION

## 2023-08-08 PROCEDURE — 94640 AIRWAY INHALATION TREATMENT: CPT

## 2023-08-08 PROCEDURE — 97535 SELF CARE MNGMENT TRAINING: CPT

## 2023-08-08 RX ORDER — LANOLIN ALCOHOL/MO/W.PET/CERES
6 CREAM (GRAM) TOPICAL NIGHTLY PRN
Status: DISCONTINUED | OUTPATIENT
Start: 2023-08-08 | End: 2023-08-18 | Stop reason: HOSPADM

## 2023-08-08 RX ADMIN — INSULIN LISPRO 4 UNITS: 100 INJECTION, SOLUTION INTRAVENOUS; SUBCUTANEOUS at 11:45

## 2023-08-08 RX ADMIN — PANTOPRAZOLE SODIUM 40 MG: 40 TABLET, DELAYED RELEASE ORAL at 06:20

## 2023-08-08 RX ADMIN — GABAPENTIN 600 MG: 300 CAPSULE ORAL at 09:06

## 2023-08-08 RX ADMIN — GABAPENTIN 600 MG: 300 CAPSULE ORAL at 16:59

## 2023-08-08 RX ADMIN — METFORMIN HYDROCHLORIDE 850 MG: 850 TABLET ORAL at 09:06

## 2023-08-08 RX ADMIN — ASPIRIN 81 MG: 81 TABLET, COATED ORAL at 09:06

## 2023-08-08 RX ADMIN — MELATONIN 3 MG ORAL TABLET 6 MG: 3 TABLET ORAL at 20:17

## 2023-08-08 RX ADMIN — LOSARTAN POTASSIUM 50 MG: 50 TABLET, FILM COATED ORAL at 09:05

## 2023-08-08 RX ADMIN — ATORVASTATIN CALCIUM 40 MG: 40 TABLET, FILM COATED ORAL at 20:13

## 2023-08-08 RX ADMIN — TICAGRELOR 90 MG: 90 TABLET ORAL at 09:06

## 2023-08-08 RX ADMIN — INSULIN GLARGINE-YFGN 15 UNITS: 100 INJECTION, SOLUTION SUBCUTANEOUS at 20:13

## 2023-08-08 RX ADMIN — ARFORMOTEROL TARTRATE 15 MCG: 15 SOLUTION RESPIRATORY (INHALATION) at 18:54

## 2023-08-08 RX ADMIN — BUDESONIDE INHALATION 500 MCG: 0.5 SUSPENSION RESPIRATORY (INHALATION) at 18:54

## 2023-08-08 RX ADMIN — DIVALPROEX SODIUM 250 MG: 125 CAPSULE, COATED PELLETS ORAL at 20:13

## 2023-08-08 RX ADMIN — GABAPENTIN 600 MG: 300 CAPSULE ORAL at 13:22

## 2023-08-08 RX ADMIN — METOPROLOL TARTRATE 50 MG: 50 TABLET, FILM COATED ORAL at 09:06

## 2023-08-08 RX ADMIN — GABAPENTIN 600 MG: 300 CAPSULE ORAL at 20:13

## 2023-08-08 RX ADMIN — ENOXAPARIN SODIUM 30 MG: 100 INJECTION SUBCUTANEOUS at 20:13

## 2023-08-08 RX ADMIN — ENOXAPARIN SODIUM 30 MG: 100 INJECTION SUBCUTANEOUS at 09:06

## 2023-08-08 RX ADMIN — METOPROLOL TARTRATE 50 MG: 50 TABLET, FILM COATED ORAL at 20:14

## 2023-08-08 RX ADMIN — ARFORMOTEROL TARTRATE 15 MCG: 15 SOLUTION RESPIRATORY (INHALATION) at 08:47

## 2023-08-08 RX ADMIN — DIVALPROEX SODIUM 250 MG: 125 CAPSULE, COATED PELLETS ORAL at 09:05

## 2023-08-08 RX ADMIN — DULOXETINE HYDROCHLORIDE 30 MG: 30 CAPSULE, DELAYED RELEASE ORAL at 09:05

## 2023-08-08 RX ADMIN — INSULIN LISPRO 4 UNITS: 100 INJECTION, SOLUTION INTRAVENOUS; SUBCUTANEOUS at 09:09

## 2023-08-08 RX ADMIN — METFORMIN HYDROCHLORIDE 850 MG: 850 TABLET ORAL at 16:59

## 2023-08-08 RX ADMIN — BUDESONIDE INHALATION 500 MCG: 0.5 SUSPENSION RESPIRATORY (INHALATION) at 08:47

## 2023-08-08 RX ADMIN — BISMUTH SUBSALICYLATE 30 ML: 525 SUSPENSION ORAL at 05:55

## 2023-08-08 RX ADMIN — TICAGRELOR 90 MG: 90 TABLET ORAL at 20:14

## 2023-08-08 ASSESSMENT — PAIN SCALES - GENERAL: PAINLEVEL_OUTOF10: 0

## 2023-08-08 NOTE — PROGRESS NOTES
Physical Therapy  Treatment note   Evaluating Therapist: Landon Carrillo, PT, DPT [id number]      ROOM: [address] [place] [address]  DIAGNOSIS: acute ischemic stroke   PRECAUTIONS:  Falls, SBP <180, seizure, cognition, bed/chair alarm, L LE weakness > R LE, sitter, impulsive,  behavior     HPI: Patient initially presented to the ED complaining of chest pain on 07/26/2023. Patient underwent a stress test which was abnormal and with his chest pain, cardiology decided patient should undergo a cardiac catheterization. Patient underwent the cardiac catheterization on 07/26/2023 with stent placement. Shortly after the procedure, patient developed slurred speech and right facial droop for which a stroke alert was called. Patient underwent a series of CT exams which showed ischemia in the left temporal lobe with multiple foci of severe stenosis in the proximal P2 segment of the right posterior cerebral artery. Neurology was consulted at this time and after return from CT scan patient began to have seizure-like activity and was found to be not breathing and pulseless in which a CODE BLUE was called. Patient spontaneously began breathing on his own and was found to have a pulse however was not protecting his airway and was intubated for airway protection. MRI revealed a small 3mm infarction in the left temporal lobe. Patient extubated on 07/28/2023. Patient has a history of CAD, DM, hyperlipidemia, hypertension, sleep apnea, and asthma. Social: Pt lives with wife in a 1 story home with 4- 5 stairs to enter and 1 rail. Pt ambulated with WW inside the home and [facility] in the community independently PTA. Pt's wife works full time. Pt is retired. Pt reports history of 7 falls in the last 3 months. Pt reports he has struggled with B knee/LE weakness PTA. Fall contributed to B LE buckling and unsteadiness. Initial Evaluation  Date: 8/2/23 AM     PM    Short Term Goals Long Term Goals    Was pt agreeable to Eval/treatment?  Yes  Yes SBA    Dynamic standing: at [employer] with ModA    BBS: >35/56  FGA: TBD   BBS: >45/56  FGA: TBD   Date Family Teach Completed        Is additional Family Teaching Needed? Y or N Y Y      Hindering Progress Debility, PLOF, B LE weakness, cognition  Debility, PLOF, B LE weakness, cognition      PT recommended ELOS 3-4 weeks         Team's Discharge Plan        Therapist at Team Meeting          General:        HRmax: 158 bpm       Beta blockers (Y/N): Y      Adjusted HRmax: 148 bpm   Blood pressure (mmHg): AM:   - Prior to Tx: NT  - During Tx: NT  - Post Tx: NT PM:  - Prior to Tx: NT  - During Tx: NT  - Post Tx: NT   Time spent in HR ranges: Moderate intensity (60-70% HRmax): AM: NT due to concurrent session PM: NT due to concurrent session   High intensity (70-85% HRmax): AM: NT PM: NT   Max RPE reported:  AM: NT PM: NT       Patient education  Pt educated on strategies to maintain safe turning technique    Patient response to education:   Pt verbalized understanding Pt demonstrated skill Pt requires further education in this area   yes partial yes     Additional Comments: Pt remains agreeable to activity. Ambulation distance progressed with standard walker with occasional cueing still required for appropriate sequencing. Pt is becoming more efficient at self correction of LOB with this device. Obstacle negotiation training progressed in height of curb step practiced. Pt is able to self correct sequencing errors with this task. Occasional sub-optimal technique noted when turning to sit in chair, particularly when patient is fatigued. PM session included progression of stair training with various techniques reviewed Pt prefers negotiation with single HR and SPC as this is how he performs this task at baseline, however therapist advised patient of observed improved stability with lateral tehchnique with BUE on single HR.  Less assistance required with lateral technique as well due to improved

## 2023-08-08 NOTE — PROGRESS NOTES
Occupational Therapy  OCCUPATIONAL THERAPY DAILY NOTE    Date:2023  Patient Name: Priscilla Bruner  MRN: [id number]  : 1950  Room: [address] [place] [address]-A     Referring Practitioner: Kenya Glasgow MD  Diagnosis:  s/p cardiac cath & stent 23 & post op CVA- ischemic  Additional Pertinent Hx: CAD, DM, HLD, HTN, JOSÉ MIGUEL, DM, hx falls- per pt 7 falls recently, asthma, anxiety  Restrictions/Precautions: Fall Risk, seizure, SBP<180, LLE weak, cognitive deficits, diet- 5 carb- low fat/low cholesterol/BRAYDEN & high fiber & had sitter  Discharge Recommendations: Home with assist as needed & OT [facility]/aides      Functional Assessment:   Date Status AE  Comments   Feeding 23 Mod I     Grooming 23 S wc       Oral Care 23 S wc    Bathing 23 Mod A Ww  Shower bench  HH shower  Grab bar    UB Dressing 23 setup     LB Dressing 23 Min A        Footwear 23  Max A  Pt placed feet in shoes assist to tie shoe laces    Toileting 23 Min/Mod A     Homemaking  TBD       Functional Transfers / Balance:   Date Status DME  Comments   Sit Balance 23 SBA     Stand Balance 7823 Mod/Min  A     [] Tub  [x] Shower   Transfer 23 Mod A Grab bar  wc    Commode   Transfer 23 Min A Grab bar    Functional   Mobility 23  Mod/Min A  ww Throughout therapy apt setting with pt needed increased level of assist when going from tile to carpet floor surfaces. Pt needed verbal cues to stand up straight.  Pt ambulating with knees bent    Other: sit<>stand    EOB<wc    Supine<>sit    SPT bed to w/c     On/off firm recliner and standard queen bed  23  MIN A     Min A    SBA    Min A     Min A      Ww        Ww    ww                 Verbal cues for hand placement      Functional Exercises / Activity:  BUE strength exercises: aplomo box with 8 # wt 3 sets 10 reps in all planes on table top surface                                           Green can do bar 3 sets 10 reps   Pt completed copy design green and white block pattern activity at supervision level   Pt completed card matching sorting activity with focus on visual scanning, direction following and problem solving. Pt completed activity at supervision level     Sensory / Neuromuscular Re-Education:      Cognitive Skills:   Status Comments   Problem   Solving fair  . Memory fair     Sequencing fair     Safety fair       Visual Perception:    Education:  Pt was educated on safe functional transfers with ww     [] Family teach completed on:    Pain Level: no pain reported during the session    Additional Notes:       Patient has made fair  progress during treatment sessions toward set goals. Therapy emphasis to obtain goals:  Current Treatment Recommendations: Strengthening, Coordination training, ROM, Balance training, Self-Care / ADL, Functional mobility training, Safety education & training, Home management training, Endurance training, Patient/Caregiver education & training, Gait training, Neuromuscular re-education, Equipment evaluation, education, & procurement, Positioning    [x] Continue with current OT Plan of care. [] Prepare for Discharge    Goals  Long Term Goals  Time Frame for Long term goals : 3-4 weeks to address above problem areas  Long Term Goal 1: Pt demo  independent to eat all meals  Long Term Goal 2: Pt demo Mod I grooming seated @ sink level & demo G- safety  Long Term Goal 3: Pt demo SBA-CGA to bathe @ shower level  & or sponge bathing both seated & standing & demo G- safety & insight  Long Term Goal 4: Pt demo s/u UE & SBA LE dress to don underwear &, pants using AE as needed & s/u socks & shoes suing AE & demo G- safety & insight  Long Term Goal 5: Pt demo SBA commode trf using appropriate DME to ensure pt safety.  Pt demo SBA toileting & demo G- safety & insight  Long Term Goal 6: Pt demo CGA-SBA  walk in shower using appropriate DME to esnure pt safety & pt demo G- safety & insight  Long Term Goal 7: Pt

## 2023-08-08 NOTE — PROGRESS NOTES
Comprehensive Nutrition Assessment    Type and Reason for Visit:  Initial, RD Nutrition Re-Screen/LOS    Nutrition Recommendations/Plan:   Continue current diet ; pt w/ mostly adequate oral intakes at meals  Will follow     Malnutrition Assessment:  Malnutrition Status:  No malnutrition (08/08/23 1041)    Context:  Acute Illness     Findings of the 6 clinical characteristics of malnutrition:  Energy Intake:  No significant decrease in energy intake  Weight Loss:  Unable to assess (d/t lack of wt hx per EMR)     Body Fat Loss:  No significant body fat loss     Muscle Mass Loss:  No significant muscle mass loss    Fluid Accumulation:  No significant fluid accumulation     Strength:  Not Performed    Nutrition Assessment:    pt adm to ARU for therapy 2/2 CVA w/ recent MI; pt s/p recent heart-cath 7/26 s/p cardiac arrest/seizure activity w/ intubation - extubated 7/28; PMhx of DM,CAD; pt w/ mostly adequate oral intakes at meals- no nutrition recs at this time; will follow. Nutrition Related Findings:    A&Ox4; active BS; trace edema; I/O WNL Wound Type: None       Current Nutrition Intake & Therapies:    Average Meal Intake: % (most)  Average Supplements Intake: None Ordered  ADULT DIET; Regular; 5 carb choices (75 gm/meal); Low Fat/Low Chol/High Fiber/BRAYDEN    Anthropometric Measures:  Height: 6' 1\" (185.4 cm)  Ideal Body Weight (IBW): 184 lbs (84 kg)    Admission Body Weight: 298 lb 15 oz (135.6 kg) (7/29-actual)  Current Body Weight: 298 lb 15 oz (135.6 kg) (7/29-actual), 162.5 % IBW.     Current BMI (kg/m2): 39.4  Usual Body Weight:  (UTO d/t lack of wt hx per EMR)     Weight Adjustment For: No Adjustment                 BMI Categories: Obese Class 2 (BMI 35.0 -39.9)    Estimated Daily Nutrient Needs:  Energy Requirements Based On: Kcal/kg  Weight Used for Energy Requirements: Current  Energy (kcal/day): 16-18kcal/tcjEVD=8780-7122  Weight Used for Protein Requirements: Ideal  Protein (g/day):

## 2023-08-09 LAB
METER GLUCOSE: 187 MG/DL (ref 74–99)
METER GLUCOSE: 187 MG/DL (ref 74–99)
METER GLUCOSE: 214 MG/DL (ref 74–99)
METER GLUCOSE: 214 MG/DL (ref 74–99)

## 2023-08-09 PROCEDURE — 6370000000 HC RX 637 (ALT 250 FOR IP): Performed by: PHYSICAL MEDICINE & REHABILITATION

## 2023-08-09 PROCEDURE — S5553 INSULIN LONG ACTING 5 U: HCPCS | Performed by: NURSE PRACTITIONER

## 2023-08-09 PROCEDURE — 97530 THERAPEUTIC ACTIVITIES: CPT

## 2023-08-09 PROCEDURE — 1280000000 HC REHAB R&B

## 2023-08-09 PROCEDURE — 97130 THER IVNTJ EA ADDL 15 MIN: CPT

## 2023-08-09 PROCEDURE — 97129 THER IVNTJ 1ST 15 MIN: CPT

## 2023-08-09 PROCEDURE — 6370000000 HC RX 637 (ALT 250 FOR IP): Performed by: NURSE PRACTITIONER

## 2023-08-09 PROCEDURE — 82962 GLUCOSE BLOOD TEST: CPT

## 2023-08-09 PROCEDURE — 97110 THERAPEUTIC EXERCISES: CPT

## 2023-08-09 PROCEDURE — 6360000002 HC RX W HCPCS: Performed by: NURSE PRACTITIONER

## 2023-08-09 PROCEDURE — 97535 SELF CARE MNGMENT TRAINING: CPT

## 2023-08-09 PROCEDURE — 6360000002 HC RX W HCPCS: Performed by: PHYSICAL MEDICINE & REHABILITATION

## 2023-08-09 PROCEDURE — 94640 AIRWAY INHALATION TREATMENT: CPT

## 2023-08-09 RX ADMIN — DULOXETINE HYDROCHLORIDE 30 MG: 30 CAPSULE, DELAYED RELEASE ORAL at 08:10

## 2023-08-09 RX ADMIN — GABAPENTIN 600 MG: 300 CAPSULE ORAL at 08:10

## 2023-08-09 RX ADMIN — ENOXAPARIN SODIUM 30 MG: 100 INJECTION SUBCUTANEOUS at 20:27

## 2023-08-09 RX ADMIN — TICAGRELOR 90 MG: 90 TABLET ORAL at 20:24

## 2023-08-09 RX ADMIN — DIVALPROEX SODIUM 250 MG: 125 CAPSULE, COATED PELLETS ORAL at 20:23

## 2023-08-09 RX ADMIN — GABAPENTIN 600 MG: 300 CAPSULE ORAL at 12:54

## 2023-08-09 RX ADMIN — BUDESONIDE INHALATION 500 MCG: 0.5 SUSPENSION RESPIRATORY (INHALATION) at 19:21

## 2023-08-09 RX ADMIN — DIVALPROEX SODIUM 250 MG: 125 CAPSULE, COATED PELLETS ORAL at 08:10

## 2023-08-09 RX ADMIN — METOPROLOL TARTRATE 50 MG: 50 TABLET, FILM COATED ORAL at 08:11

## 2023-08-09 RX ADMIN — METFORMIN HYDROCHLORIDE 1000 MG: 1000 TABLET, FILM COATED ORAL at 08:10

## 2023-08-09 RX ADMIN — INSULIN GLARGINE-YFGN 15 UNITS: 100 INJECTION, SOLUTION SUBCUTANEOUS at 20:24

## 2023-08-09 RX ADMIN — LOSARTAN POTASSIUM 50 MG: 50 TABLET, FILM COATED ORAL at 08:10

## 2023-08-09 RX ADMIN — ENOXAPARIN SODIUM 30 MG: 100 INJECTION SUBCUTANEOUS at 08:10

## 2023-08-09 RX ADMIN — PANTOPRAZOLE SODIUM 40 MG: 40 TABLET, DELAYED RELEASE ORAL at 06:18

## 2023-08-09 RX ADMIN — INSULIN LISPRO 4 UNITS: 100 INJECTION, SOLUTION INTRAVENOUS; SUBCUTANEOUS at 11:29

## 2023-08-09 RX ADMIN — ASPIRIN 81 MG: 81 TABLET, COATED ORAL at 08:10

## 2023-08-09 RX ADMIN — METFORMIN HYDROCHLORIDE 1000 MG: 1000 TABLET, FILM COATED ORAL at 16:31

## 2023-08-09 RX ADMIN — ARFORMOTEROL TARTRATE 15 MCG: 15 SOLUTION RESPIRATORY (INHALATION) at 19:21

## 2023-08-09 RX ADMIN — GABAPENTIN 600 MG: 300 CAPSULE ORAL at 20:23

## 2023-08-09 RX ADMIN — METOPROLOL TARTRATE 50 MG: 50 TABLET, FILM COATED ORAL at 20:24

## 2023-08-09 RX ADMIN — ATORVASTATIN CALCIUM 40 MG: 40 TABLET, FILM COATED ORAL at 20:24

## 2023-08-09 RX ADMIN — GABAPENTIN 600 MG: 300 CAPSULE ORAL at 16:31

## 2023-08-09 RX ADMIN — TICAGRELOR 90 MG: 90 TABLET ORAL at 08:10

## 2023-08-09 RX ADMIN — INSULIN LISPRO 4 UNITS: 100 INJECTION, SOLUTION INTRAVENOUS; SUBCUTANEOUS at 20:35

## 2023-08-09 NOTE — PROGRESS NOTES
Physical Therapy  Treatment note   Evaluating Therapist: Delbert Martines PT, [id number]      ROOM: [address] [place], [place] [address]-  DIAGNOSIS: acute ischemic stroke   PRECAUTIONS:  Falls, SBP <180, seizure, cognition, bed/chair alarm, L LE weakness > R LE, sitter, impulsive,  behavior     HPI: Patient initially presented to the ED complaining of chest pain on 07/26/2023. Patient underwent a stress test which was abnormal and with his chest pain, cardiology decided patient should undergo a cardiac catheterization. Patient underwent the cardiac catheterization on 07/26/2023 with stent placement. Shortly after the procedure, patient developed slurred speech and right facial droop for which a stroke alert was called. Patient underwent a series of CT exams which showed ischemia in the left temporal lobe with multiple foci of severe stenosis in the proximal P2 segment of the right posterior cerebral artery. Neurology was consulted at this time and after return from CT scan patient began to have seizure-like activity and was found to be not breathing and pulseless in which a CODE BLUE was called. Patient spontaneously began breathing on his own and was found to have a pulse however was not protecting his airway and was intubated for airway protection. MRI revealed a small 3mm infarction in the left temporal lobe. Patient extubated on 07/28/2023. Patient has a history of CAD, DM, hyperlipidemia, hypertension, sleep apnea, and asthma. Social: Pt lives with wife in a 1 story home with 4- 5 stairs to enter and 1 rail. Pt ambulated with WW inside the home and [facility] in the community independently PTA. Pt's wife works full time. Pt is retired. Pt reports history of 7 falls in the last 3 months. Pt reports he has struggled with B knee/LE weakness PTA. Fall contributed to B LE buckling and unsteadiness. Initial Evaluation  Date: 8/2/23 AM     PM    Short Term Goals Long Term Goals    Was pt agreeable to Eval/treatment?  Yes  Yes

## 2023-08-09 NOTE — PROGRESS NOTES
for functional activities. Fair+ tolerance. -Therapeutic activity completed seated at table manipulating small wooden tiles and forming words from jumbled letters with focus on increasing FM skills/dexterity and sequencing/problem solving skills. Increased time required to complete. Fair tolerance. Sensory / Neuromuscular Re-Education:      Cognitive Skills:   Status Comments   Problem   Solving fair  . Memory fair     Sequencing fair     Safety fair       Visual Perception:    Education:  Pt was educated on safe functional transfers with ww and safety/fall prevention upon discharge. Pt education ongoing. [] Family teach completed on:    Pain Level: no pain reported during the session    Additional Notes:       Patient has made fair  progress during treatment sessions toward set goals. Therapy emphasis to obtain goals:  Current Treatment Recommendations: Strengthening, Coordination training, ROM, Balance training, Self-Care / ADL, Functional mobility training, Safety education & training, Home management training, Endurance training, Patient/Caregiver education & training, Gait training, Neuromuscular re-education, Equipment evaluation, education, & procurement, Positioning    [x] Continue with current OT Plan of care. [] Prepare for Discharge    Goals  Long Term Goals  Time Frame for Long term goals : 3-4 weeks to address above problem areas  Long Term Goal 1: Pt demo  independent to eat all meals  Long Term Goal 2: Pt demo Mod I grooming seated @ sink level & demo G- safety  Long Term Goal 3: Pt demo SBA-CGA to bathe @ shower level  & or sponge bathing both seated & standing & demo G- safety & insight  Long Term Goal 4: Pt demo s/u UE & SBA LE dress to don underwear &, pants using AE as needed & s/u socks & shoes suing AE & demo G- safety & insight  Long Term Goal 5: Pt demo SBA commode trf using appropriate DME to ensure pt safety.  Pt demo SBA toileting & demo G- safety out Tx Time Breakdown  Variance:   First Session  [id number] [] Individual Tx-   [x] Concurrent Tx -45  [] Co-Tx -   [] Group Tx -   [] Time Missed -     Second Session [id number] [x] Individual Tx- 45 Mins  [] Concurrent Tx -    [] Co-Tx -   [] Group Tx -   [] Time Missed -     Third Session    [] Individual Tx-   [] Concurrent Tx -  [] Co-Tx -   [] Group Tx -   [] Time Missed -         Total Tx Time : 90 Mins     GARFIELD GARCIA [facility] OTR/L [address] [facility] BALL/L [id number]

## 2023-08-09 NOTE — PLAN OF CARE
Problem: Discharge Planning  Goal: Discharge to home or other facility with appropriate resources  8/9/2023 0944 by Yolande Phillips RN  Outcome: Progressing  8/8/2023 2144 by Nicholaus Lombard, RN  Outcome: Progressing     Problem: Safety - Medical Restraint  Goal: Remains free of injury from restraints (Restraint for Interference with Medical Device)  Description: INTERVENTIONS:  1. Determine that other, less restrictive measures have been tried or would not be effective before applying the restraint  2. Evaluate the patient's condition at the time of restraint application  3. Inform patient/family regarding the reason for restraint  4. Q2H: Monitor safety, psychosocial status, comfort, nutrition and hydration  8/9/2023 0944 by Yolande Phillips RN  Outcome: Progressing  8/8/2023 2144 by Nicholaus Lombard, RN  Outcome: Progressing     Problem: Safety - Adult  Goal: Free from fall injury  8/9/2023 0944 by Yolande Phillips RN  Outcome: Progressing  8/8/2023 2144 by Nicholaus Lombard, RN  Outcome: Progressing     Problem: Skin/Tissue Integrity  Goal: Absence of new skin breakdown  Description: 1. Monitor for areas of redness and/or skin breakdown  2. Assess vascular access sites hourly  3. Every 4-6 hours minimum:  Change oxygen saturation probe site  4. Every 4-6 hours:  If on nasal continuous positive airway pressure, respiratory therapy assess nares and determine need for appliance change or resting period.   8/9/2023 0944 by Yolande Phillips RN  Outcome: Progressing  8/8/2023 2144 by Nicholaus Lombard, RN  Outcome: Progressing     Problem: ABCDS Injury Assessment  Goal: Absence of physical injury  8/9/2023 0944 by Yolande Phillips RN  Outcome: Progressing  8/8/2023 2144 by Nicholaus Lombard, RN  Outcome: Progressing     Problem: Chronic Conditions and Co-morbidities  Goal: Patient's chronic conditions and co-morbidity symptoms are monitored and maintained or improved  8/9/2023 0944 by Yolande Phillips RN  Outcome: Progressing  8/8/2023

## 2023-08-09 NOTE — PROGRESS NOTES
Physical Therapy  Weekly note   Evaluating Therapist: Naomi Douglas PT, DPT [id number]      ROOM: [address] [place] [address]  DIAGNOSIS: acute ischemic stroke     PRECAUTIONS:  Falls, SBP <180, seizure, cognition, bed/chair alarm, L LE weakness > R LE, sitter      HPI: Patient initially presented to the ED complaining of chest pain on 07/26/2023. Patient underwent a stress test which was abnormal and with his chest pain, cardiology decided patient should undergo a cardiac catheterization. Patient underwent the cardiac catheterization on 07/26/2023 with stent placement. Shortly after the procedure, patient developed slurred speech and right facial droop for which a stroke alert was called. Patient underwent a series of CT exams which showed ischemia in the left temporal lobe with multiple foci of severe stenosis in the proximal P2 segment of the right posterior cerebral artery. Neurology was consulted at this time and after return from CT scan patient began to have seizure-like activity and was found to be not breathing and pulseless in which a CODE BLUE was called. Patient spontaneously began breathing on his own and was found to have a pulse however was not protecting his airway and was intubated for airway protection. MRI revealed a small 3mm infarction in the left temporal lobe. Patient extubated on 07/28/2023. Patient has a history of CAD, DM, hyperlipidemia, hypertension, sleep apnea, and asthma. Social: Pt lives with wife in a 1 story home with 4- 5 stairs to enter and 1 rail. Pt ambulated with WW inside the home and [facility] in the community independently PTA. Pt's wife works full time. Pt is retired. Pt reports history of 7 falls in the last 3 months. Pt reports he has struggled with B knee/LE weakness PTA. Fall contributed to B LE buckling and unsteadiness. Date: 8/2/23 8/9/23 Comments     Short Term Goals Long Term Goals    Was pt agreeable to Eval/treatment? Yes  Yes      Does pt have pain?  L hip and L

## 2023-08-09 NOTE — PROGRESS NOTES
Speech Language Pathology  ACUTE REHABILITATION--DAILY PROGRESS NOTE            SWALLOWING:      Diet:  Regular consistency solids with thin liquids (IDDSI level 0)    Per 8/8 note: Patient no longer requires supervision during mealtimes. Patient eats his meals in the dining room per preference. Observed patient coughing with ice cream at end of meal this date-- unclear if due to temperature of the ice cream. Patient denies food/liquid going the wrong way. No difficulty observed by nursing staff. May have been an isolated incident but monitor as patient eats meals in dining room. Not addressed this session; no swallow concerns noted or raised by pt during session on 8/9. LANGUAGE:    Requires increased time for processing as well as occasional repetition/clarification of directives. Continued occasional difficulty with word retrieval during conversational tasks. SLP reviewed taking time, describing to assist if moments of anomia occur. COGNITION:    Pt seen for ST this AM in tx space; goals targeted per POC. Pt oriented to all concepts besides room # and date; SLP reviewed ext supports such as calendar and whiteboard for use as appropriate. To improve STM/ problem solving skills, pt answered basic level questions re: classified ads (following time to read/re-read ads, ads removed and following <1 min delay) with 85% accuracy; pt was able to correct errors when ads provided for reference. SLP reviewed re-reading material to assist with recall of functional info. To improve problem solving skills, pt answered basic level questions re: TV guide listings (such as times of programs, what can be watched given time constraints, etc) with 64% accuracy given min to mod verbal/visual cues. Pt brought to room and left w/ callbell w/I reach following session, telesitter informed of pt return to room. Pt reported that fan left in room was not pt's personal fan--nursing staff informed of this. 5--Supervision           SPEECH THERAPY  PLAN OF CARE   The speech therapy  POC is established based on physician order, speech pathology diagnosis and results of clinical assessment       SHORT/LONG TERM GOALS    Pt will improve orientation to spatial and temporal surroundings with use of external memory aides. Pt will improve immediate, short term, recent memory during structured and unstructured tasks with 80% accuracy      Pt will improve problem solving/thought organization during structured and unstructured tasks with 80% accuracy      Pt will improve receptive and expressive language skills with adequate thought content, organization, and processing time to facilitate improved communication with moderate.      Pt will improve word finding and verbal fluency with phrase/sentence level, wh-questions and open ended conversation through incorporation of preparatory and circumlocution strategies 90% accuracy     Pt will improve speech intelligibility and increased articulatory precision via compensatory strategies and oral motor exercises

## 2023-08-10 LAB
GLUCOSE BLD-MCNC: 150 MG/DL (ref 74–99)
GLUCOSE BLD-MCNC: 181 MG/DL (ref 74–99)
GLUCOSE BLD-MCNC: 189 MG/DL (ref 74–99)
METER GLUCOSE: 171 MG/DL (ref 74–99)

## 2023-08-10 PROCEDURE — S5553 INSULIN LONG ACTING 5 U: HCPCS | Performed by: NURSE PRACTITIONER

## 2023-08-10 PROCEDURE — 97130 THER IVNTJ EA ADDL 15 MIN: CPT | Performed by: SPEECH-LANGUAGE PATHOLOGIST

## 2023-08-10 PROCEDURE — 97110 THERAPEUTIC EXERCISES: CPT

## 2023-08-10 PROCEDURE — 6370000000 HC RX 637 (ALT 250 FOR IP): Performed by: PHYSICAL MEDICINE & REHABILITATION

## 2023-08-10 PROCEDURE — 97530 THERAPEUTIC ACTIVITIES: CPT

## 2023-08-10 PROCEDURE — 6370000000 HC RX 637 (ALT 250 FOR IP): Performed by: NURSE PRACTITIONER

## 2023-08-10 PROCEDURE — 82962 GLUCOSE BLOOD TEST: CPT

## 2023-08-10 PROCEDURE — 97129 THER IVNTJ 1ST 15 MIN: CPT | Performed by: SPEECH-LANGUAGE PATHOLOGIST

## 2023-08-10 PROCEDURE — 6360000002 HC RX W HCPCS: Performed by: NURSE PRACTITIONER

## 2023-08-10 PROCEDURE — 6360000002 HC RX W HCPCS: Performed by: PHYSICAL MEDICINE & REHABILITATION

## 2023-08-10 PROCEDURE — 97535 SELF CARE MNGMENT TRAINING: CPT

## 2023-08-10 PROCEDURE — 1280000000 HC REHAB R&B

## 2023-08-10 PROCEDURE — 94640 AIRWAY INHALATION TREATMENT: CPT

## 2023-08-10 RX ADMIN — METOPROLOL TARTRATE 50 MG: 50 TABLET, FILM COATED ORAL at 20:17

## 2023-08-10 RX ADMIN — GABAPENTIN 600 MG: 300 CAPSULE ORAL at 20:16

## 2023-08-10 RX ADMIN — PANTOPRAZOLE SODIUM 40 MG: 40 TABLET, DELAYED RELEASE ORAL at 06:16

## 2023-08-10 RX ADMIN — BUDESONIDE INHALATION 500 MCG: 0.5 SUSPENSION RESPIRATORY (INHALATION) at 10:01

## 2023-08-10 RX ADMIN — LOSARTAN POTASSIUM 50 MG: 50 TABLET, FILM COATED ORAL at 08:53

## 2023-08-10 RX ADMIN — ARFORMOTEROL TARTRATE 15 MCG: 15 SOLUTION RESPIRATORY (INHALATION) at 10:00

## 2023-08-10 RX ADMIN — METOPROLOL TARTRATE 50 MG: 50 TABLET, FILM COATED ORAL at 08:54

## 2023-08-10 RX ADMIN — ENOXAPARIN SODIUM 30 MG: 100 INJECTION SUBCUTANEOUS at 20:16

## 2023-08-10 RX ADMIN — ATORVASTATIN CALCIUM 40 MG: 40 TABLET, FILM COATED ORAL at 20:16

## 2023-08-10 RX ADMIN — GABAPENTIN 600 MG: 300 CAPSULE ORAL at 16:38

## 2023-08-10 RX ADMIN — TICAGRELOR 90 MG: 90 TABLET ORAL at 20:16

## 2023-08-10 RX ADMIN — ENOXAPARIN SODIUM 30 MG: 100 INJECTION SUBCUTANEOUS at 08:54

## 2023-08-10 RX ADMIN — GABAPENTIN 600 MG: 300 CAPSULE ORAL at 08:54

## 2023-08-10 RX ADMIN — METFORMIN HYDROCHLORIDE 1000 MG: 1000 TABLET, FILM COATED ORAL at 08:54

## 2023-08-10 RX ADMIN — GABAPENTIN 600 MG: 300 CAPSULE ORAL at 12:50

## 2023-08-10 RX ADMIN — INSULIN GLARGINE-YFGN 15 UNITS: 100 INJECTION, SOLUTION SUBCUTANEOUS at 20:16

## 2023-08-10 RX ADMIN — METFORMIN HYDROCHLORIDE 1000 MG: 1000 TABLET, FILM COATED ORAL at 16:38

## 2023-08-10 RX ADMIN — DULOXETINE HYDROCHLORIDE 30 MG: 30 CAPSULE, DELAYED RELEASE ORAL at 08:53

## 2023-08-10 RX ADMIN — ASPIRIN 81 MG: 81 TABLET, COATED ORAL at 08:54

## 2023-08-10 RX ADMIN — DIVALPROEX SODIUM 250 MG: 125 CAPSULE, COATED PELLETS ORAL at 08:53

## 2023-08-10 RX ADMIN — TICAGRELOR 90 MG: 90 TABLET ORAL at 08:53

## 2023-08-10 RX ADMIN — DIVALPROEX SODIUM 250 MG: 125 CAPSULE, COATED PELLETS ORAL at 20:17

## 2023-08-10 NOTE — PROGRESS NOTES
to tie shoes   Toileting 8/10/23 Min/Mod A  Patient able to perform clothing management before toileting and front henok hygiene with assistance for clothing management following. Vc's for safety and techniques while standing. Homemaking  TBD       Functional Transfers / Balance:   Date Status DME  Comments   Sit Balance 8/10/23 SBA  Demonstrated seated EOB level and wc level to perform ADLs   Stand Balance 8/1023 Mod/Min A  Demonstrated with significant B knee instability, requires hands on assistance for balance   [] Tub  [x] Shower   Transfer 8/10/23 Mod/Min  A Grab bar  wc Sptrf with vc's for safety and techniques to perform. Patient did require increased hands on assistance due to L knee buckle intermittently   Commode   Transfer 8/10/23 Min A Grab bar Sptrf with vc's for safety and techniques to perform   Functional   Mobility 8/8/23  Mod/Min A  ww     Other: sit<>stand    EOB<wc    Supine<>sit    SPT bed to w/c     On/off firm recliner and standard queen bed  8/10/23    8/10/23    8/10/23    8/8/23     8/8/23  MIN A     Min A    SBA    Min A     Min A      Ww        Ww    ww Demonstrated during toileting and dressing tasks  Sptrf with vc's for safety and techniques to perform  Vc's for techniques                 Functional Exercises / Activity:  2nd session: BUE AROM /coordination exercises wearing 1# wts to increase endurance & strength for ADL's, mobility and transfers while engaging in peg board design activity, matching playing cards on poster board along with Seek and find task seated at table top. Tasks also facilitating problem solving, attention on task and following directions. Pt enjoyed Seek and find task. Sensory / Neuromuscular Re-Education:      Cognitive Skills:   Status Comments   Problem   Solving fair  . Memory fair     Sequencing fair     Safety fair       Visual Perception:    Education:  Patient educated on safety and techniques to complete ADLs.  Patient Tx-   [] Concurrent Tx -  [] Co-Tx -   [] Group Tx -   [] Time Missed -         Total Tx Time : 95min     Miriam Iron BALL/L [id number]  Chip Smith BALL/L [id number]

## 2023-08-10 NOTE — PROGRESS NOTES
Physical Therapy  Treatment note     Supervising Therapist: Rocío Ayala, PT, DPT [id number]      ROOM: [address] [place] [address]  DIAGNOSIS: acute ischemic stroke   PRECAUTIONS:  Falls, SBP <180, seizure, cognition, bed/chair alarm, L LE weakness > R LE, sitter, impulsive,  behavior     HPI: Patient initially presented to the ED complaining of chest pain on 07/26/2023. Patient underwent a stress test which was abnormal and with his chest pain, cardiology decided patient should undergo a cardiac catheterization. Patient underwent the cardiac catheterization on 07/26/2023 with stent placement. Shortly after the procedure, patient developed slurred speech and right facial droop for which a stroke alert was called. Patient underwent a series of CT exams which showed ischemia in the left temporal lobe with multiple foci of severe stenosis in the proximal P2 segment of the right posterior cerebral artery. Neurology was consulted at this time and after return from CT scan patient began to have seizure-like activity and was found to be not breathing and pulseless in which a CODE BLUE was called. Patient spontaneously began breathing on his own and was found to have a pulse however was not protecting his airway and was intubated for airway protection. MRI revealed a small 3mm infarction in the left temporal lobe. Patient extubated on 07/28/2023. Patient has a history of CAD, DM, hyperlipidemia, hypertension, sleep apnea, and asthma. Social: Pt lives with wife in a 1 story home with 4- 5 stairs to enter and 1 rail. Pt ambulated with WW inside the home and [facility] in the community independently PTA. Pt's wife works full time. Pt is retired. Pt reports history of 7 falls in the last 3 months. Pt reports he has struggled with B knee/LE weakness PTA. Fall contributed to B LE buckling and unsteadiness. Initial Evaluation  Date: 8/2/23 AM     PM    Short Term Goals Long Term Goals    Was pt agreeable to Eval/treatment?  Yes  Yes

## 2023-08-10 NOTE — PATIENT CARE CONFERENCE
[address] [place] [address] NOTE/PATIENT PLAN OF CARE    The physician was present and led this team conference    Date: 8/10/2023  Admission date: 2023  Patient Name: Brandie Rivera        MRN: [id number]    : 1950  (68 y.o.)  Gender: male   Rehab diagnosis/surgery with date:  CVA 2023 left temporal lobe infarct  Impairment Group Code:  1.2      MEDICAL/FUNCTIONAL HISTORY/STATUS:  Patient started back on metformin and sugars have improved. Behaviors improved since Cymbalta restarted and Depakote added. Patient is otherwise doing well.     Consultations/Labs/X-rays: Psychiatry      Latest Reference Range & Units 23 20:00 23 06:14 23 11:09 23 16:23 23 20:22 08/10/23 06:15   Meter Glucose 74 - 99 mg/dL 186 (H) 187 (H) 214 (H) 187 (H) 214 (H) 171 (H)   (H): Data is abnormally high      MEDICATION UPDATE:  Depakote started and stopped keppra, started patient back of Cymbalta    metFORMIN  1,000 mg Oral BID WC    insulin lispro  0-16 Units SubCUTAneous 4x Daily AC & HS    divalproex  250 mg Oral BID    DULoxetine  30 mg Oral Daily    aspirin  81 mg Oral Daily    ticagrelor  90 mg Oral BID    atorvastatin  40 mg Oral Nightly    gabapentin  600 mg Oral 4x Daily    pantoprazole  40 mg Oral Daily    budesonide  0.5 mg Nebulization BID RT     And    arformoterol tartrate  15 mcg Nebulization BID RT    losartan  50 mg Oral Daily    insulin glargine  15 Units SubCUTAneous Nightly    metoprolol tartrate  50 mg Oral BID    enoxaparin  30 mg SubCUTAneous BID         NURSING :    Bowel:   Always Continent  [x]   Occasionally incontinent  []   Frequently incontinent  []   Always incontinent  []   Not occurred  []     Bladder:   Always Continent  [x]    Incontinent less than daily[]   Incontinent  daily []   Always incontinent  []   No urine output    []   Indwelling catheter []       Toilet Hygiene:   Current level : min assist  Short

## 2023-08-10 NOTE — CARE COORDINATION
Per Team:  Plan 2 wk ELOS. Updated the pt. And his wife- Gracie Helm. LTG: CGA / Supervision/Mod I. Since the pt. Has restarted Cymbalta and Depacote was added his behavior has been better. He seems to be more stable with a standard walker vs.the [employer]. Team recommends 24hr supervision and due to safety, memory and problem solving. Fariba discussed with her employer and she plans to begin to work remotely, therefore, she will be available to provide. In addition, SW provided a list of adult day care and private duty. Gracie Helm also confirmed the pt's daughter has NEVER assisted him or provided any kind of care prior to admission despite the pt. Verbalizing that. Gracie Helm said she also feels his gait/ambulation appears to be just about at baseline. DME:  He has a [employer] which he can convert to std. Walker. He also has a tub seat. He prefers to use the walk in shower, therefore, he should not need any DME. Aftercare- HH- RN, HHA, PT, OT & SP. Referral to [facility] per pt. Choice. (Will need orders)    FT- to be scheduled    The Plan for Transition of Care is related to the following treatment goals: Home with [facility]    The Patient and/or patient representative Gracie Helm was provided with a choice of provider and agrees with the discharge plan. [x] Yes [] No    Freedom of choice list was provided with basic dialogue that supports the patient's individualized plan of care/goals, treatment preferences and shares the quality data associated with the providers.  [x] Yes [] No    ELFEGO Morejon  8/10/2023

## 2023-08-10 NOTE — PROGRESS NOTES
Speech Language Pathology  ACUTE REHABILITATION--DAILY PROGRESS NOTE            SWALLOWING:      Diet:  Regular consistency solids with thin liquids (IDDSI level 0)    Per 8/8 note: Patient no longer requires supervision during mealtimes. Patient eats his meals in the dining room per preference. LANGUAGE:    Requires increased time for processing as well as occasional repetition/clarification of directives. Continued occasional difficulty with word retrieval during conversational tasks. SLP reviewed taking time, describing to assist if moments of anomia occur (none noted today)    COGNITION:      Pt active participant in functional problem solving/ remote recall task. Pt and SLP completed [employer] game; pt able to provide appropriate cues to SLP to assist in deducing correct response. Pt also noted to offer appropriate facts r/t topics at hand with good accuracy. SPEECH:    Functional during session. Minute Tracking:    Individual therapy:   45 minutes  Concurrent therapy:  0 minutes  Group therapy:     0 minutes  Co-treatment therapy:    0 minutes    Total minutes for 8/10/2023: 45 minutes        SAFETY:      Fair; pt with telesitter in place. EDUCATION:    Patient educated on plan of care. OUTCOME:    Progress made towards goals. Will continue SP intervention as per previously established POC.     SP recommended after discharge:  TBD  Supervision recommended at discharge: TBD      FIMS SCORES                                    Swallowing                          Current Status            6--Modified Independent            Short Term Goal         6--Modified Independent           Long Term Goal          6--Modified Independent              Receptive                          Current Status            5--Supervision              Short Term Goal         5--Supervision, MET, increased to 6--Modified Independent     Long Term Goal          5--Supervision, MET, increased to 6--Modified

## 2023-08-10 NOTE — PLAN OF CARE
Problem: Discharge Planning  Goal: Discharge to home or other facility with appropriate resources  Outcome: Progressing  Flowsheets (Taken 8/10/2023 0815)  Discharge to home or other facility with appropriate resources: Identify barriers to discharge with patient and caregiver     Problem: Safety - Medical Restraint  Goal: Remains free of injury from restraints (Restraint for Interference with Medical Device)  Description: INTERVENTIONS:  1. Determine that other, less restrictive measures have been tried or would not be effective before applying the restraint  2. Evaluate the patient's condition at the time of restraint application  3. Inform patient/family regarding the reason for restraint  4. Q2H: Monitor safety, psychosocial status, comfort, nutrition and hydration  Outcome: Progressing     Problem: Safety - Adult  Goal: Free from fall injury  Outcome: Progressing     Problem: Skin/Tissue Integrity  Goal: Absence of new skin breakdown  Description: 1. Monitor for areas of redness and/or skin breakdown  2. Assess vascular access sites hourly  3. Every 4-6 hours minimum:  Change oxygen saturation probe site  4. Every 4-6 hours:  If on nasal continuous positive airway pressure, respiratory therapy assess nares and determine need for appliance change or resting period.   Outcome: Progressing     Problem: ABCDS Injury Assessment  Goal: Absence of physical injury  Outcome: Progressing  Flowsheets (Taken 8/10/2023 1129)  Absence of Physical Injury: Implement safety measures based on patient assessment     Problem: Chronic Conditions and Co-morbidities  Goal: Patient's chronic conditions and co-morbidity symptoms are monitored and maintained or improved  Outcome: Progressing     Problem: Pain  Goal: Verbalizes/displays adequate comfort level or baseline comfort level  Outcome: Progressing

## 2023-08-11 LAB
GLUCOSE BLD-MCNC: 184 MG/DL (ref 74–99)
GLUCOSE BLD-MCNC: 196 MG/DL (ref 74–99)
GLUCOSE BLD-MCNC: 204 MG/DL (ref 74–99)
GLUCOSE BLD-MCNC: 242 MG/DL (ref 74–99)

## 2023-08-11 PROCEDURE — 97130 THER IVNTJ EA ADDL 15 MIN: CPT

## 2023-08-11 PROCEDURE — S5553 INSULIN LONG ACTING 5 U: HCPCS | Performed by: NURSE PRACTITIONER

## 2023-08-11 PROCEDURE — 82962 GLUCOSE BLOOD TEST: CPT

## 2023-08-11 PROCEDURE — 1280000000 HC REHAB R&B

## 2023-08-11 PROCEDURE — 97530 THERAPEUTIC ACTIVITIES: CPT

## 2023-08-11 PROCEDURE — 6370000000 HC RX 637 (ALT 250 FOR IP): Performed by: PHYSICAL MEDICINE & REHABILITATION

## 2023-08-11 PROCEDURE — 97535 SELF CARE MNGMENT TRAINING: CPT

## 2023-08-11 PROCEDURE — 97110 THERAPEUTIC EXERCISES: CPT

## 2023-08-11 PROCEDURE — 6370000000 HC RX 637 (ALT 250 FOR IP): Performed by: NURSE PRACTITIONER

## 2023-08-11 PROCEDURE — 97129 THER IVNTJ 1ST 15 MIN: CPT

## 2023-08-11 PROCEDURE — 6360000002 HC RX W HCPCS: Performed by: PHYSICAL MEDICINE & REHABILITATION

## 2023-08-11 RX ADMIN — TICAGRELOR 90 MG: 90 TABLET ORAL at 09:27

## 2023-08-11 RX ADMIN — TICAGRELOR 90 MG: 90 TABLET ORAL at 21:59

## 2023-08-11 RX ADMIN — GABAPENTIN 600 MG: 300 CAPSULE ORAL at 13:18

## 2023-08-11 RX ADMIN — INSULIN LISPRO 4 UNITS: 100 INJECTION, SOLUTION INTRAVENOUS; SUBCUTANEOUS at 21:58

## 2023-08-11 RX ADMIN — GABAPENTIN 600 MG: 300 CAPSULE ORAL at 09:26

## 2023-08-11 RX ADMIN — DULOXETINE HYDROCHLORIDE 30 MG: 30 CAPSULE, DELAYED RELEASE ORAL at 09:26

## 2023-08-11 RX ADMIN — INSULIN GLARGINE-YFGN 15 UNITS: 100 INJECTION, SOLUTION SUBCUTANEOUS at 21:58

## 2023-08-11 RX ADMIN — ENOXAPARIN SODIUM 30 MG: 100 INJECTION SUBCUTANEOUS at 21:58

## 2023-08-11 RX ADMIN — METFORMIN HYDROCHLORIDE 1000 MG: 1000 TABLET, FILM COATED ORAL at 09:26

## 2023-08-11 RX ADMIN — ASPIRIN 81 MG: 81 TABLET, COATED ORAL at 09:26

## 2023-08-11 RX ADMIN — INSULIN LISPRO 4 UNITS: 100 INJECTION, SOLUTION INTRAVENOUS; SUBCUTANEOUS at 11:26

## 2023-08-11 RX ADMIN — ATORVASTATIN CALCIUM 40 MG: 40 TABLET, FILM COATED ORAL at 21:59

## 2023-08-11 RX ADMIN — DIVALPROEX SODIUM 250 MG: 125 CAPSULE, COATED PELLETS ORAL at 09:26

## 2023-08-11 RX ADMIN — METOPROLOL TARTRATE 50 MG: 50 TABLET, FILM COATED ORAL at 09:26

## 2023-08-11 RX ADMIN — LOSARTAN POTASSIUM 50 MG: 50 TABLET, FILM COATED ORAL at 09:26

## 2023-08-11 RX ADMIN — METFORMIN HYDROCHLORIDE 1000 MG: 1000 TABLET, FILM COATED ORAL at 16:35

## 2023-08-11 RX ADMIN — GABAPENTIN 600 MG: 300 CAPSULE ORAL at 21:59

## 2023-08-11 RX ADMIN — ENOXAPARIN SODIUM 30 MG: 100 INJECTION SUBCUTANEOUS at 09:25

## 2023-08-11 RX ADMIN — DIVALPROEX SODIUM 250 MG: 125 CAPSULE, COATED PELLETS ORAL at 21:58

## 2023-08-11 RX ADMIN — METOPROLOL TARTRATE 50 MG: 50 TABLET, FILM COATED ORAL at 21:59

## 2023-08-11 RX ADMIN — GABAPENTIN 600 MG: 300 CAPSULE ORAL at 16:35

## 2023-08-11 RX ADMIN — PANTOPRAZOLE SODIUM 40 MG: 40 TABLET, DELAYED RELEASE ORAL at 05:50

## 2023-08-11 ASSESSMENT — PAIN SCALES - WONG BAKER
WONGBAKER_NUMERICALRESPONSE: 0

## 2023-08-11 ASSESSMENT — PAIN SCALES - GENERAL
PAINLEVEL_OUTOF10: 0

## 2023-08-11 NOTE — PROGRESS NOTES
Speech Language Pathology  ACUTE REHABILITATION--DAILY PROGRESS NOTE            SWALLOWING:      Diet:  Regular consistency solids with thin liquids (IDDSI level 0)    Per 8/8 note: Patient no longer requires supervision during mealtimes. Patient eats his meals in the dining room per preference. LANGUAGE:    Occasionally requires increased time for processing as well as occasional repetition/clarification of directives. Continued occasional difficulty with word retrieval during conversational tasks. COGNITION:      Pt seen for ST this AM in tx space. Pt oriented to all concepts given ext time. To improve problem solving skills, pt completed task of placing info onto calendar to determine appropriate date to have a \"party;\" pt determined appropriate date with good ability given ext time and min verbal/visual cues. Pt placed provided info onto calendar with fair+ ability given ext time and min cues; SLP encouraged pt to be as specific as able to assist with accurate placement of information. To improve problem solving and reasoning skills, pt completed categorical naming matrix (e.g., such as a color that starts w/ \"b\") with fair+ accuracy given ext time and min verbal/visual cues; accuracy noted to increase following increased time and mod verbal cues. SLP educated pt re: strategies such as taking time, using process of elimination, etc to assist with completion. Pt reported that he needed to use restroom during session; pt did follow directives to wait for nursing staff to assist with w/ pt transfer for such. Pt demo'd fair safety awareness when maneuvering wheelchair with nursing staff in order to use restroom. SLP reviewed rec for pt to have staff assist with all transfers at this time. Pt brought to room and left w/ callbell w/I reach following session. Telesitter informed of pt return to room. SPEECH:    Functional during session.        Minute Tracking:    Individual therapy:   45 minutes  Concurrent therapy:  0 minutes  Group therapy:     0 minutes  Co-treatment therapy:    0 minutes    Total minutes for 8/11/2023: 45 minutes        SAFETY:      Fair; pt with telesitter in place. Pt demo'd fair safety awareness when maneuvering wheelchair with nursing staff in order to use restroom. EDUCATION:    Patient educated on plan of care. OUTCOME:    Progress made towards goals. Will continue SP intervention as per previously established POC.     SP recommended after discharge:  TBD  Supervision recommended at discharge: TBD      FIMS SCORES                                    Swallowing                          Current Status            6--Modified Independent            Short Term Goal         6--Modified Independent           Long Term Goal          6--Modified Independent              Receptive                          Current Status            5--Supervision              Short Term Goal         5--Supervision, MET, increased to 6--Modified Independent     Long Term Goal          5--Supervision, MET, increased to 6--Modified Independent        Expressive                          Current Status             4--Minimal Assistance               Short Term Goal         5--Supervision               Long Term Goal          5--Supervision                                                                 Problem Solving                          Current Status            3--Moderate Assistance                       Short Term Goal         4--Minimal Assistance            Long Term Goal          4--Minimal Assistance                  Memory                          Current Status             3--Moderate Assistance                        Short Term Goal         4--Minimal Assistance               Long Term Goal          5--Supervision      Social Interaction              Current Status             4--Minimal Assistance               Short Term Goal         5--Supervision               Long Term Goal

## 2023-08-11 NOTE — PROGRESS NOTES
Physical Therapy  Treatment note     Supervising Therapist: Ambika Santana, PT, DPT [id number]      ROOM: [address] [place] [address]  DIAGNOSIS: acute ischemic stroke   PRECAUTIONS:  Falls, SBP <180, seizure, cognition, bed/chair alarm, L LE weakness > R LE, sitter, impulsive,  behavior     HPI: Patient initially presented to the ED complaining of chest pain on 07/26/2023. Patient underwent a stress test which was abnormal and with his chest pain, cardiology decided patient should undergo a cardiac catheterization. Patient underwent the cardiac catheterization on 07/26/2023 with stent placement. Shortly after the procedure, patient developed slurred speech and right facial droop for which a stroke alert was called. Patient underwent a series of CT exams which showed ischemia in the left temporal lobe with multiple foci of severe stenosis in the proximal P2 segment of the right posterior cerebral artery. Neurology was consulted at this time and after return from CT scan patient began to have seizure-like activity and was found to be not breathing and pulseless in which a CODE BLUE was called. Patient spontaneously began breathing on his own and was found to have a pulse however was not protecting his airway and was intubated for airway protection. MRI revealed a small 3mm infarction in the left temporal lobe. Patient extubated on 07/28/2023. Patient has a history of CAD, DM, hyperlipidemia, hypertension, sleep apnea, and asthma. Social: Pt lives with wife in a 1 story home with 4- 5 stairs to enter and 1 rail. Pt ambulated with WW inside the home and [facility] in the community independently PTA. Pt's wife works full time. Pt is retired. Pt reports history of 7 falls in the last 3 months. Pt reports he has struggled with B knee/LE weakness PTA. Fall contributed to B LE buckling and unsteadiness. Initial Evaluation  Date: 8/2/23 AM     PM    Short Term Goals Long Term Goals    Was pt agreeable to Eval/treatment?  Yes  Yes TBD   BBS: >45/56  FGA: TBD   Date Family Teach Completed        Is additional Family Teaching Needed? Y or N Y Y      Hindering Progress Debility, PLOF, B LE weakness, cognition  Debility, PLOF, B LE weakness, cognition      PT recommended ELOS 3-4 weeks         Team's Discharge Plan        Therapist at Team Meeting          General:        HRmax: 158 bpm       Beta blockers (Y/N): Y      Adjusted HRmax: 148 bpm   Blood pressure (mmHg): AM:   - Prior to Tx: NT  - During Tx: NT  - Post Tx: NT PM:  - Prior to Tx: NT  - During Tx: NT  - Post Tx: NT   Time spent in HR ranges: Moderate intensity (60-70% HRmax): AM: NT due to concurrent session PM: NT due to concurrent session   High intensity (70-85% HRmax): AM: NT PM: NT   Max RPE reported:  AM: NT PM: NT     Therapeutic Exercise:   AM: FW non-reciprocal stepping over 3 SPC with standard walker x 2 reps with SBA  FW non-reciprocal stepping over 3 SPC with R SPC x 4 reps with Janelle  Ambulation with R SPC 75 feet x 1 rep with Janelle  PM: Weaving between 4 standing quad canes x 2 reps with standard walker with SBA  FW non-reciprocal stepping over 4 SPC on floor x 2 reps with standard walker with SBA/CGA  FW non-reciprocal stepping over 4 SPC on floor x 2 reps with SPC with Janelle  Ambulation with R SPC, 70 feet x 2 reps with Janelle    Patient education  Pt educated on optimal sequencing when stepping over obstacles    Patient response to education:   Pt verbalized understanding Pt demonstrated skill Pt requires further education in this area   yes partial yes     Additional Comments: Pt remains agreeable to activity, continues to exhibit improving activity tolerance with increasing ambulation distance using standard walker. Standard walker remains the safest option for mobility device for patient upon discharge.  [facility] training continued including obstacle negotiation in order to challenge postural stability and progress further toward prior level of

## 2023-08-11 NOTE — PROGRESS NOTES
toward set goals. Therapy emphasis to obtain goals:  Current Treatment Recommendations: Strengthening, Coordination training, ROM, Balance training, Self-Care / ADL, Functional mobility training, Safety education & training, Home management training, Endurance training, Patient/Caregiver education & training, Gait training, Neuromuscular re-education, Equipment evaluation, education, & procurement, Positioning    [x] Continue with current OT Plan of care. [] Prepare for Discharge    Goals  Long Term Goals  Time Frame for Long term goals : 3-4 weeks to address above problem areas  Long Term Goal 1: Pt demo  independent to eat all meals  Long Term Goal 2: Pt demo Mod I grooming seated @ sink level & demo G- safety  Long Term Goal 3: Pt demo SBA-CGA to bathe @ shower level  & or sponge bathing both seated & standing & demo G- safety & insight  Long Term Goal 4: Pt demo s/u UE & SBA LE dress to don underwear &, pants using AE as needed & s/u socks & shoes suing AE & demo G- safety & insight  Long Term Goal 5: Pt demo SBA commode trf using appropriate DME to ensure pt safety. Pt demo SBA toileting & demo G- safety & insight  Long Term Goal 6: Pt demo CGA-SBA  walk in shower using appropriate DME to esnure pt safety & pt demo G- safety & insight  Long Term Goal 7: Pt demo Min A light homemaking activity & demo G- safety & insight  Long Term Goal 8: Pt demo G- endurance for a 30 minute functional activity  Long Term Goal 9: Pt demo improved  & FMC/dexterity as evidenced by gains from evaluation on 8/2/23 .  Pt demo the following  strength- R hand 19# & norm for pt age & gender is 75#, L hand 30# & norm for pt age & gender is 65#; 9-hole peg test- R hand 43.9sec & norm for pt age & gender is 25.8sec & L hand 52.1sec & norm for pt age & gender is 26.0sec  Long Term Goal 10: Pt demo the following neurological improvement using the [employer] robotic arm to improve pt ability to complete ADLs & IADLs  as evidenced by

## 2023-08-12 LAB
GLUCOSE BLD-MCNC: 141 MG/DL (ref 74–99)
GLUCOSE BLD-MCNC: 190 MG/DL (ref 74–99)
GLUCOSE BLD-MCNC: 191 MG/DL (ref 74–99)
GLUCOSE BLD-MCNC: 250 MG/DL (ref 74–99)

## 2023-08-12 PROCEDURE — 6360000002 HC RX W HCPCS: Performed by: NURSE PRACTITIONER

## 2023-08-12 PROCEDURE — 99232 SBSQ HOSP IP/OBS MODERATE 35: CPT | Performed by: PHYSICAL MEDICINE & REHABILITATION

## 2023-08-12 PROCEDURE — 82962 GLUCOSE BLOOD TEST: CPT

## 2023-08-12 PROCEDURE — 94640 AIRWAY INHALATION TREATMENT: CPT

## 2023-08-12 PROCEDURE — 1280000000 HC REHAB R&B

## 2023-08-12 PROCEDURE — 97530 THERAPEUTIC ACTIVITIES: CPT

## 2023-08-12 PROCEDURE — 6370000000 HC RX 637 (ALT 250 FOR IP): Performed by: PHYSICAL MEDICINE & REHABILITATION

## 2023-08-12 PROCEDURE — 97129 THER IVNTJ 1ST 15 MIN: CPT

## 2023-08-12 PROCEDURE — 97130 THER IVNTJ EA ADDL 15 MIN: CPT

## 2023-08-12 PROCEDURE — S5553 INSULIN LONG ACTING 5 U: HCPCS | Performed by: NURSE PRACTITIONER

## 2023-08-12 PROCEDURE — 6360000002 HC RX W HCPCS: Performed by: PHYSICAL MEDICINE & REHABILITATION

## 2023-08-12 PROCEDURE — 6370000000 HC RX 637 (ALT 250 FOR IP): Performed by: NURSE PRACTITIONER

## 2023-08-12 RX ADMIN — DIVALPROEX SODIUM 250 MG: 125 CAPSULE, COATED PELLETS ORAL at 09:09

## 2023-08-12 RX ADMIN — DIVALPROEX SODIUM 250 MG: 125 CAPSULE, COATED PELLETS ORAL at 20:56

## 2023-08-12 RX ADMIN — GABAPENTIN 600 MG: 300 CAPSULE ORAL at 12:45

## 2023-08-12 RX ADMIN — INSULIN LISPRO 8 UNITS: 100 INJECTION, SOLUTION INTRAVENOUS; SUBCUTANEOUS at 12:45

## 2023-08-12 RX ADMIN — INSULIN GLARGINE-YFGN 15 UNITS: 100 INJECTION, SOLUTION SUBCUTANEOUS at 20:56

## 2023-08-12 RX ADMIN — METFORMIN HYDROCHLORIDE 1000 MG: 1000 TABLET, FILM COATED ORAL at 18:05

## 2023-08-12 RX ADMIN — ARFORMOTEROL TARTRATE 15 MCG: 15 SOLUTION RESPIRATORY (INHALATION) at 08:58

## 2023-08-12 RX ADMIN — TICAGRELOR 90 MG: 90 TABLET ORAL at 09:09

## 2023-08-12 RX ADMIN — METOPROLOL TARTRATE 50 MG: 50 TABLET, FILM COATED ORAL at 20:56

## 2023-08-12 RX ADMIN — METFORMIN HYDROCHLORIDE 1000 MG: 1000 TABLET, FILM COATED ORAL at 09:17

## 2023-08-12 RX ADMIN — TICAGRELOR 90 MG: 90 TABLET ORAL at 20:56

## 2023-08-12 RX ADMIN — METOPROLOL TARTRATE 50 MG: 50 TABLET, FILM COATED ORAL at 09:09

## 2023-08-12 RX ADMIN — GABAPENTIN 600 MG: 300 CAPSULE ORAL at 20:56

## 2023-08-12 RX ADMIN — PANTOPRAZOLE SODIUM 40 MG: 40 TABLET, DELAYED RELEASE ORAL at 06:36

## 2023-08-12 RX ADMIN — LOSARTAN POTASSIUM 50 MG: 50 TABLET, FILM COATED ORAL at 09:09

## 2023-08-12 RX ADMIN — GABAPENTIN 600 MG: 300 CAPSULE ORAL at 09:09

## 2023-08-12 RX ADMIN — BUDESONIDE INHALATION 500 MCG: 0.5 SUSPENSION RESPIRATORY (INHALATION) at 08:58

## 2023-08-12 RX ADMIN — ATORVASTATIN CALCIUM 40 MG: 40 TABLET, FILM COATED ORAL at 20:56

## 2023-08-12 RX ADMIN — ENOXAPARIN SODIUM 30 MG: 100 INJECTION SUBCUTANEOUS at 09:10

## 2023-08-12 RX ADMIN — ENOXAPARIN SODIUM 30 MG: 100 INJECTION SUBCUTANEOUS at 20:56

## 2023-08-12 RX ADMIN — ASPIRIN 81 MG: 81 TABLET, COATED ORAL at 09:09

## 2023-08-12 RX ADMIN — DULOXETINE HYDROCHLORIDE 30 MG: 30 CAPSULE, DELAYED RELEASE ORAL at 09:09

## 2023-08-12 RX ADMIN — GABAPENTIN 600 MG: 300 CAPSULE ORAL at 18:05

## 2023-08-12 NOTE — PROGRESS NOTES
Physical Therapy  Treatment note     Supervising Therapist: Todd Quevedo, PT, DPT [id number]      ROOM: [address] [place] [address]  DIAGNOSIS: acute ischemic stroke   PRECAUTIONS:  Falls, SBP <180, seizure, cognition, bed/chair alarm, L LE weakness > R LE, sitter, impulsive,  behavior     HPI: Patient initially presented to the ED complaining of chest pain on 07/26/2023. Patient underwent a stress test which was abnormal and with his chest pain, cardiology decided patient should undergo a cardiac catheterization. Patient underwent the cardiac catheterization on 07/26/2023 with stent placement. Shortly after the procedure, patient developed slurred speech and right facial droop for which a stroke alert was called. Patient underwent a series of CT exams which showed ischemia in the left temporal lobe with multiple foci of severe stenosis in the proximal P2 segment of the right posterior cerebral artery. Neurology was consulted at this time and after return from CT scan patient began to have seizure-like activity and was found to be not breathing and pulseless in which a CODE BLUE was called. Patient spontaneously began breathing on his own and was found to have a pulse however was not protecting his airway and was intubated for airway protection. MRI revealed a small 3mm infarction in the left temporal lobe. Patient extubated on 07/28/2023. Patient has a history of CAD, DM, hyperlipidemia, hypertension, sleep apnea, and asthma. Social: Pt lives with wife in a 1 story home with 4- 5 stairs to enter and 1 rail. Pt ambulated with WW inside the home and [facility] in the community independently PTA. Pt's wife works full time. Pt is retired. Pt reports history of 7 falls in the last 3 months. Pt reports he has struggled with B knee/LE weakness PTA. Fall contributed to B LE buckling and unsteadiness. Initial Evaluation  Date: 8/2/23 AM     Short Term Goals Long Term Goals    Was pt agreeable to Eval/treatment?  Yes  Yes  NA

## 2023-08-12 NOTE — PROGRESS NOTES
Speech Language Pathology  ACUTE REHABILITATION--DAILY PROGRESS NOTE            SWALLOWING:      Diet:  Regular consistency solids with thin liquids (IDDSI level 0)    Per 8/8 note: Patient no longer requires supervision during mealtimes. Patient eats his meals in the dining room per preference. LANGUAGE:    Occasionally requires increased time for processing as well as occasional repetition/clarification of directives. Continued occasional difficulty with word retrieval during conversational tasks. COGNITION:      Actively engaged in discussion about home safety modifications. Given min v/c, patient identified potential areas within own home that could pose a safety risk following discharge from ARU. He required intermittent min-mod v/c to provide reasonable adaptations to environment to promote increased functional independence. SPEECH:    Functional during session. Minute Tracking:    Individual therapy:     0 minutes  Concurrent therapy:  30 minutes  Group therapy:     0 minutes  Co-treatment therapy:    0 minutes    Total minutes for 8/12/2023: 30 minutes        SAFETY:      Fair; pt with telesitter in place. Pt demo'd fair safety awareness when maneuvering wheelchair with nursing staff in order to use restroom. EDUCATION:    Patient educated on plan of care. OUTCOME:    Progress made towards goals. Will continue SP intervention as per previously established POC.     SP recommended after discharge:  TBD  Supervision recommended at discharge: TBD      FIMS SCORES                                    Swallowing                          Current Status            6--Modified Independent            Short Term Goal         6--Modified Independent           Long Term Goal          6--Modified Independent              Receptive                          Current Status            5--Supervision              Short Term Goal         5--Supervision, MET, increased to 6--Modified Independent     Long Term

## 2023-08-13 LAB
GLUCOSE BLD-MCNC: 133 MG/DL (ref 74–99)
GLUCOSE BLD-MCNC: 191 MG/DL (ref 74–99)
GLUCOSE BLD-MCNC: 204 MG/DL (ref 74–99)
GLUCOSE BLD-MCNC: 243 MG/DL (ref 74–99)

## 2023-08-13 PROCEDURE — 94640 AIRWAY INHALATION TREATMENT: CPT

## 2023-08-13 PROCEDURE — 6360000002 HC RX W HCPCS: Performed by: PHYSICAL MEDICINE & REHABILITATION

## 2023-08-13 PROCEDURE — 6360000002 HC RX W HCPCS: Performed by: NURSE PRACTITIONER

## 2023-08-13 PROCEDURE — 6370000000 HC RX 637 (ALT 250 FOR IP): Performed by: PHYSICAL MEDICINE & REHABILITATION

## 2023-08-13 PROCEDURE — 1280000000 HC REHAB R&B

## 2023-08-13 PROCEDURE — 6370000000 HC RX 637 (ALT 250 FOR IP): Performed by: NURSE PRACTITIONER

## 2023-08-13 PROCEDURE — 97110 THERAPEUTIC EXERCISES: CPT

## 2023-08-13 PROCEDURE — S5553 INSULIN LONG ACTING 5 U: HCPCS | Performed by: NURSE PRACTITIONER

## 2023-08-13 PROCEDURE — 82962 GLUCOSE BLOOD TEST: CPT

## 2023-08-13 RX ADMIN — INSULIN LISPRO 4 UNITS: 100 INJECTION, SOLUTION INTRAVENOUS; SUBCUTANEOUS at 21:32

## 2023-08-13 RX ADMIN — METFORMIN HYDROCHLORIDE 1000 MG: 1000 TABLET, FILM COATED ORAL at 09:14

## 2023-08-13 RX ADMIN — ARFORMOTEROL TARTRATE 15 MCG: 15 SOLUTION RESPIRATORY (INHALATION) at 18:44

## 2023-08-13 RX ADMIN — DIVALPROEX SODIUM 250 MG: 125 CAPSULE, COATED PELLETS ORAL at 09:14

## 2023-08-13 RX ADMIN — METOPROLOL TARTRATE 50 MG: 50 TABLET, FILM COATED ORAL at 21:25

## 2023-08-13 RX ADMIN — ENOXAPARIN SODIUM 30 MG: 100 INJECTION SUBCUTANEOUS at 09:14

## 2023-08-13 RX ADMIN — ENOXAPARIN SODIUM 30 MG: 100 INJECTION SUBCUTANEOUS at 21:25

## 2023-08-13 RX ADMIN — GABAPENTIN 600 MG: 300 CAPSULE ORAL at 09:13

## 2023-08-13 RX ADMIN — LOSARTAN POTASSIUM 50 MG: 50 TABLET, FILM COATED ORAL at 09:14

## 2023-08-13 RX ADMIN — DIVALPROEX SODIUM 250 MG: 125 CAPSULE, COATED PELLETS ORAL at 21:25

## 2023-08-13 RX ADMIN — DULOXETINE HYDROCHLORIDE 30 MG: 30 CAPSULE, DELAYED RELEASE ORAL at 09:14

## 2023-08-13 RX ADMIN — GABAPENTIN 600 MG: 300 CAPSULE ORAL at 16:34

## 2023-08-13 RX ADMIN — METOPROLOL TARTRATE 50 MG: 50 TABLET, FILM COATED ORAL at 09:14

## 2023-08-13 RX ADMIN — ASPIRIN 81 MG: 81 TABLET, COATED ORAL at 09:14

## 2023-08-13 RX ADMIN — BUDESONIDE INHALATION 500 MCG: 0.5 SUSPENSION RESPIRATORY (INHALATION) at 18:44

## 2023-08-13 RX ADMIN — GABAPENTIN 600 MG: 300 CAPSULE ORAL at 21:25

## 2023-08-13 RX ADMIN — TICAGRELOR 90 MG: 90 TABLET ORAL at 21:25

## 2023-08-13 RX ADMIN — INSULIN LISPRO 4 UNITS: 100 INJECTION, SOLUTION INTRAVENOUS; SUBCUTANEOUS at 09:13

## 2023-08-13 RX ADMIN — INSULIN GLARGINE-YFGN 15 UNITS: 100 INJECTION, SOLUTION SUBCUTANEOUS at 21:25

## 2023-08-13 RX ADMIN — PANTOPRAZOLE SODIUM 40 MG: 40 TABLET, DELAYED RELEASE ORAL at 06:40

## 2023-08-13 RX ADMIN — BUDESONIDE INHALATION 500 MCG: 0.5 SUSPENSION RESPIRATORY (INHALATION) at 08:13

## 2023-08-13 RX ADMIN — METFORMIN HYDROCHLORIDE 1000 MG: 1000 TABLET, FILM COATED ORAL at 16:34

## 2023-08-13 RX ADMIN — ATORVASTATIN CALCIUM 40 MG: 40 TABLET, FILM COATED ORAL at 21:25

## 2023-08-13 RX ADMIN — TICAGRELOR 90 MG: 90 TABLET ORAL at 09:14

## 2023-08-13 RX ADMIN — ARFORMOTEROL TARTRATE 15 MCG: 15 SOLUTION RESPIRATORY (INHALATION) at 08:13

## 2023-08-13 RX ADMIN — GABAPENTIN 600 MG: 300 CAPSULE ORAL at 12:53

## 2023-08-13 NOTE — PROGRESS NOTES
Occupational Therapy  OCCUPATIONAL THERAPY DAILY NOTE    Date:2023  Patient Name: Khai Bah  MRN: [id number]  : 1950  Room: [address] [place] [address]     Referring Practitioner: Rainey Spatz, MD  Diagnosis:  s/p cardiac cath & stent 23 & post op CVA- ischemic  Additional Pertinent Hx: CAD, DM, HLD, HTN, JOSÉ MIGUEL, DM, hx falls- per pt 7 falls recently, asthma, anxiety  Restrictions/Precautions: Fall Risk, seizure, SBP<180, LLE weak, cognitive deficits, diet- 5 carb- low fat/low cholesterol/BRAYDEN & high fiber & had sitter  Discharge Recommendations: Home with assist as needed & OT home health/aides      Functional Assessment:   Date Status AE  Comments   Feeding 8/10/23 Mod I     Grooming 23 S wc        Oral Care 8/10/23 S wc    Bathing 8/10/23 Mod A Ww  Shower bench  HH shower  Grab bar    UB Dressing 8/10/23 setup     LB Dressing 8/10/23 Min A        Footwear 8/10/23  Max A     Toileting 8/10/23 Min/Mod A     Homemaking 23 SBA/CGA Seated and standing        Functional Transfers / Balance:   Date Status DME  Comments   Sit Balance 23 Sup      Stand Balance 23 CGA     [] Tub  [x] Shower   Transfer 8/10/23 Mod/Min  A Grab bar  wc    Commode   Transfer 8/10/23 Min A Grab bar    Functional   Mobility 23 Mod/Min A     CGA Ww    W/c    Other: sit<>stand    EOB<wc    Supine<>sit    SPT bed to w/c     On/off firm recliner and standard queen bed  8/11/23    8/10/23    8/10/23    8/8/23     8/8/23  CGA    Min A    SBA    Min A     Min A      Ww        Ww    ww      Functional Exercises / Activity:  Pt engaged in B UE ex's focusing on strength/endurance to increase independence with transfers/mobility and ADL tasks; pt requiring v/c's/encouragement to remain on task, increased time to complete with pt stopping periodically before starting up ex's again;    5# weighted bar 3 x 10 reps on all planes;    Mod resistant can do bar 2 x 15 reps on 3 planes;                      Sensory / Neuromuscular Re-Education:      Cognitive Skills:   Status Comments   Problem   Solving fair  . demo'd in hmkg, w/c mobility, sit to stand and grocery/shopping task. Memory fair     Sequencing fair     Safety fair       Visual Perception:    Education:  Patient educated on safety with hand placement during sit to stand transfers and Dyn standing balance to increase awareness. Patient education is ongoing. [] Family teach completed on:    Pain Level: no pain reported     Additional Notes:       Patient has made fair +  progress during treatment sessions toward set goals. Therapy emphasis to obtain goals:  Current Treatment Recommendations: Strengthening, Coordination training, ROM, Balance training, Self-Care / ADL, Functional mobility training, Safety education & training, Home management training, Endurance training, Patient/Caregiver education & training, Gait training, Neuromuscular re-education, Equipment evaluation, education, & procurement, Positioning    [x] Continue with current OT Plan of care. [] Prepare for Discharge    Goals  Long Term Goals  Time Frame for Long term goals : 3-4 weeks to address above problem areas  Long Term Goal 1: Pt demo  independent to eat all meals  Long Term Goal 2: Pt demo Mod I grooming seated @ sink level & demo G- safety  Long Term Goal 3: Pt demo SBA-CGA to bathe @ shower level  & or sponge bathing both seated & standing & demo G- safety & insight  Long Term Goal 4: Pt demo s/u UE & SBA LE dress to don underwear &, pants using AE as needed & s/u socks & shoes suing AE & demo G- safety & insight  Long Term Goal 5: Pt demo SBA commode trf using appropriate DME to ensure pt safety.  Pt demo SBA toileting & demo G- safety & insight  Long Term Goal 6: Pt demo CGA-SBA  walk in shower using appropriate DME to esnure pt safety & pt demo G- safety & insight  Long Term Goal 7: Pt demo Min A light homemaking activity & demo G- safety & insight  Long Term Goal 8: Pt demo G-

## 2023-08-14 LAB
GLUCOSE BLD-MCNC: 128 MG/DL (ref 74–99)
GLUCOSE BLD-MCNC: 158 MG/DL (ref 74–99)
GLUCOSE BLD-MCNC: 188 MG/DL (ref 74–99)
GLUCOSE BLD-MCNC: 212 MG/DL (ref 74–99)

## 2023-08-14 PROCEDURE — S5553 INSULIN LONG ACTING 5 U: HCPCS | Performed by: NURSE PRACTITIONER

## 2023-08-14 PROCEDURE — 97110 THERAPEUTIC EXERCISES: CPT

## 2023-08-14 PROCEDURE — 97530 THERAPEUTIC ACTIVITIES: CPT

## 2023-08-14 PROCEDURE — 6360000002 HC RX W HCPCS: Performed by: NURSE PRACTITIONER

## 2023-08-14 PROCEDURE — 97535 SELF CARE MNGMENT TRAINING: CPT

## 2023-08-14 PROCEDURE — 6360000002 HC RX W HCPCS: Performed by: PHYSICAL MEDICINE & REHABILITATION

## 2023-08-14 PROCEDURE — 6370000000 HC RX 637 (ALT 250 FOR IP): Performed by: PHYSICAL MEDICINE & REHABILITATION

## 2023-08-14 PROCEDURE — 97129 THER IVNTJ 1ST 15 MIN: CPT

## 2023-08-14 PROCEDURE — 97130 THER IVNTJ EA ADDL 15 MIN: CPT

## 2023-08-14 PROCEDURE — 82962 GLUCOSE BLOOD TEST: CPT

## 2023-08-14 PROCEDURE — 1280000000 HC REHAB R&B

## 2023-08-14 PROCEDURE — 94640 AIRWAY INHALATION TREATMENT: CPT

## 2023-08-14 PROCEDURE — 6370000000 HC RX 637 (ALT 250 FOR IP): Performed by: NURSE PRACTITIONER

## 2023-08-14 RX ADMIN — BUDESONIDE INHALATION 500 MCG: 0.5 SUSPENSION RESPIRATORY (INHALATION) at 08:03

## 2023-08-14 RX ADMIN — TICAGRELOR 90 MG: 90 TABLET ORAL at 08:39

## 2023-08-14 RX ADMIN — GABAPENTIN 600 MG: 300 CAPSULE ORAL at 17:17

## 2023-08-14 RX ADMIN — ATORVASTATIN CALCIUM 40 MG: 40 TABLET, FILM COATED ORAL at 21:36

## 2023-08-14 RX ADMIN — PANTOPRAZOLE SODIUM 40 MG: 40 TABLET, DELAYED RELEASE ORAL at 06:26

## 2023-08-14 RX ADMIN — DIVALPROEX SODIUM 250 MG: 125 CAPSULE, COATED PELLETS ORAL at 08:39

## 2023-08-14 RX ADMIN — METOPROLOL TARTRATE 50 MG: 50 TABLET, FILM COATED ORAL at 21:36

## 2023-08-14 RX ADMIN — METFORMIN HYDROCHLORIDE 1000 MG: 1000 TABLET, FILM COATED ORAL at 08:39

## 2023-08-14 RX ADMIN — ARFORMOTEROL TARTRATE 15 MCG: 15 SOLUTION RESPIRATORY (INHALATION) at 08:03

## 2023-08-14 RX ADMIN — GABAPENTIN 600 MG: 300 CAPSULE ORAL at 12:55

## 2023-08-14 RX ADMIN — METOPROLOL TARTRATE 50 MG: 50 TABLET, FILM COATED ORAL at 08:38

## 2023-08-14 RX ADMIN — INSULIN LISPRO 4 UNITS: 100 INJECTION, SOLUTION INTRAVENOUS; SUBCUTANEOUS at 11:40

## 2023-08-14 RX ADMIN — DIVALPROEX SODIUM 250 MG: 125 CAPSULE, COATED PELLETS ORAL at 21:35

## 2023-08-14 RX ADMIN — GABAPENTIN 600 MG: 300 CAPSULE ORAL at 08:39

## 2023-08-14 RX ADMIN — ASPIRIN 81 MG: 81 TABLET, COATED ORAL at 08:39

## 2023-08-14 RX ADMIN — TICAGRELOR 90 MG: 90 TABLET ORAL at 21:36

## 2023-08-14 RX ADMIN — GABAPENTIN 600 MG: 300 CAPSULE ORAL at 21:36

## 2023-08-14 RX ADMIN — ENOXAPARIN SODIUM 30 MG: 100 INJECTION SUBCUTANEOUS at 21:35

## 2023-08-14 RX ADMIN — ARFORMOTEROL TARTRATE 15 MCG: 15 SOLUTION RESPIRATORY (INHALATION) at 21:13

## 2023-08-14 RX ADMIN — BUDESONIDE INHALATION 500 MCG: 0.5 SUSPENSION RESPIRATORY (INHALATION) at 21:13

## 2023-08-14 RX ADMIN — INSULIN GLARGINE-YFGN 15 UNITS: 100 INJECTION, SOLUTION SUBCUTANEOUS at 21:35

## 2023-08-14 RX ADMIN — LOSARTAN POTASSIUM 50 MG: 50 TABLET, FILM COATED ORAL at 08:39

## 2023-08-14 RX ADMIN — METFORMIN HYDROCHLORIDE 1000 MG: 1000 TABLET, FILM COATED ORAL at 17:17

## 2023-08-14 RX ADMIN — DULOXETINE HYDROCHLORIDE 30 MG: 30 CAPSULE, DELAYED RELEASE ORAL at 08:39

## 2023-08-14 RX ADMIN — ENOXAPARIN SODIUM 30 MG: 100 INJECTION SUBCUTANEOUS at 08:38

## 2023-08-14 ASSESSMENT — PAIN SCALES - GENERAL
PAINLEVEL_OUTOF10: 0
PAINLEVEL_OUTOF10: 0

## 2023-08-14 NOTE — PROGRESS NOTES
Physical Therapy  Treatment note     Supervising Therapist: Elaina Hubbard, PT, DPT [id number]      ROOM: [id number]  DIAGNOSIS: acute ischemic stroke   PRECAUTIONS:  Falls, SBP <180, seizure, cognition, bed/chair alarm, L LE weakness > R LE, sitter, impulsive,  behavior     HPI: Patient initially presented to the ED complaining of chest pain on 07/26/2023. Patient underwent a stress test which was abnormal and with his chest pain, cardiology decided patient should undergo a cardiac catheterization. Patient underwent the cardiac catheterization on 07/26/2023 with stent placement. Shortly after the procedure, patient developed slurred speech and right facial droop for which a stroke alert was called. Patient underwent a series of CT exams which showed ischemia in the left temporal lobe with multiple foci of severe stenosis in the proximal P2 segment of the right posterior cerebral artery. Neurology was consulted at this time and after return from CT scan patient began to have seizure-like activity and was found to be not breathing and pulseless in which a CODE BLUE was called. Patient spontaneously began breathing on his own and was found to have a pulse however was not protecting his airway and was intubated for airway protection. MRI revealed a small 3mm infarction in the left temporal lobe. Patient extubated on 07/28/2023. Patient has a history of CAD, DM, hyperlipidemia, hypertension, sleep apnea, and asthma. Social: Pt lives with wife in a 1 story home with 4- 5 stairs to enter and 1 rail. Pt ambulated with WW inside the home and [facility] in the community independently PTA. Pt's wife works full time. Pt is retired. Pt reports history of 7 falls in the last 3 months. Pt reports he has struggled with B knee/LE weakness PTA. Fall contributed to B LE buckling and unsteadiness. Initial Evaluation  Date: 8/2/23 AM     PM    Short Term Goals Long Term Goals    Was pt agreeable to Eval/treatment?  Yes  Yes Chair/bed alarm: armed following session    AM  Time in: 1000  Time out: 1045    PM  Time in: 1430  Time out: 1515    Pt is making good progress toward established Physical Therapy goals. Continue with physical therapy current plan of care.     Christopher Hugo, PT, DPT  Board Certified Clinical Specialist in Neurologic Physical Therapy  [id number]

## 2023-08-14 NOTE — PLAN OF CARE
Problem: Discharge Planning  Goal: Discharge to home or other facility with appropriate resources  8/14/2023 1003 by Stephanie Bonilla RN  Outcome: Progressing  Flowsheets (Taken 8/14/2023 0910)  Discharge to home or other facility with appropriate resources: Identify barriers to discharge with patient and caregiver  8/14/2023 0109 by Paul Roe RN  Outcome: Progressing     Problem: Safety - Medical Restraint  Goal: Remains free of injury from restraints (Restraint for Interference with Medical Device)  Description: INTERVENTIONS:  1. Determine that other, less restrictive measures have been tried or would not be effective before applying the restraint  2. Evaluate the patient's condition at the time of restraint application  3. Inform patient/family regarding the reason for restraint  4. Q2H: Monitor safety, psychosocial status, comfort, nutrition and hydration  8/14/2023 1003 by Stephanie Bonilla RN  Outcome: Progressing  8/14/2023 0109 by Paul Roe RN  Outcome: Progressing     Problem: Safety - Adult  Goal: Free from fall injury  8/14/2023 1003 by Stephanie Bonilla RN  Outcome: Progressing  8/14/2023 0109 by Paul Roe RN  Outcome: Progressing     Problem: Skin/Tissue Integrity  Goal: Absence of new skin breakdown  Description: 1. Monitor for areas of redness and/or skin breakdown  2. Assess vascular access sites hourly  3. Every 4-6 hours minimum:  Change oxygen saturation probe site  4. Every 4-6 hours:  If on nasal continuous positive airway pressure, respiratory therapy assess nares and determine need for appliance change or resting period.   8/14/2023 1003 by Stephanie Bonilla RN  Outcome: Progressing  8/14/2023 0109 by Paul Roe RN  Outcome: Progressing     Problem: ABCDS Injury Assessment  Goal: Absence of physical injury  8/14/2023 1003 by Stephanie Bonilla RN  Outcome: Progressing  Flowsheets (Taken 8/14/2023 1002)  Absence of Physical Injury: Implement

## 2023-08-14 NOTE — PROGRESS NOTES
Occupational Therapy  OCCUPATIONAL THERAPY DAILY NOTE    Date:2023  Patient Name: Benitez Booth  MRN: [id number]  : 1950  Room: [address] [place] [address]A     Referring Practitioner: Brittany Contreras MD  Diagnosis:  s/p cardiac cath & stent 23 & post op CVA- ischemic  Additional Pertinent Hx: CAD, DM, HLD, HTN, JOSÉ MIGUEL, DM, hx falls- per pt 7 falls recently, asthma, anxiety  Restrictions/Precautions: Fall Risk, seizure, SBP<180, LLE weak, cognitive deficits, diet- 5 carb- low fat/low cholesterol/BRAYDEN & high fiber & had sitter  Discharge Recommendations: Home with assist as needed & OT home health/aides      Functional Assessment:   Date Status AE  Comments   Feeding 8/10/23 Mod I     Grooming 23 S wc        Oral Care 8/10/23 S wc    Bathing 8/10/23 Mod A Ww  Shower bench  HH shower  Grab bar    UB Dressing 8/10/23 setup     LB Dressing 8/10/23 Min A        Footwear 23  Min A  Patient performed donning and doffing of socks and shoes this date. The patient was able to doff socks and shoes. The patient was able to utilize a wide sock aide to don socks, assist to place feet into shoes but patient was able to tie. Vc's for techniques and safety completing seated from the  level. Issued patient a wide sock aide this date. Overall improvements with independence and participation with LB dressing this date. Toileting 8/10/23 Min/Mod A     Homemaking 23 SBA/CGA Seated and standing        Functional Transfers / Balance:   Date Status DME  Comments   Sit Balance 23 Mod I      Stand Balance 23 CGA/Min A  Demonstrated during mobility and transfers, still having a LOB at times with buckling and flexing at the knees.    [] Tub  [x] Shower   Transfer 8/10/23 Mod/Min  A Grab bar  wc    Commode   Transfer 23 Min A Grab bar  3:1 commode Patient performed transfer training, vc's for safety and techniques to perform   Functional   Mobility 23 Mod/Min A     CGA Ww    W/c Demonstrated within goals are updated on this date. Pt lives with his wife in a 1-story home 5 CATE & 1HR with a walk in shower. Pt used a rw in home & cane in community. Pt wife works. Pt also reported a recent history of 7 falls PTA.  Tentative length of stay is 2 weeks if plan is for pt to return home  Faraz Canada OTR/L [id number]       DISCHARGE RECOMMENDATIONS  Recommended DME: TBD    Post Discharge Care:   []Home Independently  []Home with 24hr Care / Supervision []Home with Partial Supervision []Home with Home Health OT []Home with Out Pt OT []Other: ___   Comments:         Time in Time out Tx Time Breakdown  Variance:   First Session  [id number] [x] Individual Tx- 45min  [] Concurrent Tx -  [] Co-Tx -   [] Group Tx -   [] Time Missed -       Second Session  [id number] [x] Individual Tx-  45min  [] Concurrent Tx -    [] Co-Tx -   [] Group Tx -   [] Time Missed -     Third Session    [] Individual Tx-   [] Concurrent Tx -  [] Co-Tx -   [] Group Tx -   [] Time Missed -         Total Tx Time :  90min     Ceferino Reyna BALL/L [id number]

## 2023-08-14 NOTE — PROGRESS NOTES
Speech Language Pathology  ACUTE REHABILITATION--DAILY PROGRESS NOTE            SWALLOWING:      Diet:  Regular consistency solids with thin liquids (IDDSI level 0)    Per 8/8 note: Patient no longer requires supervision during mealtimes. Patient eats his meals in the dining room per preference. LANGUAGE:    Occasionally requires increased time for processing as well as occasional repetition/clarification of directives. Continued occasional difficulty with word retrieval during conversational tasks. COGNITION:      Engaged patient in tabletop based cognitive task in which he was instructed to read a list of voicemails and organize the information from the messages into a weekly planner. Patient required to utilize visual scanning, organization, working memory, and reasoning skills to complete the task. Patient completed task with 90% accuracy. Patient benefited from increased time and min v/c from SLP to double check times written on planner. Patient completed a clock math / time word problems task with 75% accuracy given increased time. SPEECH:    Functional     Minute Tracking:    Individual therapy:     0 minutes  Concurrent therapy:  45 minutes  Group therapy:     0 minutes  Co-treatment therapy:    0 minutes    Total minutes for 8/14/2023: 45 minutes    SAFETY:      Fair; patient with telesitter in place. EDUCATION:    Patient educated on plan of care. OUTCOME:    Progress made towards goals. Will continue SP intervention as per previously established POC.     SP recommended after discharge:  TBD  Supervision recommended at discharge: TBD      FIMS SCORES                                    Swallowing                          Current Status            6--Modified Independent            Short Term Goal         6--Modified Independent           Long Term Goal          6--Modified Independent              Receptive                          Current Status            5--Supervision

## 2023-08-15 LAB
GLUCOSE BLD-MCNC: 149 MG/DL (ref 74–99)
GLUCOSE BLD-MCNC: 165 MG/DL (ref 74–99)
GLUCOSE BLD-MCNC: 169 MG/DL (ref 74–99)
GLUCOSE BLD-MCNC: 173 MG/DL (ref 74–99)

## 2023-08-15 PROCEDURE — 6360000002 HC RX W HCPCS: Performed by: NURSE PRACTITIONER

## 2023-08-15 PROCEDURE — 82962 GLUCOSE BLOOD TEST: CPT

## 2023-08-15 PROCEDURE — 94640 AIRWAY INHALATION TREATMENT: CPT

## 2023-08-15 PROCEDURE — 6360000002 HC RX W HCPCS: Performed by: PHYSICAL MEDICINE & REHABILITATION

## 2023-08-15 PROCEDURE — 97130 THER IVNTJ EA ADDL 15 MIN: CPT | Performed by: SPEECH-LANGUAGE PATHOLOGIST

## 2023-08-15 PROCEDURE — 97110 THERAPEUTIC EXERCISES: CPT

## 2023-08-15 PROCEDURE — S5553 INSULIN LONG ACTING 5 U: HCPCS | Performed by: NURSE PRACTITIONER

## 2023-08-15 PROCEDURE — 97535 SELF CARE MNGMENT TRAINING: CPT

## 2023-08-15 PROCEDURE — 97530 THERAPEUTIC ACTIVITIES: CPT

## 2023-08-15 PROCEDURE — 6370000000 HC RX 637 (ALT 250 FOR IP): Performed by: NURSE PRACTITIONER

## 2023-08-15 PROCEDURE — 6370000000 HC RX 637 (ALT 250 FOR IP): Performed by: PHYSICAL MEDICINE & REHABILITATION

## 2023-08-15 PROCEDURE — 97129 THER IVNTJ 1ST 15 MIN: CPT | Performed by: SPEECH-LANGUAGE PATHOLOGIST

## 2023-08-15 PROCEDURE — 1280000000 HC REHAB R&B

## 2023-08-15 RX ADMIN — BUDESONIDE INHALATION 500 MCG: 0.5 SUSPENSION RESPIRATORY (INHALATION) at 21:14

## 2023-08-15 RX ADMIN — DIVALPROEX SODIUM 250 MG: 125 CAPSULE, COATED PELLETS ORAL at 21:36

## 2023-08-15 RX ADMIN — TICAGRELOR 90 MG: 90 TABLET ORAL at 21:36

## 2023-08-15 RX ADMIN — GABAPENTIN 600 MG: 300 CAPSULE ORAL at 21:36

## 2023-08-15 RX ADMIN — METFORMIN HYDROCHLORIDE 1000 MG: 1000 TABLET, FILM COATED ORAL at 09:08

## 2023-08-15 RX ADMIN — DIVALPROEX SODIUM 250 MG: 125 CAPSULE, COATED PELLETS ORAL at 09:08

## 2023-08-15 RX ADMIN — GABAPENTIN 600 MG: 300 CAPSULE ORAL at 14:43

## 2023-08-15 RX ADMIN — ARFORMOTEROL TARTRATE 15 MCG: 15 SOLUTION RESPIRATORY (INHALATION) at 21:14

## 2023-08-15 RX ADMIN — PANTOPRAZOLE SODIUM 40 MG: 40 TABLET, DELAYED RELEASE ORAL at 06:23

## 2023-08-15 RX ADMIN — ENOXAPARIN SODIUM 30 MG: 100 INJECTION SUBCUTANEOUS at 21:35

## 2023-08-15 RX ADMIN — GABAPENTIN 600 MG: 300 CAPSULE ORAL at 16:54

## 2023-08-15 RX ADMIN — ATORVASTATIN CALCIUM 40 MG: 40 TABLET, FILM COATED ORAL at 21:36

## 2023-08-15 RX ADMIN — METOPROLOL TARTRATE 50 MG: 50 TABLET, FILM COATED ORAL at 21:35

## 2023-08-15 RX ADMIN — ENOXAPARIN SODIUM 30 MG: 100 INJECTION SUBCUTANEOUS at 09:09

## 2023-08-15 RX ADMIN — INSULIN GLARGINE-YFGN 15 UNITS: 100 INJECTION, SOLUTION SUBCUTANEOUS at 21:43

## 2023-08-15 RX ADMIN — ASPIRIN 81 MG: 81 TABLET, COATED ORAL at 09:08

## 2023-08-15 RX ADMIN — METOPROLOL TARTRATE 50 MG: 50 TABLET, FILM COATED ORAL at 09:08

## 2023-08-15 RX ADMIN — GABAPENTIN 600 MG: 300 CAPSULE ORAL at 09:08

## 2023-08-15 RX ADMIN — TICAGRELOR 90 MG: 90 TABLET ORAL at 09:08

## 2023-08-15 RX ADMIN — ARFORMOTEROL TARTRATE 15 MCG: 15 SOLUTION RESPIRATORY (INHALATION) at 09:34

## 2023-08-15 RX ADMIN — LOSARTAN POTASSIUM 50 MG: 50 TABLET, FILM COATED ORAL at 09:08

## 2023-08-15 RX ADMIN — DULOXETINE HYDROCHLORIDE 30 MG: 30 CAPSULE, DELAYED RELEASE ORAL at 09:08

## 2023-08-15 RX ADMIN — BUDESONIDE INHALATION 500 MCG: 0.5 SUSPENSION RESPIRATORY (INHALATION) at 09:34

## 2023-08-15 RX ADMIN — METFORMIN HYDROCHLORIDE 1000 MG: 1000 TABLET, FILM COATED ORAL at 16:54

## 2023-08-15 ASSESSMENT — PAIN SCALES - GENERAL: PAINLEVEL_OUTOF10: 0

## 2023-08-15 NOTE — PLAN OF CARE
Problem: Discharge Planning  Goal: Discharge to home or other facility with appropriate resources  Outcome: Progressing  Flowsheets (Taken 8/15/2023 0815)  Discharge to home or other facility with appropriate resources: Identify barriers to discharge with patient and caregiver     Problem: Safety - Medical Restraint  Goal: Remains free of injury from restraints (Restraint for Interference with Medical Device)  Description: INTERVENTIONS:  1. Determine that other, less restrictive measures have been tried or would not be effective before applying the restraint  2. Evaluate the patient's condition at the time of restraint application  3. Inform patient/family regarding the reason for restraint  4. Q2H: Monitor safety, psychosocial status, comfort, nutrition and hydration  Outcome: Progressing     Problem: Safety - Adult  Goal: Free from fall injury  Outcome: Progressing     Problem: Skin/Tissue Integrity  Goal: Absence of new skin breakdown  Description: 1. Monitor for areas of redness and/or skin breakdown  2. Assess vascular access sites hourly  3. Every 4-6 hours minimum:  Change oxygen saturation probe site  4. Every 4-6 hours:  If on nasal continuous positive airway pressure, respiratory therapy assess nares and determine need for appliance change or resting period.   Outcome: Progressing     Problem: ABCDS Injury Assessment  Goal: Absence of physical injury  Outcome: Progressing  Flowsheets (Taken 8/15/2023 1011)  Absence of Physical Injury: Implement safety measures based on patient assessment     Problem: Chronic Conditions and Co-morbidities  Goal: Patient's chronic conditions and co-morbidity symptoms are monitored and maintained or improved  Outcome: Progressing     Problem: Pain  Goal: Verbalizes/displays adequate comfort level or baseline comfort level  Outcome: Progressing

## 2023-08-15 NOTE — PROGRESS NOTES
Physical Therapy  Treatment note     Supervising Therapist: Danya Avina, PT, DPT [id number]      ROOM: [address] [place] [address]  DIAGNOSIS: acute ischemic stroke   PRECAUTIONS:  Falls, SBP <180, seizure, cognition, bed/chair alarm, L LE weakness > R LE, sitter, impulsive,  behavior     HPI: Patient initially presented to the ED complaining of chest pain on 07/26/2023. Patient underwent a stress test which was abnormal and with his chest pain, cardiology decided patient should undergo a cardiac catheterization. Patient underwent the cardiac catheterization on 07/26/2023 with stent placement. Shortly after the procedure, patient developed slurred speech and right facial droop for which a stroke alert was called. Patient underwent a series of CT exams which showed ischemia in the left temporal lobe with multiple foci of severe stenosis in the proximal P2 segment of the right posterior cerebral artery. Neurology was consulted at this time and after return from CT scan patient began to have seizure-like activity and was found to be not breathing and pulseless in which a CODE BLUE was called. Patient spontaneously began breathing on his own and was found to have a pulse however was not protecting his airway and was intubated for airway protection. MRI revealed a small 3mm infarction in the left temporal lobe. Patient extubated on 07/28/2023. Patient has a history of CAD, DM, hyperlipidemia, hypertension, sleep apnea, and asthma. Social: Pt lives with wife in a 1 story home with 4- 5 stairs to enter and 1 rail. Pt ambulated with WW inside the home and [facility] in the community independently PTA. Pt's wife works full time. Pt is retired. Pt reports history of 7 falls in the last 3 months. Pt reports he has struggled with B knee/LE weakness PTA. Fall contributed to B LE buckling and unsteadiness. Initial Evaluation  Date: 8/2/23 AM     PM    Short Term Goals Long Term Goals    Was pt agreeable to Eval/treatment?  Yes  Yes Yes     Does pt have pain?  L hip and L shoulder, not rated   Denies pain  No c/o pain     Bed Mobility  Rolling: Janelle  Supine to sit: Janelle  Sit to supine: Janelle  Scooting: Janelle NT NT Supervision  Modified Independent   Transfers Sit to stand: [address]  Stand to sit: [address]  Stand pivot: ModA with [employer]    5xSTS: NT sit to stand: SBA  Stand to sit: SBA  Stand pivot: SBA with standard walker Sit to stand: SBA  Stand to sit: SBA  Stand pivot: SBA with standard walker SBA  5xSTS: TBD Mod I  5xSTS: TBD   Ambulation    25 feet with [employer] with ModA    WC follow     10mWT: 0.29 m/s (ModA, [employer], chair follow  6mWT: 28 m  (3:10 minutes, WC follow, ModA, [employer])  300 feet x 1 rep with standard walker with SBA      300 feet x 1 rep  with standard walker with SBA    10mWT: 0.24 m/s  6mWT: 39 m  (3:50 minutes, WC follow, SBA, Standard walker, 8/9/23) 150 feet with AAD with SBA    10mWT: >0.6 m/s   6mWT: 100 m  >250 feet with AAD with Supervision    10mWT: > 0.8 m/s   6mWT: >200 m    Walking 10 feet on uneven surface NT NT NT 10 feet with AAD with Janelle 10 feet with AAD with Mod I   Wheel Chair Mobility NT NT NT     Car Transfers NT NT NT Janelle Mod I   Stair negotiation: ascended and descended  NT 8 steps x 1 rep with R HR, L SPC with CGA 16 steps with B HR with SBA (reciprocal) 4 steps with 1-2 rail with Janelle 8-12 steps with 1-2 rail with SBA   Curb Step:   ascended and descended NT NT NT 4 inch step with AAD and Janelle 4 inch step with AAD and SBA   Picking up object off the floor NT NT NT Will  cone/shoe with reacher and support from AAD Janelle  Will  cone/shoe with reacher and support from AAD Mod I      BLE ROM WFL NT NT     BLE Strength L LE:  Hip 3+/5   Knee 3-/5   Ankle 4/5     R LE:   Hip 4/5   Knee 3+/5   Ankle 4/5  NT NT     Balance  Static and dynamic sitting: SBA    Dynamic standing: at [employer] with ModA    BBS: NT  FGA: NT Static and dynamic sitting: SBA    Dynamic standing balance SBA with standard walker BBS: 12/56 (8/9/23)   BBS:

## 2023-08-15 NOTE — PROGRESS NOTES
Occupational Therapy  OCCUPATIONAL THERAPY DAILY NOTE    Date:8/15/2023  Patient Name: Bong Greer  MRN: [id number]  : 1950  Room: [address] [place] [address]A     Referring Practitioner: Jamarcus Waters MD  Diagnosis:  s/p cardiac cath & stent 23 & post op CVA- ischemic  Additional Pertinent Hx: CAD, DM, HLD, HTN, JOSÉ MIGUEL, DM, hx falls- per pt 7 falls recently, asthma, anxiety  Restrictions/Precautions: Fall Risk, seizure, SBP<180, LLE weak, cognitive deficits, diet- 5 carb- low fat/low cholesterol/BRAYDEN & high fiber & had sitter  Discharge Recommendations: Home with assist as needed & OT home health/aides      Functional Assessment:   Date Status AE  Comments   Feeding 8/15/23 Mod I  Patient able to perform all parts of feeding without difficulty    Grooming 8/15/23 S wc  Patient performed shaving, face washing, hair care, and applying deodorant seated wc level. Recommending seated level at this time due to balance deficits. Intermittent vc's for safety with the razor      Oral Care 8/15/23 Mod I wc Seated wc level to perform all parts of oral care   Bathing 8/15/23 Min A Ww  Shower bench  HH shower  Grab bar Patient performed bathing seated/standing level in the shower. The patient performed washing of hair, washing of UB, front henok area, and BLE's with assist for thoroughness of buttocks. Standing level at [facility] A/CGA due to B instability of patients knees. Vc's for safety and techniques to perform   UB Dressing 8/15/23 setup  Setup seated level to perform pullover tshirt   LB Dressing 8/15/23 CGA  Patient able to thread his depends and pants with assistance for clothing management over hips. The patient required CGA standing balance to perform management. Footwear 8/15/23  Min A  Patient able to don his socks with the sock aide, assistance for placing foot into shoe, he was able to tie shoes. Vc's for safety.    Toileting 8/10/23 Min/Mod A     Homemaking 23 SBA/CGA Seated and standing        Functional activity & demo G- safety & insight  Long Term Goal 8: Pt demo G- endurance for a 30 minute functional activity  Long Term Goal 9: Pt demo improved  & FMC/dexterity as evidenced by gains from evaluation on 8/2/23 . Pt demo the following  strength- R hand 19# & norm for pt age & gender is 75#, L hand 30# & norm for pt age & gender is 65#; 9-hole peg test- R hand 43.9sec & norm for pt age & gender is 25.8sec & L hand 52.1sec & norm for pt age & gender is 26.0sec  Long Term Goal 10: Pt demo the following neurological improvement using the [employer]Motion robotic arm to improve pt ability to complete ADLs & IADLs  as evidenced by gains in the LUE. Pt demo the following during the assessment of the [employer]Motion arm using a 14cm Makah- Makah size 95.60 cm & norm is 100.53cm, Makah symmetry 0.852 & norm is 0.834, smoothness 0.484 & norm is 0.533, target accuracy 2.39 & norm is 0.85 cm, AIM 0.83 & norm is 0.55 cm, movement duration 2.4 sec & norm is 1.4 sec, movement efficiency 72% & norm is 75%, overall isometric hold displacement 2.83 & norm is 2.40 cm & overall displacement 13.05 & norm is 13.00 cm  Patient goals : \"Be able to stand. \"  8/3/23- Pt POC & goals are updated on this date. Pt lives with his wife in a 1-story home 5 CATE & 1HR with a walk in shower. Pt used a rw in home & cane in community. Pt wife works. Pt also reported a recent history of 7 falls PTA. Pt also has a shower chair & elevated commode. Pt tentative length of stay 3-4 weeks Leticia Delgado OTR/L [id number]  8/10/23- Pt POC & goals are updated on this date. Pt lives with his wife in a 1-story home 5 CATE & 1HR with a walk in shower. Pt used a rw in home & cane in community. Pt wife works. Pt also reported a recent history of 7 falls PTA.  Tentative length of stay is 2 weeks if plan is for pt to return home  Gregorio GIRON/ANGY [id number]       DISCHARGE RECOMMENDATIONS  Recommended DME: TBD    Post Discharge Care:   []Home Independently  []Home with 24hr Care /

## 2023-08-15 NOTE — PROGRESS NOTES
Speech Language Pathology  ACUTE REHABILITATION--DAILY PROGRESS NOTE            SWALLOWING:      Diet:  Regular consistency solids with thin liquids (IDDSI level 0)    Per 8/8 note: Patient no longer requires supervision during mealtimes. Patient eats his meals in the dining room per preference. LANGUAGE:    Occasionally requires increased time for processing as well as occasional repetition/clarification of directives. Continued occasional difficulty with word retrieval during conversational tasks. COGNITION:      Completed task with focus on visual scanning, functional math, and problem solving. Patient was given photocopies of 5 gift cards (4 from [employer], 1 from [employer]) which denoted remaining balances on each card. Patient was then asked to refer to a [employer] menu and answer questions which required basic calculations (e.g. \"How many chocolate chip cookies would you be able to purchase solely with use of the gift cards? \"). Patient completed calculations with 100% accuracy given use of a calculator and increased time to complete task. Pt did require verbal prompts from SLP to re-visit stimuli and was able to deduce correct response upon checking word. SPEECH:    Functional     Minute Tracking:    Individual therapy:    40 minutes  Concurrent therapy:    0 minutes  Group therapy:     0 minutes  Co-treatment therapy:    0 minutes    Total minutes for 8/15/2023: 40 minutes    SAFETY:      Fair; patient with telesitter in place. EDUCATION:    Patient educated on plan of care. OUTCOME:    Progress made towards goals. Will continue SP intervention as per previously established POC.     SP recommended after discharge:  TBD  Supervision recommended at discharge: TBD      FIMS SCORES                                    Swallowing                          Current Status            6--Modified Independent            Short Term Goal         6--Modified Independent           Long Term Goal 6--Modified Independent              Receptive                          Current Status            5--Supervision              Short Term Goal         5--Supervision, MET, increased to 6--Modified Independent     Long Term Goal          5--Supervision, MET, increased to 6--Modified Independent        Expressive                          Current Status             4--Minimal Assistance               Short Term Goal         5--Supervision               Long Term Goal          5--Supervision                                                                 Problem Solving                          Current Status            4--Minimal Assistance / 3--Moderate Assistance                       Short Term Goal         4--Minimal Assistance            Long Term Goal          4--Minimal Assistance                  Memory                          Current Status            4--Minimal Assistance / 3--Moderate Assistance                        Short Term Goal         4--Minimal Assistance               Long Term Goal          5--Supervision      Social Interaction              Current Status             4--Minimal Assistance               Short Term Goal         5--Supervision               Long Term Goal          5--Supervision           SPEECH THERAPY  PLAN OF CARE   The speech therapy  POC is established based on physician order, speech pathology diagnosis and results of clinical assessment       SHORT/LONG TERM GOALS    Pt will improve orientation to spatial and temporal surroundings with use of external memory aides. Pt will improve immediate, short term, recent memory during structured and unstructured tasks with 80% accuracy      Pt will improve problem solving/thought organization during structured and unstructured tasks with 80% accuracy      Pt will improve receptive and expressive language skills with adequate thought content, organization, and processing time to facilitate improved communication with moderate.

## 2023-08-16 LAB
GLUCOSE BLD-MCNC: 163 MG/DL (ref 74–99)
GLUCOSE BLD-MCNC: 171 MG/DL (ref 74–99)
GLUCOSE BLD-MCNC: 195 MG/DL (ref 74–99)
GLUCOSE BLD-MCNC: 201 MG/DL (ref 74–99)

## 2023-08-16 PROCEDURE — 6370000000 HC RX 637 (ALT 250 FOR IP): Performed by: PHYSICAL MEDICINE & REHABILITATION

## 2023-08-16 PROCEDURE — 97130 THER IVNTJ EA ADDL 15 MIN: CPT

## 2023-08-16 PROCEDURE — 97530 THERAPEUTIC ACTIVITIES: CPT

## 2023-08-16 PROCEDURE — 82962 GLUCOSE BLOOD TEST: CPT

## 2023-08-16 PROCEDURE — 97110 THERAPEUTIC EXERCISES: CPT

## 2023-08-16 PROCEDURE — 6360000002 HC RX W HCPCS: Performed by: NURSE PRACTITIONER

## 2023-08-16 PROCEDURE — 6360000002 HC RX W HCPCS: Performed by: PHYSICAL MEDICINE & REHABILITATION

## 2023-08-16 PROCEDURE — 6370000000 HC RX 637 (ALT 250 FOR IP): Performed by: NURSE PRACTITIONER

## 2023-08-16 PROCEDURE — 1280000000 HC REHAB R&B

## 2023-08-16 PROCEDURE — 94640 AIRWAY INHALATION TREATMENT: CPT

## 2023-08-16 PROCEDURE — S5553 INSULIN LONG ACTING 5 U: HCPCS | Performed by: NURSE PRACTITIONER

## 2023-08-16 PROCEDURE — 97129 THER IVNTJ 1ST 15 MIN: CPT

## 2023-08-16 RX ADMIN — DULOXETINE HYDROCHLORIDE 30 MG: 30 CAPSULE, DELAYED RELEASE ORAL at 09:10

## 2023-08-16 RX ADMIN — GABAPENTIN 600 MG: 300 CAPSULE ORAL at 21:24

## 2023-08-16 RX ADMIN — METOPROLOL TARTRATE 50 MG: 50 TABLET, FILM COATED ORAL at 09:10

## 2023-08-16 RX ADMIN — METFORMIN HYDROCHLORIDE 1000 MG: 1000 TABLET, FILM COATED ORAL at 17:25

## 2023-08-16 RX ADMIN — GABAPENTIN 600 MG: 300 CAPSULE ORAL at 12:34

## 2023-08-16 RX ADMIN — METFORMIN HYDROCHLORIDE 1000 MG: 1000 TABLET, FILM COATED ORAL at 09:09

## 2023-08-16 RX ADMIN — ARFORMOTEROL TARTRATE 15 MCG: 15 SOLUTION RESPIRATORY (INHALATION) at 07:40

## 2023-08-16 RX ADMIN — ARFORMOTEROL TARTRATE 15 MCG: 15 SOLUTION RESPIRATORY (INHALATION) at 20:17

## 2023-08-16 RX ADMIN — GABAPENTIN 600 MG: 300 CAPSULE ORAL at 09:09

## 2023-08-16 RX ADMIN — ENOXAPARIN SODIUM 30 MG: 100 INJECTION SUBCUTANEOUS at 21:24

## 2023-08-16 RX ADMIN — ATORVASTATIN CALCIUM 40 MG: 40 TABLET, FILM COATED ORAL at 21:24

## 2023-08-16 RX ADMIN — ASPIRIN 81 MG: 81 TABLET, COATED ORAL at 09:09

## 2023-08-16 RX ADMIN — GABAPENTIN 600 MG: 300 CAPSULE ORAL at 17:25

## 2023-08-16 RX ADMIN — ENOXAPARIN SODIUM 30 MG: 100 INJECTION SUBCUTANEOUS at 09:09

## 2023-08-16 RX ADMIN — TICAGRELOR 90 MG: 90 TABLET ORAL at 09:10

## 2023-08-16 RX ADMIN — METOPROLOL TARTRATE 50 MG: 50 TABLET, FILM COATED ORAL at 21:24

## 2023-08-16 RX ADMIN — DIVALPROEX SODIUM 250 MG: 125 CAPSULE, COATED PELLETS ORAL at 09:10

## 2023-08-16 RX ADMIN — BUDESONIDE INHALATION 500 MCG: 0.5 SUSPENSION RESPIRATORY (INHALATION) at 20:17

## 2023-08-16 RX ADMIN — TICAGRELOR 90 MG: 90 TABLET ORAL at 21:24

## 2023-08-16 RX ADMIN — DIVALPROEX SODIUM 250 MG: 125 CAPSULE, COATED PELLETS ORAL at 21:24

## 2023-08-16 RX ADMIN — LOSARTAN POTASSIUM 50 MG: 50 TABLET, FILM COATED ORAL at 09:10

## 2023-08-16 RX ADMIN — BUDESONIDE INHALATION 500 MCG: 0.5 SUSPENSION RESPIRATORY (INHALATION) at 07:41

## 2023-08-16 RX ADMIN — INSULIN GLARGINE-YFGN 15 UNITS: 100 INJECTION, SOLUTION SUBCUTANEOUS at 21:24

## 2023-08-16 RX ADMIN — INSULIN LISPRO 4 UNITS: 100 INJECTION, SOLUTION INTRAVENOUS; SUBCUTANEOUS at 06:29

## 2023-08-16 RX ADMIN — PANTOPRAZOLE SODIUM 40 MG: 40 TABLET, DELAYED RELEASE ORAL at 06:24

## 2023-08-16 ASSESSMENT — PAIN SCALES - WONG BAKER
WONGBAKER_NUMERICALRESPONSE: 0
WONGBAKER_NUMERICALRESPONSE: 0

## 2023-08-16 ASSESSMENT — PAIN SCALES - GENERAL
PAINLEVEL_OUTOF10: 0
PAINLEVEL_OUTOF10: 0

## 2023-08-16 NOTE — PROGRESS NOTES
Physical Therapy  Treatment note     Supervising Therapist: Ambika Satnana, PT, DPT [id number]      ROOM: [address] [place] [address]  DIAGNOSIS: acute ischemic stroke   PRECAUTIONS:  Falls, SBP <180, seizure, cognition, bed/chair alarm, L LE weakness > R LE, sitter, impulsive,  behavior     HPI: Patient initially presented to the ED complaining of chest pain on 07/26/2023. Patient underwent a stress test which was abnormal and with his chest pain, cardiology decided patient should undergo a cardiac catheterization. Patient underwent the cardiac catheterization on 07/26/2023 with stent placement. Shortly after the procedure, patient developed slurred speech and right facial droop for which a stroke alert was called. Patient underwent a series of CT exams which showed ischemia in the left temporal lobe with multiple foci of severe stenosis in the proximal P2 segment of the right posterior cerebral artery. Neurology was consulted at this time and after return from CT scan patient began to have seizure-like activity and was found to be not breathing and pulseless in which a CODE BLUE was called. Patient spontaneously began breathing on his own and was found to have a pulse however was not protecting his airway and was intubated for airway protection. MRI revealed a small 3mm infarction in the left temporal lobe. Patient extubated on 07/28/2023. Patient has a history of CAD, DM, hyperlipidemia, hypertension, sleep apnea, and asthma. Social: Pt lives with wife in a 1 story home with 4- 5 stairs to enter and 1 rail. Pt ambulated with WW inside the home and [facility] in the community independently PTA. Pt's wife works full time. Pt is retired. Pt reports history of 7 falls in the last 3 months. Pt reports he has struggled with B knee/LE weakness PTA. Fall contributed to B LE buckling and unsteadiness. Initial Evaluation  Date: 8/2/23 AM     PM    Short Term Goals Long Term Goals    Was pt agreeable to Eval/treatment?  Yes  Yes NT  FGA: NT Static and dynamic sitting: SBA    Dynamic standing balance SBA with standard walker BBS: 21/56 (8/16/23)   BBS: >35/56  FGA: TBD   BBS: >45/56  FGA: TBD   Date Family Teach Completed        Is additional Family Teaching Needed? Y or N Y Y      Hindering Progress Debility, PLOF, B LE weakness, cognition  Debility, PLOF, B LE weakness, cognition      PT recommended ELOS 3-4 weeks         Team's Discharge Plan        Therapist at Team Meeting          General:        HRmax: 158 bpm       Beta blockers (Y/N): Y      Adjusted HRmax: 148 bpm   Blood pressure (mmHg): AM:   - Prior to Tx: NT  - During Tx: NT  - Post Tx: NT PM:  - Prior to Tx: NT  - During Tx: NT  - Post Tx: NT   Time spent in HR ranges: Moderate intensity (60-70% HRmax): AM: NT due to concurrent session PM: NT due to concurrent session   High intensity (70-85% HRmax): AM: NT PM: NT   Max RPE reported:  AM: NT PM: NT     Therapeutic Exercise:   AM: NA  PM:     Patient education  Pt educated on optimal sequencing with SPC during stair negotiation    Patient response to education:   Pt verbalized understanding Pt demonstrated skill Pt requires further education in this area   yes partial yes     Additional Comments: Pt remains pleasant and agreeable to activity. Activity tolerance continues to improve with continually increasing ambulation distance. Pt exhibits occasional unsteadiness due to sequencing error, however no gross LOB occurs. Pt exhibits improving stability with stair negotiation and requires less assistance from therapist during transfers and bed mobility tasks. PM session included repetition of outcome measures at which pt exhibited significant improvement in walking distance, speed, and static/dynamic standing balance. Plan for FT prior to discharge.      Chair/bed alarm: armed following session    AM  Time in: 1000  Time out: 1045    PM  Time in: 1430  Time out: 1515    Pt is making good progress toward

## 2023-08-16 NOTE — PROGRESS NOTES
PLT in the last 72 hours. BMP:  No results for input(s): NA, K, CL, CO2, BUN, CREATININE, GLUCOSE in the last 72 hours. Hepatic: No results for input(s): AST, ALT, ALB, BILITOT, ALKPHOS in the last 72 hours. BNP: No results for input(s): BNP in the last 72 hours. Lipids: No results for input(s): CHOL, HDL in the last 72 hours. Invalid input(s): LDLCALCU  INR: No results for input(s): INR in the last 72 hours. Assessment/  Patient Active Problem List:     Obstructive sleep apnea syndrome     Chest pain     LANGFORD (dyspnea on exertion)     Abnormal ECG     Disability of walking     Generalized osteoarthritis     Moderate persistent asthma without complication     Uncontrolled type 2 diabetes mellitus     CAD in native artery     Acute stroke due to ischemia [facility])     Acute respiratory failure with hypoxia (HCC)     S/P primary angioplasty with coronary stent     Coronary artery disease due to lipid rich plaque     Abnormal nuclear stress test     Seizure-like activity (HCC)     Acute ischemic stroke (HCC)     Staring episodes     Agitation     Aggression     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 Continue hypoglycemic for blood sugar control.   Continue DVT prophylaxis  Continue aspirin and Brilinta for secondary stroke prevention  Continue divalproex for seizure  For team conference tomorrow          Angeles Tabor MD

## 2023-08-16 NOTE — PROGRESS NOTES
Physical Therapy  Weekly note   Evaluating Therapist: Lilo Ramirez PT, DPT [id number]      ROOM: [address] [place] [address]  DIAGNOSIS: acute ischemic stroke     PRECAUTIONS:  Falls, SBP <180, seizure, cognition, bed/chair alarm, L LE weakness > R LE, sitter      HPI: Patient initially presented to the ED complaining of chest pain on 07/26/2023. Patient underwent a stress test which was abnormal and with his chest pain, cardiology decided patient should undergo a cardiac catheterization. Patient underwent the cardiac catheterization on 07/26/2023 with stent placement. Shortly after the procedure, patient developed slurred speech and right facial droop for which a stroke alert was called. Patient underwent a series of CT exams which showed ischemia in the left temporal lobe with multiple foci of severe stenosis in the proximal P2 segment of the right posterior cerebral artery. Neurology was consulted at this time and after return from CT scan patient began to have seizure-like activity and was found to be not breathing and pulseless in which a CODE BLUE was called. Patient spontaneously began breathing on his own and was found to have a pulse however was not protecting his airway and was intubated for airway protection. MRI revealed a small 3mm infarction in the left temporal lobe. Patient extubated on 07/28/2023. Patient has a history of CAD, DM, hyperlipidemia, hypertension, sleep apnea, and asthma. Social: Pt lives with wife in a 1 story home with 4- 5 stairs to enter and 1 rail. Pt ambulated with WW inside the home and [facility] in the community independently PTA. Pt's wife works full time. Pt is retired. Pt reports history of 7 falls in the last 3 months. Pt reports he has struggled with B knee/LE weakness PTA. Fall contributed to B LE buckling and unsteadiness. Date: 8/2/23 8/9/23 8/16/23 Comments     Short Term Goals Long Term Goals    Was pt agreeable to Eval/treatment? Yes  Yes yes      Does pt have pain?

## 2023-08-16 NOTE — PROGRESS NOTES
accuracy      Pt will improve problem solving/thought organization during structured and unstructured tasks with 80% accuracy      Pt will improve receptive and expressive language skills with adequate thought content, organization, and processing time to facilitate improved communication with moderate.      Pt will improve word finding and verbal fluency with phrase/sentence level, wh-questions and open ended conversation through incorporation of preparatory and circumlocution strategies 90% accuracy     Pt will improve speech intelligibility and increased articulatory precision via compensatory strategies and oral motor exercises

## 2023-08-16 NOTE — PROGRESS NOTES
sets of 10 with 4.5# therabar completing x8 different exercises targeting the various planes of the UE. Patient performed 3 sets of 10 with blue resistance hand gripper performing in the L and R hand with a 3 second hold. Vc's for proper for form and pacing to complete. Patient performed 3 sets of 10 with yellow resistance theraband completing x5 different exercises targeting the proximal planes of the UE. Fair tolerance overall with rest breaks taken as needed. Patient is requiring less cues for form and did look up on his phone of where to purchase the exercise equipment. Patient performed puzzle task and 3D block assembly to promote seated tolerance and balance, leisure participation, BUE function and coordination, and activity tolerance training for carry over of skills to perform self care tasks and transfers. Patient completed assembly of non traditional shape puzzle of the [facility]. He was provided with a visual guide of the puzzle and able to follow instructions properly however with increased time and trail and error. The patient performed assembly of 3D block assembly of various colors and designs following a visual pattern. He was 100% accurate with the task, increased time to perform overall Patient alternated the L and R hand to perform the task and reaching across midline to simulate PNF patterns utilized in ADLs. Patient demonstrated with good endurance during, appropriate rest periods for endurance recovery. Sensory / Neuromuscular Re-Education:      Cognitive Skills:   Status Comments   Problem   Solving fair     Memory fair     Sequencing fair     Safety fair       Visual Perception:    Education:  Patient educated on purpose and benefits of exercise programs with good understanding. Patient education is ongoing. [] Family teach completed on:    Pain Level: no pain reported     Additional Notes:       Patient has made fair +  progress during treatment sessions toward set goals. demo the following during the assessment of the [employer] arm using a 14cm [place] size 95.60 cm & norm is 100.53cm, [place] symmetry 0.852 & norm is 0.834, smoothness 0.484 & norm is 0.533, target accuracy 2.39 & norm is 0.85 cm, AIM 0.83 & norm is 0.55 cm, movement duration 2.4 sec & norm is 1.4 sec, movement efficiency 72% & norm is 75%, overall isometric hold displacement 2.83 & norm is 2.40 cm & overall displacement 13.05 & norm is 13.00 cm  Patient goals : \"Be able to stand. \"  8/3/23- Pt POC & goals are updated on this date. Pt lives with his wife in a 1-story home 5 CATE & 1HR with a walk in shower. Pt used a rw in home & cane in community. Pt wife works. Pt also reported a recent history of 7 falls PTA. Pt also has a shower chair & elevated commode. Pt tentative length of stay 3-4 weeks Tom Ospina OTR/L [id number]  8/10/23- Pt POC & goals are updated on this date. Pt lives with his wife in a 1-story home 5 CATE & 1HR with a walk in shower. Pt used a rw in home & cane in community. Pt wife works. Pt also reported a recent history of 7 falls PTA.  Tentative length of stay is 2 weeks if plan is for pt to return home  Andreas Cantu OTR/L [id number]       DISCHARGE RECOMMENDATIONS  Recommended DME: TBD    Post Discharge Care:   []Home Independently  []Home with 24hr Care / Supervision []Home with Partial Supervision []Home with Home Health OT []Home with Out Pt OT []Other: ___   Comments:         Time in Time out Tx Time Breakdown  Variance:   First Session  [id number] [x] Individual Tx-  45min  [] Concurrent Tx -  [] Co-Tx -   [] Group Tx -   [] Time Missed -       Second Session [id number] [x] Individual Tx- 45min  [] Concurrent Tx -    [] Co-Tx -   [] Group Tx -   [] Time Missed -     Third Session    [] Individual Tx-   [] Concurrent Tx -  [] Co-Tx -   [] Group Tx -   [] Time Missed -         Total Tx Time :  90min     Gearld Stephan BALL/L [id number]

## 2023-08-17 PROBLEM — J96.01 ACUTE RESPIRATORY FAILURE WITH HYPOXIA (HCC): Status: RESOLVED | Noted: 2023-07-26 | Resolved: 2023-08-17

## 2023-08-17 PROBLEM — I63.9 ACUTE STROKE DUE TO ISCHEMIA (HCC): Status: RESOLVED | Noted: 2023-07-26 | Resolved: 2023-08-17

## 2023-08-17 PROBLEM — R07.9 CHEST PAIN: Status: RESOLVED | Noted: 2017-04-26 | Resolved: 2023-08-17

## 2023-08-17 PROBLEM — R94.31 ABNORMAL ECG: Status: RESOLVED | Noted: 2017-11-14 | Resolved: 2023-08-17

## 2023-08-17 LAB
ALBUMIN SERPL-MCNC: 4 G/DL (ref 3.5–5.2)
ALP SERPL-CCNC: 55 U/L (ref 40–129)
ALT SERPL-CCNC: 48 U/L (ref 0–40)
ANION GAP SERPL CALCULATED.3IONS-SCNC: 14 MMOL/L (ref 7–16)
AST SERPL-CCNC: 37 U/L (ref 0–39)
BASOPHILS # BLD: 0.04 K/UL (ref 0–0.2)
BASOPHILS NFR BLD: 1 % (ref 0–2)
BILIRUB SERPL-MCNC: 0.5 MG/DL (ref 0–1.2)
BUN SERPL-MCNC: 17 MG/DL (ref 6–23)
CALCIUM SERPL-MCNC: 9.3 MG/DL (ref 8.6–10.2)
CHLORIDE SERPL-SCNC: 101 MMOL/L (ref 98–107)
CO2 SERPL-SCNC: 25 MMOL/L (ref 22–29)
CREAT SERPL-MCNC: 0.7 MG/DL (ref 0.7–1.2)
EOSINOPHIL # BLD: 0.13 K/UL (ref 0.05–0.5)
EOSINOPHILS RELATIVE PERCENT: 3 % (ref 0–6)
ERYTHROCYTE [DISTWIDTH] IN BLOOD BY AUTOMATED COUNT: 12.9 % (ref 11.5–15)
GFR SERPL CREATININE-BSD FRML MDRD: >60 ML/MIN/1.73M2
GLUCOSE BLD-MCNC: 152 MG/DL (ref 74–99)
GLUCOSE BLD-MCNC: 171 MG/DL (ref 74–99)
GLUCOSE BLD-MCNC: 176 MG/DL (ref 74–99)
GLUCOSE BLD-MCNC: 213 MG/DL (ref 74–99)
GLUCOSE SERPL-MCNC: 176 MG/DL (ref 74–99)
HCT VFR BLD AUTO: 42.1 % (ref 37–54)
HGB BLD-MCNC: 13.7 G/DL (ref 12.5–16.5)
IMM GRANULOCYTES # BLD AUTO: <0.03 K/UL (ref 0–0.58)
IMM GRANULOCYTES NFR BLD: 1 % (ref 0–5)
LYMPHOCYTES NFR BLD: 1.84 K/UL (ref 1.5–4)
LYMPHOCYTES RELATIVE PERCENT: 42 % (ref 20–42)
MCH RBC QN AUTO: 30 PG (ref 26–35)
MCHC RBC AUTO-ENTMCNC: 32.5 G/DL (ref 32–34.5)
MCV RBC AUTO: 92.1 FL (ref 80–99.9)
MONOCYTES NFR BLD: 0.53 K/UL (ref 0.1–0.95)
MONOCYTES NFR BLD: 12 % (ref 2–12)
NEUTROPHILS NFR BLD: 42 % (ref 43–80)
NEUTS SEG NFR BLD: 1.83 K/UL (ref 1.8–7.3)
PLATELET # BLD AUTO: 273 K/UL (ref 130–450)
PMV BLD AUTO: 10.1 FL (ref 7–12)
POTASSIUM SERPL-SCNC: 4.4 MMOL/L (ref 3.5–5)
PROT SERPL-MCNC: 6.8 G/DL (ref 6.4–8.3)
RBC # BLD AUTO: 4.57 M/UL (ref 3.8–5.8)
SODIUM SERPL-SCNC: 140 MMOL/L (ref 132–146)
WBC OTHER # BLD: 4.4 K/UL (ref 4.5–11.5)

## 2023-08-17 PROCEDURE — 97530 THERAPEUTIC ACTIVITIES: CPT

## 2023-08-17 PROCEDURE — 80053 COMPREHEN METABOLIC PANEL: CPT

## 2023-08-17 PROCEDURE — 97535 SELF CARE MNGMENT TRAINING: CPT

## 2023-08-17 PROCEDURE — 6360000002 HC RX W HCPCS: Performed by: NURSE PRACTITIONER

## 2023-08-17 PROCEDURE — 6360000002 HC RX W HCPCS: Performed by: PHYSICAL MEDICINE & REHABILITATION

## 2023-08-17 PROCEDURE — 6370000000 HC RX 637 (ALT 250 FOR IP): Performed by: PHYSICAL MEDICINE & REHABILITATION

## 2023-08-17 PROCEDURE — 6370000000 HC RX 637 (ALT 250 FOR IP): Performed by: NURSE PRACTITIONER

## 2023-08-17 PROCEDURE — S5553 INSULIN LONG ACTING 5 U: HCPCS | Performed by: NURSE PRACTITIONER

## 2023-08-17 PROCEDURE — 36415 COLL VENOUS BLD VENIPUNCTURE: CPT

## 2023-08-17 PROCEDURE — 82962 GLUCOSE BLOOD TEST: CPT

## 2023-08-17 PROCEDURE — 94640 AIRWAY INHALATION TREATMENT: CPT

## 2023-08-17 PROCEDURE — 1280000000 HC REHAB R&B

## 2023-08-17 PROCEDURE — 85025 COMPLETE CBC W/AUTO DIFF WBC: CPT

## 2023-08-17 PROCEDURE — 97110 THERAPEUTIC EXERCISES: CPT

## 2023-08-17 RX ORDER — LOSARTAN POTASSIUM 50 MG/1
50 TABLET ORAL DAILY
Qty: 30 TABLET | Refills: 3 | Status: SHIPPED | OUTPATIENT
Start: 2023-08-18

## 2023-08-17 RX ORDER — ASPIRIN 81 MG/1
81 TABLET ORAL DAILY
Qty: 30 TABLET | Refills: 3 | Status: SHIPPED | OUTPATIENT
Start: 2023-08-18

## 2023-08-17 RX ORDER — POLYETHYLENE GLYCOL 3350 17 G/17G
17 POWDER, FOR SOLUTION ORAL DAILY PRN
Qty: 527 G | Refills: 1 | COMMUNITY
Start: 2023-08-17 | End: 2023-08-22 | Stop reason: CLARIF

## 2023-08-17 RX ORDER — DIVALPROEX SODIUM 125 MG/1
250 CAPSULE, COATED PELLETS ORAL 2 TIMES DAILY
Qty: 60 CAPSULE | Refills: 3 | Status: SHIPPED | OUTPATIENT
Start: 2023-08-17

## 2023-08-17 RX ORDER — ATORVASTATIN CALCIUM 40 MG/1
40 TABLET, FILM COATED ORAL NIGHTLY
Qty: 30 TABLET | Refills: 3 | Status: SHIPPED | OUTPATIENT
Start: 2023-08-17

## 2023-08-17 RX ORDER — METOPROLOL TARTRATE 50 MG/1
50 TABLET, FILM COATED ORAL 2 TIMES DAILY
Qty: 60 TABLET | Refills: 3 | Status: SHIPPED | OUTPATIENT
Start: 2023-08-17

## 2023-08-17 RX ORDER — INSULIN GLARGINE-YFGN 100 [IU]/ML
15 INJECTION, SOLUTION SUBCUTANEOUS NIGHTLY
Qty: 1 EACH | Refills: 1 | Status: SHIPPED | OUTPATIENT
Start: 2023-08-17 | End: 2023-08-22 | Stop reason: CLARIF

## 2023-08-17 RX ORDER — INSULIN LISPRO 100 [IU]/ML
0-16 INJECTION, SOLUTION INTRAVENOUS; SUBCUTANEOUS
Qty: 1 EACH | Refills: 1 | Status: SHIPPED | OUTPATIENT
Start: 2023-08-17

## 2023-08-17 RX ORDER — LANOLIN ALCOHOL/MO/W.PET/CERES
6 CREAM (GRAM) TOPICAL NIGHTLY PRN
Refills: 3 | COMMUNITY
Start: 2023-08-17

## 2023-08-17 RX ADMIN — TICAGRELOR 90 MG: 90 TABLET ORAL at 09:37

## 2023-08-17 RX ADMIN — TICAGRELOR 90 MG: 90 TABLET ORAL at 20:49

## 2023-08-17 RX ADMIN — BUDESONIDE INHALATION 500 MCG: 0.5 SUSPENSION RESPIRATORY (INHALATION) at 19:50

## 2023-08-17 RX ADMIN — ENOXAPARIN SODIUM 30 MG: 100 INJECTION SUBCUTANEOUS at 09:37

## 2023-08-17 RX ADMIN — ASPIRIN 81 MG: 81 TABLET, COATED ORAL at 09:37

## 2023-08-17 RX ADMIN — PANTOPRAZOLE SODIUM 40 MG: 40 TABLET, DELAYED RELEASE ORAL at 06:42

## 2023-08-17 RX ADMIN — METFORMIN HYDROCHLORIDE 1000 MG: 1000 TABLET, FILM COATED ORAL at 09:40

## 2023-08-17 RX ADMIN — ENOXAPARIN SODIUM 30 MG: 100 INJECTION SUBCUTANEOUS at 20:53

## 2023-08-17 RX ADMIN — GABAPENTIN 600 MG: 300 CAPSULE ORAL at 13:41

## 2023-08-17 RX ADMIN — DIVALPROEX SODIUM 250 MG: 125 CAPSULE, COATED PELLETS ORAL at 20:49

## 2023-08-17 RX ADMIN — DULOXETINE HYDROCHLORIDE 30 MG: 30 CAPSULE, DELAYED RELEASE ORAL at 09:37

## 2023-08-17 RX ADMIN — ARFORMOTEROL TARTRATE 15 MCG: 15 SOLUTION RESPIRATORY (INHALATION) at 19:50

## 2023-08-17 RX ADMIN — ARFORMOTEROL TARTRATE 15 MCG: 15 SOLUTION RESPIRATORY (INHALATION) at 08:07

## 2023-08-17 RX ADMIN — LOSARTAN POTASSIUM 50 MG: 50 TABLET, FILM COATED ORAL at 09:37

## 2023-08-17 RX ADMIN — GABAPENTIN 600 MG: 300 CAPSULE ORAL at 09:37

## 2023-08-17 RX ADMIN — METFORMIN HYDROCHLORIDE 1000 MG: 1000 TABLET, FILM COATED ORAL at 17:35

## 2023-08-17 RX ADMIN — METOPROLOL TARTRATE 50 MG: 50 TABLET, FILM COATED ORAL at 20:54

## 2023-08-17 RX ADMIN — METOPROLOL TARTRATE 50 MG: 50 TABLET, FILM COATED ORAL at 09:37

## 2023-08-17 RX ADMIN — GABAPENTIN 600 MG: 300 CAPSULE ORAL at 20:48

## 2023-08-17 RX ADMIN — DIVALPROEX SODIUM 250 MG: 125 CAPSULE, COATED PELLETS ORAL at 09:37

## 2023-08-17 RX ADMIN — GABAPENTIN 600 MG: 300 CAPSULE ORAL at 17:35

## 2023-08-17 RX ADMIN — BUDESONIDE INHALATION 500 MCG: 0.5 SUSPENSION RESPIRATORY (INHALATION) at 08:06

## 2023-08-17 RX ADMIN — INSULIN LISPRO 4 UNITS: 100 INJECTION, SOLUTION INTRAVENOUS; SUBCUTANEOUS at 20:49

## 2023-08-17 RX ADMIN — ATORVASTATIN CALCIUM 40 MG: 40 TABLET, FILM COATED ORAL at 20:49

## 2023-08-17 RX ADMIN — INSULIN GLARGINE-YFGN 15 UNITS: 100 INJECTION, SOLUTION SUBCUTANEOUS at 20:49

## 2023-08-17 NOTE — PROGRESS NOTES
Occupational Therapy  OCCUPATIONAL THERAPY DAILY NOTE    Date:2023  Patient Name: Brandie Rivera  MRN: [id number]  : 1950  Room: [address] [place] [address]-A     Referring Practitioner: Halley Peña MD  Diagnosis:  s/p cardiac cath & stent 23 & post op CVA- ischemic  Additional Pertinent Hx: CAD, DM, HLD, HTN, JOSÉ MIGUEL, DM, hx falls- per pt 7 falls recently, asthma, anxiety  Restrictions/Precautions: Fall Risk, seizure, SBP<180, LLE weak, cognitive deficits, diet- 5 carb- low fat/low cholesterol/BRAYDEN & high fiber & had sitter  Discharge Recommendations: Home with assist as needed & OT home health/aides      Functional Assessment:   Date Status AE  Comments   Feeding 23 Mod I  Patient able to perform all parts of feeding without difficulty   Grooming 23 Mod I wc Patient performed shaving, face washing, hair care, and applying deodorant seated wc level. Recommending seated level at this time due to balance deficits      Oral Care 23 Mod I wc Seated wc level to perform all parts of oral care   Bathing 23 Min A stw  Shower bench  HH shower  Grab bar Patient performed bathing seated/standing level in the shower. The patient performed washing of hair, washing of UB, front henok area, and BLE's with assist for thoroughness of buttocks. Standing level at CGA/SBA due to B instability of patients knees. Vc's for safety and techniques to perform   UB Dressing 23 Mod I  Setup seated level to perform pullover tshirt and performed retrieval    LB Dressing 23 CGA stw Patient able to thread his depends and pants with assistance for clothing management over hips. The patient required CGA standing balance to perform management. Footwear 23  Min A  Patient able to don his socks with the sock aide, assistance for placing foot into shoe, he was able to tie shoes. Vc's for safety.    Toileting 23 Min A  Patient able to perform front henok hygiene and perform clothing management before and after

## 2023-08-17 NOTE — CARE COORDINATION
Team:  Team meeting this . SW spoke  team yesterday and was told to anticipate discharge. Reviewed plan with wife on phone yesterday. Team meeting today and discharge date confirmed for 8/18. Spoke with wife on phone and met with patient in room to review plan. Plan as noted below    D/C Plan:   Home with wife and home health   as noted       DME:  Wife to bring wheeled walker to [facility] teach tomorrow. PT will switch to standard walker. PT recommends wheelchair. Spec obtained and ordered form [facility] per choice. Aftercare :  [address]  RN [address] notified of dc date and will open on Monday. FT:  8/18/pm    The Plan for Transition of Care is related to the following treatment goals: home with home health    The Patient and/or patient representative  was provided with a choice of provider and agrees   with the discharge plan. [x] Yes [] No    Freedom of choice list was provided with basic dialogue that supports the patient's individualized plan of care/goals, treatment preferences and shares the quality data associated with the providers.  x Yes [] No

## 2023-08-17 NOTE — PROGRESS NOTES
Speech Language Pathology      NAME:  Rosalio Taylor  :  1950  DATE: 2023  ROOM:  5507/5507-A    FIMS SCORES                                    Swallowing                          Current Status            6--Modified Independent            Short Term Goal         6--Modified Independent           Long Term Goal          6--Modified Independent              Receptive                          Current Status            5--Supervision              Short Term Goal         5--Supervision, MET, increased to 6--Modified Independent     Long Term Goal          5--Supervision, MET, increased to 6--Modified Independent        Expressive                          Current Status             4--Minimal Assistance               Short Term Goal         5--Supervision               Long Term Goal          5--Supervision                                                                 Problem Solving                          Current Status            4--Minimal Assistance / 3--Moderate Assistance                       Short Term Goal         4--Minimal Assistance            Long Term Goal          4--Minimal Assistance                  Memory                          Current Status            4--Minimal Assistance / 3--Moderate Assistance                        Short Term Goal         4--Minimal Assistance               Long Term Goal          5--Supervision      Social Interaction              Current Status               5--Supervision             Short Term Goal         5--Supervision               Long Term Goal          5--Supervision            Acute ischemic stroke ([address] [facility]) [I63.9]

## 2023-08-17 NOTE — PATIENT CARE CONFERENCE
[facility]  INPATIENT ACUTE REHABILITATION  TEAM CONFERENCE NOTE/PATIENT PLAN OF CARE    The physician was present and led this team conference    Date: 2023  Admission date: 2023  Patient Name: Shandra Cole        MRN: [id number]    : 1950  (68 y.o.)  Gender: male   Rehab diagnosis/surgery with date:  Left temporal lobe infarct of 23  Impairment Group Code:  1.2      MEDICAL/FUNCTIONAL HISTORY/STATUS:  glucose levels under good control, participating and making gains, ready for discharge soon    Consultations/Labs/X-rays: FBS this am 171   Latest Reference Range & Units 23 05:58   Sodium 132 - 146 mmol/L 140   Potassium 3.5 - 5.0 mmol/L 4.4   Chloride 98 - 107 mmol/L 101   CO2 22 - 29 mmol/L 25   BUN,BUNPL 6 - 23 mg/dL 17   Creatinine 0.70 - 1.20 mg/dL 0.7      Latest Reference Range & Units 23 05:58   WBC 4.5 - 11.5 k/uL 4.4 (L)   RBC 3.80 - 5.80 m/uL 4.57   Hemoglobin Quant 12.5 - 16.5 g/dL 13.7   Hematocrit 37.0 - 54.0 % 42.1   (L): Data is abnormally low    MEDICATION UPDATE:     metFORMIN  1,000 mg Oral BID WC    insulin lispro  0-16 Units SubCUTAneous 4x Daily AC & HS    divalproex  250 mg Oral BID    DULoxetine  30 mg Oral Daily    aspirin  81 mg Oral Daily    ticagrelor  90 mg Oral BID    atorvastatin  40 mg Oral Nightly    gabapentin  600 mg Oral 4x Daily    pantoprazole  40 mg Oral Daily    budesonide  0.5 mg Nebulization BID RT     And    arformoterol tartrate  15 mcg Nebulization BID RT    losartan  50 mg Oral Daily    insulin glargine  15 Units SubCUTAneous Nightly    metoprolol tartrate  50 mg Oral BID    enoxaparin  30 mg SubCUTAneous BID       NURSING :    Bowel:   Always Continent  [x]   Occasionally incontinent  []   Frequently incontinent  []   Always incontinent  []   Not occurred  []     Bladder:   Always Continent  [x]    Incontinent less than daily[]   Incontinent  daily []   Always incontinent  []   No urine output    []   Indwelling

## 2023-08-17 NOTE — PROGRESS NOTES
Physical Therapy  Treatment note     Supervising Therapist: Joaquina Johnson PT, MOUSTAPHA [id number]      ROOM: [facility]  DIAGNOSIS: acute ischemic stroke   PRECAUTIONS:  Falls, SBP <180, seizure, cognition, bed/chair alarm, L LE weakness > R LE, sitter, impulsive,  behavior     HPI: Patient initially presented to the ED complaining of chest pain on 07/26/2023. Patient underwent a stress test which was abnormal and with his chest pain, cardiology decided patient should undergo a cardiac catheterization. Patient underwent the cardiac catheterization on 07/26/2023 with stent placement. Shortly after the procedure, patient developed slurred speech and right facial droop for which a stroke alert was called. Patient underwent a series of CT exams which showed ischemia in the left temporal lobe with multiple foci of severe stenosis in the proximal P2 segment of the right posterior cerebral artery. Neurology was consulted at this time and after return from CT scan patient began to have seizure-like activity and was found to be not breathing and pulseless in which a CODE BLUE was called. Patient spontaneously began breathing on his own and was found to have a pulse however was not protecting his airway and was intubated for airway protection. MRI revealed a small 3mm infarction in the left temporal lobe. Patient extubated on 07/28/2023. Patient has a history of CAD, DM, hyperlipidemia, hypertension, sleep apnea, and asthma. Social: Pt lives with wife in a 1 story home with 4- 5 stairs to enter and 1 rail. Pt ambulated with WW inside the home and [facility] in the community independently PTA. Pt's wife works full time. Pt is retired. Pt reports history of 7 falls in the last 3 months. Pt reports he has struggled with B knee/LE weakness PTA. Fall contributed to B LE buckling and unsteadiness. Initial Evaluation  Date: 8/2/23 AM     PM    Short Term Goals Long Term Goals    Was pt agreeable to Eval/treatment?  Yes  Yes NT     Balance  Static and dynamic sitting: SBA    Dynamic standing: at [address] with ModA    BBS: NT  FGA: NT Static and dynamic sitting: SBA    Dynamic standing balance SBA with standard walker BBS: 21/56 (8/16/23)   BBS: >35/56  FGA: TBD   BBS: >45/56  FGA: TBD   Date Family Teach Completed        Is additional Family Teaching Needed? Y or N Y Y      Hindering Progress Debility, PLOF, B LE weakness, cognition  Debility, PLOF, B LE weakness, cognition      PT recommended ELOS 3-4 weeks         Team's Discharge Plan        Therapist at Team Meeting          General:        HRmax: 158 bpm       Beta blockers (Y/N): Y      Adjusted HRmax: 148 bpm   Blood pressure (mmHg): AM:   - Prior to Tx: NT  - During Tx: NT  - Post Tx: NT PM:  - Prior to Tx: NT  - During Tx: NT  - Post Tx: NT   Time spent in HR ranges: Moderate intensity (60-70% HRmax): AM: NT due to concurrent session PM: NT due to concurrent session   High intensity (70-85% HRmax): AM: NT PM: NT   Max RPE reported:  AM: NT PM: NT     Therapeutic Exercise:   AM: Ambulation with R SPC, 75 feet x 2 reps with Janelle  PM: Ambulation with R SPC, 75 feet x 1 rep with Janelle    Patient education  Pt educated on safe sequencing with SPC for stability    Patient response to education:   Pt verbalized understanding Pt demonstrated skill Pt requires further education in this area   yes partial yes     Additional Comments: Pt remains pleasant and agreeable to activity. Mobility training with standard walker reviewed in anticipation of discharge home tomorrow. [facility] training reviewed as an alternative device to progress toward using full-time per pt's goals. Recommend pt utilizes [address] at all times upon discharge until advised otherwise by future therapist. Plan for FT prior to discharge tomorrow. AM  Time in: 1000  Time out: 1045    PM  Time in: 1430  Time out: 1515    Pt is making good progress toward established Physical Therapy goals.   Continue with

## 2023-08-18 VITALS
TEMPERATURE: 97 F | DIASTOLIC BLOOD PRESSURE: 76 MMHG | BODY MASS INDEX: 39.49 KG/M2 | SYSTOLIC BLOOD PRESSURE: 129 MMHG | RESPIRATION RATE: 17 BRPM | HEIGHT: 73 IN | WEIGHT: 298 LBS | OXYGEN SATURATION: 97 % | HEART RATE: 82 BPM

## 2023-08-18 LAB
GLUCOSE BLD-MCNC: 192 MG/DL (ref 74–99)
GLUCOSE BLD-MCNC: 197 MG/DL (ref 74–99)

## 2023-08-18 PROCEDURE — 97530 THERAPEUTIC ACTIVITIES: CPT

## 2023-08-18 PROCEDURE — 6370000000 HC RX 637 (ALT 250 FOR IP): Performed by: PHYSICAL MEDICINE & REHABILITATION

## 2023-08-18 PROCEDURE — 97130 THER IVNTJ EA ADDL 15 MIN: CPT

## 2023-08-18 PROCEDURE — 6370000000 HC RX 637 (ALT 250 FOR IP): Performed by: NURSE PRACTITIONER

## 2023-08-18 PROCEDURE — 82962 GLUCOSE BLOOD TEST: CPT

## 2023-08-18 PROCEDURE — 6360000002 HC RX W HCPCS: Performed by: PHYSICAL MEDICINE & REHABILITATION

## 2023-08-18 PROCEDURE — 97129 THER IVNTJ 1ST 15 MIN: CPT

## 2023-08-18 PROCEDURE — 97110 THERAPEUTIC EXERCISES: CPT

## 2023-08-18 RX ADMIN — LOSARTAN POTASSIUM 50 MG: 50 TABLET, FILM COATED ORAL at 09:17

## 2023-08-18 RX ADMIN — TICAGRELOR 90 MG: 90 TABLET ORAL at 09:17

## 2023-08-18 RX ADMIN — DULOXETINE HYDROCHLORIDE 30 MG: 30 CAPSULE, DELAYED RELEASE ORAL at 09:15

## 2023-08-18 RX ADMIN — ENOXAPARIN SODIUM 30 MG: 100 INJECTION SUBCUTANEOUS at 09:15

## 2023-08-18 RX ADMIN — GABAPENTIN 600 MG: 300 CAPSULE ORAL at 13:17

## 2023-08-18 RX ADMIN — METFORMIN HYDROCHLORIDE 1000 MG: 1000 TABLET, FILM COATED ORAL at 09:15

## 2023-08-18 RX ADMIN — DIVALPROEX SODIUM 250 MG: 125 CAPSULE, COATED PELLETS ORAL at 09:15

## 2023-08-18 RX ADMIN — METOPROLOL TARTRATE 50 MG: 50 TABLET, FILM COATED ORAL at 09:17

## 2023-08-18 RX ADMIN — PANTOPRAZOLE SODIUM 40 MG: 40 TABLET, DELAYED RELEASE ORAL at 06:36

## 2023-08-18 RX ADMIN — GABAPENTIN 600 MG: 300 CAPSULE ORAL at 09:15

## 2023-08-18 RX ADMIN — ASPIRIN 81 MG: 81 TABLET, COATED ORAL at 09:15

## 2023-08-18 ASSESSMENT — PAIN SCALES - GENERAL
PAINLEVEL_OUTOF10: 0

## 2023-08-18 ASSESSMENT — PAIN SCALES - WONG BAKER
WONGBAKER_NUMERICALRESPONSE: 0

## 2023-08-18 NOTE — DISCHARGE SUMMARY
30 mLs by mouth every 6 hours as needed for Diarrhea      divalproex (DEPAKOTE SPRINKLE) 125 MG DR capsule Take 2 capsules by mouth 2 times daily  Qty: 60 capsule, Refills: 3      glucose 4 g chewable tablet Take 4 tablets by mouth as needed for Low blood sugar  Qty: 60 tablet, Refills: 3      insulin glargine-yfgn (SEMGLEE-YFGN) 100 UNIT/ML injection vial Inject 15 Units into the skin nightly  Qty: 1 each, Refills: 1      insulin lispro (HUMALOG) 100 UNIT/ML SOLN injection vial Inject 0-16 Units into the skin 4 times daily (before meals and nightly)  Qty: 1 each, Refills: 1      melatonin 3 MG TABS tablet Take 2 tablets by mouth nightly as needed (insomnia)  Refills: 3      metoprolol tartrate (LOPRESSOR) 50 MG tablet Take 1 tablet by mouth 2 times daily  Qty: 60 tablet, Refills: 3      polyethylene glycol (GLYCOLAX) 17 g packet Take 1 packet by mouth daily as needed for Constipation  Qty: 527 g, Refills: 1      ticagrelor (BRILINTA) 90 MG TABS tablet Take 1 tablet by mouth 2 times daily  Qty: 60 tablet, Refills: 1                Details   losartan (COZAAR) 50 MG tablet Take 1 tablet by mouth daily  Qty: 30 tablet, Refills: 3                Details   pantoprazole (PROTONIX) 40 MG tablet Take 1 tablet by mouth daily      gabapentin (NEURONTIN) 300 MG capsule Take 2 capsules by mouth 4 times daily.       albuterol sulfate HFA (PROVENTIL;VENTOLIN;PROAIR) 108 (90 Base) MCG/ACT inhaler Inhale 2 puffs into the lungs 4 times daily as needed for Wheezing      fenofibrate (TRICOR) 54 MG tablet Take one daily  Qty: 90 tablet, Refills: 5      Multiple Vitamin (MULTI VITAMIN PO) Take by mouth daily      metFORMIN (GLUCOPHAGE) 1000 MG tablet Take 1 tablet by mouth 2 times daily (with meals)      DULoxetine (CYMBALTA) 30 MG extended release capsule Take 1 capsule by mouth daily      Cholecalciferol (VITAMIN D3) 2000 UNITS CAPS Take 1 capsule by mouth daily      tamsulosin (FLOMAX) 0.4 MG capsule Take 2 capsules by mouth daily Indications: take 2 /daily      fluticasone (FLONASE) 50 MCG/ACT nasal spray 1 spray by Nasal route 2 times daily as needed for Rhinitis      budesonide-formoterol (SYMBICORT) 160-4.5 MCG/ACT AERO Inhale 2 puffs into the lungs 2 times daily as needed      Coenzyme Q10 (CO Q 10) 100 MG CAPS Take by mouth daily                  Activity: activity as tolerated and no driving until cleared and completed a drivers evaluation program.  Diet: diabetic diet  Wound Care: none needed  DME Orders after Discharge: Wife to bring in walker to be adjusted/converted to standard. Wheelchair    Follow-up:   [address] [place], [place]. Schedule an appointment as soon as possible for a   visit in 1 week(s). Specialty: Family Medicine  Why: Hospital Follow up  Contact information:  [address] [place] [id number]  [address] Follow up. Specialty: [facility]  Why: IQ,OXJ,PZ,TW,UO  Contact information:  [address], [address] [facility] [address]  [phone]             [facility] HOME MEDICAL EQUIPMENT Follow up. Specialty: Durable Medical Equipment  Why: Wheelchair to deliver to room prior to discharge  Contact information:  [address]  [phone]             Melany Riojas MD Follow up. Specialty: Cardiology  Contact information:  [address] [place] [address]             Ana Perry MD Follow up in 1 month(s).     Specialty: Physical Medicine and Rehab  Contact information:  [address]  [address] [facility] [address]  [address] [address] [phone]

## 2023-08-18 NOTE — PROGRESS NOTES
Pt d/c per orders. D/c instructions explained to pt and wife, questions answered. New medications sent to pt home pharmacy prior to depart. Pt d/c to home. Pt off floor at this time.

## 2023-08-18 NOTE — PROGRESS NOTES
Occupational Therapy  OCCUPATIONAL THERAPY DAILY NOTE/DISCHARGE SUMMARY    Date:2023  Patient Name: Chan Browne  MRN: [id number]  : 1950  Room: [address] [place] [address]A     Referring Practitioner: Lucina Anne MD  Diagnosis:  s/p cardiac cath & stent 23 & post op CVA- ischemic  Additional Pertinent Hx: CAD, DM, HLD, HTN, JOSÉ MIGUEL, DM, hx falls- per pt 7 falls recently, asthma, anxiety  Restrictions/Precautions: Fall Risk, seizure, SBP<180, LLE weak, cognitive deficits, diet- 5 carb- low fat/low cholesterol/BRAYDEN & high fiber & had sitter  Discharge Recommendations: Home with assist as needed & OT home health/aides      Functional Assessment:   Date Status AE  Comments   Feeding 23 Mod I     Grooming 23 Mod I wc       Oral Care 23 Mod I wc    Bathing 23 Min A stw  Shower bench  HH shower  Grab bar    UB Dressing 23 Mod I     LB Dressing 23 CGA stw       Footwear 23  Min A     Toileting 23 Min A     Homemaking 23 SBA/CGA stw      Functional Transfers / Balance:   Date Status DME  Comments   Sit Balance 23 Mod I     Stand Balance 23 CGA/SBA     [] Tub  [x] Shower   Transfer 23 CGA Grab bar  wc    Commode   Transfer 23 SBA Grab bar  3:1 commode    Functional   Mobility 23      CGA/SBA Ww      Other: sit<>stand    EOB<wc    Supine<>sit    SPT bed to w/c     On/off firm recliner and standard queen bed  23  SBA    SBA    Mod I    Min A     Min A      Ww        Ww    ww      Functional Exercises / Activity:    Patient performed BUE theraband and flexbar exercise program to promote strengthening and endurance training for improved core and postural stability to perform functional standing and transfers. Patient completed 3 sets of 10 with yellow resistance theraband completing x5 different exercises targeting the various planes of the UE.  Patient performed 3 sets of 10 with green resistance flexbar

## 2023-08-18 NOTE — PROGRESS NOTES
Physical Therapy  Treatment note     Supervising Therapist: Rocío Ayala, PT, DPT [id number]      ROOM: [facility]  DIAGNOSIS: acute ischemic stroke   PRECAUTIONS:  Falls, SBP <180, seizure, cognition, bed/chair alarm, L LE weakness > R LE, sitter, impulsive,  behavior     HPI: Patient initially presented to the ED complaining of chest pain on 07/26/2023. Patient underwent a stress test which was abnormal and with his chest pain, cardiology decided patient should undergo a cardiac catheterization. Patient underwent the cardiac catheterization on 07/26/2023 with stent placement. Shortly after the procedure, patient developed slurred speech and right facial droop for which a stroke alert was called. Patient underwent a series of CT exams which showed ischemia in the left temporal lobe with multiple foci of severe stenosis in the proximal P2 segment of the right posterior cerebral artery. Neurology was consulted at this time and after return from CT scan patient began to have seizure-like activity and was found to be not breathing and pulseless in which a CODE BLUE was called. Patient spontaneously began breathing on his own and was found to have a pulse however was not protecting his airway and was intubated for airway protection. MRI revealed a small 3mm infarction in the left temporal lobe. Patient extubated on 07/28/2023. Patient has a history of CAD, DM, hyperlipidemia, hypertension, sleep apnea, and asthma. Social: Pt lives with wife in a 1 story home with 4- 5 stairs to enter and 1 rail. Pt ambulated with WW inside the home and [facility] in the community independently PTA. Pt's wife works full time. Pt is retired. Pt reports history of 7 falls in the last 3 months. Pt reports he has struggled with B knee/LE weakness PTA. Fall contributed to B LE buckling and unsteadiness. Initial Evaluation  Date: 8/2/23 AM     PM    Short Term Goals Long Term Goals    Was pt agreeable to Eval/treatment?  Yes  Yes DPT  Board Certified Clinical Specialist in Neurologic Physical Therapy  [id number]

## 2023-08-18 NOTE — PROGRESS NOTES
Speech Language Pathology  ACUTE REHABILITATION  DAILY PROGRESS NOTE / DISCHARGE SUMMARY            SWALLOWING:      Diet:  Regular consistency solids with thin liquids (IDDSI level 0)    Patient no longer requires supervision during mealtimes. Patient eats his meals in the dining room per preference. LANGUAGE:    Occasionally requires increased time for processing as well as occasional repetition/clarification of directives. Continued occasional difficulty with word retrieval during conversational tasks. COGNITION:      Co-tx with OT. Patient's wife present for a family teach session. Reviewed impairments noted on initial evaluation and progress made in therapy. Discussed recommendation for continued speech therapy following discharge today. Patient benefited from cues for hand placement during completion of functional transfers in the therapy apartment. SLP discussed patient's tendency to complete motor and verbal/written tasks too quickly at times and emphasized importance of recalling strategies and thinking things through. SLP provided education regarding use of compensatory strategies for memory (e.g. repetition, using visualizations, writing things down, telling loved ones, setting locations for specific items, keeping routines) with patient and his wife    SPEECH:    Functional     Minute Tracking:    Individual therapy:   10 minutes  Concurrent therapy:    0 minutes  Group therapy:     0 minutes  Co-treatment therapy:  20 minutes    Total minutes for 8/18/2023: 30 minutes    SAFETY:      Fair; patient with telesitter in place. EDUCATION:    SLP completed education with the patient and his wife regarding type of cognitive impairment. Discussed compensatory strategies to assist in improving attention, STM/LTM, problem solving, abstract reasoning and safety/insight. Encouraged patient and his wife to engage SLP in structured Q&A session relative to identified deficit areas.  Patient and his

## 2023-08-18 NOTE — PROGRESS NOTES
Physical Therapy  Discharge Report    Supervising Therapist: Jeremi Christensen, PT, DPT [id number]      ROOM: [address] [place], [place] [address]  DIAGNOSIS: acute ischemic stroke   PRECAUTIONS:  Falls, SBP <180, seizure, cognition, bed/chair alarm, L LE weakness > R LE, sitter, impulsive,  behavior     HPI: Patient initially presented to the ED complaining of chest pain on 07/26/2023. Patient underwent a stress test which was abnormal and with his chest pain, cardiology decided patient should undergo a cardiac catheterization. Patient underwent the cardiac catheterization on 07/26/2023 with stent placement. Shortly after the procedure, patient developed slurred speech and right facial droop for which a stroke alert was called. Patient underwent a series of CT exams which showed ischemia in the left temporal lobe with multiple foci of severe stenosis in the proximal P2 segment of the right posterior cerebral artery. Neurology was consulted at this time and after return from CT scan patient began to have seizure-like activity and was found to be not breathing and pulseless in which a CODE BLUE was called. Patient spontaneously began breathing on his own and was found to have a pulse however was not protecting his airway and was intubated for airway protection. MRI revealed a small 3mm infarction in the left temporal lobe. Patient extubated on 07/28/2023. Patient has a history of CAD, DM, hyperlipidemia, hypertension, sleep apnea, and asthma. Social: Pt lives with wife in a 1 story home with 4- 5 stairs to enter and 1 rail. Pt ambulated with WW inside the home and [facility] in the community independently PTA. Pt's wife works full time. Pt is retired. Pt reports history of 7 falls in the last 3 months. Pt reports he has struggled with B knee/LE weakness PTA. Fall contributed to B LE buckling and unsteadiness. Initial Evaluation  Date: 8/2/23 Discharge 8/18/23    Short Term Goals Long Term Goals    Was pt agreeable to Eval/treatment? Yes  Yes      Does pt have pain?  L hip and L shoulder, not rated   Denies pain      Bed Mobility  Rolling: Janelle  Supine to sit: Janelle  Sit to supine: Janelle  Scooting: Janelle Mod I all aspects (twin bed) Supervision  Modified Independent   Transfers Sit to stand: ModA  Stand to sit: ModA  Stand pivot: ModA with [employer]    5xSTS: NT Sit to stand: Supervision  Stand to sit: Supervision  Stand pivot: Supervision with standard walker SBA  5xSTS: TBD Mod I  5xSTS: TBD   Ambulation    25 feet with [employer] with ModA    WC follow     10mWT: 0.29 m/s (ModA, [employer], chair follow  6mWT: 28 m  (3:10 minutes, WC follow, ModA, [employer])  200 feet x 1 rep with standard walker with Supervision    10mWT: 0.35 m/s  6mWT: 90 m  (WC follow, Supervision, Standard walker, 8/16/23) 150 feet with AAD with SBA    10mWT: >0.6 m/s   6mWT: 100 m  >250 feet with AAD with Supervision    10mWT: > 0.8 m/s   6mWT: >200 m    Walking 10 feet on uneven surface NT 10 feet with standard walker with SBA 10 feet with AAD with Janelle 10 feet with AAD with Mod I   Wheel Chair Mobility  feet with BUE with Mod I     Car Transfers NT SBA Janelle Mod I   Stair negotiation: ascended and descended  NT 12 steps x 1 rep with R HR, L SPC with CGA/SBA 4 steps with 1-2 rail with Janelle 8-12 steps with 1-2 rail with SBA   Curb Step:   ascended and descended NT 4\" step with [employer] with SBA 4 inch step with AAD and Janelle 4 inch step with AAD and SBA   Picking up object off the floor NT Pt picked up cone with standard walker and SBA Will  cone/shoe with reacher and support from AAD Janelle  Will  cone/shoe with reacher and support from AAD Mod I      BLE ROM WFL NT     BLE Strength L LE:  Hip 3+/5   Knee 3-/5   Ankle 4/5     R LE:   Hip 4/5   Knee 3+/5   Ankle 4/5  NT     Balance  Static and dynamic sitting: SBA    Dynamic standing: at [employer] with ModA    BBS: NT  FGA: NT BBS: 21/56 (8/16/23)    Dynamic standing balance SBA with standard walker   BBS: >35/56  FGA: TBD   BBS: >45/56  FGA: TBD

## 2023-08-22 ENCOUNTER — OFFICE VISIT (OUTPATIENT)
Dept: CARDIOLOGY CLINIC | Age: 73
End: 2023-08-22
Payer: MEDICARE

## 2023-08-22 VITALS
WEIGHT: 278.8 LBS | HEART RATE: 71 BPM | RESPIRATION RATE: 18 BRPM | BODY MASS INDEX: 36.95 KG/M2 | DIASTOLIC BLOOD PRESSURE: 82 MMHG | SYSTOLIC BLOOD PRESSURE: 147 MMHG | HEIGHT: 73 IN

## 2023-08-22 DIAGNOSIS — I25.10 CAD IN NATIVE ARTERY: Primary | ICD-10-CM

## 2023-08-22 DIAGNOSIS — E66.01 MORBID OBESITY (HCC): ICD-10-CM

## 2023-08-22 DIAGNOSIS — E78.5 HYPERLIPIDEMIA, UNSPECIFIED HYPERLIPIDEMIA TYPE: ICD-10-CM

## 2023-08-22 PROBLEM — R94.39 ABNORMAL NUCLEAR STRESS TEST: Status: RESOLVED | Noted: 2023-07-27 | Resolved: 2023-08-22

## 2023-08-22 PROBLEM — F06.30 MOOD DISORDER DUE TO MEDICAL CONDITION: Status: RESOLVED | Noted: 2023-08-03 | Resolved: 2023-08-22

## 2023-08-22 PROBLEM — I63.9 ACUTE ISCHEMIC STROKE (HCC): Status: RESOLVED | Noted: 2023-08-01 | Resolved: 2023-08-22

## 2023-08-22 PROBLEM — R46.89 AGGRESSION: Status: RESOLVED | Noted: 2023-08-03 | Resolved: 2023-08-22

## 2023-08-22 PROBLEM — R26.2 DISABILITY OF WALKING: Status: RESOLVED | Noted: 2017-10-23 | Resolved: 2023-08-22

## 2023-08-22 PROBLEM — I25.83 CORONARY ARTERY DISEASE DUE TO LIPID RICH PLAQUE: Status: RESOLVED | Noted: 2023-07-27 | Resolved: 2023-08-22

## 2023-08-22 PROBLEM — Z95.5 S/P PRIMARY ANGIOPLASTY WITH CORONARY STENT: Status: RESOLVED | Noted: 2023-07-27 | Resolved: 2023-08-22

## 2023-08-22 PROBLEM — R45.1 AGITATION: Status: RESOLVED | Noted: 2023-08-03 | Resolved: 2023-08-22

## 2023-08-22 PROBLEM — R56.9 SEIZURE-LIKE ACTIVITY (HCC): Status: RESOLVED | Noted: 2023-07-29 | Resolved: 2023-08-22

## 2023-08-22 PROBLEM — R40.4 STARING EPISODES: Status: RESOLVED | Noted: 2023-08-03 | Resolved: 2023-08-22

## 2023-08-22 PROCEDURE — 1036F TOBACCO NON-USER: CPT | Performed by: INTERNAL MEDICINE

## 2023-08-22 PROCEDURE — 3017F COLORECTAL CA SCREEN DOC REV: CPT | Performed by: INTERNAL MEDICINE

## 2023-08-22 PROCEDURE — 99215 OFFICE O/P EST HI 40 MIN: CPT | Performed by: INTERNAL MEDICINE

## 2023-08-22 PROCEDURE — G8427 DOCREV CUR MEDS BY ELIG CLIN: HCPCS | Performed by: INTERNAL MEDICINE

## 2023-08-22 PROCEDURE — G8417 CALC BMI ABV UP PARAM F/U: HCPCS | Performed by: INTERNAL MEDICINE

## 2023-08-22 PROCEDURE — 1111F DSCHRG MED/CURRENT MED MERGE: CPT | Performed by: INTERNAL MEDICINE

## 2023-08-22 PROCEDURE — 1123F ACP DISCUSS/DSCN MKR DOCD: CPT | Performed by: INTERNAL MEDICINE

## 2023-08-22 PROCEDURE — 93000 ELECTROCARDIOGRAM COMPLETE: CPT | Performed by: INTERNAL MEDICINE

## 2023-08-22 RX ORDER — CLOPIDOGREL BISULFATE 75 MG/1
75 TABLET ORAL DAILY
Qty: 90 TABLET | Refills: 5 | Status: SHIPPED | OUTPATIENT
Start: 2023-08-22

## 2023-08-22 NOTE — PROGRESS NOTES
Chief Complaint   Patient presents with    Coronary Artery Disease       Patient Active Problem List    Diagnosis Date Noted    HLD (hyperlipidemia) 08/22/2023    Morbid obesity ([address]) 08/22/2023    CAD in native artery 07/26/2023     Overview Note:     A. PCI of OM 7/26/2023 ([place]); normal LV        Moderate persistent asthma without complication 46/73/1739    Uncontrolled type 2 diabetes mellitus 03/17/2014    Obstructive sleep apnea syndrome 03/22/2011       Current Outpatient Medications   Medication Sig Dispense Refill    Insulin Glargine (BASAGLAR KWIKPEN SC) Inject 66 Units into the skin in the morning and at bedtime      aspirin 81 MG EC tablet Take 1 tablet by mouth daily 30 tablet 3    divalproex (DEPAKOTE SPRINKLE) 125 MG DR capsule Take 2 capsules by mouth 2 times daily 60 capsule 3    insulin lispro (HUMALOG) 100 UNIT/ML SOLN injection vial Inject 0-16 Units into the skin 4 times daily (before meals and nightly) 1 each 1    losartan (COZAAR) 50 MG tablet Take 1 tablet by mouth daily 30 tablet 3    melatonin 3 MG TABS tablet Take 2 tablets by mouth nightly as needed (insomnia)  3    metoprolol tartrate (LOPRESSOR) 50 MG tablet Take 1 tablet by mouth 2 times daily 60 tablet 3    pantoprazole (PROTONIX) 40 MG tablet Take 1 tablet by mouth daily      gabapentin (NEURONTIN) 300 MG capsule Take 2 capsules by mouth 4 times daily.       albuterol sulfate HFA (PROVENTIL;VENTOLIN;PROAIR) 108 (90 Base) MCG/ACT inhaler Inhale 2 puffs into the lungs 4 times daily as needed for Wheezing      fenofibrate (TRICOR) 54 MG tablet Take one daily 90 tablet 5    Multiple Vitamin (MULTI VITAMIN PO) Take by mouth daily      metFORMIN (GLUCOPHAGE) 1000 MG tablet Take 1 tablet by mouth 2 times daily (with meals)      DULoxetine (CYMBALTA) 60 MG extended release capsule Take 1 capsule by mouth daily      Cholecalciferol (VITAMIN D3) 2000 UNITS CAPS Take 1 capsule by mouth daily      tamsulosin (FLOMAX) 0.4 MG capsule Take 2 capsules

## 2023-10-09 ENCOUNTER — HOSPITAL ENCOUNTER (OUTPATIENT)
Dept: MRI IMAGING | Age: 73
Discharge: HOME | End: 2023-10-11
Payer: MEDICARE

## 2023-10-09 DIAGNOSIS — I69.354 FLACCID HEMIPLEGIA OF LEFT NONDOMINANT SIDE AS LATE EFFECT OF CEREBRAL INFARCTION (HCC): ICD-10-CM

## 2023-10-09 DIAGNOSIS — I63.30: ICD-10-CM

## 2023-10-09 DIAGNOSIS — G45.9 BRAIN TIA: ICD-10-CM

## 2023-10-09 PROCEDURE — 70551 MRI BRAIN STEM W/O DYE: CPT

## 2023-10-25 ENCOUNTER — INITIAL CONSULT (OUTPATIENT)
Dept: NEUROSURGERY | Age: 73
End: 2023-10-25
Payer: MEDICARE

## 2023-10-25 VITALS
BODY MASS INDEX: 36.84 KG/M2 | RESPIRATION RATE: 16 BRPM | HEART RATE: 81 BPM | DIASTOLIC BLOOD PRESSURE: 90 MMHG | WEIGHT: 278 LBS | OXYGEN SATURATION: 91 % | SYSTOLIC BLOOD PRESSURE: 172 MMHG | HEIGHT: 73 IN

## 2023-10-25 DIAGNOSIS — G93.89 CEREBRAL VENTRICULOMEGALY: Primary | ICD-10-CM

## 2023-10-25 PROCEDURE — G8417 CALC BMI ABV UP PARAM F/U: HCPCS | Performed by: NEUROLOGICAL SURGERY

## 2023-10-25 PROCEDURE — 1036F TOBACCO NON-USER: CPT | Performed by: NEUROLOGICAL SURGERY

## 2023-10-25 PROCEDURE — 99204 OFFICE O/P NEW MOD 45 MIN: CPT | Performed by: NEUROLOGICAL SURGERY

## 2023-10-25 PROCEDURE — 99202 OFFICE O/P NEW SF 15 MIN: CPT

## 2023-10-25 PROCEDURE — 3017F COLORECTAL CA SCREEN DOC REV: CPT | Performed by: NEUROLOGICAL SURGERY

## 2023-10-25 PROCEDURE — 1123F ACP DISCUSS/DSCN MKR DOCD: CPT | Performed by: NEUROLOGICAL SURGERY

## 2023-10-25 PROCEDURE — G8427 DOCREV CUR MEDS BY ELIG CLIN: HCPCS | Performed by: NEUROLOGICAL SURGERY

## 2023-10-25 PROCEDURE — G8484 FLU IMMUNIZE NO ADMIN: HCPCS | Performed by: NEUROLOGICAL SURGERY

## 2023-10-25 ASSESSMENT — ENCOUNTER SYMPTOMS
GASTROINTESTINAL NEGATIVE: 1
RESPIRATORY NEGATIVE: 1
EYES NEGATIVE: 1
BACK PAIN: 1

## 2023-10-26 NOTE — PROGRESS NOTES
Piper Aguilar (:  1950) is a 68 y.o. male,New patient, here for evaluation of the following chief complaint(s):  New Patient (Referred by dr Ariane Coronado for hydrocephalis  having memory loss   has had several falls )         ASSESSMENT/PLAN:  1. Cerebral ventriculomegaly  68year old male with gait instability and memory problems. He has some ventriculomegaly suspicious for NPH. I would like to do a drain trial but he is on dual antiplatelet therapy for fresh cardiac stents,. Unable to perform trial until we can have him of antiplatelet therapy. He has a video visit with neurology     No follow-ups on file. Subjective   SUBJECTIVE/OBJECTIVE:  HPI  68year old male who presents with a long term history of shuffling gait. He recently had developed memory problems and altered mental status. He did recently have a stroke. He also has a recent cardiac cath with stents and is on Aspirin and Plavix. His MRI shows some ventriculomegaly. He denies any urinary incontinence. He denies any back pain or numbness or tingling. He states that he has bad knees. Review of Systems   Constitutional: Negative. HENT: Negative. Eyes: Negative. Respiratory: Negative. Cardiovascular: Negative. Gastrointestinal: Negative. Endocrine: Negative. Genitourinary: Negative. Musculoskeletal:  Positive for arthralgias, back pain and gait problem. Skin: Negative. Hematological:  Bruises/bleeds easily. Psychiatric/Behavioral: Negative. Objective   Physical Exam  Vitals reviewed. Constitutional:       General: He is not in acute distress. Appearance: Normal appearance. He is normal weight. He is not ill-appearing or toxic-appearing. HENT:      Head: Normocephalic and atraumatic. Nose: Nose normal. No congestion. Mouth/Throat:      Mouth: Mucous membranes are moist.      Pharynx: Oropharynx is clear. No oropharyngeal exudate.    Eyes:      General: No visual

## 2023-11-03 ENCOUNTER — APPOINTMENT (OUTPATIENT)
Dept: GENERAL RADIOLOGY | Age: 73
DRG: 884 | End: 2023-11-03
Payer: MEDICARE

## 2023-11-03 ENCOUNTER — HOSPITAL ENCOUNTER (INPATIENT)
Age: 73
LOS: 4 days | Discharge: NURSING, REHAB OR LONG-TERM CARE | DRG: 884 | End: 2023-11-07
Attending: EMERGENCY MEDICINE | Admitting: INTERNAL MEDICINE
Payer: MEDICARE

## 2023-11-03 ENCOUNTER — APPOINTMENT (OUTPATIENT)
Dept: CT IMAGING | Age: 73
DRG: 884 | End: 2023-11-03
Payer: MEDICARE

## 2023-11-03 DIAGNOSIS — G91.2 NORMAL PRESSURE HYDROCEPHALUS (HCC): ICD-10-CM

## 2023-11-03 DIAGNOSIS — R41.82 ALTERED MENTAL STATUS, UNSPECIFIED ALTERED MENTAL STATUS TYPE: Primary | ICD-10-CM
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
## 2023-11-03 LAB
ALBUMIN SERPL-MCNC: 4 G/DL (ref 3.5–5.2)
ALP SERPL-CCNC: 50 U/L (ref 40–129)
ALT SERPL-CCNC: 28 U/L (ref 0–40)
ANION GAP SERPL CALCULATED.3IONS-SCNC: 9 MMOL/L (ref 7–16)
AST SERPL-CCNC: 29 U/L (ref 0–39)
B PARAP IS1001 DNA NPH QL NAA+NON-PROBE: NOT DETECTED
B PERT DNA SPEC QL NAA+PROBE: NOT DETECTED
BASOPHILS # BLD: 0.02 K/UL (ref 0–0.2)
BASOPHILS NFR BLD: 0 % (ref 0–2)
BILIRUB DIRECT SERPL-MCNC: <0.2 MG/DL (ref 0–0.3)
BILIRUB INDIRECT SERPL-MCNC: NORMAL MG/DL (ref 0–1)
BILIRUB SERPL-MCNC: 0.7 MG/DL (ref 0–1.2)
BILIRUB UR QL STRIP: NEGATIVE
BUN SERPL-MCNC: 19 MG/DL (ref 6–23)
C PNEUM DNA NPH QL NAA+NON-PROBE: NOT DETECTED
CALCIUM SERPL-MCNC: 9.4 MG/DL (ref 8.6–10.2)
CHLORIDE SERPL-SCNC: 100 MMOL/L (ref 98–107)
CLARITY UR: CLEAR
CO2 SERPL-SCNC: 29 MMOL/L (ref 22–29)
COLOR UR: YELLOW
CREAT SERPL-MCNC: 0.8 MG/DL (ref 0.7–1.2)
EKG ATRIAL RATE: 73 BPM
EKG P AXIS: 50 DEGREES
EKG P-R INTERVAL: 244 MS
EKG Q-T INTERVAL: 426 MS
EKG QRS DURATION: 102 MS
EKG QTC CALCULATION (BAZETT): 469 MS
EKG R AXIS: -40 DEGREES
EKG T AXIS: 58 DEGREES
EKG VENTRICULAR RATE: 73 BPM
EOSINOPHIL # BLD: 0.16 K/UL (ref 0.05–0.5)
EOSINOPHILS RELATIVE PERCENT: 3 % (ref 0–6)
ERYTHROCYTE [DISTWIDTH] IN BLOOD BY AUTOMATED COUNT: 14 % (ref 11.5–15)
FLUAV RNA NPH QL NAA+NON-PROBE: NOT DETECTED
FLUBV RNA NPH QL NAA+NON-PROBE: NOT DETECTED
GFR SERPL CREATININE-BSD FRML MDRD: >60 ML/MIN/1.73M2
GLUCOSE BLD-MCNC: 192 MG/DL (ref 74–99)
GLUCOSE BLD-MCNC: 87 MG/DL (ref 74–99)
GLUCOSE BLD-MCNC: 91 MG/DL (ref 74–99)
GLUCOSE SERPL-MCNC: 183 MG/DL (ref 74–99)
GLUCOSE UR STRIP-MCNC: NEGATIVE MG/DL
HADV DNA NPH QL NAA+NON-PROBE: NOT DETECTED
HCOV 229E RNA NPH QL NAA+NON-PROBE: NOT DETECTED
HCOV HKU1 RNA NPH QL NAA+NON-PROBE: NOT DETECTED
HCOV NL63 RNA NPH QL NAA+NON-PROBE: NOT DETECTED
HCOV OC43 RNA NPH QL NAA+NON-PROBE: NOT DETECTED
HCT VFR BLD AUTO: 35.9 % (ref 37–54)
HGB BLD-MCNC: 12 G/DL (ref 12.5–16.5)
HGB UR QL STRIP.AUTO: NEGATIVE
HMPV RNA NPH QL NAA+NON-PROBE: NOT DETECTED
HPIV1 RNA NPH QL NAA+NON-PROBE: NOT DETECTED
HPIV2 RNA NPH QL NAA+NON-PROBE: NOT DETECTED
HPIV3 RNA NPH QL NAA+NON-PROBE: NOT DETECTED
HPIV4 RNA NPH QL NAA+NON-PROBE: NOT DETECTED
IMM GRANULOCYTES # BLD AUTO: <0.03 K/UL (ref 0–0.58)
IMM GRANULOCYTES NFR BLD: 0 % (ref 0–5)
INR PPP: 1.2
KETONES UR STRIP-MCNC: NEGATIVE MG/DL
LACTATE BLDV-SCNC: 1.8 MMOL/L (ref 0.5–2.2)
LEUKOCYTE ESTERASE UR QL STRIP: NEGATIVE
LIPASE SERPL-CCNC: 24 U/L (ref 13–60)
LYMPHOCYTES NFR BLD: 1.61 K/UL (ref 1.5–4)
LYMPHOCYTES RELATIVE PERCENT: 34 % (ref 20–42)
M PNEUMO DNA NPH QL NAA+NON-PROBE: NOT DETECTED
MCH RBC QN AUTO: 31 PG (ref 26–35)
MCHC RBC AUTO-ENTMCNC: 33.4 G/DL (ref 32–34.5)
MCV RBC AUTO: 92.8 FL (ref 80–99.9)
MONOCYTES NFR BLD: 0.51 K/UL (ref 0.1–0.95)
MONOCYTES NFR BLD: 11 % (ref 2–12)
NEUTROPHILS NFR BLD: 51 % (ref 43–80)
NEUTS SEG NFR BLD: 2.37 K/UL (ref 1.8–7.3)
NITRITE UR QL STRIP: NEGATIVE
PH UR STRIP: 6 [PH] (ref 5–9)
PLATELET # BLD AUTO: 253 K/UL (ref 130–450)
PMV BLD AUTO: 9.6 FL (ref 7–12)
POTASSIUM SERPL-SCNC: 3.8 MMOL/L (ref 3.5–5)
PROT SERPL-MCNC: 6.9 G/DL (ref 6.4–8.3)
PROT UR STRIP-MCNC: 30 MG/DL
PROTHROMBIN TIME: 13.1 SEC (ref 9.3–12.4)
RBC # BLD AUTO: 3.87 M/UL (ref 3.8–5.8)
RBC #/AREA URNS HPF: NORMAL /HPF
RSV RNA NPH QL NAA+NON-PROBE: NOT DETECTED
RV+EV RNA NPH QL NAA+NON-PROBE: NOT DETECTED
SARS-COV-2 RNA NPH QL NAA+NON-PROBE: NOT DETECTED
SODIUM SERPL-SCNC: 138 MMOL/L (ref 132–146)
SP GR UR STRIP: 1.02 (ref 1–1.03)
SPECIMEN DESCRIPTION: NORMAL
TROPONIN I SERPL HS-MCNC: 31 NG/L (ref 0–11)
TROPONIN I SERPL HS-MCNC: 32 NG/L (ref 0–11)
UROBILINOGEN UR STRIP-ACNC: 2 EU/DL (ref 0–1)
WBC #/AREA URNS HPF: NORMAL /HPF
WBC OTHER # BLD: 4.7 K/UL (ref 4.5–11.5)

## 2023-11-03 PROCEDURE — 82248 BILIRUBIN DIRECT: CPT

## 2023-11-03 PROCEDURE — 85610 PROTHROMBIN TIME: CPT

## 2023-11-03 PROCEDURE — 71045 X-RAY EXAM CHEST 1 VIEW: CPT

## 2023-11-03 PROCEDURE — 73030 X-RAY EXAM OF SHOULDER: CPT

## 2023-11-03 PROCEDURE — 93005 ELECTROCARDIOGRAM TRACING: CPT

## 2023-11-03 PROCEDURE — 82962 GLUCOSE BLOOD TEST: CPT

## 2023-11-03 PROCEDURE — 99285 EMERGENCY DEPT VISIT HI MDM: CPT

## 2023-11-03 PROCEDURE — 0202U NFCT DS 22 TRGT SARS-COV-2: CPT

## 2023-11-03 PROCEDURE — 93010 ELECTROCARDIOGRAM REPORT: CPT | Performed by: INTERNAL MEDICINE

## 2023-11-03 PROCEDURE — 72125 CT NECK SPINE W/O DYE: CPT

## 2023-11-03 PROCEDURE — 2060000000 HC ICU INTERMEDIATE R&B

## 2023-11-03 PROCEDURE — 85025 COMPLETE CBC W/AUTO DIFF WBC: CPT

## 2023-11-03 PROCEDURE — 81001 URINALYSIS AUTO W/SCOPE: CPT

## 2023-11-03 PROCEDURE — 83690 ASSAY OF LIPASE: CPT

## 2023-11-03 PROCEDURE — 84484 ASSAY OF TROPONIN QUANT: CPT

## 2023-11-03 PROCEDURE — 6360000002 HC RX W HCPCS: Performed by: NURSE PRACTITIONER

## 2023-11-03 PROCEDURE — 83605 ASSAY OF LACTIC ACID: CPT

## 2023-11-03 PROCEDURE — 2580000003 HC RX 258: Performed by: NURSE PRACTITIONER

## 2023-11-03 PROCEDURE — 6370000000 HC RX 637 (ALT 250 FOR IP): Performed by: NURSE PRACTITIONER

## 2023-11-03 PROCEDURE — 80053 COMPREHEN METABOLIC PANEL: CPT

## 2023-11-03 PROCEDURE — 70450 CT HEAD/BRAIN W/O DYE: CPT

## 2023-11-03 RX ORDER — ASPIRIN 81 MG/1
81 TABLET ORAL DAILY
Status: DISCONTINUED | OUTPATIENT
Start: 2023-11-03 | End: 2023-11-07 | Stop reason: HOSPADM

## 2023-11-03 RX ORDER — FENOFIBRATE 54 MG/1
54 TABLET ORAL EVERY MORNING
COMMUNITY

## 2023-11-03 RX ORDER — ACETAMINOPHEN 650 MG/1
650 SUPPOSITORY RECTAL EVERY 6 HOURS PRN
Status: DISCONTINUED | OUTPATIENT
Start: 2023-11-03 | End: 2023-11-07 | Stop reason: HOSPADM

## 2023-11-03 RX ORDER — SODIUM CHLORIDE 0.9 % (FLUSH) 0.9 %
5-40 SYRINGE (ML) INJECTION EVERY 12 HOURS SCHEDULED
Status: DISCONTINUED | OUTPATIENT
Start: 2023-11-03 | End: 2023-11-07 | Stop reason: HOSPADM

## 2023-11-03 RX ORDER — POTASSIUM CHLORIDE 20 MEQ/1
40 TABLET, EXTENDED RELEASE ORAL PRN
Status: DISCONTINUED | OUTPATIENT
Start: 2023-11-03 | End: 2023-11-07 | Stop reason: HOSPADM

## 2023-11-03 RX ORDER — INSULIN LISPRO 100 [IU]/ML
0-16 INJECTION, SOLUTION INTRAVENOUS; SUBCUTANEOUS
Status: DISCONTINUED | OUTPATIENT
Start: 2023-11-03 | End: 2023-11-04 | Stop reason: DRUGHIGH

## 2023-11-03 RX ORDER — CLOPIDOGREL BISULFATE 75 MG/1
75 TABLET ORAL DAILY
Status: DISCONTINUED | OUTPATIENT
Start: 2023-11-03 | End: 2023-11-07 | Stop reason: HOSPADM

## 2023-11-03 RX ORDER — CLOPIDOGREL BISULFATE 75 MG/1
75 TABLET ORAL EVERY MORNING
COMMUNITY

## 2023-11-03 RX ORDER — ASPIRIN 81 MG/1
81 TABLET ORAL NIGHTLY
COMMUNITY

## 2023-11-03 RX ORDER — SODIUM CHLORIDE 0.9 % (FLUSH) 0.9 %
10 SYRINGE (ML) INJECTION PRN
Status: DISCONTINUED | OUTPATIENT
Start: 2023-11-03 | End: 2023-11-07 | Stop reason: HOSPADM

## 2023-11-03 RX ORDER — PANTOPRAZOLE SODIUM 40 MG/1
40 TABLET, DELAYED RELEASE ORAL DAILY
Status: DISCONTINUED | OUTPATIENT
Start: 2023-11-04 | End: 2023-11-07 | Stop reason: HOSPADM

## 2023-11-03 RX ORDER — ENOXAPARIN SODIUM 100 MG/ML
30 INJECTION SUBCUTANEOUS 2 TIMES DAILY
Status: DISCONTINUED | OUTPATIENT
Start: 2023-11-03 | End: 2023-11-07 | Stop reason: HOSPADM

## 2023-11-03 RX ORDER — MELATONIN 10 MG
10 CAPSULE ORAL NIGHTLY PRN
COMMUNITY

## 2023-11-03 RX ORDER — ACETAMINOPHEN 325 MG/1
650 TABLET ORAL EVERY 6 HOURS PRN
Status: DISCONTINUED | OUTPATIENT
Start: 2023-11-03 | End: 2023-11-07 | Stop reason: HOSPADM

## 2023-11-03 RX ORDER — GLIPIZIDE 10 MG/1
10 TABLET, FILM COATED, EXTENDED RELEASE ORAL 2 TIMES DAILY
COMMUNITY

## 2023-11-03 RX ORDER — ONDANSETRON 4 MG/1
4 TABLET, ORALLY DISINTEGRATING ORAL EVERY 8 HOURS PRN
Status: DISCONTINUED | OUTPATIENT
Start: 2023-11-03 | End: 2023-11-07 | Stop reason: HOSPADM

## 2023-11-03 RX ORDER — LOSARTAN POTASSIUM 50 MG/1
50 TABLET ORAL DAILY
Status: DISCONTINUED | OUTPATIENT
Start: 2023-11-03 | End: 2023-11-07 | Stop reason: HOSPADM

## 2023-11-03 RX ORDER — LOSARTAN POTASSIUM 100 MG/1
100 TABLET ORAL EVERY MORNING
COMMUNITY

## 2023-11-03 RX ORDER — METOPROLOL TARTRATE 50 MG/1
50 TABLET, FILM COATED ORAL 2 TIMES DAILY
Status: DISCONTINUED | OUTPATIENT
Start: 2023-11-03 | End: 2023-11-07 | Stop reason: HOSPADM

## 2023-11-03 RX ORDER — LANOLIN ALCOHOL/MO/W.PET/CERES
3 CREAM (GRAM) TOPICAL NIGHTLY PRN
Status: DISCONTINUED | OUTPATIENT
Start: 2023-11-03 | End: 2023-11-07 | Stop reason: HOSPADM

## 2023-11-03 RX ORDER — DEXTROSE MONOHYDRATE 100 MG/ML
INJECTION, SOLUTION INTRAVENOUS CONTINUOUS PRN
Status: DISCONTINUED | OUTPATIENT
Start: 2023-11-03 | End: 2023-11-07 | Stop reason: HOSPADM

## 2023-11-03 RX ORDER — GABAPENTIN 300 MG/1
600 CAPSULE ORAL 4 TIMES DAILY
Status: DISCONTINUED | OUTPATIENT
Start: 2023-11-03 | End: 2023-11-07 | Stop reason: HOSPADM

## 2023-11-03 RX ORDER — CHOLECALCIFEROL (VITAMIN D3) 50 MCG
2000 TABLET ORAL DAILY
Status: DISCONTINUED | OUTPATIENT
Start: 2023-11-03 | End: 2023-11-07 | Stop reason: HOSPADM

## 2023-11-03 RX ORDER — MAGNESIUM SULFATE IN WATER 40 MG/ML
2000 INJECTION, SOLUTION INTRAVENOUS PRN
Status: DISCONTINUED | OUTPATIENT
Start: 2023-11-03 | End: 2023-11-07 | Stop reason: HOSPADM

## 2023-11-03 RX ORDER — POTASSIUM CHLORIDE 7.45 MG/ML
10 INJECTION INTRAVENOUS PRN
Status: DISCONTINUED | OUTPATIENT
Start: 2023-11-03 | End: 2023-11-07 | Stop reason: HOSPADM

## 2023-11-03 RX ORDER — UBIDECARENONE 75 MG
200 CAPSULE ORAL EVERY MORNING
COMMUNITY

## 2023-11-03 RX ORDER — DIVALPROEX SODIUM 125 MG/1
250 CAPSULE, COATED PELLETS ORAL 2 TIMES DAILY
Status: DISCONTINUED | OUTPATIENT
Start: 2023-11-03 | End: 2023-11-07 | Stop reason: HOSPADM

## 2023-11-03 RX ORDER — DULOXETIN HYDROCHLORIDE 60 MG/1
60 CAPSULE, DELAYED RELEASE ORAL DAILY
Status: DISCONTINUED | OUTPATIENT
Start: 2023-11-04 | End: 2023-11-07 | Stop reason: HOSPADM

## 2023-11-03 RX ORDER — INSULIN GLARGINE 100 [IU]/ML
66 INJECTION, SOLUTION SUBCUTANEOUS 2 TIMES DAILY
Status: DISCONTINUED | OUTPATIENT
Start: 2023-11-03 | End: 2023-11-07 | Stop reason: HOSPADM

## 2023-11-03 RX ORDER — POLYETHYLENE GLYCOL 3350 17 G/17G
17 POWDER, FOR SOLUTION ORAL DAILY PRN
Status: DISCONTINUED | OUTPATIENT
Start: 2023-11-03 | End: 2023-11-07 | Stop reason: HOSPADM

## 2023-11-03 RX ORDER — ONDANSETRON 2 MG/ML
4 INJECTION INTRAMUSCULAR; INTRAVENOUS EVERY 6 HOURS PRN
Status: DISCONTINUED | OUTPATIENT
Start: 2023-11-03 | End: 2023-11-07 | Stop reason: HOSPADM

## 2023-11-03 RX ORDER — DIVALPROEX SODIUM 250 MG/1
250 TABLET, DELAYED RELEASE ORAL 2 TIMES DAILY
COMMUNITY

## 2023-11-03 RX ORDER — TAMSULOSIN HYDROCHLORIDE 0.4 MG/1
0.8 CAPSULE ORAL DAILY
Status: DISCONTINUED | OUTPATIENT
Start: 2023-11-03 | End: 2023-11-07 | Stop reason: HOSPADM

## 2023-11-03 RX ORDER — SODIUM CHLORIDE 9 MG/ML
INJECTION, SOLUTION INTRAVENOUS PRN
Status: DISCONTINUED | OUTPATIENT
Start: 2023-11-03 | End: 2023-11-07 | Stop reason: HOSPADM

## 2023-11-03 RX ORDER — ATORVASTATIN CALCIUM 40 MG/1
40 TABLET, FILM COATED ORAL NIGHTLY
Status: DISCONTINUED | OUTPATIENT
Start: 2023-11-03 | End: 2023-11-07 | Stop reason: HOSPADM

## 2023-11-03 RX ADMIN — GABAPENTIN 600 MG: 300 CAPSULE ORAL at 18:08

## 2023-11-03 RX ADMIN — Medication 10 ML: at 20:25

## 2023-11-03 RX ADMIN — GABAPENTIN 600 MG: 300 CAPSULE ORAL at 20:24

## 2023-11-03 RX ADMIN — ATORVASTATIN CALCIUM 40 MG: 40 TABLET, FILM COATED ORAL at 20:24

## 2023-11-03 RX ADMIN — LOSARTAN POTASSIUM 50 MG: 50 TABLET, FILM COATED ORAL at 18:08

## 2023-11-03 RX ADMIN — ENOXAPARIN SODIUM 30 MG: 100 INJECTION SUBCUTANEOUS at 20:25

## 2023-11-03 RX ADMIN — METOPROLOL TARTRATE 50 MG: 50 TABLET, FILM COATED ORAL at 20:24

## 2023-11-03 RX ADMIN — Medication 3 MG: at 20:25

## 2023-11-03 RX ADMIN — METFORMIN HYDROCHLORIDE 1000 MG: 1000 TABLET ORAL at 18:08

## 2023-11-03 RX ADMIN — DIVALPROEX SODIUM 250 MG: 125 CAPSULE, COATED PELLETS ORAL at 20:24

## 2023-11-03 ASSESSMENT — PAIN SCALES - GENERAL: PAINLEVEL_OUTOF10: 0

## 2023-11-03 NOTE — ED PROVIDER NOTES
Department of Emergency Medicine     Written by: Diego Wise MD  Patient Name: Lorena Wilson  Admit Date: 11/3/2023 11:31 AM  MRN: [id number]                   : 1950      HPI  Chief Complaint   Patient presents with    Fall     Two falls, yesterday and today    Arm Pain     right    Altered Mental Status       Lorena Wilson is a 68 y.o. male that presents to the ED with complaints of altered mental status, right shoulder pain. The patient had a stroke back in July, currently on Plavix. The patient has had 2 falls at home as per the wife over the past 4 days, where he has also become more confused. She says that since the stroke, the patient has a history of becoming confused intermittently for about an hour or 2 and then becomes normal.  She says this is the first time he has been persistently confused. He had an MRI back in July and an MRI last month, in October, and he is noted to have normal pressure hydrocephalus. The wife states that he has been weak, unable to stand up when he falls down, she cannot care for him anymore as long as he is this week. The patient himself is confused however is able to answer questions. He denies chest pain, shortness of breath, abdominal pain fevers chills diaphoresis urinary changes. MDM   History is obtained from patient, EMS, and family for patient's acute onset of moderate confusion, weakness    In the ED, patient hemodynamically stable. He is slow to respond, however he is alert and oriented. He knows where he is, what year it is, however when asked who the president is, he takes a minute before answering the question, seemingly thinking hard to find the answer. He has clear lungs, normal heart sounds, soft nontender nondistended abdomen. The patient's oromucosa is moist.  Wife is at bedside and provides some of the history as well.  CBC is ordered to evaluate for any signs of infection or inflammation by obtaining a WBC count, or any

## 2023-11-04 LAB
ANION GAP SERPL CALCULATED.3IONS-SCNC: 11 MMOL/L (ref 7–16)
BASOPHILS # BLD: 0.02 K/UL (ref 0–0.2)
BASOPHILS NFR BLD: 0 % (ref 0–2)
BUN SERPL-MCNC: 18 MG/DL (ref 6–23)
CALCIUM SERPL-MCNC: 9.5 MG/DL (ref 8.6–10.2)
CHLORIDE SERPL-SCNC: 104 MMOL/L (ref 98–107)
CO2 SERPL-SCNC: 27 MMOL/L (ref 22–29)
CREAT SERPL-MCNC: 0.8 MG/DL (ref 0.7–1.2)
EOSINOPHIL # BLD: 0.2 K/UL (ref 0.05–0.5)
EOSINOPHILS RELATIVE PERCENT: 4 % (ref 0–6)
ERYTHROCYTE [DISTWIDTH] IN BLOOD BY AUTOMATED COUNT: 14 % (ref 11.5–15)
GFR SERPL CREATININE-BSD FRML MDRD: >60 ML/MIN/1.73M2
GLUCOSE BLD-MCNC: 137 MG/DL (ref 74–99)
GLUCOSE BLD-MCNC: 147 MG/DL (ref 74–99)
GLUCOSE BLD-MCNC: 175 MG/DL (ref 74–99)
GLUCOSE BLD-MCNC: 94 MG/DL (ref 74–99)
GLUCOSE SERPL-MCNC: 62 MG/DL (ref 74–99)
HBA1C MFR BLD: 6.2 % (ref 4–5.6)
HCT VFR BLD AUTO: 35 % (ref 37–54)
HGB BLD-MCNC: 11.8 G/DL (ref 12.5–16.5)
IMM GRANULOCYTES # BLD AUTO: <0.03 K/UL (ref 0–0.58)
IMM GRANULOCYTES NFR BLD: 0 % (ref 0–5)
LYMPHOCYTES NFR BLD: 2.1 K/UL (ref 1.5–4)
LYMPHOCYTES RELATIVE PERCENT: 39 % (ref 20–42)
MCH RBC QN AUTO: 30.7 PG (ref 26–35)
MCHC RBC AUTO-ENTMCNC: 33.7 G/DL (ref 32–34.5)
MCV RBC AUTO: 91.1 FL (ref 80–99.9)
MONOCYTES NFR BLD: 0.57 K/UL (ref 0.1–0.95)
MONOCYTES NFR BLD: 11 % (ref 2–12)
NEUTROPHILS NFR BLD: 46 % (ref 43–80)
NEUTS SEG NFR BLD: 2.46 K/UL (ref 1.8–7.3)
PLATELET # BLD AUTO: 258 K/UL (ref 130–450)
PMV BLD AUTO: 9.7 FL (ref 7–12)
POTASSIUM SERPL-SCNC: 3.6 MMOL/L (ref 3.5–5)
RBC # BLD AUTO: 3.84 M/UL (ref 3.8–5.8)
SODIUM SERPL-SCNC: 142 MMOL/L (ref 132–146)
WBC OTHER # BLD: 5.4 K/UL (ref 4.5–11.5)

## 2023-11-04 PROCEDURE — 83036 HEMOGLOBIN GLYCOSYLATED A1C: CPT

## 2023-11-04 PROCEDURE — 6360000002 HC RX W HCPCS: Performed by: NURSE PRACTITIONER

## 2023-11-04 PROCEDURE — 2060000000 HC ICU INTERMEDIATE R&B

## 2023-11-04 PROCEDURE — 97535 SELF CARE MNGMENT TRAINING: CPT

## 2023-11-04 PROCEDURE — 97165 OT EVAL LOW COMPLEX 30 MIN: CPT

## 2023-11-04 PROCEDURE — 6370000000 HC RX 637 (ALT 250 FOR IP): Performed by: NURSE PRACTITIONER

## 2023-11-04 PROCEDURE — 6370000000 HC RX 637 (ALT 250 FOR IP): Performed by: FAMILY MEDICINE

## 2023-11-04 PROCEDURE — 82962 GLUCOSE BLOOD TEST: CPT

## 2023-11-04 PROCEDURE — 80048 BASIC METABOLIC PNL TOTAL CA: CPT

## 2023-11-04 PROCEDURE — 2580000003 HC RX 258: Performed by: NURSE PRACTITIONER

## 2023-11-04 PROCEDURE — 85025 COMPLETE CBC W/AUTO DIFF WBC: CPT

## 2023-11-04 PROCEDURE — 6360000002 HC RX W HCPCS: Performed by: FAMILY MEDICINE

## 2023-11-04 RX ORDER — HALOPERIDOL 5 MG/ML
1 INJECTION INTRAMUSCULAR EVERY 6 HOURS PRN
Status: DISCONTINUED | OUTPATIENT
Start: 2023-11-04 | End: 2023-11-07 | Stop reason: HOSPADM

## 2023-11-04 RX ORDER — INSULIN LISPRO 100 [IU]/ML
0-4 INJECTION, SOLUTION INTRAVENOUS; SUBCUTANEOUS NIGHTLY
Status: DISCONTINUED | OUTPATIENT
Start: 2023-11-04 | End: 2023-11-07 | Stop reason: HOSPADM

## 2023-11-04 RX ORDER — FOLIC ACID 1 MG/1
1 TABLET ORAL DAILY
Status: DISCONTINUED | OUTPATIENT
Start: 2023-11-04 | End: 2023-11-07 | Stop reason: HOSPADM

## 2023-11-04 RX ORDER — LANOLIN ALCOHOL/MO/W.PET/CERES
1000 CREAM (GRAM) TOPICAL DAILY
Status: DISCONTINUED | OUTPATIENT
Start: 2023-11-04 | End: 2023-11-07 | Stop reason: HOSPADM

## 2023-11-04 RX ORDER — THIAMINE HYDROCHLORIDE 100 MG/ML
100 INJECTION, SOLUTION INTRAMUSCULAR; INTRAVENOUS DAILY
Status: DISCONTINUED | OUTPATIENT
Start: 2023-11-04 | End: 2023-11-07 | Stop reason: ALTCHOICE

## 2023-11-04 RX ORDER — INSULIN LISPRO 100 [IU]/ML
0-16 INJECTION, SOLUTION INTRAVENOUS; SUBCUTANEOUS
Status: DISCONTINUED | OUTPATIENT
Start: 2023-11-04 | End: 2023-11-07 | Stop reason: HOSPADM

## 2023-11-04 RX ADMIN — Medication 2000 UNITS: at 08:22

## 2023-11-04 RX ADMIN — HALOPERIDOL LACTATE 1 MG: 5 INJECTION, SOLUTION INTRAMUSCULAR at 16:17

## 2023-11-04 RX ADMIN — ASPIRIN 81 MG: 81 TABLET, COATED ORAL at 08:22

## 2023-11-04 RX ADMIN — ATORVASTATIN CALCIUM 40 MG: 40 TABLET, FILM COATED ORAL at 20:16

## 2023-11-04 RX ADMIN — Medication 5 ML: at 10:12

## 2023-11-04 RX ADMIN — ENOXAPARIN SODIUM 30 MG: 100 INJECTION SUBCUTANEOUS at 20:15

## 2023-11-04 RX ADMIN — METFORMIN HYDROCHLORIDE 1000 MG: 1000 TABLET ORAL at 08:22

## 2023-11-04 RX ADMIN — PANTOPRAZOLE SODIUM 40 MG: 40 TABLET, DELAYED RELEASE ORAL at 08:22

## 2023-11-04 RX ADMIN — LOSARTAN POTASSIUM 50 MG: 50 TABLET, FILM COATED ORAL at 08:23

## 2023-11-04 RX ADMIN — DIVALPROEX SODIUM 250 MG: 125 CAPSULE, COATED PELLETS ORAL at 20:15

## 2023-11-04 RX ADMIN — Medication 3 MG: at 20:15

## 2023-11-04 RX ADMIN — Medication 10 ML: at 20:16

## 2023-11-04 RX ADMIN — ENOXAPARIN SODIUM 30 MG: 100 INJECTION SUBCUTANEOUS at 08:21

## 2023-11-04 RX ADMIN — FOLIC ACID 1 MG: 1 TABLET ORAL at 10:42

## 2023-11-04 RX ADMIN — METOPROLOL TARTRATE 50 MG: 50 TABLET, FILM COATED ORAL at 08:22

## 2023-11-04 RX ADMIN — CLOPIDOGREL BISULFATE 75 MG: 75 TABLET ORAL at 08:23

## 2023-11-04 RX ADMIN — THIAMINE HYDROCHLORIDE 100 MG: 100 INJECTION, SOLUTION INTRAMUSCULAR; INTRAVENOUS at 10:42

## 2023-11-04 RX ADMIN — DULOXETINE HYDROCHLORIDE 60 MG: 60 CAPSULE, DELAYED RELEASE ORAL at 08:23

## 2023-11-04 RX ADMIN — METOPROLOL TARTRATE 50 MG: 50 TABLET, FILM COATED ORAL at 20:15

## 2023-11-04 RX ADMIN — GABAPENTIN 600 MG: 300 CAPSULE ORAL at 12:53

## 2023-11-04 RX ADMIN — GABAPENTIN 600 MG: 300 CAPSULE ORAL at 20:16

## 2023-11-04 RX ADMIN — DIVALPROEX SODIUM 250 MG: 125 CAPSULE, COATED PELLETS ORAL at 08:22

## 2023-11-04 RX ADMIN — TAMSULOSIN HYDROCHLORIDE 0.8 MG: 0.4 CAPSULE ORAL at 08:22

## 2023-11-04 RX ADMIN — GABAPENTIN 600 MG: 300 CAPSULE ORAL at 08:22

## 2023-11-04 RX ADMIN — CYANOCOBALAMIN TAB 1000 MCG 1000 MCG: 1000 TAB at 10:42

## 2023-11-04 ASSESSMENT — PAIN DESCRIPTION - FREQUENCY: FREQUENCY: CONTINUOUS

## 2023-11-04 ASSESSMENT — PAIN DESCRIPTION - ONSET: ONSET: ON-GOING

## 2023-11-04 ASSESSMENT — PAIN SCALES - GENERAL: PAINLEVEL_OUTOF10: 7

## 2023-11-04 ASSESSMENT — PAIN DESCRIPTION - PAIN TYPE: TYPE: CHRONIC PAIN

## 2023-11-04 ASSESSMENT — PAIN DESCRIPTION - DESCRIPTORS: DESCRIPTORS: PATIENT UNABLE TO DESCRIBE

## 2023-11-04 ASSESSMENT — PAIN - FUNCTIONAL ASSESSMENT: PAIN_FUNCTIONAL_ASSESSMENT: PREVENTS OR INTERFERES SOME ACTIVE ACTIVITIES AND ADLS

## 2023-11-04 ASSESSMENT — PAIN DESCRIPTION - ORIENTATION: ORIENTATION: RIGHT

## 2023-11-04 ASSESSMENT — PAIN DESCRIPTION - LOCATION: LOCATION: SHOULDER

## 2023-11-04 NOTE — HOME CARE
Current with [facility] for PT. Will need DARSHAN orders prior to discharge.   Mirna Cardenas LPN, [facility]

## 2023-11-04 NOTE — H&P
[facility]  History and Physical      CHIEF COMPLAINT:    Chief Complaint   Patient presents with    Fall     Two falls, yesterday and today    Arm Pain     right    Altered Mental Status        Patient of Juli Peterson DO presents with:  AMS (altered mental status)    History of Present Illness:   Patient is a 73-year-old male with a past medical history of CVA, anxiety, depression, DM, HLD, HTN, sleep apnea who presents to the emergency room for fall with right arm pain and altered mental status. Patient's wife who is at bedside and ER explained to the ER that patient is intermittently confused following his stroke however he has been increasingly confused with now falling at home becoming too much for her to take care of. Patient was seen by his PCP the beginning of October where he underwent a MRI of the brain on 10/9 which revealed a pattern of ventriculomegaly concerning for normal pressure hydrocephalus. After CT of the head and C-spine came back negative ER spoke with neurosurgery which they recommended an outpatient follow-up with them for lumbar drain trial.  An x-ray of his right shoulder was negative for any acute osseous abnormality. Labs were obtained and were found to be relatively unremarkable. Patient is admitted to [facility] with telemetry for further work-up and treatment. REVIEW OF SYSTEMS:  Pertinent negatives are above in HPI. 10 point ROS otherwise negative.       Past Medical History:   Diagnosis Date    Acute stroke due to ischemia ([address]) 7/26/2023    Anxiety     CAD in native artery 7/26/2023    Depression     Diabetes mellitus ([address])     Hyperlipidemia     Hypertension     Moderate persistent asthma without complication 73/32/5659    Obesity     Sleep apnea     Type 2 diabetes mellitus without complication ([address])     Ulceration 05/2012    right foot         Past Surgical History:   Procedure Laterality Date    BACK SURGERY  10/2019    COLONOSCOPY      CORONARY

## 2023-11-05 LAB
ANION GAP SERPL CALCULATED.3IONS-SCNC: 10 MMOL/L (ref 7–16)
BASOPHILS # BLD: 0.02 K/UL (ref 0–0.2)
BASOPHILS NFR BLD: 0 % (ref 0–2)
BUN SERPL-MCNC: 16 MG/DL (ref 6–23)
CALCIUM SERPL-MCNC: 9.4 MG/DL (ref 8.6–10.2)
CHLORIDE SERPL-SCNC: 103 MMOL/L (ref 98–107)
CO2 SERPL-SCNC: 27 MMOL/L (ref 22–29)
CREAT SERPL-MCNC: 0.8 MG/DL (ref 0.7–1.2)
EOSINOPHIL # BLD: 0.17 K/UL (ref 0.05–0.5)
EOSINOPHILS RELATIVE PERCENT: 4 % (ref 0–6)
ERYTHROCYTE [DISTWIDTH] IN BLOOD BY AUTOMATED COUNT: 13.9 % (ref 11.5–15)
GFR SERPL CREATININE-BSD FRML MDRD: >60 ML/MIN/1.73M2
GLUCOSE BLD-MCNC: 140 MG/DL (ref 74–99)
GLUCOSE BLD-MCNC: 142 MG/DL (ref 74–99)
GLUCOSE BLD-MCNC: 153 MG/DL (ref 74–99)
GLUCOSE BLD-MCNC: 154 MG/DL (ref 74–99)
GLUCOSE SERPL-MCNC: 128 MG/DL (ref 74–99)
HCT VFR BLD AUTO: 34.8 % (ref 37–54)
HGB BLD-MCNC: 11.9 G/DL (ref 12.5–16.5)
IMM GRANULOCYTES # BLD AUTO: <0.03 K/UL (ref 0–0.58)
IMM GRANULOCYTES NFR BLD: 0 % (ref 0–5)
LYMPHOCYTES NFR BLD: 1.7 K/UL (ref 1.5–4)
LYMPHOCYTES RELATIVE PERCENT: 36 % (ref 20–42)
MCH RBC QN AUTO: 30.7 PG (ref 26–35)
MCHC RBC AUTO-ENTMCNC: 34.2 G/DL (ref 32–34.5)
MCV RBC AUTO: 89.9 FL (ref 80–99.9)
MONOCYTES NFR BLD: 0.5 K/UL (ref 0.1–0.95)
MONOCYTES NFR BLD: 11 % (ref 2–12)
NEUTROPHILS NFR BLD: 49 % (ref 43–80)
NEUTS SEG NFR BLD: 2.31 K/UL (ref 1.8–7.3)
PLATELET # BLD AUTO: 275 K/UL (ref 130–450)
PMV BLD AUTO: 9.5 FL (ref 7–12)
POTASSIUM SERPL-SCNC: 3.8 MMOL/L (ref 3.5–5)
RBC # BLD AUTO: 3.87 M/UL (ref 3.8–5.8)
SODIUM SERPL-SCNC: 140 MMOL/L (ref 132–146)
WBC OTHER # BLD: 4.7 K/UL (ref 4.5–11.5)

## 2023-11-05 PROCEDURE — 97530 THERAPEUTIC ACTIVITIES: CPT

## 2023-11-05 PROCEDURE — 6360000002 HC RX W HCPCS: Performed by: NURSE PRACTITIONER

## 2023-11-05 PROCEDURE — 2580000003 HC RX 258: Performed by: NURSE PRACTITIONER

## 2023-11-05 PROCEDURE — 82962 GLUCOSE BLOOD TEST: CPT

## 2023-11-05 PROCEDURE — 6370000000 HC RX 637 (ALT 250 FOR IP): Performed by: NURSE PRACTITIONER

## 2023-11-05 PROCEDURE — 2060000000 HC ICU INTERMEDIATE R&B

## 2023-11-05 PROCEDURE — 80048 BASIC METABOLIC PNL TOTAL CA: CPT

## 2023-11-05 PROCEDURE — 97161 PT EVAL LOW COMPLEX 20 MIN: CPT

## 2023-11-05 PROCEDURE — 85025 COMPLETE CBC W/AUTO DIFF WBC: CPT

## 2023-11-05 PROCEDURE — 6360000002 HC RX W HCPCS: Performed by: FAMILY MEDICINE

## 2023-11-05 PROCEDURE — 6370000000 HC RX 637 (ALT 250 FOR IP): Performed by: FAMILY MEDICINE

## 2023-11-05 RX ORDER — LABETALOL HYDROCHLORIDE 5 MG/ML
20 INJECTION, SOLUTION INTRAVENOUS ONCE
Status: COMPLETED | OUTPATIENT
Start: 2023-11-05 | End: 2023-11-05

## 2023-11-05 RX ADMIN — CYANOCOBALAMIN TAB 1000 MCG 1000 MCG: 1000 TAB at 08:14

## 2023-11-05 RX ADMIN — GABAPENTIN 600 MG: 300 CAPSULE ORAL at 16:20

## 2023-11-05 RX ADMIN — CLOPIDOGREL BISULFATE 75 MG: 75 TABLET ORAL at 08:15

## 2023-11-05 RX ADMIN — ENOXAPARIN SODIUM 30 MG: 100 INJECTION SUBCUTANEOUS at 08:15

## 2023-11-05 RX ADMIN — ASPIRIN 81 MG: 81 TABLET, COATED ORAL at 08:13

## 2023-11-05 RX ADMIN — Medication 2000 UNITS: at 08:14

## 2023-11-05 RX ADMIN — Medication 3 MG: at 20:28

## 2023-11-05 RX ADMIN — Medication 10 ML: at 20:29

## 2023-11-05 RX ADMIN — METFORMIN HYDROCHLORIDE 1000 MG: 1000 TABLET ORAL at 08:15

## 2023-11-05 RX ADMIN — DULOXETINE HYDROCHLORIDE 60 MG: 60 CAPSULE, DELAYED RELEASE ORAL at 08:14

## 2023-11-05 RX ADMIN — FOLIC ACID 1 MG: 1 TABLET ORAL at 08:14

## 2023-11-05 RX ADMIN — METOPROLOL TARTRATE 50 MG: 50 TABLET, FILM COATED ORAL at 20:29

## 2023-11-05 RX ADMIN — ATORVASTATIN CALCIUM 40 MG: 40 TABLET, FILM COATED ORAL at 20:28

## 2023-11-05 RX ADMIN — GABAPENTIN 600 MG: 300 CAPSULE ORAL at 20:28

## 2023-11-05 RX ADMIN — METOPROLOL TARTRATE 50 MG: 50 TABLET, FILM COATED ORAL at 07:25

## 2023-11-05 RX ADMIN — METFORMIN HYDROCHLORIDE 1000 MG: 1000 TABLET ORAL at 16:20

## 2023-11-05 RX ADMIN — Medication 5 ML: at 08:52

## 2023-11-05 RX ADMIN — TAMSULOSIN HYDROCHLORIDE 0.8 MG: 0.4 CAPSULE ORAL at 08:13

## 2023-11-05 RX ADMIN — PANTOPRAZOLE SODIUM 40 MG: 40 TABLET, DELAYED RELEASE ORAL at 08:15

## 2023-11-05 RX ADMIN — LABETALOL HYDROCHLORIDE 20 MG: 5 INJECTION INTRAVENOUS at 08:29

## 2023-11-05 RX ADMIN — DIVALPROEX SODIUM 250 MG: 125 CAPSULE, COATED PELLETS ORAL at 20:28

## 2023-11-05 RX ADMIN — DIVALPROEX SODIUM 250 MG: 125 CAPSULE, COATED PELLETS ORAL at 08:13

## 2023-11-05 RX ADMIN — GABAPENTIN 600 MG: 300 CAPSULE ORAL at 13:09

## 2023-11-05 RX ADMIN — LOSARTAN POTASSIUM 50 MG: 50 TABLET, FILM COATED ORAL at 07:25

## 2023-11-05 RX ADMIN — GABAPENTIN 600 MG: 300 CAPSULE ORAL at 08:14

## 2023-11-05 RX ADMIN — ENOXAPARIN SODIUM 30 MG: 100 INJECTION SUBCUTANEOUS at 20:28

## 2023-11-05 RX ADMIN — THIAMINE HYDROCHLORIDE 100 MG: 100 INJECTION, SOLUTION INTRAMUSCULAR; INTRAVENOUS at 08:16

## 2023-11-05 RX ADMIN — INSULIN GLARGINE 66 UNITS: 100 INJECTION, SOLUTION SUBCUTANEOUS at 08:16

## 2023-11-05 ASSESSMENT — PAIN DESCRIPTION - ORIENTATION: ORIENTATION: RIGHT

## 2023-11-05 ASSESSMENT — PAIN SCALES - GENERAL
PAINLEVEL_OUTOF10: 0
PAINLEVEL_OUTOF10: 7

## 2023-11-05 ASSESSMENT — PAIN DESCRIPTION - PAIN TYPE: TYPE: CHRONIC PAIN

## 2023-11-05 ASSESSMENT — PAIN DESCRIPTION - LOCATION: LOCATION: SHOULDER

## 2023-11-05 NOTE — CONSULTS
Consult received for \"altered mental status, increased agitation and aggression. \"    Chart reviewed and discussed with APRN. History is consistent with vascular dementia after a fairly recent stroke and hence patient is easily prone to delirium. Furthermore he has some features of NPH which may be contributing to altered mental status. Agree with current psychotropic regimen of Depakote sprinkles and low-dose prn antipsychotic - really nothing further to add from psychiatry at this point. I do not see psychiatric hospitalization being useful; long-term ECF placement may be appropriate though. Psychiatry sign off for now please call us if new concerns or questions.     Electronically signed by Kevin Pedraza MD on 11/5/2023 at 12:38 PM

## 2023-11-06 LAB
ANION GAP SERPL CALCULATED.3IONS-SCNC: 13 MMOL/L (ref 7–16)
BASOPHILS # BLD: 0.03 K/UL (ref 0–0.2)
BASOPHILS NFR BLD: 1 % (ref 0–2)
BUN SERPL-MCNC: 18 MG/DL (ref 6–23)
CALCIUM SERPL-MCNC: 9.6 MG/DL (ref 8.6–10.2)
CHLORIDE SERPL-SCNC: 105 MMOL/L (ref 98–107)
CO2 SERPL-SCNC: 26 MMOL/L (ref 22–29)
CREAT SERPL-MCNC: 0.8 MG/DL (ref 0.7–1.2)
EOSINOPHIL # BLD: 0.15 K/UL (ref 0.05–0.5)
EOSINOPHILS RELATIVE PERCENT: 3 % (ref 0–6)
ERYTHROCYTE [DISTWIDTH] IN BLOOD BY AUTOMATED COUNT: 14 % (ref 11.5–15)
GFR SERPL CREATININE-BSD FRML MDRD: >60 ML/MIN/1.73M2
GLUCOSE BLD-MCNC: 146 MG/DL (ref 74–99)
GLUCOSE BLD-MCNC: 202 MG/DL (ref 74–99)
GLUCOSE BLD-MCNC: 63 MG/DL (ref 74–99)
GLUCOSE BLD-MCNC: 81 MG/DL (ref 74–99)
GLUCOSE BLD-MCNC: 95 MG/DL (ref 74–99)
GLUCOSE SERPL-MCNC: 136 MG/DL (ref 74–99)
HCT VFR BLD AUTO: 38.2 % (ref 37–54)
HGB BLD-MCNC: 13 G/DL (ref 12.5–16.5)
IMM GRANULOCYTES # BLD AUTO: <0.03 K/UL (ref 0–0.58)
IMM GRANULOCYTES NFR BLD: 0 % (ref 0–5)
LYMPHOCYTES NFR BLD: 1.48 K/UL (ref 1.5–4)
LYMPHOCYTES RELATIVE PERCENT: 30 % (ref 20–42)
MCH RBC QN AUTO: 30.9 PG (ref 26–35)
MCHC RBC AUTO-ENTMCNC: 34 G/DL (ref 32–34.5)
MCV RBC AUTO: 90.7 FL (ref 80–99.9)
MONOCYTES NFR BLD: 0.52 K/UL (ref 0.1–0.95)
MONOCYTES NFR BLD: 10 % (ref 2–12)
NEUTROPHILS NFR BLD: 56 % (ref 43–80)
NEUTS SEG NFR BLD: 2.78 K/UL (ref 1.8–7.3)
PLATELET # BLD AUTO: 290 K/UL (ref 130–450)
PMV BLD AUTO: 9.4 FL (ref 7–12)
POTASSIUM SERPL-SCNC: 4 MMOL/L (ref 3.5–5)
RBC # BLD AUTO: 4.21 M/UL (ref 3.8–5.8)
SODIUM SERPL-SCNC: 144 MMOL/L (ref 132–146)
WBC OTHER # BLD: 5 K/UL (ref 4.5–11.5)

## 2023-11-06 PROCEDURE — 6360000002 HC RX W HCPCS: Performed by: NURSE PRACTITIONER

## 2023-11-06 PROCEDURE — 6360000002 HC RX W HCPCS: Performed by: FAMILY MEDICINE

## 2023-11-06 PROCEDURE — 36415 COLL VENOUS BLD VENIPUNCTURE: CPT

## 2023-11-06 PROCEDURE — 92610 EVALUATE SWALLOWING FUNCTION: CPT

## 2023-11-06 PROCEDURE — 6370000000 HC RX 637 (ALT 250 FOR IP): Performed by: FAMILY MEDICINE

## 2023-11-06 PROCEDURE — 6370000000 HC RX 637 (ALT 250 FOR IP): Performed by: NURSE PRACTITIONER

## 2023-11-06 PROCEDURE — 82962 GLUCOSE BLOOD TEST: CPT

## 2023-11-06 PROCEDURE — 2060000000 HC ICU INTERMEDIATE R&B

## 2023-11-06 PROCEDURE — 80048 BASIC METABOLIC PNL TOTAL CA: CPT

## 2023-11-06 PROCEDURE — 2580000003 HC RX 258: Performed by: NURSE PRACTITIONER

## 2023-11-06 PROCEDURE — 97535 SELF CARE MNGMENT TRAINING: CPT

## 2023-11-06 PROCEDURE — 85025 COMPLETE CBC W/AUTO DIFF WBC: CPT

## 2023-11-06 PROCEDURE — 92526 ORAL FUNCTION THERAPY: CPT

## 2023-11-06 RX ADMIN — LOSARTAN POTASSIUM 50 MG: 50 TABLET, FILM COATED ORAL at 10:49

## 2023-11-06 RX ADMIN — ATORVASTATIN CALCIUM 40 MG: 40 TABLET, FILM COATED ORAL at 20:20

## 2023-11-06 RX ADMIN — GABAPENTIN 600 MG: 300 CAPSULE ORAL at 14:06

## 2023-11-06 RX ADMIN — INSULIN GLARGINE 66 UNITS: 100 INJECTION, SOLUTION SUBCUTANEOUS at 09:18

## 2023-11-06 RX ADMIN — ASPIRIN 81 MG: 81 TABLET, COATED ORAL at 10:49

## 2023-11-06 RX ADMIN — Medication 2000 UNITS: at 10:55

## 2023-11-06 RX ADMIN — INSULIN LISPRO 4 UNITS: 100 INJECTION, SOLUTION INTRAVENOUS; SUBCUTANEOUS at 13:07

## 2023-11-06 RX ADMIN — Medication 10 ML: at 09:20

## 2023-11-06 RX ADMIN — THIAMINE HYDROCHLORIDE 100 MG: 100 INJECTION, SOLUTION INTRAMUSCULAR; INTRAVENOUS at 09:26

## 2023-11-06 RX ADMIN — DIVALPROEX SODIUM 250 MG: 125 CAPSULE, COATED PELLETS ORAL at 10:49

## 2023-11-06 RX ADMIN — DIVALPROEX SODIUM 250 MG: 125 CAPSULE, COATED PELLETS ORAL at 20:19

## 2023-11-06 RX ADMIN — INSULIN GLARGINE 33 UNITS: 100 INJECTION, SOLUTION SUBCUTANEOUS at 20:34

## 2023-11-06 RX ADMIN — ENOXAPARIN SODIUM 30 MG: 100 INJECTION SUBCUTANEOUS at 09:18

## 2023-11-06 RX ADMIN — DULOXETINE HYDROCHLORIDE 60 MG: 60 CAPSULE, DELAYED RELEASE ORAL at 10:50

## 2023-11-06 RX ADMIN — METFORMIN HYDROCHLORIDE 1000 MG: 1000 TABLET ORAL at 17:33

## 2023-11-06 RX ADMIN — GABAPENTIN 600 MG: 300 CAPSULE ORAL at 20:20

## 2023-11-06 RX ADMIN — FOLIC ACID 1 MG: 1 TABLET ORAL at 10:50

## 2023-11-06 RX ADMIN — METFORMIN HYDROCHLORIDE 1000 MG: 1000 TABLET ORAL at 10:50

## 2023-11-06 RX ADMIN — GABAPENTIN 600 MG: 300 CAPSULE ORAL at 17:33

## 2023-11-06 RX ADMIN — METOPROLOL TARTRATE 50 MG: 50 TABLET, FILM COATED ORAL at 20:20

## 2023-11-06 RX ADMIN — ENOXAPARIN SODIUM 30 MG: 100 INJECTION SUBCUTANEOUS at 20:20

## 2023-11-06 RX ADMIN — METOPROLOL TARTRATE 50 MG: 50 TABLET, FILM COATED ORAL at 10:50

## 2023-11-06 RX ADMIN — CLOPIDOGREL BISULFATE 75 MG: 75 TABLET ORAL at 10:50

## 2023-11-06 RX ADMIN — GABAPENTIN 600 MG: 300 CAPSULE ORAL at 10:50

## 2023-11-06 RX ADMIN — Medication 10 ML: at 20:36

## 2023-11-06 RX ADMIN — TAMSULOSIN HYDROCHLORIDE 0.8 MG: 0.4 CAPSULE ORAL at 10:54

## 2023-11-06 RX ADMIN — CYANOCOBALAMIN TAB 1000 MCG 1000 MCG: 1000 TAB at 10:55

## 2023-11-06 RX ADMIN — PANTOPRAZOLE SODIUM 40 MG: 40 TABLET, DELAYED RELEASE ORAL at 10:50

## 2023-11-06 NOTE — ACP (ADVANCE CARE PLANNING)
Advance Care Planning   Healthcare Decision Maker:    Primary Decision Maker: Fariba Mark - [facility] - [phone]    Click here to complete Healthcare Decision Makers including selection of the Healthcare Decision Maker Relationship (ie \"Primary\").

## 2023-11-06 NOTE — CARE COORDINATION
Transition of Care-Met with patient and wife at the bedside. Patient lives with his wife in a one story home, uses a walker to ambulate. PCP is Dr. Minor Culver, preferred pharmacy is [facility], past stay at [address] [place] [address] and [address] [place] [address]. PT score 16, OT score 12. Patient and wife requested placement for skilled nursing rehab. First choice is [facility]-referral made. Wife is reviewing SNF lists for additional choices if [facility] cannot accept. Awaiting decision.     Fay WILDERN, RN  [facility]

## 2023-11-07 VITALS
WEIGHT: 279.2 LBS | SYSTOLIC BLOOD PRESSURE: 149 MMHG | HEART RATE: 74 BPM | OXYGEN SATURATION: 94 % | TEMPERATURE: 98.7 F | DIASTOLIC BLOOD PRESSURE: 86 MMHG | RESPIRATION RATE: 18 BRPM | HEIGHT: 73 IN | BODY MASS INDEX: 37 KG/M2

## 2023-11-07 LAB
ANION GAP SERPL CALCULATED.3IONS-SCNC: 14 MMOL/L (ref 7–16)
BASOPHILS # BLD: 0.02 K/UL (ref 0–0.2)
BASOPHILS NFR BLD: 0 % (ref 0–2)
BUN SERPL-MCNC: 17 MG/DL (ref 6–23)
CALCIUM SERPL-MCNC: 9.8 MG/DL (ref 8.6–10.2)
CHLORIDE SERPL-SCNC: 103 MMOL/L (ref 98–107)
CO2 SERPL-SCNC: 23 MMOL/L (ref 22–29)
CREAT SERPL-MCNC: 0.9 MG/DL (ref 0.7–1.2)
EOSINOPHIL # BLD: 0.21 K/UL (ref 0.05–0.5)
EOSINOPHILS RELATIVE PERCENT: 4 % (ref 0–6)
ERYTHROCYTE [DISTWIDTH] IN BLOOD BY AUTOMATED COUNT: 14 % (ref 11.5–15)
GFR SERPL CREATININE-BSD FRML MDRD: >60 ML/MIN/1.73M2
GLUCOSE BLD-MCNC: 114 MG/DL (ref 74–99)
GLUCOSE BLD-MCNC: 120 MG/DL (ref 74–99)
GLUCOSE SERPL-MCNC: 115 MG/DL (ref 74–99)
HCT VFR BLD AUTO: 45.1 % (ref 37–54)
HGB BLD-MCNC: 14.3 G/DL (ref 12.5–16.5)
IMM GRANULOCYTES # BLD AUTO: <0.03 K/UL (ref 0–0.58)
IMM GRANULOCYTES NFR BLD: 0 % (ref 0–5)
LYMPHOCYTES NFR BLD: 2.15 K/UL (ref 1.5–4)
LYMPHOCYTES RELATIVE PERCENT: 37 % (ref 20–42)
MCH RBC QN AUTO: 30.8 PG (ref 26–35)
MCHC RBC AUTO-ENTMCNC: 31.7 G/DL (ref 32–34.5)
MCV RBC AUTO: 97.2 FL (ref 80–99.9)
MONOCYTES NFR BLD: 0.58 K/UL (ref 0.1–0.95)
MONOCYTES NFR BLD: 10 % (ref 2–12)
NEUTROPHILS NFR BLD: 48 % (ref 43–80)
NEUTS SEG NFR BLD: 2.79 K/UL (ref 1.8–7.3)
PLATELET # BLD AUTO: 301 K/UL (ref 130–450)
PMV BLD AUTO: 9.6 FL (ref 7–12)
POTASSIUM SERPL-SCNC: 3.9 MMOL/L (ref 3.5–5)
RBC # BLD AUTO: 4.64 M/UL (ref 3.8–5.8)
SODIUM SERPL-SCNC: 140 MMOL/L (ref 132–146)
WBC OTHER # BLD: 5.8 K/UL (ref 4.5–11.5)

## 2023-11-07 PROCEDURE — 6370000000 HC RX 637 (ALT 250 FOR IP): Performed by: FAMILY MEDICINE

## 2023-11-07 PROCEDURE — 2580000003 HC RX 258: Performed by: NURSE PRACTITIONER

## 2023-11-07 PROCEDURE — 82962 GLUCOSE BLOOD TEST: CPT

## 2023-11-07 PROCEDURE — 80048 BASIC METABOLIC PNL TOTAL CA: CPT

## 2023-11-07 PROCEDURE — 85025 COMPLETE CBC W/AUTO DIFF WBC: CPT

## 2023-11-07 PROCEDURE — 6370000000 HC RX 637 (ALT 250 FOR IP): Performed by: NURSE PRACTITIONER

## 2023-11-07 PROCEDURE — 6360000002 HC RX W HCPCS: Performed by: NURSE PRACTITIONER

## 2023-11-07 RX ORDER — METOPROLOL TARTRATE 5 MG/5ML
5 INJECTION INTRAVENOUS ONCE
Status: DISCONTINUED | OUTPATIENT
Start: 2023-11-07 | End: 2023-11-07 | Stop reason: HOSPADM

## 2023-11-07 RX ORDER — LANOLIN ALCOHOL/MO/W.PET/CERES
100 CREAM (GRAM) TOPICAL DAILY
Status: DISCONTINUED | OUTPATIENT
Start: 2023-11-07 | End: 2023-11-07 | Stop reason: HOSPADM

## 2023-11-07 RX ADMIN — Medication 100 MG: at 09:17

## 2023-11-07 RX ADMIN — LOSARTAN POTASSIUM 50 MG: 50 TABLET, FILM COATED ORAL at 09:17

## 2023-11-07 RX ADMIN — TAMSULOSIN HYDROCHLORIDE 0.8 MG: 0.4 CAPSULE ORAL at 09:18

## 2023-11-07 RX ADMIN — Medication 2000 UNITS: at 09:17

## 2023-11-07 RX ADMIN — METOPROLOL TARTRATE 50 MG: 50 TABLET, FILM COATED ORAL at 09:17

## 2023-11-07 RX ADMIN — FOLIC ACID 1 MG: 1 TABLET ORAL at 09:17

## 2023-11-07 RX ADMIN — ENOXAPARIN SODIUM 30 MG: 100 INJECTION SUBCUTANEOUS at 09:18

## 2023-11-07 RX ADMIN — Medication 10 ML: at 09:19

## 2023-11-07 RX ADMIN — ASPIRIN 81 MG: 81 TABLET, COATED ORAL at 09:18

## 2023-11-07 RX ADMIN — GABAPENTIN 600 MG: 300 CAPSULE ORAL at 09:17

## 2023-11-07 RX ADMIN — DIVALPROEX SODIUM 250 MG: 125 CAPSULE, COATED PELLETS ORAL at 09:17

## 2023-11-07 RX ADMIN — INSULIN GLARGINE 66 UNITS: 100 INJECTION, SOLUTION SUBCUTANEOUS at 10:05

## 2023-11-07 RX ADMIN — PANTOPRAZOLE SODIUM 40 MG: 40 TABLET, DELAYED RELEASE ORAL at 09:18

## 2023-11-07 RX ADMIN — DULOXETINE HYDROCHLORIDE 60 MG: 60 CAPSULE, DELAYED RELEASE ORAL at 09:17

## 2023-11-07 RX ADMIN — CLOPIDOGREL BISULFATE 75 MG: 75 TABLET ORAL at 09:17

## 2023-11-07 RX ADMIN — CYANOCOBALAMIN TAB 1000 MCG 1000 MCG: 1000 TAB at 09:17

## 2023-11-07 RX ADMIN — METFORMIN HYDROCHLORIDE 1000 MG: 1000 TABLET ORAL at 09:17

## 2023-11-07 NOTE — PLAN OF CARE
Problem: ABCDS Injury Assessment  Goal: Absence of physical injury  Outcome: Progressing     Problem: Discharge Planning  Goal: Discharge to home or other facility with appropriate resources  11/7/2023 0541 by Reese Yang RN  Outcome: Progressing  11/6/2023 1645 by Ronal Elizondo RN  Outcome: Progressing     Problem: Safety - Adult  Goal: Free from fall injury  11/7/2023 0541 by Reese Yang RN  Outcome: Progressing  11/6/2023 1645 by Ronal Elizondo RN  Outcome: Progressing     Problem: Pain  Goal: Verbalizes/displays adequate comfort level or baseline comfort level  11/7/2023 0541 by Reese Yang RN  Outcome: Progressing  11/6/2023 1645 by Ronal Elizondo RN  Outcome: Progressing     Problem: Skin/Tissue Integrity  Goal: Absence of new skin breakdown  Description: 1. Monitor for areas of redness and/or skin breakdown  2. Assess vascular access sites hourly  3. Every 4-6 hours minimum:  Change oxygen saturation probe site  4. Every 4-6 hours:  If on nasal continuous positive airway pressure, respiratory therapy assess nares and determine need for appliance change or resting period.   11/7/2023 0541 by Reese Yang RN  Outcome: Progressing  11/6/2023 1645 by Ronal Elizondo RN  Outcome: Progressing     Problem: Chronic Conditions and Co-morbidities  Goal: Patient's chronic conditions and co-morbidity symptoms are monitored and maintained or improved  Outcome: Progressing

## 2023-11-07 NOTE — PLAN OF CARE
Problem: ABCDS Injury Assessment  Goal: Absence of physical injury  11/7/2023 0541 by Dina Ann RN  Outcome: Progressing     Problem: Discharge Planning  Goal: Discharge to home or other facility with appropriate resources  11/7/2023 0541 by Dina Ann RN  Outcome: Progressing     Problem: Safety - Adult  Goal: Free from fall injury  11/7/2023 0541 by Dina Ann RN  Outcome: Progressing     Problem: Pain  Goal: Verbalizes/displays adequate comfort level or baseline comfort level  11/7/2023 0541 by Dina Ann RN  Outcome: Progressing     Problem: Skin/Tissue Integrity  Goal: Absence of new skin breakdown  Description: 1. Monitor for areas of redness and/or skin breakdown  2. Assess vascular access sites hourly  3. Every 4-6 hours minimum:  Change oxygen saturation probe site  4. Every 4-6 hours:  If on nasal continuous positive airway pressure, respiratory therapy assess nares and determine need for appliance change or resting period.   11/7/2023 0541 by Dina Ann RN  Outcome: Progressing

## 2023-11-07 NOTE — CARE COORDINATION
Transition of Care-Discharge order noted. [facility] to  at 12 noon. Facility liaison, wife and patient updated.      Denise WILDERN, RN  [facility]

## 2023-11-08 LAB
ALBUMIN SERPL-MCNC: 4.2 G/DL (ref 3.5–5.2)
ALP SERPL-CCNC: 74 U/L (ref 40–129)
ALT SERPL-CCNC: 34 U/L (ref 0–40)
ANION GAP SERPL CALCULATED.3IONS-SCNC: 18 MMOL/L (ref 7–16)
AST SERPL-CCNC: 41 U/L (ref 0–39)
BASOPHILS # BLD: 0.04 K/UL (ref 0–0.2)
BASOPHILS NFR BLD: 1 % (ref 0–2)
BILIRUB SERPL-MCNC: 0.8 MG/DL (ref 0–1.2)
BUN SERPL-MCNC: 18 MG/DL (ref 6–23)
CALCIUM SERPL-MCNC: 9.6 MG/DL (ref 8.6–10.2)
CHLORIDE SERPL-SCNC: 101 MMOL/L (ref 98–107)
CO2 SERPL-SCNC: 19 MMOL/L (ref 22–29)
CREAT SERPL-MCNC: 0.8 MG/DL (ref 0.7–1.2)
DATE LAST DOSE: ABNORMAL
EOSINOPHIL # BLD: 0.11 K/UL (ref 0.05–0.5)
EOSINOPHILS RELATIVE PERCENT: 2 % (ref 0–6)
ERYTHROCYTE [DISTWIDTH] IN BLOOD BY AUTOMATED COUNT: 13.8 % (ref 11.5–15)
GFR SERPL CREATININE-BSD FRML MDRD: >60 ML/MIN/1.73M2
GLUCOSE SERPL-MCNC: 210 MG/DL (ref 74–99)
HCT VFR BLD AUTO: 41.8 % (ref 37–54)
HGB BLD-MCNC: 14 G/DL (ref 12.5–16.5)
IMM GRANULOCYTES # BLD AUTO: <0.03 K/UL (ref 0–0.58)
IMM GRANULOCYTES NFR BLD: 0 % (ref 0–5)
LYMPHOCYTES NFR BLD: 1.59 K/UL (ref 1.5–4)
LYMPHOCYTES RELATIVE PERCENT: 27 % (ref 20–42)
MCH RBC QN AUTO: 30.4 PG (ref 26–35)
MCHC RBC AUTO-ENTMCNC: 33.5 G/DL (ref 32–34.5)
MCV RBC AUTO: 90.7 FL (ref 80–99.9)
MONOCYTES NFR BLD: 0.7 K/UL (ref 0.1–0.95)
MONOCYTES NFR BLD: 12 % (ref 2–12)
NEUTROPHILS NFR BLD: 58 % (ref 43–80)
NEUTS SEG NFR BLD: 3.41 K/UL (ref 1.8–7.3)
PLATELET # BLD AUTO: 308 K/UL (ref 130–450)
PMV BLD AUTO: 9.7 FL (ref 7–12)
POTASSIUM SERPL-SCNC: 4.1 MMOL/L (ref 3.5–5)
PROT SERPL-MCNC: 8.1 G/DL (ref 6.4–8.3)
RBC # BLD AUTO: 4.61 M/UL (ref 3.8–5.8)
SODIUM SERPL-SCNC: 138 MMOL/L (ref 132–146)
TME LAST DOSE: ABNORMAL H
VALPROATE SERPL-MCNC: 20 UG/ML (ref 50–100)
VANCOMYCIN DOSE: ABNORMAL MG
WBC OTHER # BLD: 5.9 K/UL (ref 4.5–11.5)

## 2023-11-08 NOTE — DISCHARGE SUMMARY
dense.  There is no acute compression fracture cervical spine. The patient has undergone anterior fusion of C3, C4, C5, C6, C7 and T1. Intervertebral body grafts are noted at all levels. Posterior degenerative endplate spurs are noted at the C6-7 and C7-T1 levels. There is mild multilevel facet arthropathy. SOFT TISSUES: There is no prevertebral soft tissue swelling. No acute abnormality of the cervical spine. Previous anterior fusion of C through through T1. There is no hardware complication. Mild multilevel facet arthropathy. Discharge Exam:    HEENT: NCAT,  PERRLA, No JVD  Heart:  RRR, no murmurs, gallops, or rubs.   Lungs:  CTA bilaterally, no wheeze, rales or rhonchi  Abd: bowel sounds present, nontender, nondistended, no masses  Extrem:  No clubbing, cyanosis, or edema    Disposition: [facility]     Patient Condition at Discharge: stable    Patient Instructions:      Medication List        CONTINUE taking these medications      albuterol sulfate  (90 Base) MCG/ACT inhaler  Commonly known as: PROVENTIL;VENTOLIN;PROAIR     aspirin 81 MG EC tablet     atorvastatin 40 MG tablet  Commonly known as: LIPITOR  Take 1 tablet by mouth nightly     budesonide-formoterol 160-4.5 MCG/ACT Aero  Commonly known as: SYMBICORT     Centrum Silver 50+Men Tabs     clopidogrel 75 MG tablet  Commonly known as: PLAVIX     Co Q-10 200 MG Caps     CPAP Machine Misc     divalproex 250 MG DR tablet  Commonly known as: DEPAKOTE     DULoxetine 60 MG extended release capsule  Commonly known as: CYMBALTA     fenofibrate 54 MG tablet  Commonly known as: TRICOR     fluticasone 50 MCG/ACT nasal spray  Commonly known as: FLONASE     gabapentin 600 MG tablet  Commonly known as: NEURONTIN     glipiZIDE 10 MG extended release tablet  Commonly known as: GLUCOTROL XL     insulin glargine 100 UNIT/ML injection pen  Commonly known as: LANTUS;BASAGLAR     insulin lispro 100 UNIT/ML Soln injection vial  Commonly known as: HUMALOG  Inject 0-16

## 2023-11-23 ENCOUNTER — HOSPITAL ENCOUNTER (EMERGENCY)
Age: 73
Discharge: HOME | End: 2023-11-24
Attending: EMERGENCY MEDICINE
Payer: MEDICARE

## 2023-11-23 ENCOUNTER — APPOINTMENT (OUTPATIENT)
Dept: GENERAL RADIOLOGY | Age: 73
End: 2023-11-23
Payer: MEDICARE

## 2023-11-23 VITALS
BODY MASS INDEX: 36.81 KG/M2 | RESPIRATION RATE: 20 BRPM | DIASTOLIC BLOOD PRESSURE: 77 MMHG | OXYGEN SATURATION: 94 % | SYSTOLIC BLOOD PRESSURE: 148 MMHG | HEART RATE: 93 BPM | TEMPERATURE: 98 F | WEIGHT: 279 LBS

## 2023-11-23 DIAGNOSIS — R07.89 ATYPICAL CHEST PAIN: Primary | ICD-10-CM

## 2023-11-23 LAB
ALBUMIN SERPL-MCNC: 4.2 G/DL (ref 3.5–5.2)
ALP SERPL-CCNC: 51 U/L (ref 40–129)
ALT SERPL-CCNC: 31 U/L (ref 0–40)
ANION GAP SERPL CALCULATED.3IONS-SCNC: 14 MMOL/L (ref 7–16)
AST SERPL-CCNC: 32 U/L (ref 0–39)
BASOPHILS # BLD: 0.04 K/UL (ref 0–0.2)
BASOPHILS NFR BLD: 1 % (ref 0–2)
BILIRUB SERPL-MCNC: 0.4 MG/DL (ref 0–1.2)
BUN SERPL-MCNC: 15 MG/DL (ref 6–23)
CALCIUM SERPL-MCNC: 9.1 MG/DL (ref 8.6–10.2)
CHLORIDE SERPL-SCNC: 106 MMOL/L (ref 98–107)
CHP ED QC CHECK: YES
CO2 SERPL-SCNC: 23 MMOL/L (ref 22–29)
CREAT SERPL-MCNC: 0.8 MG/DL (ref 0.7–1.2)
EOSINOPHIL # BLD: 0.16 K/UL (ref 0.05–0.5)
EOSINOPHILS RELATIVE PERCENT: 3 % (ref 0–6)
ERYTHROCYTE [DISTWIDTH] IN BLOOD BY AUTOMATED COUNT: 13.4 % (ref 11.5–15)
FLUAV RNA RESP QL NAA+PROBE: NOT DETECTED
FLUBV RNA RESP QL NAA+PROBE: NOT DETECTED
GFR SERPL CREATININE-BSD FRML MDRD: >60 ML/MIN/1.73M2
GLUCOSE BLD-MCNC: 110 MG/DL
GLUCOSE BLD-MCNC: 110 MG/DL (ref 74–99)
GLUCOSE SERPL-MCNC: 110 MG/DL (ref 74–99)
HCT VFR BLD AUTO: 40.1 % (ref 37–54)
HGB BLD-MCNC: 13.3 G/DL (ref 12.5–16.5)
IMM GRANULOCYTES # BLD AUTO: 0.03 K/UL (ref 0–0.58)
IMM GRANULOCYTES NFR BLD: 1 % (ref 0–5)
LYMPHOCYTES NFR BLD: 2.29 K/UL (ref 1.5–4)
LYMPHOCYTES RELATIVE PERCENT: 43 % (ref 20–42)
MCH RBC QN AUTO: 30.6 PG (ref 26–35)
MCHC RBC AUTO-ENTMCNC: 33.2 G/DL (ref 32–34.5)
MCV RBC AUTO: 92.4 FL (ref 80–99.9)
MONOCYTES NFR BLD: 0.59 K/UL (ref 0.1–0.95)
MONOCYTES NFR BLD: 11 % (ref 2–12)
NEUTROPHILS NFR BLD: 42 % (ref 43–80)
NEUTS SEG NFR BLD: 2.23 K/UL (ref 1.8–7.3)
PLATELET # BLD AUTO: 310 K/UL (ref 130–450)
PMV BLD AUTO: 9.3 FL (ref 7–12)
POTASSIUM SERPL-SCNC: 4.3 MMOL/L (ref 3.5–5)
PROT SERPL-MCNC: 7 G/DL (ref 6.4–8.3)
RBC # BLD AUTO: 4.34 M/UL (ref 3.8–5.8)
SARS-COV-2 RNA RESP QL NAA+PROBE: NOT DETECTED
SODIUM SERPL-SCNC: 143 MMOL/L (ref 132–146)
SOURCE: NORMAL
SPECIMEN DESCRIPTION: NORMAL
TROPONIN I SERPL HS-MCNC: 29 NG/L (ref 0–11)
TROPONIN I SERPL HS-MCNC: 29 NG/L (ref 0–11)
WBC OTHER # BLD: 5.3 K/UL (ref 4.5–11.5)

## 2023-11-23 PROCEDURE — 99285 EMERGENCY DEPT VISIT HI MDM: CPT

## 2023-11-23 PROCEDURE — 84484 ASSAY OF TROPONIN QUANT: CPT

## 2023-11-23 PROCEDURE — 82962 GLUCOSE BLOOD TEST: CPT

## 2023-11-23 PROCEDURE — 93005 ELECTROCARDIOGRAM TRACING: CPT

## 2023-11-23 PROCEDURE — 85025 COMPLETE CBC W/AUTO DIFF WBC: CPT

## 2023-11-23 PROCEDURE — 80053 COMPREHEN METABOLIC PANEL: CPT

## 2023-11-23 PROCEDURE — 87636 SARSCOV2 & INF A&B AMP PRB: CPT

## 2023-11-23 PROCEDURE — 71045 X-RAY EXAM CHEST 1 VIEW: CPT

## 2023-11-23 NOTE — ED PROVIDER NOTES
deviation not due to LBBB, LVH or digoxin         [x]   Normal  0       []   Nonspecific Repolarization Disturbance  +1       []   Significant ST Depression  +2    AGE       []   <45  0       []   45-64  +1       [x]    >65  +2    RISK FACTORS:  1. HTN    2. Hypercholesterolemia    3. DM     4. Cigarette smoking (current or cessation < 3 mos)    5. Positive family history  (parent or sibling with CVD before age 72). 6. Obesity (BMI >30kg/m2)         []   No Risk factors Known  0       []   1-2 Risk Factors  +1       [x]   >3 Risk Factors or History of Atherosclerotic Disease  +2    INITIAL TROPONIN       [x]   < Normal Limit   0       []   1-3 x Normal Limit   +1       []   >3 x Normal Limit   +2     -----------------------------------------------------------------------------------------------------------------  SCORE TOTAL:  4 POINTS     Low Score:         (0-3 Points), risk of MACE of 0.9-1.7% (discuss d/c home with f/u)  Mod Score:         (4-6 Points), risk of MACE of 12-16.6% (discuss admission for further testing)  High Score:        (7-10 Points), risk of MACE of 50-65% (Admit ALL as they are candidates for early invasive measures)    I utilized an evidence-based risk rating tool (CMT) along with my training and experience to weigh the risk of discharge against the risks of further testing, imaging, or hospitalization. At this time I estimate the risks of additional testing, imaging, or hospitalization to be equal to or greater than the risk of discharge. I discussed my risk assessment with the patient and the patient consents to the risk of discharge as well as the risk of uncertainty in estimating outcomes. The patient's HEART Score is <4. In rare cases, I give patients with HEART Score of 4 the option of discharge, but only when they meet criteria for \"Low 4,\" meaning that HST was used, and the 4 is not from a highly suspicious story, highly suspicious EKG, or positive cardiac enzymes.  In these

## 2023-11-24 LAB
EKG ATRIAL RATE: 75 BPM
EKG P AXIS: 73 DEGREES
EKG P-R INTERVAL: 234 MS
EKG Q-T INTERVAL: 414 MS
EKG QRS DURATION: 102 MS
EKG QTC CALCULATION (BAZETT): 462 MS
EKG R AXIS: -64 DEGREES
EKG T AXIS: 73 DEGREES
EKG VENTRICULAR RATE: 75 BPM

## 2023-11-24 PROCEDURE — 93010 ELECTROCARDIOGRAM REPORT: CPT | Performed by: INTERNAL MEDICINE

## 2023-11-24 NOTE — ED NOTES
Spoke to RN at [place] regarding pt status and update     Rosa Toscano [place], [place]  11/23/23 [id number]

## 2023-12-14 LAB
ALBUMIN SERPL-MCNC: 4.1 G/DL (ref 3.5–5.2)
ALP SERPL-CCNC: 47 U/L (ref 40–129)
ALT SERPL-CCNC: 27 U/L (ref 0–40)
AMMONIA PLAS-SCNC: 49 UMOL/L (ref 16–60)
ANION GAP SERPL CALCULATED.3IONS-SCNC: 11 MMOL/L (ref 7–16)
AST SERPL-CCNC: 27 U/L (ref 0–39)
BASOPHILS # BLD: 0.03 K/UL (ref 0–0.2)
BASOPHILS NFR BLD: 1 % (ref 0–2)
BILIRUB SERPL-MCNC: 0.3 MG/DL (ref 0–1.2)
BUN SERPL-MCNC: 19 MG/DL (ref 6–23)
CALCIUM SERPL-MCNC: 9.4 MG/DL (ref 8.6–10.2)
CHLORIDE SERPL-SCNC: 104 MMOL/L (ref 98–107)
CO2 SERPL-SCNC: 28 MMOL/L (ref 22–29)
CREAT SERPL-MCNC: 0.9 MG/DL (ref 0.7–1.2)
EOSINOPHIL # BLD: 0.16 K/UL (ref 0.05–0.5)
EOSINOPHILS RELATIVE PERCENT: 4 % (ref 0–6)
ERYTHROCYTE [DISTWIDTH] IN BLOOD BY AUTOMATED COUNT: 13.6 % (ref 11.5–15)
GFR SERPL CREATININE-BSD FRML MDRD: >60 ML/MIN/1.73M2
GLUCOSE SERPL-MCNC: 141 MG/DL (ref 74–99)
HCT VFR BLD AUTO: 38.3 % (ref 37–54)
HGB BLD-MCNC: 12.9 G/DL (ref 12.5–16.5)
IMM GRANULOCYTES # BLD AUTO: <0.03 K/UL (ref 0–0.58)
IMM GRANULOCYTES NFR BLD: 1 % (ref 0–5)
LYMPHOCYTES NFR BLD: 1.71 K/UL (ref 1.5–4)
LYMPHOCYTES RELATIVE PERCENT: 42 % (ref 20–42)
MCH RBC QN AUTO: 30.5 PG (ref 26–35)
MCHC RBC AUTO-ENTMCNC: 33.7 G/DL (ref 32–34.5)
MCV RBC AUTO: 90.5 FL (ref 80–99.9)
MONOCYTES NFR BLD: 0.51 K/UL (ref 0.1–0.95)
MONOCYTES NFR BLD: 13 % (ref 2–12)
NEUTROPHILS NFR BLD: 40 % (ref 43–80)
NEUTS SEG NFR BLD: 1.62 K/UL (ref 1.8–7.3)
PLATELET # BLD AUTO: 242 K/UL (ref 130–450)
PMV BLD AUTO: 9.7 FL (ref 7–12)
POTASSIUM SERPL-SCNC: 4.3 MMOL/L (ref 3.5–5)
PROT SERPL-MCNC: 6.6 G/DL (ref 6.4–8.3)
RBC # BLD AUTO: 4.23 M/UL (ref 3.8–5.8)
SODIUM SERPL-SCNC: 143 MMOL/L (ref 132–146)
WBC OTHER # BLD: 4.1 K/UL (ref 4.5–11.5)

## 2024-03-04 ENCOUNTER — OFFICE VISIT (OUTPATIENT)
Dept: CARDIOLOGY CLINIC | Age: 74
End: 2024-03-04
Payer: MEDICARE

## 2024-03-04 VITALS
DIASTOLIC BLOOD PRESSURE: 70 MMHG | HEIGHT: 73 IN | HEART RATE: 62 BPM | RESPIRATION RATE: 16 BRPM | WEIGHT: 293 LBS | SYSTOLIC BLOOD PRESSURE: 123 MMHG | BODY MASS INDEX: 38.83 KG/M2

## 2024-03-04 DIAGNOSIS — I25.10 CAD IN NATIVE ARTERY: Primary | ICD-10-CM

## 2024-03-04 PROCEDURE — 3017F COLORECTAL CA SCREEN DOC REV: CPT | Performed by: INTERNAL MEDICINE

## 2024-03-04 PROCEDURE — G8417 CALC BMI ABV UP PARAM F/U: HCPCS | Performed by: INTERNAL MEDICINE

## 2024-03-04 PROCEDURE — 1123F ACP DISCUSS/DSCN MKR DOCD: CPT | Performed by: INTERNAL MEDICINE

## 2024-03-04 PROCEDURE — 93000 ELECTROCARDIOGRAM COMPLETE: CPT | Performed by: INTERNAL MEDICINE

## 2024-03-04 PROCEDURE — G8427 DOCREV CUR MEDS BY ELIG CLIN: HCPCS | Performed by: INTERNAL MEDICINE

## 2024-03-04 PROCEDURE — 99213 OFFICE O/P EST LOW 20 MIN: CPT | Performed by: INTERNAL MEDICINE

## 2024-03-04 PROCEDURE — G8484 FLU IMMUNIZE NO ADMIN: HCPCS | Performed by: INTERNAL MEDICINE

## 2024-03-04 PROCEDURE — 1036F TOBACCO NON-USER: CPT | Performed by: INTERNAL MEDICINE

## 2024-03-04 RX ORDER — LORATADINE 10 MG/1
10 TABLET ORAL DAILY
COMMUNITY

## 2024-03-04 RX ORDER — ACETAMINOPHEN 325 MG/1
650 TABLET ORAL EVERY 6 HOURS PRN
COMMUNITY
End: 2024-03-04 | Stop reason: CLARIF

## 2024-03-04 RX ORDER — MENTHOL 40 MG/ML
GEL TOPICAL PRN
COMMUNITY

## 2024-03-04 RX ORDER — BISACODYL 10 MG
10 SUPPOSITORY, RECTAL RECTAL DAILY PRN
COMMUNITY

## 2024-03-04 RX ORDER — HYDROCHLOROTHIAZIDE 25 MG/1
TABLET ORAL
COMMUNITY
Start: 2024-02-07

## 2024-03-11 LAB
25(OH)D3 SERPL-MCNC: 31.8 NG/ML (ref 30–100)
ALBUMIN SERPL-MCNC: 4.1 G/DL (ref 3.5–5.2)
ALP SERPL-CCNC: 45 U/L (ref 40–129)
ALT SERPL-CCNC: 30 U/L (ref 0–40)
ANION GAP SERPL CALCULATED.3IONS-SCNC: 13 MMOL/L (ref 7–16)
AST SERPL-CCNC: 26 U/L (ref 0–39)
BASOPHILS # BLD: 0.04 K/UL (ref 0–0.2)
BASOPHILS NFR BLD: 1 % (ref 0–2)
BILIRUB SERPL-MCNC: 0.3 MG/DL (ref 0–1.2)
BUN SERPL-MCNC: 17 MG/DL (ref 6–23)
CALCIUM SERPL-MCNC: 9.2 MG/DL (ref 8.6–10.2)
CHLORIDE SERPL-SCNC: 102 MMOL/L (ref 98–107)
CHOLEST SERPL-MCNC: 137 MG/DL
CO2 SERPL-SCNC: 27 MMOL/L (ref 22–29)
CREAT SERPL-MCNC: 0.9 MG/DL (ref 0.7–1.2)
DATE LAST DOSE: ABNORMAL
EOSINOPHIL # BLD: 0.2 K/UL (ref 0.05–0.5)
EOSINOPHILS RELATIVE PERCENT: 4 % (ref 0–6)
ERYTHROCYTE [DISTWIDTH] IN BLOOD BY AUTOMATED COUNT: 14.1 % (ref 11.5–15)
GFR SERPL CREATININE-BSD FRML MDRD: >60 ML/MIN/1.73M2
GLUCOSE SERPL-MCNC: 206 MG/DL (ref 74–99)
HBA1C MFR BLD: 8 % (ref 4–5.6)
HCT VFR BLD AUTO: 37.4 % (ref 37–54)
HDLC SERPL-MCNC: 28 MG/DL
HGB BLD-MCNC: 12.4 G/DL (ref 12.5–16.5)
IMM GRANULOCYTES # BLD AUTO: <0.03 K/UL (ref 0–0.58)
IMM GRANULOCYTES NFR BLD: 0 % (ref 0–5)
LDLC SERPL CALC-MCNC: 63 MG/DL
LYMPHOCYTES NFR BLD: 2.13 K/UL (ref 1.5–4)
LYMPHOCYTES RELATIVE PERCENT: 45 % (ref 20–42)
MCH RBC QN AUTO: 30.8 PG (ref 26–35)
MCHC RBC AUTO-ENTMCNC: 33.2 G/DL (ref 32–34.5)
MCV RBC AUTO: 92.8 FL (ref 80–99.9)
MONOCYTES NFR BLD: 0.56 K/UL (ref 0.1–0.95)
MONOCYTES NFR BLD: 12 % (ref 2–12)
NEUTROPHILS NFR BLD: 37 % (ref 43–80)
NEUTS SEG NFR BLD: 1.74 K/UL (ref 1.8–7.3)
PLATELET # BLD AUTO: 258 K/UL (ref 130–450)
PMV BLD AUTO: 9.7 FL (ref 7–12)
POTASSIUM SERPL-SCNC: 3.7 MMOL/L (ref 3.5–5)
PROT SERPL-MCNC: 6.7 G/DL (ref 6.4–8.3)
RBC # BLD AUTO: 4.03 M/UL (ref 3.8–5.8)
SODIUM SERPL-SCNC: 142 MMOL/L (ref 132–146)
TME LAST DOSE: ABNORMAL H
TRIGL SERPL-MCNC: 232 MG/DL
VALPROATE SERPL-MCNC: 16 UG/ML (ref 50–100)
VANCOMYCIN DOSE: ABNORMAL MG
VLDLC SERPL CALC-MCNC: 46 MG/DL
WBC OTHER # BLD: 4.7 K/UL (ref 4.5–11.5)

## 2024-03-31 ENCOUNTER — HOSPITAL ENCOUNTER (EMERGENCY)
Age: 74
Discharge: HOME | End: 2024-03-31
Attending: EMERGENCY MEDICINE
Payer: MEDICARE

## 2024-03-31 ENCOUNTER — APPOINTMENT (OUTPATIENT)
Dept: CT IMAGING | Age: 74
End: 2024-03-31
Payer: MEDICARE

## 2024-03-31 VITALS
HEART RATE: 76 BPM | WEIGHT: 293 LBS | OXYGEN SATURATION: 93 % | HEIGHT: 73 IN | RESPIRATION RATE: 14 BRPM | SYSTOLIC BLOOD PRESSURE: 136 MMHG | BODY MASS INDEX: 38.83 KG/M2 | DIASTOLIC BLOOD PRESSURE: 76 MMHG | TEMPERATURE: 98.1 F

## 2024-03-31 DIAGNOSIS — J18.9 PNEUMONIA OF LEFT LOWER LOBE DUE TO INFECTIOUS ORGANISM: Primary | ICD-10-CM

## 2024-03-31 LAB
ANION GAP SERPL CALCULATED.3IONS-SCNC: 9 MMOL/L (ref 7–16)
BASOPHILS # BLD: 0.04 K/UL (ref 0–0.2)
BASOPHILS NFR BLD: 1 % (ref 0–2)
BUN SERPL-MCNC: 17 MG/DL (ref 6–23)
CALCIUM SERPL-MCNC: 9.3 MG/DL (ref 8.6–10.2)
CHLORIDE SERPL-SCNC: 98 MMOL/L (ref 98–107)
CO2 SERPL-SCNC: 30 MMOL/L (ref 22–29)
CREAT SERPL-MCNC: 0.9 MG/DL (ref 0.7–1.2)
EOSINOPHIL # BLD: 0.14 K/UL (ref 0.05–0.5)
EOSINOPHILS RELATIVE PERCENT: 3 % (ref 0–6)
ERYTHROCYTE [DISTWIDTH] IN BLOOD BY AUTOMATED COUNT: 13.5 % (ref 11.5–15)
GFR SERPL CREATININE-BSD FRML MDRD: >90 ML/MIN/1.73M2
GLUCOSE SERPL-MCNC: 217 MG/DL (ref 74–99)
HCT VFR BLD AUTO: 35.6 % (ref 37–54)
HGB BLD-MCNC: 11.8 G/DL (ref 12.5–16.5)
IMM GRANULOCYTES # BLD AUTO: <0.03 K/UL (ref 0–0.58)
IMM GRANULOCYTES NFR BLD: 0 % (ref 0–5)
INFLUENZA A BY PCR: NOT DETECTED
INFLUENZA B BY PCR: NOT DETECTED
LYMPHOCYTES NFR BLD: 1.53 K/UL (ref 1.5–4)
LYMPHOCYTES RELATIVE PERCENT: 31 % (ref 20–42)
MCH RBC QN AUTO: 30.9 PG (ref 26–35)
MCHC RBC AUTO-ENTMCNC: 33.1 G/DL (ref 32–34.5)
MCV RBC AUTO: 93.2 FL (ref 80–99.9)
MONOCYTES NFR BLD: 0.57 K/UL (ref 0.1–0.95)
MONOCYTES NFR BLD: 12 % (ref 2–12)
NEUTROPHILS NFR BLD: 54 % (ref 43–80)
NEUTS SEG NFR BLD: 2.65 K/UL (ref 1.8–7.3)
PLATELET # BLD AUTO: 265 K/UL (ref 130–450)
PMV BLD AUTO: 9.5 FL (ref 7–12)
POTASSIUM SERPL-SCNC: 4 MMOL/L (ref 3.5–5)
PROCALCITONIN SERPL-MCNC: 0.07 NG/ML (ref 0–0.08)
RBC # BLD AUTO: 3.82 M/UL (ref 3.8–5.8)
RSV BY PCR: NOT DETECTED
SARS-COV-2 RDRP RESP QL NAA+PROBE: NOT DETECTED
SODIUM SERPL-SCNC: 137 MMOL/L (ref 132–146)
SPECIMEN DESCRIPTION: NORMAL
SPECIMEN SOURCE: NORMAL
WBC OTHER # BLD: 5 K/UL (ref 4.5–11.5)

## 2024-03-31 PROCEDURE — 6360000004 HC RX CONTRAST MEDICATION: Performed by: RADIOLOGY

## 2024-03-31 PROCEDURE — 84145 PROCALCITONIN (PCT): CPT

## 2024-03-31 PROCEDURE — 6370000000 HC RX 637 (ALT 250 FOR IP): Performed by: EMERGENCY MEDICINE

## 2024-03-31 PROCEDURE — 71275 CT ANGIOGRAPHY CHEST: CPT

## 2024-03-31 PROCEDURE — 2580000003 HC RX 258: Performed by: EMERGENCY MEDICINE

## 2024-03-31 PROCEDURE — 80048 BASIC METABOLIC PNL TOTAL CA: CPT

## 2024-03-31 PROCEDURE — 87635 SARS-COV-2 COVID-19 AMP PRB: CPT

## 2024-03-31 PROCEDURE — 87899 AGENT NOS ASSAY W/OPTIC: CPT

## 2024-03-31 PROCEDURE — 87502 INFLUENZA DNA AMP PROBE: CPT

## 2024-03-31 PROCEDURE — 99285 EMERGENCY DEPT VISIT HI MDM: CPT

## 2024-03-31 PROCEDURE — 87634 RSV DNA/RNA AMP PROBE: CPT

## 2024-03-31 PROCEDURE — 85025 COMPLETE CBC W/AUTO DIFF WBC: CPT

## 2024-03-31 PROCEDURE — 6360000002 HC RX W HCPCS: Performed by: EMERGENCY MEDICINE

## 2024-03-31 PROCEDURE — 96374 THER/PROPH/DIAG INJ IV PUSH: CPT

## 2024-03-31 PROCEDURE — 87449 NOS EACH ORGANISM AG IA: CPT

## 2024-03-31 RX ORDER — DOXYCYCLINE HYCLATE 100 MG/1
100 CAPSULE ORAL ONCE
Status: COMPLETED | OUTPATIENT
Start: 2024-03-31 | End: 2024-03-31

## 2024-03-31 RX ORDER — DOXYCYCLINE HYCLATE 100 MG
100 TABLET ORAL 2 TIMES DAILY
Qty: 14 TABLET | Refills: 0 | Status: SHIPPED | OUTPATIENT
Start: 2024-03-31 | End: 2024-04-28

## 2024-03-31 RX ORDER — AMOXICILLIN AND CLAVULANATE POTASSIUM 875; 125 MG/1; MG/1
1 TABLET, FILM COATED ORAL 2 TIMES DAILY
Qty: 20 TABLET | Refills: 0 | Status: SHIPPED | OUTPATIENT
Start: 2024-03-31 | End: 2024-04-14

## 2024-03-31 RX ADMIN — IOPAMIDOL 75 ML: 755 INJECTION, SOLUTION INTRAVENOUS at 09:45

## 2024-03-31 RX ADMIN — DOXYCYCLINE HYCLATE 100 MG: 100 CAPSULE ORAL at 11:41

## 2024-03-31 RX ADMIN — CEFTRIAXONE SODIUM 2000 MG: 2 INJECTION, POWDER, FOR SOLUTION INTRAMUSCULAR; INTRAVENOUS at 11:40

## 2024-03-31 ASSESSMENT — PAIN - FUNCTIONAL ASSESSMENT: PAIN_FUNCTIONAL_ASSESSMENT: NONE - DENIES PAIN

## 2024-03-31 NOTE — ED PROVIDER NOTES
HPI:  3/31/24, Time: 7:54 AM EDT         Stewart Mark is a 73 y.o. male presenting to the ED for presents from [facility] been coughing for approximately a week.  He states he coughed up some bloody sputum this morning.  He denies chest pain or shortness of breath.  No fevers or chills reported.  Patient had a stroke and suffered seizure July 2023.  He states has been multiple outbreaks of influenza and COVID at the [facility].  Patient also suffers from coronary artery disease diabetes hyperlipidemia hypertension sleep apnea and morbid obesity., beginning several hours ago.  The complaint has been persistent, mild in severity, and worsened by nothing.      Review of Systems:   A complete review of systems was performed and pertinent positives and negatives are stated within HPI, all other systems reviewed and are negative.    --------------------------------------------- PAST HISTORY ---------------------------------------------  Past Medical History:  has a past medical history of Acute stroke due to ischemia (HCC), Anxiety, CAD in native artery, Depression, Diabetes mellitus (HCC), Hyperlipidemia, Hypertension, Moderate persistent asthma without complication, Obesity, Sleep apnea, Type 2 diabetes mellitus without complication (HCC), and Ulceration.    Past Surgical History:  has a past surgical history that includes hernia repair; Colonoscopy; joint replacement (Left); Revision total knee arthroplasty (Right, 12/14/2018); back surgery (10/2019); and Coronary stent placement.    Social History:  reports that he quit smoking about 44 years ago. His smoking use included cigarettes. He started smoking about 52 years ago. He has a 24.9 pack-year smoking history. He has been exposed to tobacco smoke. He has never used smokeless tobacco. He reports that he does not currently use alcohol. He reports that he does not use drugs.    Family History: family history includes Cancer in his mother and  Nurse Triage SBAR    Is this a 2nd Level Triage? NO    Situation: Flu Vaccine reaction    Background: Mother called stating her daughter had her flu vaccine today around 0900 and about 1400 mother notices patient having red and swollen knuckles, elbows and feet. Mother thought maybe her 4 y/o had dropped a toy on patient.     Assessment: Mother reports redness and swollen areas by the knuckles, elbows and feet, rash- raised bumps, patient is fussy - but acting her normal for just having the vaccine.  Mother denies fever, redness/swelling by injection site, blisters, SOB, difficulty breathing.     This is patients first influenza vaccine.    Protocol Recommended Disposition:   Home Care    Recommendation: RN reviewed red flag symptoms with patient's mother per RN protocol.   Advised patient's mother to seek emergency care if symptoms worsen, child gets a fever above 103F, or if becomes unmanageable. Writer reviewed information below from provider.   Mother verbalized understanding, agreeable to plan and no further questions at this time.    Kristen Mcfadden RN on 2024 at 4:39 PM      Reason for Disposition   Normal immunization reaction    Additional Information   Negative: Difficulty with breathing or swallowing   Negative: Limp, weak, or not moving   Negative: Unresponsive or difficult to awaken   Negative: Sounds like a life-threatening emergency to the triager   Negative: COVID-19 vaccine reactions OR questions about the vaccines   Negative: Fever starts over 2 days after the shot and no signs of cellulitis (Exception: MMR or varicella vaccines can cause delayed fever) and 3 months or older   Negative: Age 4 - 12 weeks old with fever > 102 F (39 C) rectally following vaccine   Negative: Age 4 - 12 weeks old with fever 100.4 F (38 C) or higher rectally and begins > 24 hours after shot OR lasts > 48 hours   Negative: Age 4 - 12 weeks old with fever 100.4 F (38 C) or higher rectally following vaccine and has  "other RISK FACTORS for sepsis   Negative: Age 4 - 12 weeks old with fever 100.4 F (38 C) or higher rectally following vaccine and only received Hep B vaccine   Negative: Rotavirus vaccine and vomiting 3 or more times, bloody diarrhea or severe crying   Negative: Measles vaccine rash (onset day 6-12) is purple or blood-colored   Negative: Child sounds very sick or weak to the triager (Exception: severe local reaction)   Negative: Fever > 105 F (40.6 C)   Negative: Crying continuously for > 3 hours   Negative: Fever and weak immune system (sickle cell disease, HIV, splenectomy, chemotherapy, organ transplant, chronic oral steroids, etc)   Negative: Over 3 days since shot and general symptoms (such as muscle aches, headache, fussiness, chills) are getting worse   Negative: Fever present > 3 days   Negative: Over 3 days since shot and redness is larger than 2 inches (5 cm) (Note: can be normal after 4th and 5th DTaP)   Negative: Over 3 days since shot and redness at the injection site is getting worse   Negative: Deep lump (following DTaP at 2-8 weeks) becomes tender to the touch   Negative: Measles vaccine rash (onset day 6-12) persists > 4 days   Negative: Triager thinks child needs to be seen for non-urgent problem   Negative: Caller wants child seen for non-urgent problem    Answer Assessment - Initial Assessment Questions  1. SYMPTOMS: \"What is the main symptom?\" (redness, swelling, pain) For redness, ask: \"How large is the area of red skin?\" (inches or cm)      Redness, swelling- Knuckles, feet, elbows  2. ONSET: \"When was the vaccine (shot) given?\" \"How much later did the 7 hours begin?\" (Hours or days) This question mainly refers to the onset of redness or fever.      Redness  3. SEVERITY: \"How sick is your child acting?\" \"What is your child doing right now?\"      Car seat - fussy normal after an inmmunization  4. FEVER: \"Is there a fever?\" If so, ask: \"What is it, how was it measured, and when did it start?\"    " "   No fever  5. IMMUNIZATIONS GIVEN:  \"What shots has your child recently received?\" This question does not need to be asked unless the child received a single vaccine such as influenza, typhoid or rabies.       Influenza vaccine  6. PAST REACTIONS: \"Has he reacted to immunizations before?\" If so, ask: \"What happened?\"      No - first time receiving the influenza vaccine    Protocols used: Immunization [id number]    "

## 2024-04-01 LAB
L PNEUMO1 AG UR QL IA.RAPID: NEGATIVE
S PNEUM AG SPEC QL: NEGATIVE
SPECIMEN SOURCE: NORMAL

## 2024-05-09 ENCOUNTER — HOSPITAL ENCOUNTER (EMERGENCY)
Age: 74
Discharge: HOME | End: 2024-05-10
Attending: STUDENT IN AN ORGANIZED HEALTH CARE EDUCATION/TRAINING PROGRAM
Payer: MEDICARE

## 2024-05-09 ENCOUNTER — APPOINTMENT (OUTPATIENT)
Dept: CT IMAGING | Age: 74
End: 2024-05-09
Payer: MEDICARE

## 2024-05-09 ENCOUNTER — APPOINTMENT (OUTPATIENT)
Dept: GENERAL RADIOLOGY | Age: 74
End: 2024-05-09
Payer: MEDICARE
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
## 2024-05-09 LAB
ALBUMIN SERPL-MCNC: 4.4 G/DL (ref 3.5–5.2)
ALP SERPL-CCNC: 47 U/L (ref 40–129)
ALT SERPL-CCNC: 43 U/L (ref 0–40)
ANION GAP SERPL CALCULATED.3IONS-SCNC: 11 MMOL/L (ref 7–16)
AST SERPL-CCNC: 38 U/L (ref 0–39)
BASOPHILS # BLD: 0.04 K/UL (ref 0–0.2)
BASOPHILS NFR BLD: 1 % (ref 0–2)
BILIRUB SERPL-MCNC: 0.4 MG/DL (ref 0–1.2)
BILIRUB UR QL STRIP: NEGATIVE
BUN SERPL-MCNC: 22 MG/DL (ref 6–23)
CALCIUM SERPL-MCNC: 9.4 MG/DL (ref 8.6–10.2)
CHLORIDE SERPL-SCNC: 97 MMOL/L (ref 98–107)
CLARITY UR: CLEAR
CO2 SERPL-SCNC: 27 MMOL/L (ref 22–29)
COLOR UR: YELLOW
CREAT SERPL-MCNC: 1 MG/DL (ref 0.7–1.2)
EOSINOPHIL # BLD: 0.26 K/UL (ref 0.05–0.5)
EOSINOPHILS RELATIVE PERCENT: 4 % (ref 0–6)
EPI CELLS #/AREA URNS HPF: NORMAL /HPF
ERYTHROCYTE [DISTWIDTH] IN BLOOD BY AUTOMATED COUNT: 13.4 % (ref 11.5–15)
GFR, ESTIMATED: 85 ML/MIN/1.73M2
GLUCOSE SERPL-MCNC: 165 MG/DL (ref 74–99)
GLUCOSE UR STRIP-MCNC: NEGATIVE MG/DL
HCT VFR BLD AUTO: 38.6 % (ref 37–54)
HGB BLD-MCNC: 13.1 G/DL (ref 12.5–16.5)
HGB UR QL STRIP.AUTO: NEGATIVE
IMM GRANULOCYTES # BLD AUTO: 0.03 K/UL (ref 0–0.58)
IMM GRANULOCYTES NFR BLD: 1 % (ref 0–5)
KETONES UR STRIP-MCNC: NEGATIVE MG/DL
LEUKOCYTE ESTERASE UR QL STRIP: NEGATIVE
LYMPHOCYTES NFR BLD: 2.13 K/UL (ref 1.5–4)
LYMPHOCYTES RELATIVE PERCENT: 36 % (ref 20–42)
MCH RBC QN AUTO: 30.9 PG (ref 26–35)
MCHC RBC AUTO-ENTMCNC: 33.9 G/DL (ref 32–34.5)
MCV RBC AUTO: 91 FL (ref 80–99.9)
MONOCYTES NFR BLD: 0.57 K/UL (ref 0.1–0.95)
MONOCYTES NFR BLD: 10 % (ref 2–12)
NEUTROPHILS NFR BLD: 49 % (ref 43–80)
NEUTS SEG NFR BLD: 2.88 K/UL (ref 1.8–7.3)
NITRITE UR QL STRIP: NEGATIVE
PH UR STRIP: 8 [PH] (ref 5–9)
PLATELET # BLD AUTO: 275 K/UL (ref 130–450)
PMV BLD AUTO: 9.3 FL (ref 7–12)
POTASSIUM SERPL-SCNC: 4.2 MMOL/L (ref 3.5–5)
PROT SERPL-MCNC: 7.5 G/DL (ref 6.4–8.3)
PROT UR STRIP-MCNC: NEGATIVE MG/DL
RBC # BLD AUTO: 4.24 M/UL (ref 3.8–5.8)
RBC #/AREA URNS HPF: NORMAL /HPF
SODIUM SERPL-SCNC: 135 MMOL/L (ref 132–146)
SP GR UR STRIP: 1.01 (ref 1–1.03)
T4 FREE SERPL-MCNC: 0.9 NG/DL (ref 0.9–1.7)
TROPONIN I SERPL HS-MCNC: 29 NG/L (ref 0–11)
TROPONIN I SERPL HS-MCNC: 32 NG/L (ref 0–11)
TSH SERPL DL<=0.05 MIU/L-ACNC: 4.84 UIU/ML (ref 0.27–4.2)
UROBILINOGEN UR STRIP-ACNC: 0.2 EU/DL (ref 0–1)
WBC #/AREA URNS HPF: NORMAL /HPF
WBC OTHER # BLD: 5.9 K/UL (ref 4.5–11.5)

## 2024-05-09 PROCEDURE — 70450 CT HEAD/BRAIN W/O DYE: CPT

## 2024-05-09 PROCEDURE — 80053 COMPREHEN METABOLIC PANEL: CPT

## 2024-05-09 PROCEDURE — 81001 URINALYSIS AUTO W/SCOPE: CPT

## 2024-05-09 PROCEDURE — 84443 ASSAY THYROID STIM HORMONE: CPT

## 2024-05-09 PROCEDURE — 99285 EMERGENCY DEPT VISIT HI MDM: CPT

## 2024-05-09 PROCEDURE — 85025 COMPLETE CBC W/AUTO DIFF WBC: CPT

## 2024-05-09 PROCEDURE — 72128 CT CHEST SPINE W/O DYE: CPT

## 2024-05-09 PROCEDURE — 72131 CT LUMBAR SPINE W/O DYE: CPT

## 2024-05-09 PROCEDURE — 84439 ASSAY OF FREE THYROXINE: CPT

## 2024-05-09 PROCEDURE — 93005 ELECTROCARDIOGRAM TRACING: CPT | Performed by: STUDENT IN AN ORGANIZED HEALTH CARE EDUCATION/TRAINING PROGRAM

## 2024-05-09 PROCEDURE — 72125 CT NECK SPINE W/O DYE: CPT

## 2024-05-09 PROCEDURE — 73562 X-RAY EXAM OF KNEE 3: CPT

## 2024-05-09 PROCEDURE — 71045 X-RAY EXAM CHEST 1 VIEW: CPT

## 2024-05-09 PROCEDURE — 84484 ASSAY OF TROPONIN QUANT: CPT

## 2024-05-09 RX ORDER — TETANUS AND DIPHTHERIA TOXOIDS ADSORBED 2; 2 [LF]/.5ML; [LF]/.5ML
0.5 INJECTION INTRAMUSCULAR ONCE
Status: DISCONTINUED | OUTPATIENT
Start: 2024-05-09 | End: 2024-05-10 | Stop reason: HOSPADM

## 2024-05-09 ASSESSMENT — LIFESTYLE VARIABLES
HOW MANY STANDARD DRINKS CONTAINING ALCOHOL DO YOU HAVE ON A TYPICAL DAY: PATIENT DOES NOT DRINK
HOW OFTEN DO YOU HAVE A DRINK CONTAINING ALCOHOL: NEVER

## 2024-05-09 NOTE — ED PROVIDER NOTES
(HCC), Anxiety, CAD in native artery, Depression, Diabetes mellitus (HCC), Hyperlipidemia, Hypertension, Moderate persistent asthma without complication, Obesity, Sleep apnea, Type 2 diabetes mellitus without complication (HCC), and Ulceration.    Past Surgical History:  has a past surgical history that includes hernia repair; Colonoscopy; joint replacement (Left); Revision total knee arthroplasty (Right, 12/14/2018); back surgery (10/2019); and Coronary stent placement.    Social History:  reports that he quit smoking about 44 years ago. His smoking use included cigarettes. He started smoking about 52 years ago. He has a 24.9 pack-year smoking history. He has been exposed to tobacco smoke. He has never used smokeless tobacco. He reports that he does not currently use alcohol. He reports that he does not use drugs.    Family History: family history includes Cancer in his mother and sister.     The patient’s home medications have been reviewed.    Allergies: Prinivil [lisinopril] and Seasonal    ---------------------------------------------------PHYSICAL EXAM--------------------------------------  Constitutional/General: Alert and oriented x3, well appearing, non toxic in NAD  Head: Normocephalic, atraumatic; no deformities, crepitus felt on palpation.  Patient does complain of tenderness to palpation of the posterior occiput.  Ears: Tms clear; no discharge noted; no tragus tenderness; no Mccoy sign  Eyes: PERRL; EOM intact; Conjunctiva clear;  Nose: Clear nasal turbinates without obstruction or discharge; mucus membranes moist; no septal hematoma  Mouth: Oropharynx clear, handling secretions, no trismus  Neck: Supple, full ROM, Trachea midline, No step-offs; C-spine tenderness posteriorly.  Pulmonary: Lungs clear to auscultation bilaterally, no wheezes, rales, or rhonchi. Not in respiratory distress.  Able to speak in full sentences without difficulty.  Cardiovascular: Regular Rate. Regular Rhythm.  Chest: no chest  regarding the diagnosis and prognosis.  Questions are answered at this time and they are agreeable with the plan.     --------------------------------- IMPRESSION AND DISPOSITION ---------------------------------    IMPRESSION  1. Fall, initial encounter        DISPOSITION  Disposition: Discharge to home  Patient condition is stable    NOTE: This report was transcribed using voice recognition software. Every effort was made to ensure accuracy; however, inadvertent computerized transcription errors may be present

## 2024-05-10 VITALS
BODY MASS INDEX: 39.1 KG/M2 | HEIGHT: 73 IN | TEMPERATURE: 98 F | DIASTOLIC BLOOD PRESSURE: 81 MMHG | HEART RATE: 78 BPM | SYSTOLIC BLOOD PRESSURE: 143 MMHG | RESPIRATION RATE: 18 BRPM | OXYGEN SATURATION: 95 % | WEIGHT: 295 LBS

## 2024-05-10 LAB
EKG ATRIAL RATE: 73 BPM
EKG P AXIS: 51 DEGREES
EKG P-R INTERVAL: 242 MS
EKG Q-T INTERVAL: 424 MS
EKG QRS DURATION: 94 MS
EKG QTC CALCULATION (BAZETT): 467 MS
EKG R AXIS: -54 DEGREES
EKG T AXIS: 68 DEGREES
EKG VENTRICULAR RATE: 73 BPM

## 2024-05-10 PROCEDURE — 93010 ELECTROCARDIOGRAM REPORT: CPT | Performed by: INTERNAL MEDICINE

## 2024-05-31 DIAGNOSIS — Z86.73 H/O: STROKE: ICD-10-CM

## 2024-05-31 DIAGNOSIS — R41.89 COGNITIVE IMPAIRMENT: ICD-10-CM

## 2024-05-31 DIAGNOSIS — G91.8 HYDROCEPHALUS EX VACUO (HCC): ICD-10-CM

## 2024-05-31 RX ORDER — GABAPENTIN 600 MG/1
600 TABLET ORAL 4 TIMES DAILY
Qty: 120 TABLET | Refills: 0 | Status: SHIPPED | OUTPATIENT
Start: 2024-05-31 | End: 2024-06-30

## 2024-05-31 RX ORDER — CLOPIDOGREL BISULFATE 75 MG/1
75 TABLET ORAL EVERY MORNING
Qty: 30 TABLET | Refills: 0 | Status: SHIPPED | OUTPATIENT
Start: 2024-05-31

## 2024-05-31 RX ORDER — ASPIRIN 81 MG/1
81 TABLET ORAL NIGHTLY
Qty: 30 TABLET | Refills: 0 | Status: SHIPPED | OUTPATIENT
Start: 2024-05-31

## 2024-05-31 RX ORDER — BUDESONIDE AND FORMOTEROL FUMARATE DIHYDRATE 160; 4.5 UG/1; UG/1
2 AEROSOL RESPIRATORY (INHALATION) 2 TIMES DAILY PRN
Qty: 10.2 G | Refills: 0 | Status: SHIPPED | OUTPATIENT
Start: 2024-05-31

## 2024-05-31 RX ORDER — ACETAMINOPHEN 160 MG
2000 TABLET,DISINTEGRATING ORAL 2 TIMES DAILY
Qty: 30 CAPSULE | Refills: 0 | Status: SHIPPED | OUTPATIENT
Start: 2024-05-31

## 2024-05-31 RX ORDER — LOSARTAN POTASSIUM 100 MG/1
100 TABLET ORAL EVERY MORNING
Qty: 30 TABLET | Refills: 0 | Status: SHIPPED | OUTPATIENT
Start: 2024-05-31

## 2024-05-31 RX ORDER — FLUTICASONE PROPIONATE 50 MCG
1 SPRAY, SUSPENSION (ML) NASAL 2 TIMES DAILY PRN
Qty: 16 G | Refills: 0 | Status: SHIPPED | OUTPATIENT
Start: 2024-05-31

## 2024-05-31 RX ORDER — MELATONIN 10 MG
10 CAPSULE ORAL NIGHTLY PRN
Qty: 30 CAPSULE | Refills: 0 | Status: SHIPPED | OUTPATIENT
Start: 2024-05-31

## 2024-05-31 RX ORDER — LORATADINE 10 MG/1
10 TABLET ORAL DAILY
Qty: 30 TABLET | Refills: 0 | Status: SHIPPED | OUTPATIENT
Start: 2024-05-31

## 2024-05-31 RX ORDER — PANTOPRAZOLE SODIUM 40 MG/1
40 TABLET, DELAYED RELEASE ORAL EVERY MORNING
Qty: 30 TABLET | Refills: 0 | Status: SHIPPED | OUTPATIENT
Start: 2024-05-31

## 2024-05-31 RX ORDER — ACETAMINOPHEN 325 MG/1
650 TABLET ORAL EVERY 4 HOURS PRN
Qty: 120 TABLET | Refills: 3 | Status: SHIPPED | OUTPATIENT
Start: 2024-05-31

## 2024-05-31 RX ORDER — FENOFIBRATE 54 MG/1
54 TABLET ORAL EVERY MORNING
Qty: 30 TABLET | Refills: 0 | Status: SHIPPED | OUTPATIENT
Start: 2024-05-31

## 2024-05-31 RX ORDER — HYDROCHLOROTHIAZIDE 25 MG/1
TABLET ORAL
Qty: 30 TABLET | Refills: 0 | Status: SHIPPED | OUTPATIENT
Start: 2024-05-31

## 2024-05-31 RX ORDER — MEMANTINE HYDROCHLORIDE 5 MG/1
5 TABLET ORAL 2 TIMES DAILY
Qty: 180 TABLET | Refills: 1 | Status: SHIPPED | OUTPATIENT
Start: 2024-05-31

## 2024-05-31 RX ORDER — INSULIN GLARGINE 100 [IU]/ML
56 INJECTION, SOLUTION SUBCUTANEOUS 2 TIMES DAILY
Qty: 5 ADJUSTABLE DOSE PRE-FILLED PEN SYRINGE | Refills: 0 | Status: SHIPPED | OUTPATIENT
Start: 2024-05-31

## 2024-05-31 RX ORDER — ATORVASTATIN CALCIUM 40 MG/1
40 TABLET, FILM COATED ORAL NIGHTLY
Qty: 30 TABLET | Refills: 3 | Status: SHIPPED | OUTPATIENT
Start: 2024-05-31

## 2024-05-31 RX ORDER — UBIDECARENONE 75 MG
200 CAPSULE ORAL EVERY MORNING
Qty: 30 CAPSULE | Refills: 0 | Status: SHIPPED | OUTPATIENT
Start: 2024-05-31

## 2024-05-31 RX ORDER — TAMSULOSIN HYDROCHLORIDE 0.4 MG/1
0.8 CAPSULE ORAL DAILY
Qty: 60 CAPSULE | Refills: 0 | Status: SHIPPED | OUTPATIENT
Start: 2024-05-31

## 2024-05-31 RX ORDER — DULOXETIN HYDROCHLORIDE 60 MG/1
60 CAPSULE, DELAYED RELEASE ORAL EVERY MORNING
Qty: 30 CAPSULE | Refills: 0 | Status: SHIPPED | OUTPATIENT
Start: 2024-05-31

## 2024-05-31 RX ORDER — DIVALPROEX SODIUM 250 MG/1
250 TABLET, DELAYED RELEASE ORAL 2 TIMES DAILY
Qty: 90 TABLET | Refills: 0 | Status: SHIPPED | OUTPATIENT
Start: 2024-05-31

## 2024-05-31 RX ORDER — ALBUTEROL SULFATE 90 UG/1
2 AEROSOL, METERED RESPIRATORY (INHALATION) 4 TIMES DAILY PRN
Qty: 18 G | Refills: 0 | Status: SHIPPED | OUTPATIENT
Start: 2024-05-31

## 2024-05-31 RX ORDER — METOPROLOL TARTRATE 100 MG/1
100 TABLET ORAL 2 TIMES DAILY
Qty: 60 TABLET | Refills: 0 | Status: SHIPPED | OUTPATIENT
Start: 2024-05-31

## 2024-06-27 DIAGNOSIS — Z86.73 H/O: STROKE: ICD-10-CM

## 2024-06-27 DIAGNOSIS — G91.8 HYDROCEPHALUS EX VACUO (HCC): ICD-10-CM

## 2024-06-27 DIAGNOSIS — R41.89 COGNITIVE IMPAIRMENT: ICD-10-CM

## 2024-06-27 RX ORDER — ALBUTEROL SULFATE 90 UG/1
2 AEROSOL, METERED RESPIRATORY (INHALATION) 4 TIMES DAILY PRN
Qty: 18 G | Refills: 0 | Status: SHIPPED | OUTPATIENT
Start: 2024-06-27

## 2024-06-27 RX ORDER — LOSARTAN POTASSIUM 100 MG/1
100 TABLET ORAL EVERY MORNING
Qty: 30 TABLET | Refills: 0 | Status: SHIPPED | OUTPATIENT
Start: 2024-06-27

## 2024-06-27 RX ORDER — INSULIN GLARGINE 100 [IU]/ML
56 INJECTION, SOLUTION SUBCUTANEOUS 2 TIMES DAILY
Qty: 5 ADJUSTABLE DOSE PRE-FILLED PEN SYRINGE | Refills: 0 | Status: SHIPPED | OUTPATIENT
Start: 2024-06-27

## 2024-06-27 RX ORDER — MELATONIN 10 MG
10 CAPSULE ORAL NIGHTLY PRN
Qty: 30 CAPSULE | Refills: 0 | Status: SHIPPED | OUTPATIENT
Start: 2024-06-27

## 2024-06-27 RX ORDER — METOPROLOL TARTRATE 100 MG/1
100 TABLET ORAL 2 TIMES DAILY
Qty: 60 TABLET | Refills: 0 | Status: SHIPPED | OUTPATIENT
Start: 2024-06-27

## 2024-06-27 RX ORDER — UBIDECARENONE 75 MG
200 CAPSULE ORAL EVERY MORNING
Qty: 30 CAPSULE | Refills: 0 | Status: SHIPPED | OUTPATIENT
Start: 2024-06-27

## 2024-06-27 RX ORDER — ASPIRIN 81 MG/1
81 TABLET ORAL NIGHTLY
Qty: 30 TABLET | Refills: 0 | Status: SHIPPED | OUTPATIENT
Start: 2024-06-27

## 2024-06-27 RX ORDER — HYDROCHLOROTHIAZIDE 25 MG/1
TABLET ORAL
Qty: 30 TABLET | Refills: 0 | Status: SHIPPED | OUTPATIENT
Start: 2024-06-27

## 2024-06-27 RX ORDER — PANTOPRAZOLE SODIUM 40 MG/1
40 TABLET, DELAYED RELEASE ORAL EVERY MORNING
Qty: 30 TABLET | Refills: 0 | Status: SHIPPED | OUTPATIENT
Start: 2024-06-27

## 2024-06-27 RX ORDER — DULOXETIN HYDROCHLORIDE 60 MG/1
60 CAPSULE, DELAYED RELEASE ORAL EVERY MORNING
Qty: 30 CAPSULE | Refills: 0 | Status: SHIPPED | OUTPATIENT
Start: 2024-06-27

## 2024-06-27 RX ORDER — ACETAMINOPHEN 325 MG/1
650 TABLET ORAL EVERY 4 HOURS PRN
Qty: 120 TABLET | Refills: 3 | Status: SHIPPED | OUTPATIENT
Start: 2024-06-27

## 2024-06-27 RX ORDER — ACETAMINOPHEN 160 MG
2000 TABLET,DISINTEGRATING ORAL 2 TIMES DAILY
Qty: 30 CAPSULE | Refills: 0 | Status: SHIPPED | OUTPATIENT
Start: 2024-06-27

## 2024-06-27 RX ORDER — LORATADINE 10 MG/1
10 TABLET ORAL DAILY
Qty: 30 TABLET | Refills: 0 | Status: SHIPPED | OUTPATIENT
Start: 2024-06-27

## 2024-06-27 RX ORDER — CLOPIDOGREL BISULFATE 75 MG/1
75 TABLET ORAL EVERY MORNING
Qty: 30 TABLET | Refills: 0 | Status: SHIPPED | OUTPATIENT
Start: 2024-06-27

## 2024-06-27 RX ORDER — FLUTICASONE PROPIONATE 50 MCG
1 SPRAY, SUSPENSION (ML) NASAL 2 TIMES DAILY PRN
Qty: 16 G | Refills: 0 | Status: SHIPPED | OUTPATIENT
Start: 2024-06-27

## 2024-06-27 RX ORDER — TAMSULOSIN HYDROCHLORIDE 0.4 MG/1
0.8 CAPSULE ORAL DAILY
Qty: 60 CAPSULE | Refills: 0 | Status: SHIPPED | OUTPATIENT
Start: 2024-06-27

## 2024-06-27 RX ORDER — MEMANTINE HYDROCHLORIDE 5 MG/1
5 TABLET ORAL 2 TIMES DAILY
Qty: 180 TABLET | Refills: 1 | Status: SHIPPED | OUTPATIENT
Start: 2024-06-27

## 2024-06-27 RX ORDER — GABAPENTIN 600 MG/1
600 TABLET ORAL 4 TIMES DAILY
Qty: 120 TABLET | Refills: 0 | Status: SHIPPED | OUTPATIENT
Start: 2024-06-27 | End: 2024-07-27

## 2024-06-27 RX ORDER — BUDESONIDE AND FORMOTEROL FUMARATE DIHYDRATE 160; 4.5 UG/1; UG/1
2 AEROSOL RESPIRATORY (INHALATION) 2 TIMES DAILY PRN
Qty: 10.2 G | Refills: 0 | Status: SHIPPED | OUTPATIENT
Start: 2024-06-27

## 2024-06-27 RX ORDER — DIVALPROEX SODIUM 250 MG/1
250 TABLET, DELAYED RELEASE ORAL 2 TIMES DAILY
Qty: 90 TABLET | Refills: 0 | Status: SHIPPED | OUTPATIENT
Start: 2024-06-27

## 2024-06-27 RX ORDER — FENOFIBRATE 54 MG/1
54 TABLET ORAL EVERY MORNING
Qty: 30 TABLET | Refills: 0 | Status: SHIPPED | OUTPATIENT
Start: 2024-06-27

## 2024-06-27 RX ORDER — ATORVASTATIN CALCIUM 40 MG/1
40 TABLET, FILM COATED ORAL NIGHTLY
Qty: 30 TABLET | Refills: 3 | Status: SHIPPED | OUTPATIENT
Start: 2024-06-27

## 2024-07-10 DIAGNOSIS — Z86.73 H/O: STROKE: ICD-10-CM

## 2024-07-10 DIAGNOSIS — G91.8 HYDROCEPHALUS EX VACUO (HCC): ICD-10-CM

## 2024-07-10 DIAGNOSIS — R41.89 COGNITIVE IMPAIRMENT: ICD-10-CM

## 2024-07-10 RX ORDER — HYDROCHLOROTHIAZIDE 25 MG/1
TABLET ORAL
Qty: 30 TABLET | Refills: 0 | Status: SHIPPED | OUTPATIENT
Start: 2024-07-10 | End: 2024-07-10 | Stop reason: SDUPTHER

## 2024-07-10 RX ORDER — HYDROCHLOROTHIAZIDE 25 MG/1
TABLET ORAL
Qty: 30 TABLET | Refills: 0 | Status: SHIPPED | OUTPATIENT
Start: 2024-07-10

## 2024-07-10 RX ORDER — DIVALPROEX SODIUM 250 MG/1
250 TABLET, DELAYED RELEASE ORAL 2 TIMES DAILY
Qty: 90 TABLET | Refills: 0 | Status: SHIPPED | OUTPATIENT
Start: 2024-07-10 | End: 2024-07-10 | Stop reason: SDUPTHER

## 2024-07-10 RX ORDER — MEMANTINE HYDROCHLORIDE 5 MG/1
5 TABLET ORAL 2 TIMES DAILY
Qty: 180 TABLET | Refills: 1 | Status: SHIPPED | OUTPATIENT
Start: 2024-07-10

## 2024-07-10 RX ORDER — METOPROLOL TARTRATE 100 MG/1
100 TABLET ORAL 2 TIMES DAILY
Qty: 60 TABLET | Refills: 0 | Status: SHIPPED | OUTPATIENT
Start: 2024-07-10

## 2024-07-10 RX ORDER — METOPROLOL TARTRATE 100 MG/1
100 TABLET ORAL 2 TIMES DAILY
Qty: 60 TABLET | Refills: 0 | Status: SHIPPED | OUTPATIENT
Start: 2024-07-10 | End: 2024-07-10 | Stop reason: SDUPTHER

## 2024-07-10 RX ORDER — INSULIN GLARGINE 100 [IU]/ML
60 INJECTION, SOLUTION SUBCUTANEOUS 2 TIMES DAILY
Qty: 5 ADJUSTABLE DOSE PRE-FILLED PEN SYRINGE | Refills: 0 | Status: SHIPPED | OUTPATIENT
Start: 2024-07-10

## 2024-07-10 RX ORDER — GABAPENTIN 600 MG/1
600 TABLET ORAL 4 TIMES DAILY
Qty: 120 TABLET | Refills: 0 | Status: SHIPPED | OUTPATIENT
Start: 2024-07-10 | End: 2024-08-09

## 2024-07-10 RX ORDER — MEMANTINE HYDROCHLORIDE 5 MG/1
5 TABLET ORAL 2 TIMES DAILY
Qty: 180 TABLET | Refills: 1 | Status: SHIPPED | OUTPATIENT
Start: 2024-07-10 | End: 2024-07-10 | Stop reason: SDUPTHER

## 2024-07-10 RX ORDER — DIVALPROEX SODIUM 250 MG/1
250 TABLET, DELAYED RELEASE ORAL 2 TIMES DAILY
Qty: 90 TABLET | Refills: 0 | Status: SHIPPED | OUTPATIENT
Start: 2024-07-10

## 2024-07-10 RX ORDER — GABAPENTIN 600 MG/1
600 TABLET ORAL 4 TIMES DAILY
Qty: 120 TABLET | Refills: 0 | Status: SHIPPED | OUTPATIENT
Start: 2024-07-10 | End: 2024-07-10 | Stop reason: SDUPTHER

## 2024-07-31 RX ORDER — INSULIN GLARGINE 100 [IU]/ML
60 INJECTION, SOLUTION SUBCUTANEOUS 2 TIMES DAILY
Qty: 5 ADJUSTABLE DOSE PRE-FILLED PEN SYRINGE | Refills: 0 | Status: SHIPPED | OUTPATIENT
Start: 2024-07-31

## 2024-08-05 RX ORDER — HYDROCHLOROTHIAZIDE 25 MG/1
TABLET ORAL
Qty: 30 TABLET | Refills: 0 | Status: SHIPPED | OUTPATIENT
Start: 2024-08-05

## 2024-08-05 RX ORDER — PANTOPRAZOLE SODIUM 40 MG/1
40 TABLET, DELAYED RELEASE ORAL EVERY MORNING
Qty: 30 TABLET | Refills: 0 | Status: SHIPPED | OUTPATIENT
Start: 2024-08-05

## 2024-08-05 RX ORDER — ACETAMINOPHEN 160 MG
2000 TABLET,DISINTEGRATING ORAL 2 TIMES DAILY
Qty: 30 CAPSULE | Refills: 0 | Status: SHIPPED | OUTPATIENT
Start: 2024-08-05 | End: 2024-08-10 | Stop reason: SDUPTHER

## 2024-08-05 RX ORDER — CLOPIDOGREL BISULFATE 75 MG/1
75 TABLET ORAL EVERY MORNING
Qty: 30 TABLET | Refills: 0 | Status: SHIPPED | OUTPATIENT
Start: 2024-08-05

## 2024-08-05 RX ORDER — FENOFIBRATE 54 MG/1
54 TABLET ORAL EVERY MORNING
Qty: 30 TABLET | Refills: 0 | Status: SHIPPED | OUTPATIENT
Start: 2024-08-05

## 2024-08-05 RX ORDER — LORATADINE 10 MG/1
10 TABLET ORAL DAILY
Qty: 30 TABLET | Refills: 0 | Status: SHIPPED | OUTPATIENT
Start: 2024-08-05

## 2024-08-05 RX ORDER — LOSARTAN POTASSIUM 100 MG/1
100 TABLET ORAL EVERY MORNING
Qty: 30 TABLET | Refills: 0 | Status: SHIPPED | OUTPATIENT
Start: 2024-08-05

## 2024-08-10 RX ORDER — TAMSULOSIN HYDROCHLORIDE 0.4 MG/1
0.8 CAPSULE ORAL DAILY
Qty: 60 CAPSULE | Refills: 0 | Status: SHIPPED | OUTPATIENT
Start: 2024-08-10

## 2024-08-10 RX ORDER — ACETAMINOPHEN 160 MG
2000 TABLET,DISINTEGRATING ORAL 2 TIMES DAILY
Qty: 30 CAPSULE | Refills: 0 | Status: SHIPPED | OUTPATIENT
Start: 2024-08-10

## 2024-08-10 RX ORDER — UBIDECARENONE 75 MG
200 CAPSULE ORAL EVERY MORNING
Qty: 30 CAPSULE | Refills: 0 | Status: SHIPPED | OUTPATIENT
Start: 2024-08-10

## 2024-08-10 RX ORDER — ASPIRIN 81 MG/1
81 TABLET ORAL NIGHTLY
Qty: 30 TABLET | Refills: 0 | Status: SHIPPED | OUTPATIENT
Start: 2024-08-10

## 2024-08-10 RX ORDER — DULOXETIN HYDROCHLORIDE 60 MG/1
60 CAPSULE, DELAYED RELEASE ORAL EVERY MORNING
Qty: 30 CAPSULE | Refills: 0 | Status: SHIPPED | OUTPATIENT
Start: 2024-08-10

## 2024-08-15 RX ORDER — DIVALPROEX SODIUM 125 MG/1
125 CAPSULE, COATED PELLETS ORAL
Qty: 45 CAPSULE | Refills: 0 | Status: SHIPPED | OUTPATIENT
Start: 2024-08-15 | End: 2024-09-29

## 2024-08-22 RX ORDER — INSULIN GLARGINE 100 [IU]/ML
60 INJECTION, SOLUTION SUBCUTANEOUS 2 TIMES DAILY
Qty: 5 ADJUSTABLE DOSE PRE-FILLED PEN SYRINGE | Refills: 0 | Status: SHIPPED | OUTPATIENT
Start: 2024-08-22

## 2024-08-24 RX ORDER — GABAPENTIN 600 MG/1
600 TABLET ORAL 4 TIMES DAILY
Qty: 120 TABLET | Refills: 0 | Status: SHIPPED | OUTPATIENT
Start: 2024-08-24 | End: 2024-09-23

## 2024-09-04 ENCOUNTER — OFFICE VISIT (OUTPATIENT)
Dept: CARDIOLOGY CLINIC | Age: 74
End: 2024-09-04

## 2024-09-04 VITALS
HEART RATE: 72 BPM | SYSTOLIC BLOOD PRESSURE: 149 MMHG | DIASTOLIC BLOOD PRESSURE: 83 MMHG | WEIGHT: 307.2 LBS | RESPIRATION RATE: 18 BRPM | HEIGHT: 73 IN | BODY MASS INDEX: 40.71 KG/M2

## 2024-09-04 DIAGNOSIS — I25.10 CAD IN NATIVE ARTERY: Primary | ICD-10-CM

## 2024-09-04 PROBLEM — R41.82 AMS (ALTERED MENTAL STATUS): Status: RESOLVED | Noted: 2023-11-03 | Resolved: 2024-09-04

## 2024-09-04 NOTE — PROGRESS NOTES
every morning 30 tablet 0    Insulin Pen Needle 32G X 4 MM MISC 1 each by Does not apply route daily 100 each 3    divalproex (DEPAKOTE) 250 MG DR tablet Take 1 tablet by mouth 2 times daily 90 tablet 0    memantine (NAMENDA) 5 MG tablet Take 1 tablet by mouth 2 times daily 180 tablet 1    metFORMIN (GLUCOPHAGE) 500 MG tablet Take 1 tablet by mouth 2 times daily (with meals) 60 tablet 0    metoprolol (LOPRESSOR) 100 MG tablet Take 1 tablet by mouth 2 times daily 60 tablet 0    albuterol sulfate HFA (PROVENTIL;VENTOLIN;PROAIR) 108 (90 Base) MCG/ACT inhaler Inhale 2 puffs into the lungs 4 times daily as needed for Wheezing 18 g 0    atorvastatin (LIPITOR) 40 MG tablet Take 1 tablet by mouth nightly 30 tablet 3    budesonide-formoterol (SYMBICORT) 160-4.5 MCG/ACT AERO Inhale 2 puffs into the lungs 2 times daily as needed (SOB) 10.2 g 0    fluticasone (FLONASE) 50 MCG/ACT nasal spray 1 spray by Nasal route 2 times daily as needed for Rhinitis or Allergies 16 g 0    magnesium hydroxide (MILK OF MAGNESIA) 400 MG/5ML suspension Take 30 mLs by mouth daily as needed for Constipation  0    melatonin 10 MG CAPS capsule Take 1 capsule by mouth nightly as needed (sleep) 30 capsule 0    miconazole (MICOTIN) 2 % powder Apply 2 g topically 2 times daily Apply topically 2 times daily. 45 g 0    triamcinolone (KENALOG) 0.1 % cream Apply 2 g topically 2 times daily Apply topically 2 times daily.      Menthol, Topical Analgesic, (BIOFREEZE) 4 % GEL Apply topically as needed      bisacodyl (DULCOLAX) 10 MG suppository Place 1 suppository rectally daily as needed for Constipation      Misc. Devices (CPAP MACHINE) MISC nightly       No current facility-administered medications for this visit.        Allergies   Allergen Reactions    Prinivil [Lisinopril] Cough    Seasonal Other (See Comments)     HAY FEVER       Vitals:    09/04/24 0938   BP: (!) 149/83   Pulse: 72   Resp: 18   Weight: (!) 139.3 kg (307 lb 3.2 oz)   Height: 1.854 m (6'

## 2024-09-11 RX ORDER — DIVALPROEX SODIUM 125 MG/1
125 CAPSULE, COATED PELLETS ORAL
Qty: 45 CAPSULE | Refills: 3 | Status: SHIPPED | OUTPATIENT
Start: 2024-09-11 | End: 2025-03-10

## 2024-09-11 RX ORDER — INSULIN GLARGINE 100 [IU]/ML
60 INJECTION, SOLUTION SUBCUTANEOUS 2 TIMES DAILY
Qty: 5 ADJUSTABLE DOSE PRE-FILLED PEN SYRINGE | Refills: 3 | Status: SHIPPED | OUTPATIENT
Start: 2024-09-11

## 2024-09-11 RX ORDER — GABAPENTIN 600 MG/1
600 TABLET ORAL 4 TIMES DAILY
Qty: 120 TABLET | Refills: 3 | Status: SHIPPED | OUTPATIENT
Start: 2024-09-11 | End: 2025-01-09

## 2024-09-11 RX ORDER — ATORVASTATIN CALCIUM 40 MG/1
40 TABLET, FILM COATED ORAL NIGHTLY
Qty: 30 TABLET | Refills: 3 | Status: SHIPPED | OUTPATIENT
Start: 2024-09-11

## 2024-09-11 RX ORDER — HYDROCHLOROTHIAZIDE 25 MG/1
TABLET ORAL
Qty: 30 TABLET | Refills: 3 | Status: SHIPPED | OUTPATIENT
Start: 2024-09-11

## 2024-09-11 RX ORDER — METOPROLOL TARTRATE 100 MG
100 TABLET ORAL 2 TIMES DAILY
Qty: 60 TABLET | Refills: 3 | Status: SHIPPED | OUTPATIENT
Start: 2024-09-11

## 2024-09-11 RX ORDER — DULOXETIN HYDROCHLORIDE 60 MG/1
60 CAPSULE, DELAYED RELEASE ORAL EVERY MORNING
Qty: 30 CAPSULE | Refills: 3 | Status: SHIPPED | OUTPATIENT
Start: 2024-09-11

## 2024-09-11 RX ORDER — DIVALPROEX SODIUM 250 MG/1
250 TABLET, DELAYED RELEASE ORAL 2 TIMES DAILY
Qty: 90 TABLET | Refills: 3 | Status: SHIPPED | OUTPATIENT
Start: 2024-09-11

## 2024-09-24 RX ORDER — PANTOPRAZOLE SODIUM 40 MG/1
40 TABLET, DELAYED RELEASE ORAL EVERY MORNING
Qty: 30 TABLET | Refills: 3 | Status: SHIPPED | OUTPATIENT
Start: 2024-09-24

## 2024-09-24 RX ORDER — GABAPENTIN 600 MG/1
600 TABLET ORAL 4 TIMES DAILY
Qty: 120 TABLET | Refills: 3 | Status: SHIPPED | OUTPATIENT
Start: 2024-09-24 | End: 2025-01-22

## 2024-09-24 RX ORDER — TAMSULOSIN HYDROCHLORIDE 0.4 MG/1
0.8 CAPSULE ORAL DAILY
Qty: 60 CAPSULE | Refills: 3 | Status: SHIPPED | OUTPATIENT
Start: 2024-09-24

## 2024-09-24 RX ORDER — FENOFIBRATE 54 MG/1
54 TABLET ORAL EVERY MORNING
Qty: 30 TABLET | Refills: 3 | Status: SHIPPED | OUTPATIENT
Start: 2024-09-24

## 2024-09-27 RX ORDER — LOSARTAN POTASSIUM 100 MG/1
100 TABLET ORAL EVERY MORNING
Qty: 30 TABLET | Refills: 0 | Status: SHIPPED | OUTPATIENT
Start: 2024-09-27

## 2024-09-27 RX ORDER — CLOPIDOGREL BISULFATE 75 MG/1
75 TABLET ORAL EVERY MORNING
Qty: 30 TABLET | Refills: 0 | Status: SHIPPED | OUTPATIENT
Start: 2024-09-27

## 2024-09-27 RX ORDER — DIVALPROEX SODIUM 125 MG/1
125 CAPSULE, COATED PELLETS ORAL
Qty: 45 CAPSULE | Refills: 3 | Status: SHIPPED | OUTPATIENT
Start: 2024-09-27 | End: 2025-03-26

## 2024-09-27 RX ORDER — ATORVASTATIN CALCIUM 40 MG/1
40 TABLET, FILM COATED ORAL NIGHTLY
Qty: 30 TABLET | Refills: 3 | Status: SHIPPED | OUTPATIENT
Start: 2024-09-27

## 2024-09-27 RX ORDER — LORATADINE 10 MG/1
10 TABLET ORAL DAILY
Qty: 30 TABLET | Refills: 0 | Status: SHIPPED | OUTPATIENT
Start: 2024-09-27

## 2024-09-27 RX ORDER — UBIDECARENONE 75 MG
200 CAPSULE ORAL EVERY MORNING
Qty: 30 CAPSULE | Refills: 0 | Status: SHIPPED | OUTPATIENT
Start: 2024-09-27

## 2024-09-27 RX ORDER — BUDESONIDE AND FORMOTEROL FUMARATE DIHYDRATE 160; 4.5 UG/1; UG/1
2 AEROSOL RESPIRATORY (INHALATION) 2 TIMES DAILY PRN
Qty: 10.2 G | Refills: 0 | Status: SHIPPED | OUTPATIENT
Start: 2024-09-27

## 2024-09-27 RX ORDER — DULOXETIN HYDROCHLORIDE 60 MG/1
60 CAPSULE, DELAYED RELEASE ORAL EVERY MORNING
Qty: 30 CAPSULE | Refills: 3 | Status: SHIPPED | OUTPATIENT
Start: 2024-09-27

## 2024-09-27 RX ORDER — DIVALPROEX SODIUM 250 MG/1
250 TABLET, DELAYED RELEASE ORAL 2 TIMES DAILY
Qty: 90 TABLET | Refills: 3 | Status: SHIPPED | OUTPATIENT
Start: 2024-09-27

## 2024-09-27 RX ORDER — ASPIRIN 81 MG/1
81 TABLET ORAL NIGHTLY
Qty: 30 TABLET | Refills: 0 | Status: SHIPPED | OUTPATIENT
Start: 2024-09-27

## 2024-09-27 RX ORDER — ACETAMINOPHEN 160 MG
2000 TABLET,DISINTEGRATING ORAL 2 TIMES DAILY
Qty: 30 CAPSULE | Refills: 0 | Status: SHIPPED | OUTPATIENT
Start: 2024-09-27

## 2024-10-09 RX ORDER — UBIDECARENONE 75 MG
200 CAPSULE ORAL EVERY MORNING
Qty: 30 CAPSULE | Refills: 3 | Status: SHIPPED | OUTPATIENT
Start: 2024-10-09

## 2024-10-09 RX ORDER — ASPIRIN 81 MG/1
81 TABLET ORAL NIGHTLY
Qty: 30 TABLET | Refills: 3 | Status: SHIPPED | OUTPATIENT
Start: 2024-10-09

## 2024-10-09 RX ORDER — LORATADINE 10 MG/1
10 TABLET ORAL DAILY
Qty: 30 TABLET | Refills: 3 | Status: SHIPPED | OUTPATIENT
Start: 2024-10-09

## 2024-10-09 RX ORDER — LOSARTAN POTASSIUM 100 MG/1
100 TABLET ORAL EVERY MORNING
Qty: 30 TABLET | Refills: 3 | Status: SHIPPED | OUTPATIENT
Start: 2024-10-09

## 2024-10-11 RX ORDER — DIVALPROEX SODIUM 125 MG/1
250 CAPSULE, COATED PELLETS ORAL
Qty: 45 CAPSULE | Refills: 3 | Status: SHIPPED | OUTPATIENT
Start: 2024-10-11 | End: 2025-04-09

## 2024-11-12 DIAGNOSIS — Z86.73 H/O: STROKE: ICD-10-CM

## 2024-11-12 DIAGNOSIS — G91.8 HYDROCEPHALUS EX VACUO (HCC): ICD-10-CM

## 2024-11-12 DIAGNOSIS — R41.89 COGNITIVE IMPAIRMENT: ICD-10-CM

## 2024-11-12 RX ORDER — UBIDECARENONE 75 MG
200 CAPSULE ORAL EVERY MORNING
Qty: 30 CAPSULE | Refills: 3 | Status: SHIPPED | OUTPATIENT
Start: 2024-11-12

## 2024-11-12 RX ORDER — CLOPIDOGREL BISULFATE 75 MG/1
75 TABLET ORAL EVERY MORNING
Qty: 30 TABLET | Refills: 0 | Status: SHIPPED | OUTPATIENT
Start: 2024-11-12

## 2024-11-12 RX ORDER — LOSARTAN POTASSIUM 100 MG/1
100 TABLET ORAL EVERY MORNING
Qty: 30 TABLET | Refills: 3 | Status: SHIPPED | OUTPATIENT
Start: 2024-11-12

## 2024-11-12 RX ORDER — DULOXETIN HYDROCHLORIDE 60 MG/1
60 CAPSULE, DELAYED RELEASE ORAL EVERY MORNING
Qty: 30 CAPSULE | Refills: 3 | Status: SHIPPED | OUTPATIENT
Start: 2024-11-12

## 2024-11-12 RX ORDER — MELATONIN 10 MG
10 CAPSULE ORAL NIGHTLY PRN
Qty: 30 CAPSULE | Refills: 0 | Status: SHIPPED | OUTPATIENT
Start: 2024-11-12

## 2024-11-12 RX ORDER — BUDESONIDE AND FORMOTEROL FUMARATE DIHYDRATE 160; 4.5 UG/1; UG/1
2 AEROSOL RESPIRATORY (INHALATION) 2 TIMES DAILY PRN
Qty: 10.2 G | Refills: 0 | Status: SHIPPED | OUTPATIENT
Start: 2024-11-12

## 2024-11-12 RX ORDER — DIVALPROEX SODIUM 250 MG/1
250 TABLET, DELAYED RELEASE ORAL 2 TIMES DAILY
Qty: 90 TABLET | Refills: 3 | Status: SHIPPED | OUTPATIENT
Start: 2024-11-12

## 2024-11-12 RX ORDER — PANTOPRAZOLE SODIUM 40 MG/1
40 TABLET, DELAYED RELEASE ORAL EVERY MORNING
Qty: 30 TABLET | Refills: 3 | Status: SHIPPED | OUTPATIENT
Start: 2024-11-12

## 2024-11-12 RX ORDER — FLUTICASONE PROPIONATE 50 MCG
1 SPRAY, SUSPENSION (ML) NASAL 2 TIMES DAILY PRN
Qty: 16 G | Refills: 0 | Status: SHIPPED | OUTPATIENT
Start: 2024-11-12

## 2024-11-12 RX ORDER — DIVALPROEX SODIUM 125 MG/1
250 CAPSULE, COATED PELLETS ORAL
Qty: 45 CAPSULE | Refills: 3 | Status: SHIPPED | OUTPATIENT
Start: 2024-11-12 | End: 2025-05-11

## 2024-11-12 RX ORDER — ATORVASTATIN CALCIUM 40 MG/1
40 TABLET, FILM COATED ORAL NIGHTLY
Qty: 30 TABLET | Refills: 3 | Status: SHIPPED | OUTPATIENT
Start: 2024-11-12

## 2024-11-12 RX ORDER — GABAPENTIN 600 MG/1
600 TABLET ORAL 4 TIMES DAILY
Qty: 120 TABLET | Refills: 3 | Status: SHIPPED | OUTPATIENT
Start: 2024-11-12 | End: 2025-03-12

## 2024-11-12 RX ORDER — INSULIN GLARGINE 100 [IU]/ML
60 INJECTION, SOLUTION SUBCUTANEOUS 2 TIMES DAILY
Qty: 5 ADJUSTABLE DOSE PRE-FILLED PEN SYRINGE | Refills: 3 | Status: SHIPPED | OUTPATIENT
Start: 2024-11-12

## 2024-11-12 RX ORDER — TAMSULOSIN HYDROCHLORIDE 0.4 MG/1
0.8 CAPSULE ORAL DAILY
Qty: 60 CAPSULE | Refills: 3 | Status: SHIPPED | OUTPATIENT
Start: 2024-11-12

## 2024-11-12 RX ORDER — METOPROLOL TARTRATE 100 MG/1
100 TABLET ORAL 2 TIMES DAILY
Qty: 60 TABLET | Refills: 3 | Status: SHIPPED | OUTPATIENT
Start: 2024-11-12

## 2024-11-12 RX ORDER — MEMANTINE HYDROCHLORIDE 5 MG/1
5 TABLET ORAL 2 TIMES DAILY
Qty: 180 TABLET | Refills: 1 | Status: SHIPPED | OUTPATIENT
Start: 2024-11-12

## 2024-11-12 RX ORDER — HYDROCHLOROTHIAZIDE 25 MG/1
TABLET ORAL
Qty: 30 TABLET | Refills: 3 | Status: SHIPPED | OUTPATIENT
Start: 2024-11-12

## 2024-11-12 RX ORDER — LORATADINE 10 MG/1
10 TABLET ORAL DAILY
Qty: 30 TABLET | Refills: 3 | Status: SHIPPED | OUTPATIENT
Start: 2024-11-12

## 2024-11-12 RX ORDER — ACETAMINOPHEN 160 MG
2000 TABLET,DISINTEGRATING ORAL 2 TIMES DAILY
Qty: 30 CAPSULE | Refills: 0 | Status: SHIPPED | OUTPATIENT
Start: 2024-11-12

## 2024-11-12 RX ORDER — FENOFIBRATE 54 MG/1
54 TABLET ORAL EVERY MORNING
Qty: 30 TABLET | Refills: 3 | Status: SHIPPED | OUTPATIENT
Start: 2024-11-12

## 2024-11-12 RX ORDER — ASPIRIN 81 MG/1
81 TABLET ORAL NIGHTLY
Qty: 30 TABLET | Refills: 3 | Status: SHIPPED | OUTPATIENT
Start: 2024-11-12

## 2024-12-23 ENCOUNTER — APPOINTMENT (OUTPATIENT)
Dept: CT IMAGING | Age: 74
End: 2024-12-23
Payer: MEDICARE

## 2024-12-23 ENCOUNTER — APPOINTMENT (OUTPATIENT)
Dept: GENERAL RADIOLOGY | Age: 74
End: 2024-12-23
Payer: MEDICARE

## 2024-12-23 ENCOUNTER — HOSPITAL ENCOUNTER (EMERGENCY)
Age: 74
Discharge: HOME | End: 2024-12-23
Attending: EMERGENCY MEDICINE
Payer: MEDICARE

## 2024-12-23 VITALS
OXYGEN SATURATION: 94 % | HEART RATE: 72 BPM | BODY MASS INDEX: 39.76 KG/M2 | DIASTOLIC BLOOD PRESSURE: 75 MMHG | TEMPERATURE: 100 F | WEIGHT: 300 LBS | SYSTOLIC BLOOD PRESSURE: 147 MMHG | RESPIRATION RATE: 17 BRPM | HEIGHT: 73 IN
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
## 2024-12-23 LAB
ANION GAP SERPL CALCULATED.3IONS-SCNC: 13 MMOL/L (ref 7–16)
BASOPHILS # BLD: 0.03 K/UL (ref 0–0.2)
BASOPHILS NFR BLD: 1 % (ref 0–2)
BUN SERPL-MCNC: 23 MG/DL (ref 6–23)
CALCIUM SERPL-MCNC: 9.3 MG/DL (ref 8.6–10.2)
CHLORIDE SERPL-SCNC: 99 MMOL/L (ref 98–107)
CO2 SERPL-SCNC: 26 MMOL/L (ref 22–29)
CREAT SERPL-MCNC: 0.9 MG/DL (ref 0.7–1.2)
EOSINOPHIL # BLD: 0.13 K/UL (ref 0.05–0.5)
EOSINOPHILS RELATIVE PERCENT: 3 % (ref 0–6)
ERYTHROCYTE [DISTWIDTH] IN BLOOD BY AUTOMATED COUNT: 13.6 % (ref 11.5–15)
GFR, ESTIMATED: 88 ML/MIN/1.73M2
GLUCOSE SERPL-MCNC: 347 MG/DL (ref 74–99)
HCT VFR BLD AUTO: 38 % (ref 37–54)
HGB BLD-MCNC: 12.7 G/DL (ref 12.5–16.5)
IMM GRANULOCYTES # BLD AUTO: <0.03 K/UL (ref 0–0.58)
IMM GRANULOCYTES NFR BLD: 0 % (ref 0–5)
LYMPHOCYTES NFR BLD: 1.59 K/UL (ref 1.5–4)
LYMPHOCYTES RELATIVE PERCENT: 33 % (ref 20–42)
MCH RBC QN AUTO: 30.8 PG (ref 26–35)
MCHC RBC AUTO-ENTMCNC: 33.4 G/DL (ref 32–34.5)
MCV RBC AUTO: 92.2 FL (ref 80–99.9)
MONOCYTES NFR BLD: 0.57 K/UL (ref 0.1–0.95)
MONOCYTES NFR BLD: 12 % (ref 2–12)
NEUTROPHILS NFR BLD: 52 % (ref 43–80)
NEUTS SEG NFR BLD: 2.51 K/UL (ref 1.8–7.3)
PLATELET # BLD AUTO: 246 K/UL (ref 130–450)
PMV BLD AUTO: 10 FL (ref 7–12)
POTASSIUM SERPL-SCNC: 4 MMOL/L (ref 3.5–5)
RBC # BLD AUTO: 4.12 M/UL (ref 3.8–5.8)
SODIUM SERPL-SCNC: 138 MMOL/L (ref 132–146)
WBC OTHER # BLD: 4.9 K/UL (ref 4.5–11.5)

## 2024-12-23 PROCEDURE — 74177 CT ABD & PELVIS W/CONTRAST: CPT

## 2024-12-23 PROCEDURE — 72131 CT LUMBAR SPINE W/O DYE: CPT

## 2024-12-23 PROCEDURE — 85025 COMPLETE CBC W/AUTO DIFF WBC: CPT

## 2024-12-23 PROCEDURE — 72125 CT NECK SPINE W/O DYE: CPT

## 2024-12-23 PROCEDURE — 70450 CT HEAD/BRAIN W/O DYE: CPT

## 2024-12-23 PROCEDURE — 71260 CT THORAX DX C+: CPT

## 2024-12-23 PROCEDURE — 73030 X-RAY EXAM OF SHOULDER: CPT

## 2024-12-23 PROCEDURE — 72128 CT CHEST SPINE W/O DYE: CPT

## 2024-12-23 PROCEDURE — 99285 EMERGENCY DEPT VISIT HI MDM: CPT

## 2024-12-23 PROCEDURE — 80048 BASIC METABOLIC PNL TOTAL CA: CPT

## 2024-12-23 PROCEDURE — 73521 X-RAY EXAM HIPS BI 2 VIEWS: CPT

## 2024-12-23 PROCEDURE — 6360000004 HC RX CONTRAST MEDICATION: Performed by: RADIOLOGY

## 2024-12-23 PROCEDURE — 73552 X-RAY EXAM OF FEMUR 2/>: CPT

## 2024-12-23 RX ORDER — IOPAMIDOL 755 MG/ML
75 INJECTION, SOLUTION INTRAVASCULAR
Status: COMPLETED | OUTPATIENT
Start: 2024-12-23 | End: 2024-12-23

## 2024-12-23 RX ADMIN — IOPAMIDOL 75 ML: 755 INJECTION, SOLUTION INTRAVENOUS at 10:05

## 2024-12-23 ASSESSMENT — PAIN DESCRIPTION - PAIN TYPE: TYPE: ACUTE PAIN

## 2024-12-23 ASSESSMENT — PAIN DESCRIPTION - LOCATION: LOCATION: HIP;SHOULDER

## 2024-12-23 ASSESSMENT — PAIN DESCRIPTION - DESCRIPTORS: DESCRIPTORS: ACHING

## 2024-12-23 ASSESSMENT — PAIN DESCRIPTION - ONSET: ONSET: ON-GOING

## 2024-12-23 ASSESSMENT — PAIN SCALES - GENERAL: PAINLEVEL_OUTOF10: 2

## 2024-12-23 ASSESSMENT — PAIN DESCRIPTION - FREQUENCY: FREQUENCY: CONTINUOUS

## 2024-12-23 ASSESSMENT — PAIN DESCRIPTION - ORIENTATION: ORIENTATION: LEFT

## 2024-12-23 NOTE — DISCHARGE INSTR - COC
Continuity of Care Form    Patient Name: Stewart Mark   :  1950  MRN:  [id number]    Admit date:  2024  Discharge date:  ***    Code Status Order: Prior   Advance Directives:   Advance Care Flowsheet Documentation             Admitting Physician:  No admitting provider for patient encounter.  PCP: Liv Kaufman MD    Discharging Nurse: ***  Discharging Hospital Unit/Room#:   Discharging Unit Phone Number: ***    Emergency Contact:   Extended Emergency Contact Information  Primary Emergency Contact: Fariba Mark  Address: [address] [place] [address]  Home Phone: [phone]  Work Phone: [phone]  Mobile Phone: [phone]  Relation: Spouse  Preferred language: English   needed? No    Past Surgical History:  Past Surgical History:   Procedure Laterality Date    BACK SURGERY  10/2019    COLONOSCOPY      CORONARY STENT PLACEMENT      HERNIA REPAIR      JOINT REPLACEMENT Left     left knee surgery x5    REVISION TOTAL KNEE ARTHROPLASTY Right 2018       Immunization History:   Immunization History   Administered Date(s) Administered    COVID-19, PFIZER PURPLE top, DILUTE for use, (age 12 y+), 30mcg/0.3mL 2021, 2021, 12/10/2021    Influenza Vaccine, unspecified formulation 10/01/2012, 2013, 2014, 10/06/2015, 2016, 10/15/2017    Influenza Virus Vaccine 10/01/2012, 2013, 2014, 10/01/2015, 2016, 10/09/2017, 10/18/2018    Influenza, AFLURIA (age 3 y+), FLUZONE, (age 6 mo+), Quadv MDV, 0.5mL 10/01/2012, 2013, 10/06/2015    Influenza, FLUARIX, FLULAVAL, FLUZONE (age 6 mo+) and AFLURIA, (age 3 y+), Quadv PF, 0.5mL 10/15/2017, 2020, 10/14/2021    Influenza, FLUZONE High Dose (age 65 y+), IM, Quadv, 0.7mL 10/14/2021    Influenza, FLUZONE High Dose, (age 65 y+), IM, Trivalent PF, 0.5mL 2018    Pneumococcal, PCV-13, PREVNAR 13, (age 6w+), IM, 0.5mL 2014, 2016

## 2024-12-23 NOTE — ED PROVIDER NOTES
ATTENDING PROVIDER ATTESTATION:     Stewart Mark presented to the emergency department for evaluation of Fall (While transferring to wheelchair at facility)   and was initially evaluated by the Medical Resident. See Original ED Note for H&P and ED course above.     I have reviewed and discussed the case, including pertinent history (medical, surgical, family and social) and exam findings with the Medical Resident assigned to Stewart Mark.  I have personally performed and/or participated in the history, exam, medical decision making, and procedures and agree with all pertinent clinical information and any additional changes or corrections are noted below in my assessment and plan. I have discussed this patient in detail with the resident, and provided the instruction and education,       I have reviewed my findings and recommendations with the assigned Medical Resident, Stewart Mark and members of family present at the time of disposition.      I have performed a history and physical examination of this patient and directly supervised the resident caring for this patient              [facility] EMERGENCY DEPARTMENT  EMERGENCY DEPARTMENT ENCOUNTER        Pt Name: Stewart Mark  MRN: [id number]  Birthdate 1950  Date of evaluation: 12/23/2024  Provider: Juan Jose Alex MD  PCP: Liv Kaufman MD  Note Started: 8:25 AM EST 12/23/24    CHIEF COMPLAINT       Chief Complaint   Patient presents with    Fall     While transferring to wheelchair at facility       HISTORY OF PRESENT ILLNESS: 1 or more Elements       Stewart Mark is a 74 y.o. male who presents for fall.  Patient was transferring to the wheelchair at the facility and fell.  Nonsyncopal fall.  He hit his head.  He also landed on his left side.  He complains of pain along his proximal left thigh as well as left chest wall and abdominal wall.. He denies other injuries.     Nursing Notes were all 
due to severe sepsis or septic shock?   No   Exclusion criteria - the patient is NOT to be included for SEP-1 Core Measure due to:  Infection is not suspected        Medical Decision Making/Differential Diagnosis:    CC/HPI Summary, Social Determinants of health, Records Reviewed, DDx, testing done/not done, ED Course, Reassessment, disposition considerations/shared decision making with patient, consults, disposition:            MDM:  Stewart Makr is a 74-year-old male who presented from his nursing facility for mechanical fall when he was transferring cell from his bed to his wheelchair.  He believes he may have hit his head as he landed on his back.  He did not lose consciousness.  Concerned for spinal injury, intracranial abnormality, fracture or other injury to the pelvis and left thigh, intra-abdominal injury among other pathologies.    Patient's labs are reassuring.  Patient has a glucose of 347.  He usually is hyperglycemic.    CT and x-ray imaging were negative for any acute traumatic findings in the chest, abdomen or pelvis.  No acute osseous or soft tissue findings of thoracic lumbar spine.  No acute fracture or subluxation of cervical spine.  No acute intracranial abnormality specifically there is no intracranial hemorrhage.  Osteoarthritic changes were noted to the left shoulder and left hip.    With patient's negative workup, I do believe he is safe for discharge back to his nursing facility.  I informed him that he should return to the ER if his symptoms should significantly worsen but otherwise he can follow-up with his primary care doctor.  I informed the patient alternating tween Advil and Tylenol for any pain that he has after this fall.  He expressed understanding was agreeable with this plan.        FINAL IMPRESSION      1. Fall, initial encounter          DISPOSITION/PLAN     DISPOSITION Decision To Discharge 12/23/2024 11:55:55 AM    DISPOSITION  Disposition: Discharge to nursing

## 2024-12-23 NOTE — DISCHARGE INSTRUCTIONS
Please call and schedule appoint with your primary care doctor.  Return to the ER for symptoms should worsen.

## 2024-12-23 NOTE — ED NOTES
Patient returned from CT scan, informed patient we are awaiting test results and approx time frame. Pt and family has no further needs at this time.

## 2025-02-14 ENCOUNTER — APPOINTMENT (OUTPATIENT)
Dept: CT IMAGING | Age: 75
DRG: 871 | End: 2025-02-14
Payer: MEDICARE

## 2025-02-14 ENCOUNTER — APPOINTMENT (OUTPATIENT)
Dept: GENERAL RADIOLOGY | Age: 75
DRG: 871 | End: 2025-02-14
Payer: MEDICARE

## 2025-02-14 ENCOUNTER — HOSPITAL ENCOUNTER (INPATIENT)
Age: 75
LOS: 4 days | Discharge: NURSING, REHAB OR LONG-TERM CARE | DRG: 871 | End: 2025-02-22
Attending: EMERGENCY MEDICINE | Admitting: STUDENT IN AN ORGANIZED HEALTH CARE EDUCATION/TRAINING PROGRAM
Payer: MEDICARE

## 2025-02-14 DIAGNOSIS — R06.02 SHORTNESS OF BREATH: Primary | ICD-10-CM

## 2025-02-14 DIAGNOSIS — K83.1 OBSTRUCTIVE JAUNDICE (HCC): ICD-10-CM

## 2025-02-14 DIAGNOSIS — K83.09 CHOLANGITIS (HCC): ICD-10-CM

## 2025-02-14 PROBLEM — R41.82 ALTERED MENTAL STATE: Status: ACTIVE | Noted: 2025-02-14

## 2025-02-14 LAB
ALBUMIN SERPL-MCNC: 4.2 G/DL (ref 3.5–5.2)
ALP SERPL-CCNC: 107 U/L (ref 40–129)
ALT SERPL-CCNC: 114 U/L (ref 0–40)
ANION GAP SERPL CALCULATED.3IONS-SCNC: 11 MMOL/L (ref 7–16)
AST SERPL-CCNC: 176 U/L (ref 0–39)
B PARAP IS1001 DNA NPH QL NAA+NON-PROBE: NOT DETECTED
B PERT DNA SPEC QL NAA+PROBE: NOT DETECTED
B.E.: 0 MMOL/L (ref -3–3)
BASOPHILS # BLD: 0.02 K/UL (ref 0–0.2)
BASOPHILS NFR BLD: 0 % (ref 0–2)
BILIRUB SERPL-MCNC: 2 MG/DL (ref 0–1.2)
BILIRUB UR QL STRIP: NEGATIVE
BNP SERPL-MCNC: 161 PG/ML (ref 0–125)
BUN SERPL-MCNC: 22 MG/DL (ref 6–23)
C PNEUM DNA NPH QL NAA+NON-PROBE: NOT DETECTED
CALCIUM SERPL-MCNC: 9.2 MG/DL (ref 8.6–10.2)
CHLORIDE SERPL-SCNC: 97 MMOL/L (ref 98–107)
CLARITY UR: CLEAR
CO2 SERPL-SCNC: 27 MMOL/L (ref 22–29)
COHB: 0.7 % (ref 0–1.5)
COLOR UR: YELLOW
CREAT SERPL-MCNC: 0.9 MG/DL (ref 0.7–1.2)
CRITICAL: ABNORMAL
D-DIMER QUANTITATIVE: <200 NG/ML DDU (ref 0–230)
DATE ANALYZED: ABNORMAL
DATE OF COLLECTION: ABNORMAL
EKG ATRIAL RATE: 136 BPM
EKG ATRIAL RATE: 85 BPM
EKG P AXIS: 50 DEGREES
EKG P-R INTERVAL: 136 MS
EKG P-R INTERVAL: 236 MS
EKG Q-T INTERVAL: 326 MS
EKG Q-T INTERVAL: 388 MS
EKG QRS DURATION: 106 MS
EKG QRS DURATION: 108 MS
EKG QTC CALCULATION (BAZETT): 461 MS
EKG QTC CALCULATION (BAZETT): 490 MS
EKG R AXIS: -57 DEGREES
EKG R AXIS: -76 DEGREES
EKG T AXIS: 47 DEGREES
EKG T AXIS: 52 DEGREES
EKG VENTRICULAR RATE: 136 BPM
EKG VENTRICULAR RATE: 85 BPM
EOSINOPHIL # BLD: 0.05 K/UL (ref 0.05–0.5)
EOSINOPHILS RELATIVE PERCENT: 1 % (ref 0–6)
ERYTHROCYTE [DISTWIDTH] IN BLOOD BY AUTOMATED COUNT: 14 % (ref 11.5–15)
FLUAV RNA NPH QL NAA+NON-PROBE: NOT DETECTED
FLUBV RNA NPH QL NAA+NON-PROBE: NOT DETECTED
GFR, ESTIMATED: 87 ML/MIN/1.73M2
GLUCOSE BLD-MCNC: 284 MG/DL (ref 74–99)
GLUCOSE SERPL-MCNC: 350 MG/DL (ref 74–99)
GLUCOSE UR STRIP-MCNC: >=1000 MG/DL
HADV DNA NPH QL NAA+NON-PROBE: NOT DETECTED
HCO3: 23.1 MMOL/L (ref 22–26)
HCOV 229E RNA NPH QL NAA+NON-PROBE: NOT DETECTED
HCOV HKU1 RNA NPH QL NAA+NON-PROBE: NOT DETECTED
HCOV NL63 RNA NPH QL NAA+NON-PROBE: NOT DETECTED
HCOV OC43 RNA NPH QL NAA+NON-PROBE: NOT DETECTED
HCT VFR BLD AUTO: 38 % (ref 37–54)
HGB BLD-MCNC: 12.8 G/DL (ref 12.5–16.5)
HGB UR QL STRIP.AUTO: NEGATIVE
HHB: 7.6 % (ref 0–5)
HMPV RNA NPH QL NAA+NON-PROBE: NOT DETECTED
HPIV1 RNA NPH QL NAA+NON-PROBE: NOT DETECTED
HPIV2 RNA NPH QL NAA+NON-PROBE: NOT DETECTED
HPIV3 RNA NPH QL NAA+NON-PROBE: NOT DETECTED
HPIV4 RNA NPH QL NAA+NON-PROBE: NOT DETECTED
IMM GRANULOCYTES # BLD AUTO: 0.03 K/UL (ref 0–0.58)
IMM GRANULOCYTES NFR BLD: 1 % (ref 0–5)
INFLUENZA A BY PCR: NOT DETECTED
INFLUENZA B BY PCR: NOT DETECTED
KETONES UR STRIP-MCNC: NEGATIVE MG/DL
LAB: ABNORMAL
LACTATE BLDV-SCNC: 2.5 MMOL/L (ref 0.5–2.2)
LACTATE BLDV-SCNC: 2.6 MMOL/L (ref 0.5–2.2)
LEUKOCYTE ESTERASE UR QL STRIP: NEGATIVE
LIPASE SERPL-CCNC: 48 U/L (ref 13–60)
LYMPHOCYTES NFR BLD: 0.85 K/UL (ref 1.5–4)
LYMPHOCYTES RELATIVE PERCENT: 13 % (ref 20–42)
Lab: 524
M PNEUMO DNA NPH QL NAA+NON-PROBE: NOT DETECTED
MCH RBC QN AUTO: 30.8 PG (ref 26–35)
MCHC RBC AUTO-ENTMCNC: 33.7 G/DL (ref 32–34.5)
MCV RBC AUTO: 91.3 FL (ref 80–99.9)
METHB: 0.3 % (ref 0–1.5)
MODE: ABNORMAL
MONOCYTES NFR BLD: 0.56 K/UL (ref 0.1–0.95)
MONOCYTES NFR BLD: 9 % (ref 2–12)
NEUTROPHILS NFR BLD: 77 % (ref 43–80)
NEUTS SEG NFR BLD: 4.94 K/UL (ref 1.8–7.3)
NITRITE UR QL STRIP: NEGATIVE
O2 CONTENT: 16.7 ML/DL
O2 SATURATION: 92.3 % (ref 92–98.5)
O2HB: 91.4 % (ref 94–97)
OPERATOR ID: ABNORMAL
PATIENT TEMP: 37 C
PCO2: 32.9 MMHG (ref 35–45)
PH BLOOD GAS: 7.46 (ref 7.35–7.45)
PH UR STRIP: 8 [PH] (ref 5–8)
PLATELET # BLD AUTO: 211 K/UL (ref 130–450)
PMV BLD AUTO: 9.8 FL (ref 7–12)
PO2: 62.4 MMHG (ref 75–100)
POTASSIUM SERPL-SCNC: 4 MMOL/L (ref 3.5–5)
PROT SERPL-MCNC: 7.5 G/DL (ref 6.4–8.3)
PROT UR STRIP-MCNC: NEGATIVE MG/DL
RBC # BLD AUTO: 4.16 M/UL (ref 3.8–5.8)
RBC #/AREA URNS HPF: ABNORMAL /HPF
RSV RNA NPH QL NAA+NON-PROBE: NOT DETECTED
RV+EV RNA NPH QL NAA+NON-PROBE: NOT DETECTED
SARS-COV-2 RDRP RESP QL NAA+PROBE: NOT DETECTED
SARS-COV-2 RNA NPH QL NAA+NON-PROBE: NOT DETECTED
SODIUM SERPL-SCNC: 135 MMOL/L (ref 132–146)
SOURCE, BLOOD GAS: ABNORMAL
SP GR UR STRIP: 1.02 (ref 1–1.03)
SPECIMEN DESCRIPTION: NORMAL
SPECIMEN DESCRIPTION: NORMAL
THB: 13 G/DL (ref 11.5–16.5)
TIME ANALYZED: 528
TROPONIN I SERPL HS-MCNC: 34 NG/L (ref 0–11)
TROPONIN I SERPL HS-MCNC: 37 NG/L (ref 0–11)
UROBILINOGEN UR STRIP-ACNC: 1 EU/DL (ref 0–1)
WBC #/AREA URNS HPF: ABNORMAL /HPF
WBC OTHER # BLD: 6.5 K/UL (ref 4.5–11.5)

## 2025-02-14 PROCEDURE — 74177 CT ABD & PELVIS W/CONTRAST: CPT

## 2025-02-14 PROCEDURE — 6360000002 HC RX W HCPCS: Performed by: FAMILY MEDICINE

## 2025-02-14 PROCEDURE — G0378 HOSPITAL OBSERVATION PER HR: HCPCS

## 2025-02-14 PROCEDURE — 87635 SARS-COV-2 COVID-19 AMP PRB: CPT

## 2025-02-14 PROCEDURE — 83605 ASSAY OF LACTIC ACID: CPT

## 2025-02-14 PROCEDURE — 87077 CULTURE AEROBIC IDENTIFY: CPT

## 2025-02-14 PROCEDURE — 82962 GLUCOSE BLOOD TEST: CPT

## 2025-02-14 PROCEDURE — 6370000000 HC RX 637 (ALT 250 FOR IP): Performed by: FAMILY MEDICINE

## 2025-02-14 PROCEDURE — 6370000000 HC RX 637 (ALT 250 FOR IP): Performed by: STUDENT IN AN ORGANIZED HEALTH CARE EDUCATION/TRAINING PROGRAM

## 2025-02-14 PROCEDURE — 93010 ELECTROCARDIOGRAM REPORT: CPT | Performed by: INTERNAL MEDICINE

## 2025-02-14 PROCEDURE — 71275 CT ANGIOGRAPHY CHEST: CPT

## 2025-02-14 PROCEDURE — 2500000003 HC RX 250 WO HCPCS

## 2025-02-14 PROCEDURE — 82805 BLOOD GASES W/O2 SATURATION: CPT

## 2025-02-14 PROCEDURE — 71045 X-RAY EXAM CHEST 1 VIEW: CPT

## 2025-02-14 PROCEDURE — 96365 THER/PROPH/DIAG IV INF INIT: CPT

## 2025-02-14 PROCEDURE — 87040 BLOOD CULTURE FOR BACTERIA: CPT

## 2025-02-14 PROCEDURE — 93005 ELECTROCARDIOGRAM TRACING: CPT | Performed by: EMERGENCY MEDICINE

## 2025-02-14 PROCEDURE — 83880 ASSAY OF NATRIURETIC PEPTIDE: CPT

## 2025-02-14 PROCEDURE — 85379 FIBRIN DEGRADATION QUANT: CPT

## 2025-02-14 PROCEDURE — 6360000002 HC RX W HCPCS

## 2025-02-14 PROCEDURE — 6360000004 HC RX CONTRAST MEDICATION: Performed by: RADIOLOGY

## 2025-02-14 PROCEDURE — 81001 URINALYSIS AUTO W/SCOPE: CPT

## 2025-02-14 PROCEDURE — 96366 THER/PROPH/DIAG IV INF ADDON: CPT

## 2025-02-14 PROCEDURE — 93005 ELECTROCARDIOGRAM TRACING: CPT

## 2025-02-14 PROCEDURE — 2700000000 HC OXYGEN THERAPY PER DAY

## 2025-02-14 PROCEDURE — 80053 COMPREHEN METABOLIC PANEL: CPT

## 2025-02-14 PROCEDURE — 85025 COMPLETE CBC W/AUTO DIFF WBC: CPT

## 2025-02-14 PROCEDURE — 2580000003 HC RX 258

## 2025-02-14 PROCEDURE — 84484 ASSAY OF TROPONIN QUANT: CPT

## 2025-02-14 PROCEDURE — 2500000003 HC RX 250 WO HCPCS: Performed by: FAMILY MEDICINE

## 2025-02-14 PROCEDURE — 0202U NFCT DS 22 TRGT SARS-COV-2: CPT

## 2025-02-14 PROCEDURE — 96375 TX/PRO/DX INJ NEW DRUG ADDON: CPT

## 2025-02-14 PROCEDURE — 72125 CT NECK SPINE W/O DYE: CPT

## 2025-02-14 PROCEDURE — 94640 AIRWAY INHALATION TREATMENT: CPT

## 2025-02-14 PROCEDURE — 71250 CT THORAX DX C-: CPT

## 2025-02-14 PROCEDURE — 99285 EMERGENCY DEPT VISIT HI MDM: CPT

## 2025-02-14 PROCEDURE — 87502 INFLUENZA DNA AMP PROBE: CPT

## 2025-02-14 PROCEDURE — 96372 THER/PROPH/DIAG INJ SC/IM: CPT

## 2025-02-14 PROCEDURE — 87150 DNA/RNA AMPLIFIED PROBE: CPT

## 2025-02-14 PROCEDURE — 70450 CT HEAD/BRAIN W/O DYE: CPT

## 2025-02-14 PROCEDURE — 83690 ASSAY OF LIPASE: CPT

## 2025-02-14 RX ORDER — BUDESONIDE 0.25 MG/2ML
0.25 INHALANT ORAL
Status: DISCONTINUED | OUTPATIENT
Start: 2025-02-14 | End: 2025-02-23 | Stop reason: HOSPADM

## 2025-02-14 RX ORDER — FENOFIBRATE 54 MG/1
54 TABLET ORAL DAILY
Status: DISCONTINUED | OUTPATIENT
Start: 2025-02-14 | End: 2025-02-23 | Stop reason: HOSPADM

## 2025-02-14 RX ORDER — ACETAMINOPHEN 325 MG/1
650 TABLET ORAL EVERY 6 HOURS PRN
Status: DISCONTINUED | OUTPATIENT
Start: 2025-02-14 | End: 2025-02-23 | Stop reason: HOSPADM

## 2025-02-14 RX ORDER — DIVALPROEX SODIUM 125 MG/1
250 CAPSULE, COATED PELLETS ORAL
Status: DISCONTINUED | OUTPATIENT
Start: 2025-02-14 | End: 2025-02-19

## 2025-02-14 RX ORDER — FLUTICASONE PROPIONATE 50 MCG
1 SPRAY, SUSPENSION (ML) NASAL 2 TIMES DAILY PRN
Status: DISCONTINUED | OUTPATIENT
Start: 2025-02-14 | End: 2025-02-23 | Stop reason: HOSPADM

## 2025-02-14 RX ORDER — 0.9 % SODIUM CHLORIDE 0.9 %
1000 INTRAVENOUS SOLUTION INTRAVENOUS ONCE
Status: COMPLETED | OUTPATIENT
Start: 2025-02-14 | End: 2025-02-14

## 2025-02-14 RX ORDER — BISACODYL 10 MG
10 SUPPOSITORY, RECTAL RECTAL DAILY PRN
Status: DISCONTINUED | OUTPATIENT
Start: 2025-02-14 | End: 2025-02-23 | Stop reason: HOSPADM

## 2025-02-14 RX ORDER — GABAPENTIN 300 MG/1
600 CAPSULE ORAL 4 TIMES DAILY
Status: DISCONTINUED | OUTPATIENT
Start: 2025-02-14 | End: 2025-02-23 | Stop reason: HOSPADM

## 2025-02-14 RX ORDER — ENOXAPARIN SODIUM 100 MG/ML
30 INJECTION SUBCUTANEOUS 2 TIMES DAILY
Status: DISCONTINUED | OUTPATIENT
Start: 2025-02-14 | End: 2025-02-23 | Stop reason: HOSPADM

## 2025-02-14 RX ORDER — ARFORMOTEROL TARTRATE 15 UG/2ML
15 SOLUTION RESPIRATORY (INHALATION)
Status: DISCONTINUED | OUTPATIENT
Start: 2025-02-14 | End: 2025-02-23 | Stop reason: HOSPADM

## 2025-02-14 RX ORDER — SODIUM CHLORIDE 9 MG/ML
INJECTION, SOLUTION INTRAVENOUS PRN
Status: DISCONTINUED | OUTPATIENT
Start: 2025-02-14 | End: 2025-02-23 | Stop reason: HOSPADM

## 2025-02-14 RX ORDER — DOXYCYCLINE 100 MG/1
100 CAPSULE ORAL EVERY 12 HOURS SCHEDULED
Status: DISCONTINUED | OUTPATIENT
Start: 2025-02-14 | End: 2025-02-16

## 2025-02-14 RX ORDER — ONDANSETRON 2 MG/ML
4 INJECTION INTRAMUSCULAR; INTRAVENOUS EVERY 6 HOURS PRN
Status: DISCONTINUED | OUTPATIENT
Start: 2025-02-14 | End: 2025-02-16

## 2025-02-14 RX ORDER — DULOXETIN HYDROCHLORIDE 30 MG/1
60 CAPSULE, DELAYED RELEASE ORAL EVERY MORNING
Status: DISCONTINUED | OUTPATIENT
Start: 2025-02-15 | End: 2025-02-23 | Stop reason: HOSPADM

## 2025-02-14 RX ORDER — HYDROCHLOROTHIAZIDE 25 MG/1
25 TABLET ORAL DAILY
Status: DISCONTINUED | OUTPATIENT
Start: 2025-02-14 | End: 2025-02-23 | Stop reason: HOSPADM

## 2025-02-14 RX ORDER — ASPIRIN 81 MG/1
81 TABLET ORAL NIGHTLY
Status: DISCONTINUED | OUTPATIENT
Start: 2025-02-14 | End: 2025-02-23 | Stop reason: HOSPADM

## 2025-02-14 RX ORDER — ACETAMINOPHEN 325 MG/1
650 TABLET ORAL ONCE
Status: COMPLETED | OUTPATIENT
Start: 2025-02-14 | End: 2025-02-14

## 2025-02-14 RX ORDER — IPRATROPIUM BROMIDE AND ALBUTEROL SULFATE 2.5; .5 MG/3ML; MG/3ML
1 SOLUTION RESPIRATORY (INHALATION)
Status: DISCONTINUED | OUTPATIENT
Start: 2025-02-14 | End: 2025-02-15

## 2025-02-14 RX ORDER — ONDANSETRON 4 MG/1
4 TABLET, ORALLY DISINTEGRATING ORAL EVERY 8 HOURS PRN
Status: DISCONTINUED | OUTPATIENT
Start: 2025-02-14 | End: 2025-02-16

## 2025-02-14 RX ORDER — MEMANTINE HYDROCHLORIDE 5 MG/1
5 TABLET ORAL 2 TIMES DAILY
Status: DISCONTINUED | OUTPATIENT
Start: 2025-02-14 | End: 2025-02-23 | Stop reason: HOSPADM

## 2025-02-14 RX ORDER — ACETAMINOPHEN 650 MG/1
650 SUPPOSITORY RECTAL EVERY 6 HOURS PRN
Status: DISCONTINUED | OUTPATIENT
Start: 2025-02-14 | End: 2025-02-23 | Stop reason: HOSPADM

## 2025-02-14 RX ORDER — LOSARTAN POTASSIUM 50 MG/1
100 TABLET ORAL EVERY MORNING
Status: DISCONTINUED | OUTPATIENT
Start: 2025-02-15 | End: 2025-02-23 | Stop reason: HOSPADM

## 2025-02-14 RX ORDER — SODIUM CHLORIDE 0.9 % (FLUSH) 0.9 %
5-40 SYRINGE (ML) INJECTION EVERY 12 HOURS SCHEDULED
Status: DISCONTINUED | OUTPATIENT
Start: 2025-02-14 | End: 2025-02-23 | Stop reason: HOSPADM

## 2025-02-14 RX ORDER — IOPAMIDOL 755 MG/ML
75 INJECTION, SOLUTION INTRAVASCULAR
Status: COMPLETED | OUTPATIENT
Start: 2025-02-14 | End: 2025-02-14

## 2025-02-14 RX ORDER — METOPROLOL TARTRATE 50 MG
100 TABLET ORAL 2 TIMES DAILY
Status: DISCONTINUED | OUTPATIENT
Start: 2025-02-14 | End: 2025-02-23 | Stop reason: HOSPADM

## 2025-02-14 RX ORDER — TAMSULOSIN HYDROCHLORIDE 0.4 MG/1
0.8 CAPSULE ORAL DAILY
Status: DISCONTINUED | OUTPATIENT
Start: 2025-02-14 | End: 2025-02-23 | Stop reason: HOSPADM

## 2025-02-14 RX ORDER — DIVALPROEX SODIUM 250 MG/1
250 TABLET, FILM COATED, EXTENDED RELEASE ORAL 2 TIMES DAILY
COMMUNITY

## 2025-02-14 RX ORDER — CLOPIDOGREL BISULFATE 75 MG/1
75 TABLET ORAL EVERY MORNING
Status: DISCONTINUED | OUTPATIENT
Start: 2025-02-15 | End: 2025-02-21

## 2025-02-14 RX ORDER — ALBUTEROL SULFATE 0.83 MG/ML
2.5 SOLUTION RESPIRATORY (INHALATION) 4 TIMES DAILY PRN
Status: DISCONTINUED | OUTPATIENT
Start: 2025-02-14 | End: 2025-02-23 | Stop reason: HOSPADM

## 2025-02-14 RX ORDER — SODIUM CHLORIDE 0.9 % (FLUSH) 0.9 %
10 SYRINGE (ML) INJECTION PRN
Status: DISCONTINUED | OUTPATIENT
Start: 2025-02-14 | End: 2025-02-23 | Stop reason: HOSPADM

## 2025-02-14 RX ORDER — CETIRIZINE HYDROCHLORIDE 10 MG/1
10 TABLET ORAL DAILY
Status: DISCONTINUED | OUTPATIENT
Start: 2025-02-14 | End: 2025-02-23 | Stop reason: HOSPADM

## 2025-02-14 RX ORDER — IBUPROFEN 400 MG/1
400 TABLET, FILM COATED ORAL ONCE
Status: COMPLETED | OUTPATIENT
Start: 2025-02-14 | End: 2025-02-14

## 2025-02-14 RX ORDER — PANTOPRAZOLE SODIUM 40 MG/1
40 TABLET, DELAYED RELEASE ORAL EVERY MORNING
Status: DISCONTINUED | OUTPATIENT
Start: 2025-02-15 | End: 2025-02-19

## 2025-02-14 RX ORDER — ACETAMINOPHEN 325 MG/1
650 TABLET ORAL EVERY 4 HOURS PRN
COMMUNITY

## 2025-02-14 RX ORDER — UBIDECARENONE 75 MG
200 CAPSULE ORAL EVERY MORNING
Status: DISCONTINUED | OUTPATIENT
Start: 2025-02-15 | End: 2025-02-14 | Stop reason: CLARIF

## 2025-02-14 RX ORDER — INSULIN GLARGINE 100 [IU]/ML
60 INJECTION, SOLUTION SUBCUTANEOUS 2 TIMES DAILY
Status: DISCONTINUED | OUTPATIENT
Start: 2025-02-14 | End: 2025-02-23 | Stop reason: HOSPADM

## 2025-02-14 RX ORDER — DIVALPROEX SODIUM 250 MG/1
250 TABLET, DELAYED RELEASE ORAL 2 TIMES DAILY
Status: DISCONTINUED | OUTPATIENT
Start: 2025-02-14 | End: 2025-02-23 | Stop reason: HOSPADM

## 2025-02-14 RX ORDER — ATORVASTATIN CALCIUM 40 MG/1
40 TABLET, FILM COATED ORAL NIGHTLY
Status: DISCONTINUED | OUTPATIENT
Start: 2025-02-14 | End: 2025-02-23 | Stop reason: HOSPADM

## 2025-02-14 RX ADMIN — BUDESONIDE 250 MCG: 0.25 SUSPENSION RESPIRATORY (INHALATION) at 20:37

## 2025-02-14 RX ADMIN — WATER 2000 MG: 1 INJECTION INTRAMUSCULAR; INTRAVENOUS; SUBCUTANEOUS at 10:53

## 2025-02-14 RX ADMIN — ENOXAPARIN SODIUM 30 MG: 100 INJECTION SUBCUTANEOUS at 21:10

## 2025-02-14 RX ADMIN — METOPROLOL TARTRATE 100 MG: 50 TABLET, FILM COATED ORAL at 21:09

## 2025-02-14 RX ADMIN — DOXYCYCLINE HYCLATE 100 MG: 100 CAPSULE ORAL at 22:22

## 2025-02-14 RX ADMIN — DOXYCYCLINE 100 MG: 100 INJECTION, POWDER, LYOPHILIZED, FOR SOLUTION INTRAVENOUS at 10:52

## 2025-02-14 RX ADMIN — IBUPROFEN 400 MG: 400 TABLET, FILM COATED ORAL at 07:37

## 2025-02-14 RX ADMIN — GABAPENTIN 600 MG: 300 CAPSULE ORAL at 21:09

## 2025-02-14 RX ADMIN — IOPAMIDOL 75 ML: 755 INJECTION, SOLUTION INTRAVENOUS at 09:22

## 2025-02-14 RX ADMIN — MEMANTINE HYDROCHLORIDE 5 MG: 5 TABLET, FILM COATED ORAL at 21:09

## 2025-02-14 RX ADMIN — INSULIN GLARGINE 60 UNITS: 100 INJECTION, SOLUTION SUBCUTANEOUS at 21:13

## 2025-02-14 RX ADMIN — ARFORMOTEROL TARTRATE 15 MCG: 15 SOLUTION RESPIRATORY (INHALATION) at 20:37

## 2025-02-14 RX ADMIN — IPRATROPIUM BROMIDE AND ALBUTEROL SULFATE 1 DOSE: 2.5; .5 SOLUTION RESPIRATORY (INHALATION) at 20:37

## 2025-02-14 RX ADMIN — DIVALPROEX SODIUM 250 MG: 250 TABLET, DELAYED RELEASE ORAL at 22:22

## 2025-02-14 RX ADMIN — SODIUM CHLORIDE, PRESERVATIVE FREE 10 ML: 5 INJECTION INTRAVENOUS at 21:10

## 2025-02-14 RX ADMIN — ASPIRIN 81 MG: 81 TABLET, COATED ORAL at 21:09

## 2025-02-14 RX ADMIN — SODIUM CHLORIDE 1000 ML: 9 INJECTION, SOLUTION INTRAVENOUS at 04:19

## 2025-02-14 RX ADMIN — ATORVASTATIN CALCIUM 40 MG: 40 TABLET, FILM COATED ORAL at 21:10

## 2025-02-14 RX ADMIN — ACETAMINOPHEN 650 MG: 325 TABLET ORAL at 07:37

## 2025-02-14 ASSESSMENT — PAIN SCALES - GENERAL
PAINLEVEL_OUTOF10: 0

## 2025-02-14 ASSESSMENT — PAIN - FUNCTIONAL ASSESSMENT: PAIN_FUNCTIONAL_ASSESSMENT: 0-10

## 2025-02-14 NOTE — ED NOTES
Notified Dr. Douglas and Dr. Hays that patient's HR sustaining 130's this time and temp is 100.7. EKG obtained and given to them for interpretation.

## 2025-02-14 NOTE — PROGRESS NOTES
Pharmacy Note    Stewart Mark was ordered Co Q-10 capsules.  As per the [facility] Formulary Committee Policy, herbals and certain dietary supplements will be discontinued.  The herbal or dietary supplement may be continued after discharge from the hospital.    Jacob Alanis, PharmD  02/14/25 4:24 PM

## 2025-02-14 NOTE — ED PROVIDER NOTES
[facility] MED SURG/TELE  EMERGENCY DEPARTMENT ENCOUNTER        Pt Name: Stewart Mark  MRN: [id number]  Birthdate 1950  Date of evaluation: 2/14/2025  Provider: Susan Worthington DO  PCP: Liv Kaufman MD  Note Started: 2:04 AM EST 2/14/25    CHIEF COMPLAINT       Chief Complaint   Patient presents with    Shortness of Breath     sob       HISTORY OF PRESENT ILLNESS: 1 or more Elements   Stewart Mark is a 74 y.o. male with a past medical history of obstructive sleep apnea, type 2 diabetes, and hyperlipidemia who presents to the emergency department with chief complaint of shortness of breath and weakness.  Patient presents from [facility] where a lift assist call was made as patient was unable to get out of his wheelchair.  He states that he sat in his wheelchair all day long and when he finally stood up with the help of the fire department he was short of breath.  In the emergency department he states that he has been short of breath all day.  Patient not very forthcoming with much information and so HPI is limited.    Nursing Notes were all reviewed and agreed with or any disagreements were addressed in the HPI.    REVIEW OF SYSTEMS :    Positives and Pertinent negatives as per HPI.    PAST MEDICAL HISTORY/Chronic Conditions Affecting Care    has a past medical history of Acute stroke due to ischemia ([facility]) (7/26/2023), Anxiety, CAD in native artery (7/26/2023), Depression, Diabetes mellitus (HCC), Hyperlipidemia, Hypertension, Moderate persistent asthma without complication (10/06/2015), Obesity, Sleep apnea, Type 2 diabetes mellitus without complication (HCC), and Ulceration (05/2012).     SURGICAL HISTORY     Past Surgical History:   Procedure Laterality Date    BACK SURGERY  10/2019    COLONOSCOPY      CORONARY STENT PLACEMENT      HERNIA REPAIR      JOINT REPLACEMENT Left     left knee surgery x5    REVISION TOTAL KNEE ARTHROPLASTY Right 12/14/2018  BLOOD 2 - Abnormal; Notable for the following components:    Culture   (*)     Value: KLEBSIELLA PNEUMONIAE Identification by MALDI-TOF For susceptibility, refer to previous culture. SEE BLOOD CULTURE COLLECTED 2/14/2025 0737    All other components within normal limits   CBC WITH AUTO DIFFERENTIAL - Abnormal; Notable for the following components:    Lymphocytes % 13 (*)     Lymphocytes Absolute 0.85 (*)     All other components within normal limits   COMPREHENSIVE METABOLIC PANEL - Abnormal; Notable for the following components:    Chloride 97 (*)     Glucose 350 (*)     Total Bilirubin 2.0 (*)      (*)      (*)     All other components within normal limits   LACTIC ACID - Abnormal; Notable for the following components:    Lactic Acid 2.6 (*)     All other components within normal limits   BRAIN NATRIURETIC PEPTIDE - Abnormal; Notable for the following components:    NT Pro- (*)     All other components within normal limits   TROPONIN - Abnormal; Notable for the following components:    Troponin, High Sensitivity 37 (*)     All other components within normal limits   URINALYSIS WITH MICROSCOPIC - Abnormal; Notable for the following components:    Glucose, Ur >=1000 (*)     All other components within normal limits   TROPONIN - Abnormal; Notable for the following components:    Troponin, High Sensitivity 34 (*)     All other components within normal limits   BLOOD GAS, ARTERIAL - Abnormal; Notable for the following components:    pH, Blood Gas 7.465 (*)     PCO2 32.9 (*)     PO2 62.4 (*)     O2Hb 91.4 (*)     HHb 7.6 (*)     All other components within normal limits   LACTIC ACID - Abnormal; Notable for the following components:    Lactic Acid 2.5 (*)     All other components within normal limits   COMPREHENSIVE METABOLIC PANEL W/ REFLEX TO MG FOR LOW K - Abnormal; Notable for the following components:    Glucose 284 (*)     Total Bilirubin 1.6 (*)      (*)      (*)     All other

## 2025-02-14 NOTE — PROGRESS NOTES
Database initiated. Patient is A&O at the moment. He come sin from [facility]. He uses a wheelchair and pivots to bed and back. He is RA at baseline but wears a c-pap at night. He fell out of bed today and was confused.

## 2025-02-15 LAB
ALBUMIN SERPL-MCNC: 3.7 G/DL (ref 3.5–5.2)
ALP SERPL-CCNC: 101 U/L (ref 40–129)
ALT SERPL-CCNC: 170 U/L (ref 0–40)
AMMONIA PLAS-SCNC: 36 UMOL/L (ref 16–60)
ANION GAP SERPL CALCULATED.3IONS-SCNC: 12 MMOL/L (ref 7–16)
ANION GAP SERPL CALCULATED.3IONS-SCNC: 15 MMOL/L (ref 7–16)
AST SERPL-CCNC: 157 U/L (ref 0–39)
B.E.: 1.3 MMOL/L (ref -3–3)
BILIRUB SERPL-MCNC: 1.6 MG/DL (ref 0–1.2)
BUN SERPL-MCNC: 23 MG/DL (ref 6–23)
BUN SERPL-MCNC: 24 MG/DL (ref 6–23)
CALCIUM SERPL-MCNC: 8.6 MG/DL (ref 8.6–10.2)
CALCIUM SERPL-MCNC: 8.7 MG/DL (ref 8.6–10.2)
CHLORIDE SERPL-SCNC: 96 MMOL/L (ref 98–107)
CHLORIDE SERPL-SCNC: 99 MMOL/L (ref 98–107)
CO2 SERPL-SCNC: 23 MMOL/L (ref 22–29)
CO2 SERPL-SCNC: 26 MMOL/L (ref 22–29)
COHB: 0.7 % (ref 0–1.5)
CREAT SERPL-MCNC: 0.9 MG/DL (ref 0.7–1.2)
CREAT SERPL-MCNC: 0.9 MG/DL (ref 0.7–1.2)
CRITICAL: ABNORMAL
DATE ANALYZED: ABNORMAL
DATE OF COLLECTION: ABNORMAL
ERYTHROCYTE [DISTWIDTH] IN BLOOD BY AUTOMATED COUNT: 14.1 % (ref 11.5–15)
GFR, ESTIMATED: >90 ML/MIN/1.73M2
GFR, ESTIMATED: >90 ML/MIN/1.73M2
GLUCOSE BLD-MCNC: 226 MG/DL (ref 74–99)
GLUCOSE BLD-MCNC: 247 MG/DL (ref 74–99)
GLUCOSE BLD-MCNC: 278 MG/DL (ref 74–99)
GLUCOSE BLD-MCNC: 286 MG/DL (ref 74–99)
GLUCOSE SERPL-MCNC: 269 MG/DL (ref 74–99)
GLUCOSE SERPL-MCNC: 284 MG/DL (ref 74–99)
HCO3: 24.7 MMOL/L (ref 22–26)
HCT VFR BLD AUTO: 36.6 % (ref 37–54)
HGB BLD-MCNC: 12.4 G/DL (ref 12.5–16.5)
HHB: 8 % (ref 0–5)
L PNEUMO1 AG UR QL IA.RAPID: NEGATIVE
LAB: ABNORMAL
LACTATE BLDV-SCNC: 2.6 MMOL/L (ref 0.5–2.2)
Lab: 2135
MCH RBC QN AUTO: 30.8 PG (ref 26–35)
MCHC RBC AUTO-ENTMCNC: 33.9 G/DL (ref 32–34.5)
MCV RBC AUTO: 90.8 FL (ref 80–99.9)
METHB: 0.3 % (ref 0–1.5)
MODE: ABNORMAL
O2 CONTENT: 17.5 ML/DL
O2 SATURATION: 91.9 % (ref 92–98.5)
O2HB: 91 % (ref 94–97)
OPERATOR ID: 8634
PATIENT TEMP: 37 C
PCO2: 35.1 MMHG (ref 35–45)
PH BLOOD GAS: 7.46 (ref 7.35–7.45)
PLATELET # BLD AUTO: 191 K/UL (ref 130–450)
PMV BLD AUTO: 10.1 FL (ref 7–12)
PO2: 62.3 MMHG (ref 75–100)
POTASSIUM SERPL-SCNC: 3.6 MMOL/L (ref 3.5–5)
POTASSIUM SERPL-SCNC: 3.6 MMOL/L (ref 3.5–5)
PROCALCITONIN SERPL-MCNC: 0.51 NG/ML (ref 0–0.08)
PROT SERPL-MCNC: 6.9 G/DL (ref 6.4–8.3)
RBC # BLD AUTO: 4.03 M/UL (ref 3.8–5.8)
S PNEUM AG SPEC QL: NEGATIVE
SODIUM SERPL-SCNC: 134 MMOL/L (ref 132–146)
SODIUM SERPL-SCNC: 137 MMOL/L (ref 132–146)
SOURCE, BLOOD GAS: ABNORMAL
SPECIMEN SOURCE: NORMAL
THB: 13.7 G/DL (ref 11.5–16.5)
TIME ANALYZED: 2139
WBC OTHER # BLD: 5.9 K/UL (ref 4.5–11.5)

## 2025-02-15 PROCEDURE — G0378 HOSPITAL OBSERVATION PER HR: HCPCS

## 2025-02-15 PROCEDURE — 92610 EVALUATE SWALLOWING FUNCTION: CPT

## 2025-02-15 PROCEDURE — 85027 COMPLETE CBC AUTOMATED: CPT

## 2025-02-15 PROCEDURE — 82140 ASSAY OF AMMONIA: CPT

## 2025-02-15 PROCEDURE — 83605 ASSAY OF LACTIC ACID: CPT

## 2025-02-15 PROCEDURE — 94640 AIRWAY INHALATION TREATMENT: CPT

## 2025-02-15 PROCEDURE — 6370000000 HC RX 637 (ALT 250 FOR IP): Performed by: FAMILY MEDICINE

## 2025-02-15 PROCEDURE — 2700000000 HC OXYGEN THERAPY PER DAY

## 2025-02-15 PROCEDURE — 96376 TX/PRO/DX INJ SAME DRUG ADON: CPT

## 2025-02-15 PROCEDURE — 80048 BASIC METABOLIC PNL TOTAL CA: CPT

## 2025-02-15 PROCEDURE — 96372 THER/PROPH/DIAG INJ SC/IM: CPT

## 2025-02-15 PROCEDURE — 82962 GLUCOSE BLOOD TEST: CPT

## 2025-02-15 PROCEDURE — 80053 COMPREHEN METABOLIC PANEL: CPT

## 2025-02-15 PROCEDURE — 6360000002 HC RX W HCPCS: Performed by: FAMILY MEDICINE

## 2025-02-15 PROCEDURE — 84145 PROCALCITONIN (PCT): CPT

## 2025-02-15 PROCEDURE — 87449 NOS EACH ORGANISM AG IA: CPT

## 2025-02-15 PROCEDURE — 82805 BLOOD GASES W/O2 SATURATION: CPT

## 2025-02-15 PROCEDURE — 87899 AGENT NOS ASSAY W/OPTIC: CPT

## 2025-02-15 PROCEDURE — 96375 TX/PRO/DX INJ NEW DRUG ADDON: CPT

## 2025-02-15 PROCEDURE — 2500000003 HC RX 250 WO HCPCS: Performed by: FAMILY MEDICINE

## 2025-02-15 RX ORDER — IPRATROPIUM BROMIDE AND ALBUTEROL SULFATE 2.5; .5 MG/3ML; MG/3ML
1 SOLUTION RESPIRATORY (INHALATION)
Status: DISCONTINUED | OUTPATIENT
Start: 2025-02-16 | End: 2025-02-23 | Stop reason: HOSPADM

## 2025-02-15 RX ORDER — GLUCAGON 1 MG/ML
1 KIT INJECTION PRN
Status: DISCONTINUED | OUTPATIENT
Start: 2025-02-15 | End: 2025-02-23 | Stop reason: HOSPADM

## 2025-02-15 RX ORDER — INSULIN LISPRO 100 [IU]/ML
0-4 INJECTION, SOLUTION INTRAVENOUS; SUBCUTANEOUS
Status: DISCONTINUED | OUTPATIENT
Start: 2025-02-15 | End: 2025-02-18

## 2025-02-15 RX ORDER — 0.9 % SODIUM CHLORIDE 0.9 %
250 INTRAVENOUS SOLUTION INTRAVENOUS ONCE
Status: COMPLETED | OUTPATIENT
Start: 2025-02-16 | End: 2025-02-16

## 2025-02-15 RX ORDER — SODIUM CHLORIDE 9 MG/ML
INJECTION, SOLUTION INTRAVENOUS CONTINUOUS
Status: DISCONTINUED | OUTPATIENT
Start: 2025-02-16 | End: 2025-02-18

## 2025-02-15 RX ORDER — DEXTROSE MONOHYDRATE 100 MG/ML
INJECTION, SOLUTION INTRAVENOUS CONTINUOUS PRN
Status: DISCONTINUED | OUTPATIENT
Start: 2025-02-15 | End: 2025-02-23 | Stop reason: HOSPADM

## 2025-02-15 RX ADMIN — METOPROLOL TARTRATE 100 MG: 50 TABLET, FILM COATED ORAL at 22:46

## 2025-02-15 RX ADMIN — GABAPENTIN 600 MG: 300 CAPSULE ORAL at 09:17

## 2025-02-15 RX ADMIN — INSULIN GLARGINE 60 UNITS: 100 INJECTION, SOLUTION SUBCUTANEOUS at 09:18

## 2025-02-15 RX ADMIN — METFORMIN HYDROCHLORIDE 1000 MG: 1000 TABLET ORAL at 09:17

## 2025-02-15 RX ADMIN — MEMANTINE HYDROCHLORIDE 5 MG: 5 TABLET, FILM COATED ORAL at 09:17

## 2025-02-15 RX ADMIN — PANTOPRAZOLE SODIUM 40 MG: 40 TABLET, DELAYED RELEASE ORAL at 09:17

## 2025-02-15 RX ADMIN — TAMSULOSIN HYDROCHLORIDE 0.8 MG: 0.4 CAPSULE ORAL at 09:17

## 2025-02-15 RX ADMIN — INSULIN LISPRO 2 UNITS: 100 INJECTION, SOLUTION INTRAVENOUS; SUBCUTANEOUS at 11:40

## 2025-02-15 RX ADMIN — LOSARTAN POTASSIUM 100 MG: 50 TABLET, FILM COATED ORAL at 09:16

## 2025-02-15 RX ADMIN — IPRATROPIUM BROMIDE AND ALBUTEROL SULFATE 1 DOSE: 2.5; .5 SOLUTION RESPIRATORY (INHALATION) at 21:14

## 2025-02-15 RX ADMIN — BUDESONIDE 250 MCG: 0.25 SUSPENSION RESPIRATORY (INHALATION) at 21:14

## 2025-02-15 RX ADMIN — ACETAMINOPHEN 650 MG: 325 TABLET ORAL at 23:04

## 2025-02-15 RX ADMIN — GABAPENTIN 600 MG: 300 CAPSULE ORAL at 22:46

## 2025-02-15 RX ADMIN — ATORVASTATIN CALCIUM 40 MG: 40 TABLET, FILM COATED ORAL at 22:47

## 2025-02-15 RX ADMIN — IPRATROPIUM BROMIDE AND ALBUTEROL SULFATE 1 DOSE: 2.5; .5 SOLUTION RESPIRATORY (INHALATION) at 16:33

## 2025-02-15 RX ADMIN — WATER 1000 MG: 1 INJECTION INTRAMUSCULAR; INTRAVENOUS; SUBCUTANEOUS at 11:23

## 2025-02-15 RX ADMIN — DOXYCYCLINE HYCLATE 100 MG: 100 CAPSULE ORAL at 09:19

## 2025-02-15 RX ADMIN — DOXYCYCLINE HYCLATE 100 MG: 100 CAPSULE ORAL at 22:46

## 2025-02-15 RX ADMIN — SODIUM CHLORIDE, PRESERVATIVE FREE 10 ML: 5 INJECTION INTRAVENOUS at 09:19

## 2025-02-15 RX ADMIN — MEMANTINE HYDROCHLORIDE 5 MG: 5 TABLET, FILM COATED ORAL at 22:46

## 2025-02-15 RX ADMIN — DULOXETINE 60 MG: 60 CAPSULE, DELAYED RELEASE ORAL at 09:18

## 2025-02-15 RX ADMIN — DIVALPROEX SODIUM 250 MG: 250 TABLET, DELAYED RELEASE ORAL at 23:04

## 2025-02-15 RX ADMIN — CLOPIDOGREL BISULFATE 75 MG: 75 TABLET ORAL at 09:17

## 2025-02-15 RX ADMIN — ASPIRIN 81 MG: 81 TABLET, COATED ORAL at 22:46

## 2025-02-15 RX ADMIN — ARFORMOTEROL TARTRATE 15 MCG: 15 SOLUTION RESPIRATORY (INHALATION) at 08:53

## 2025-02-15 RX ADMIN — SODIUM CHLORIDE, PRESERVATIVE FREE 10 ML: 5 INJECTION INTRAVENOUS at 23:05

## 2025-02-15 RX ADMIN — HYDROCHLOROTHIAZIDE 25 MG: 25 TABLET ORAL at 09:17

## 2025-02-15 RX ADMIN — ARFORMOTEROL TARTRATE 15 MCG: 15 SOLUTION RESPIRATORY (INHALATION) at 21:14

## 2025-02-15 RX ADMIN — IPRATROPIUM BROMIDE AND ALBUTEROL SULFATE 1 DOSE: 2.5; .5 SOLUTION RESPIRATORY (INHALATION) at 13:18

## 2025-02-15 RX ADMIN — CETIRIZINE HYDROCHLORIDE 10 MG: 10 TABLET, FILM COATED ORAL at 09:19

## 2025-02-15 RX ADMIN — DIVALPROEX SODIUM 250 MG: 250 TABLET, DELAYED RELEASE ORAL at 09:16

## 2025-02-15 RX ADMIN — METOPROLOL TARTRATE 100 MG: 50 TABLET, FILM COATED ORAL at 09:17

## 2025-02-15 RX ADMIN — ENOXAPARIN SODIUM 30 MG: 100 INJECTION SUBCUTANEOUS at 22:56

## 2025-02-15 RX ADMIN — INSULIN LISPRO 2 UNITS: 100 INJECTION, SOLUTION INTRAVENOUS; SUBCUTANEOUS at 16:21

## 2025-02-15 RX ADMIN — IPRATROPIUM BROMIDE AND ALBUTEROL SULFATE 1 DOSE: 2.5; .5 SOLUTION RESPIRATORY (INHALATION) at 08:53

## 2025-02-15 RX ADMIN — FENOFIBRATE 54 MG: 54 TABLET ORAL at 09:17

## 2025-02-15 RX ADMIN — ONDANSETRON 4 MG: 2 INJECTION, SOLUTION INTRAMUSCULAR; INTRAVENOUS at 12:39

## 2025-02-15 RX ADMIN — GABAPENTIN 600 MG: 300 CAPSULE ORAL at 11:41

## 2025-02-15 RX ADMIN — ENOXAPARIN SODIUM 30 MG: 100 INJECTION SUBCUTANEOUS at 09:18

## 2025-02-15 RX ADMIN — BUDESONIDE 250 MCG: 0.25 SUSPENSION RESPIRATORY (INHALATION) at 08:53

## 2025-02-15 RX ADMIN — DIVALPROEX SODIUM 250 MG: 125 CAPSULE, COATED PELLETS ORAL at 11:40

## 2025-02-15 RX ADMIN — GABAPENTIN 600 MG: 300 CAPSULE ORAL at 16:21

## 2025-02-15 RX ADMIN — METFORMIN HYDROCHLORIDE 1000 MG: 1000 TABLET ORAL at 16:21

## 2025-02-15 NOTE — PROGRESS NOTES
4 Eyes Skin Assessment     NAME:  Stewart Mark  YOB: 1950  MEDICAL RECORD NUMBER:  [id number]    The patient is being assessed for  Admission    I agree that at least one RN has performed a thorough Head to Toe Skin Assessment on the patient. ALL assessment sites listed below have been assessed.  Scabbed areas bilateral lower extremities, sacrum red, redness scrotum. Enlarge scrotum    Areas assessed by both nurses:             Does the Patient have a Wound? No noted wound(s)       Bienvenido Prevention initiated by RN: yes    Wound Care Orders initiated by RN: No    Pressure Injury (Stage 3,4, Unstageable, DTI, NWPT, and Complex wounds) if present, place Wound referral order by RN under : No    New Ostomies, if present place, Ostomy referral order under : No     Nurse 1 eSignature: Electronically signed by Violeta Mcnulty RN on 2/15/25 at 2:52 AM EST    **SHARE this note so that the co-signing nurse can place an eSignature**    Nurse 2 eSignature: Electronically signed by Loni Goldman RN on 2/15/25 at 2:53 AM EST

## 2025-02-15 NOTE — PROGRESS NOTES
Shortness of breath    -  Primary R06.02             PATIENT REPORT/COMPLAINT: denies difficulty swallowing  RN cleared patient for participation in assessment     yes, RN Carla reported pt tolerated pills well, no overt swallow issues noted to this time.     PRIOR LEVEL OF SWALLOW FUNCTION:    PAST HISTORY OF OROPHARYNGEAL DYSPHAGIA?: yes, reference pt's chart for further info. Most recently at this facility 11/2023 which rec'd reg/thin and supervision for use of swallow strats.     Home diet: Regular consistency solids (IDDSI level 7) with  thin liquids (IDDSI level 0)  Current Diet Order:  ADULT DIET; Regular; 4 carb choices (60 gm/meal); Low Fat/Low Chol/High Fiber/BRAYDEN    PROCEDURE:  Consistencies Administered During the Evaluation   Liquids: thin liquid   Solids:  Pureed  and Hard solid      Method of Intake:   cup, straw, spoon  Self fed  with some assist     Position:   Sitting in bed with head elevated above 75 degrees    CLINICAL ASSESSMENT:  Oral Stage:       Dentition:  natural        Mild spillage from L side, pt aware and attempted to self correct.     Mildly prolonged but functional mastication w/ reg solids with mostly no to mild oral residue which cleared with liquid wash. Pt at times took few bites at a time prior to assuring oral cavity was cleared, SLP edu'd pt r/t need for use of swallow strats and assuring oral clearance.     Pharyngeal Stage:    Thin from straw: consistent burping noted w/ straw use, X1 dry throat clear noted. Pt tolerated 2/3 straw sips w/o overt s/s of aspiration noted.     Suspect delay in pharyngeal swallow onset.     No other signs of aspiration were noted during this evaluation (with thin from cup, puree, reg solids); however, silent aspiration cannot be ruled out at bedside.  If silent aspiration is suspected, a Videofluoroscopic Study of Swallowing (MBS) is recommended and requires a physician order.    Cognition:   Confusion noted    Oral Peripheral Examination    Generalized oral weakness and Left labiobuccal weakness--both mild noted     Current Respiratory Status    O2 via nasal cannula     Parameters of Speech Production  Respiration:  Adequate for speech production  Quality:   Occasional breathy quality noted  Intensity: Within functional limits    Volitional Swallow: Present     Volitional Cough:  DNT     Pain: No pain reported.    EDUCATION:   The Speech Language Pathologist (SLP) completed education regarding results of evaluation and that intervention is warranted at this time.  Learner: Patient  Education: Reviewed results and recommendations of this evaluation and Reviewed diet and strategies  Evaluation of Education:  Needs further instruction    This plan may be re-evaluated and revised as warranted.      Evaluation Time includes thorough review of current medical information, gathering information on past medical history/social history and prior level of function, completion of standardized testing/informal observation of tasks, assessment of data and education on plan of care and goals.    [x]The admitting diagnosis and active problem list, have been reviewed prior to initiation of this evaluation.    Pt left in room w/ callbell w/I reach following session.       ACTIVE PROBLEM LIST:   Patient Active Problem List   Diagnosis    Obstructive sleep apnea syndrome    Moderate persistent asthma without complication    Uncontrolled type 2 diabetes mellitus    CAD in native artery    HLD (hyperlipidemia)    Morbid obesity    Altered mental state         CPT code:  97318  bedside swallow eval

## 2025-02-15 NOTE — CONSULTS
Infectious Disease Consult Note     Admit Date: 2/14/2025  1:39 AM    Chief complaint: Fall    Reason for Consult: Klebsiella bacteremia    Requesting Physician:  Liv Kaufman MD      HISTORY OF PRESENT ILLNESS:    Patient is a 74-year-old male with a past medical history of CVA, anxiety, CAD in native artery, depression, diabetes, hyperlipidemia, hypertension, sleep apnea, ulceration right foot.  Patient presented to the ED from facility with complaints of fall weakness and fatigue at facility.  Patient states that he sat in his wheelchair all day long when he finally stood up with the help of the fire department he was short of breath.  Patient states while he has been in the emergency room he has been short of breath all day.  Patient's currently on 3 L O2 as his baseline is room air at facility.    Patient did have increased temp and increase while in the ED however that has resolved.  ER workup revealed imaging pneumonia, troponin 37, slight elevation in liver enzymes with a total bili of 1.6, blood cultures obtained.  Patient was started on IV antibiotics and admitted to telemetry unit for further treatment.  On evaluation he denies any acute complaints-he is able to answer all my questions appropriately without altered mental status.  He has a known history of previous stroke.  Procalcitonin 0.51.  ALT /157 blood cultures with Klebsiella    Infectious disease consulted for further antibiotic recommendation    REVIEW OF SYSTEMS:    Constitutional:no Fever, chills or rigors. No unexplained weight loss.  HEENT: no headache, dizziness or lightheadedness. No sore throat or runny nose.  Respiratory: no cough, chest pain, shortness of breath or wheeze.  Cardiovascular: no chest pain or palpitations  Gastrointestinal: no nausea, vomiting, diarrhea, constipation. No blood in stool.  Genitourinary: no dysuria, hematuria, urgency, frequency or incontinence.  Musculoskeletal: no joint pain anywhere in body, or

## 2025-02-15 NOTE — ED NOTES
ED to Inpatient Handoff Report    Notified 5S that electronic handoff available and patient ready for transport to room 546.    Safety Risks: None identified    Patient in Restraints: no    Constant Observer or Patient : no    Telemetry Monitoring Ordered: No     Cardiac Rhythm: Sinus tachy    Order to transfer to unit without monitor: NO    Last MEWS: 1 Time completed: 2019 1  Deterioration Index: 43.25    Vitals:    02/14/25 1541 02/14/25 1903 02/14/25 1914 02/14/25 2018   BP: (!) 152/95  (!) 146/83 (!) 141/81   Pulse: 85 86 82 79   Resp: 16 23 20 18   Temp: 98.3 °F (36.8 °C)   98.3 °F (36.8 °C)   TempSrc: Oral      SpO2: 95% 95% 93% 93%   Weight:       Height:           Opportunity for questions and clarification was provided.

## 2025-02-15 NOTE — PLAN OF CARE
Problem: ABCDS Injury Assessment  Goal: Absence of physical injury  Outcome: Progressing     Problem: Skin/Tissue Integrity  Goal: Skin integrity remains intact  Description: 1.  Monitor for areas of redness and/or skin breakdown  2.  Assess vascular access sites hourly  3.  Every 4-6 hours minimum:  Change oxygen saturation probe site  4.  Every 4-6 hours:  If on nasal continuous positive airway pressure, respiratory therapy assess nares and determine need for appliance change or resting period  Outcome: Progressing     Problem: Discharge Planning  Goal: Discharge to home or other facility with appropriate resources  Outcome: Progressing     Problem: Chronic Conditions and Co-morbidities  Goal: Patient's chronic conditions and co-morbidity symptoms are monitored and maintained or improved  Outcome: Progressing     Problem: Pain  Goal: Verbalizes/displays adequate comfort level or baseline comfort level  Outcome: Progressing     Problem: Safety - Adult  Goal: Free from fall injury  Outcome: Progressing

## 2025-02-16 ENCOUNTER — APPOINTMENT (OUTPATIENT)
Dept: CT IMAGING | Age: 75
DRG: 871 | End: 2025-02-16
Payer: MEDICARE

## 2025-02-16 ENCOUNTER — APPOINTMENT (OUTPATIENT)
Dept: GENERAL RADIOLOGY | Age: 75
DRG: 871 | End: 2025-02-16
Payer: MEDICARE

## 2025-02-16 ENCOUNTER — APPOINTMENT (OUTPATIENT)
Dept: ULTRASOUND IMAGING | Age: 75
DRG: 871 | End: 2025-02-16
Payer: MEDICARE

## 2025-02-16 LAB
ACB COMPLEX DNA BLD POS QL NAA+NON-PROBE: NOT DETECTED
ALBUMIN SERPL-MCNC: 3.4 G/DL (ref 3.5–5.2)
ALP SERPL-CCNC: 208 U/L (ref 40–129)
ALT SERPL-CCNC: 309 U/L (ref 0–40)
ANION GAP SERPL CALCULATED.3IONS-SCNC: 14 MMOL/L (ref 7–16)
AST SERPL-CCNC: 342 U/L (ref 0–39)
B FRAGILIS DNA BLD POS QL NAA+NON-PROBE: NOT DETECTED
B.E.: 0.8 MMOL/L (ref -3–3)
BILIRUB DIRECT SERPL-MCNC: 3.2 MG/DL (ref 0–0.3)
BILIRUB INDIRECT SERPL-MCNC: 0.5 MG/DL (ref 0–1)
BILIRUB SERPL-MCNC: 3.5 MG/DL (ref 0–1.2)
BILIRUB SERPL-MCNC: 3.7 MG/DL (ref 0–1.2)
BIOFIRE TEST COMMENT: ABNORMAL
BLACTX-M ISLT/SPM QL: NOT DETECTED
BLAIMP ISLT/SPM QL: NOT DETECTED
BLAKPC ISLT/SPM QL: NOT DETECTED
BLAOXA-48-LIKE ISLT/SPM QL: NOT DETECTED
BLAVIM ISLT/SPM QL: NOT DETECTED
BUN SERPL-MCNC: 25 MG/DL (ref 6–23)
C ALBICANS DNA BLD POS QL NAA+NON-PROBE: NOT DETECTED
C AURIS DNA BLD POS QL NAA+NON-PROBE: NOT DETECTED
C GATTII+NEOFOR DNA BLD POS QL NAA+N-PRB: NOT DETECTED
C GLABRATA DNA BLD POS QL NAA+NON-PROBE: NOT DETECTED
C KRUSEI DNA BLD POS QL NAA+NON-PROBE: NOT DETECTED
C PARAP DNA BLD POS QL NAA+NON-PROBE: NOT DETECTED
C TROPICLS DNA BLD POS QL NAA+NON-PROBE: NOT DETECTED
CALCIUM SERPL-MCNC: 8.4 MG/DL (ref 8.6–10.2)
CHLORIDE SERPL-SCNC: 91 MMOL/L (ref 98–107)
CO2 SERPL-SCNC: 24 MMOL/L (ref 22–29)
COHB: 0.7 % (ref 0–1.5)
COLISTIN RES MCR-1 ISLT/SPM QL: NOT DETECTED
CREAT SERPL-MCNC: 0.9 MG/DL (ref 0.7–1.2)
CRITICAL: NORMAL
DATE ANALYZED: NORMAL
DATE OF COLLECTION: NORMAL
E CLOAC COMP DNA BLD POS NAA+NON-PROBE: NOT DETECTED
E COLI DNA BLD POS QL NAA+NON-PROBE: NOT DETECTED
E FAECALIS DNA BLD POS QL NAA+NON-PROBE: NOT DETECTED
E FAECIUM DNA BLD POS QL NAA+NON-PROBE: NOT DETECTED
ENTEROBACTERALES DNA BLD POS NAA+N-PRB: DETECTED
GFR, ESTIMATED: 87 ML/MIN/1.73M2
GLUCOSE BLD-MCNC: 280 MG/DL (ref 74–99)
GLUCOSE BLD-MCNC: 299 MG/DL (ref 74–99)
GLUCOSE BLD-MCNC: 303 MG/DL (ref 74–99)
GLUCOSE BLD-MCNC: 318 MG/DL (ref 74–99)
GLUCOSE SERPL-MCNC: 314 MG/DL (ref 74–99)
GP B STREP DNA BLD POS QL NAA+NON-PROBE: NOT DETECTED
HAEM INFLU DNA BLD POS QL NAA+NON-PROBE: NOT DETECTED
HCO3: 24.5 MMOL/L (ref 22–26)
HHB: 4.3 % (ref 0–5)
K OXYTOCA DNA BLD POS QL NAA+NON-PROBE: NOT DETECTED
KLEBSIELLA SP DNA BLD POS QL NAA+NON-PRB: DETECTED
KLEBSIELLA SP DNA BLD POS QL NAA+NON-PRB: NOT DETECTED
L MONOCYTOG DNA BLD POS QL NAA+NON-PROBE: NOT DETECTED
LAB: NORMAL
LACTATE BLDV-SCNC: 2.1 MMOL/L (ref 0.5–2.2)
Lab: 53
METHB: 0.3 % (ref 0–1.5)
MICROORGANISM SPEC CULT: ABNORMAL
MICROORGANISM SPEC CULT: ABNORMAL
MICROORGANISM/AGENT SPEC: ABNORMAL
MICROORGANISM/AGENT SPEC: ABNORMAL
MODE: NORMAL
N MEN DNA BLD POS QL NAA+NON-PROBE: NOT DETECTED
O2 CONTENT: 17.8 ML/DL
O2 SATURATION: 95.7 % (ref 92–98.5)
O2HB: 94.7 % (ref 94–97)
OPERATOR ID: NORMAL
P AERUGINOSA DNA BLD POS NAA+NON-PROBE: NOT DETECTED
PATIENT TEMP: 37 C
PCO2: 36.4 MMHG (ref 35–45)
PH BLOOD GAS: 7.45 (ref 7.35–7.45)
PO2: 78.6 MMHG (ref 75–100)
POTASSIUM SERPL-SCNC: 3.7 MMOL/L (ref 3.5–5)
PROCALCITONIN SERPL-MCNC: 0.7 NG/ML (ref 0–0.08)
PROT SERPL-MCNC: 7.1 G/DL (ref 6.4–8.3)
PROTEUS SP DNA BLD POS QL NAA+NON-PROBE: NOT DETECTED
RESISTANT GENE NDM BY PCR: NOT DETECTED
S AUREUS DNA BLD POS QL NAA+NON-PROBE: NOT DETECTED
S AUREUS+CONS DNA BLD POS NAA+NON-PROBE: NOT DETECTED
S EPIDERMIDIS DNA BLD POS QL NAA+NON-PRB: NOT DETECTED
S LUGDUNENSIS DNA BLD POS QL NAA+NON-PRB: NOT DETECTED
S MALTOPHILIA DNA BLD POS QL NAA+NON-PRB: NOT DETECTED
S MARCESCENS DNA BLD POS NAA+NON-PROBE: NOT DETECTED
S PNEUM DNA BLD POS QL NAA+NON-PROBE: NOT DETECTED
S PYO DNA BLD POS QL NAA+NON-PROBE: NOT DETECTED
SALMONELLA DNA BLD POS QL NAA+NON-PROBE: NOT DETECTED
SERVICE CMNT-IMP: ABNORMAL
SERVICE CMNT-IMP: ABNORMAL
SODIUM SERPL-SCNC: 129 MMOL/L (ref 132–146)
SOURCE, BLOOD GAS: NORMAL
SPECIMEN DESCRIPTION: ABNORMAL
SPECIMEN DESCRIPTION: ABNORMAL
STREPTOCOCCUS DNA BLD POS NAA+NON-PROBE: NOT DETECTED
THB: 13.3 G/DL (ref 11.5–16.5)
TIME ANALYZED: 100

## 2025-02-16 PROCEDURE — 94640 AIRWAY INHALATION TREATMENT: CPT

## 2025-02-16 PROCEDURE — 76705 ECHO EXAM OF ABDOMEN: CPT

## 2025-02-16 PROCEDURE — 2500000003 HC RX 250 WO HCPCS: Performed by: REGISTERED NURSE

## 2025-02-16 PROCEDURE — 93005 ELECTROCARDIOGRAM TRACING: CPT | Performed by: INTERNAL MEDICINE

## 2025-02-16 PROCEDURE — 70450 CT HEAD/BRAIN W/O DYE: CPT

## 2025-02-16 PROCEDURE — 82805 BLOOD GASES W/O2 SATURATION: CPT

## 2025-02-16 PROCEDURE — 96372 THER/PROPH/DIAG INJ SC/IM: CPT

## 2025-02-16 PROCEDURE — 6360000002 HC RX W HCPCS: Performed by: FAMILY MEDICINE

## 2025-02-16 PROCEDURE — 83605 ASSAY OF LACTIC ACID: CPT

## 2025-02-16 PROCEDURE — 6370000000 HC RX 637 (ALT 250 FOR IP): Performed by: REGISTERED NURSE

## 2025-02-16 PROCEDURE — 6370000000 HC RX 637 (ALT 250 FOR IP): Performed by: FAMILY MEDICINE

## 2025-02-16 PROCEDURE — 80053 COMPREHEN METABOLIC PANEL: CPT

## 2025-02-16 PROCEDURE — 2580000003 HC RX 258: Performed by: NURSE PRACTITIONER

## 2025-02-16 PROCEDURE — G0378 HOSPITAL OBSERVATION PER HR: HCPCS

## 2025-02-16 PROCEDURE — 2700000000 HC OXYGEN THERAPY PER DAY

## 2025-02-16 PROCEDURE — 82248 BILIRUBIN DIRECT: CPT

## 2025-02-16 PROCEDURE — 2500000003 HC RX 250 WO HCPCS: Performed by: FAMILY MEDICINE

## 2025-02-16 PROCEDURE — 2500000003 HC RX 250 WO HCPCS: Performed by: NURSE PRACTITIONER

## 2025-02-16 PROCEDURE — 96375 TX/PRO/DX INJ NEW DRUG ADDON: CPT

## 2025-02-16 PROCEDURE — 6360000002 HC RX W HCPCS: Performed by: REGISTERED NURSE

## 2025-02-16 PROCEDURE — 71045 X-RAY EXAM CHEST 1 VIEW: CPT

## 2025-02-16 PROCEDURE — 82962 GLUCOSE BLOOD TEST: CPT

## 2025-02-16 PROCEDURE — 93005 ELECTROCARDIOGRAM TRACING: CPT | Performed by: NURSE PRACTITIONER

## 2025-02-16 PROCEDURE — 36415 COLL VENOUS BLD VENIPUNCTURE: CPT

## 2025-02-16 PROCEDURE — 96376 TX/PRO/DX INJ SAME DRUG ADON: CPT

## 2025-02-16 PROCEDURE — 84145 PROCALCITONIN (PCT): CPT

## 2025-02-16 RX ORDER — HYDRALAZINE HYDROCHLORIDE 20 MG/ML
10 INJECTION INTRAMUSCULAR; INTRAVENOUS EVERY 6 HOURS PRN
Status: DISCONTINUED | OUTPATIENT
Start: 2025-02-16 | End: 2025-02-23 | Stop reason: HOSPADM

## 2025-02-16 RX ORDER — PROCHLORPERAZINE EDISYLATE 5 MG/ML
5 INJECTION INTRAMUSCULAR; INTRAVENOUS EVERY 6 HOURS PRN
Status: DISCONTINUED | OUTPATIENT
Start: 2025-02-16 | End: 2025-02-23 | Stop reason: HOSPADM

## 2025-02-16 RX ORDER — METOPROLOL TARTRATE 1 MG/ML
2.5 INJECTION, SOLUTION INTRAVENOUS EVERY 6 HOURS PRN
Status: DISCONTINUED | OUTPATIENT
Start: 2025-02-16 | End: 2025-02-23 | Stop reason: HOSPADM

## 2025-02-16 RX ORDER — LEVOFLOXACIN 500 MG/1
500 TABLET, FILM COATED ORAL DAILY
Status: COMPLETED | OUTPATIENT
Start: 2025-02-16 | End: 2025-02-23

## 2025-02-16 RX ADMIN — DIVALPROEX SODIUM 250 MG: 125 CAPSULE, COATED PELLETS ORAL at 12:48

## 2025-02-16 RX ADMIN — MEMANTINE HYDROCHLORIDE 5 MG: 5 TABLET, FILM COATED ORAL at 21:14

## 2025-02-16 RX ADMIN — GABAPENTIN 600 MG: 300 CAPSULE ORAL at 17:43

## 2025-02-16 RX ADMIN — GABAPENTIN 600 MG: 300 CAPSULE ORAL at 21:14

## 2025-02-16 RX ADMIN — INSULIN GLARGINE 60 UNITS: 100 INJECTION, SOLUTION SUBCUTANEOUS at 08:37

## 2025-02-16 RX ADMIN — MEMANTINE HYDROCHLORIDE 5 MG: 5 TABLET, FILM COATED ORAL at 08:34

## 2025-02-16 RX ADMIN — GABAPENTIN 600 MG: 300 CAPSULE ORAL at 08:35

## 2025-02-16 RX ADMIN — BUDESONIDE 250 MCG: 0.25 SUSPENSION RESPIRATORY (INHALATION) at 20:36

## 2025-02-16 RX ADMIN — INSULIN LISPRO 2 UNITS: 100 INJECTION, SOLUTION INTRAVENOUS; SUBCUTANEOUS at 21:31

## 2025-02-16 RX ADMIN — TAMSULOSIN HYDROCHLORIDE 0.8 MG: 0.4 CAPSULE ORAL at 08:37

## 2025-02-16 RX ADMIN — CETIRIZINE HYDROCHLORIDE 10 MG: 10 TABLET, FILM COATED ORAL at 08:35

## 2025-02-16 RX ADMIN — IPRATROPIUM BROMIDE AND ALBUTEROL SULFATE 1 DOSE: .5; 2.5 SOLUTION RESPIRATORY (INHALATION) at 20:36

## 2025-02-16 RX ADMIN — ENOXAPARIN SODIUM 30 MG: 100 INJECTION SUBCUTANEOUS at 08:35

## 2025-02-16 RX ADMIN — METFORMIN HYDROCHLORIDE 1000 MG: 1000 TABLET ORAL at 17:43

## 2025-02-16 RX ADMIN — METOPROLOL TARTRATE 2.5 MG: 5 INJECTION INTRAVENOUS at 02:39

## 2025-02-16 RX ADMIN — METFORMIN HYDROCHLORIDE 1000 MG: 1000 TABLET ORAL at 08:37

## 2025-02-16 RX ADMIN — IPRATROPIUM BROMIDE AND ALBUTEROL SULFATE 1 DOSE: .5; 2.5 SOLUTION RESPIRATORY (INHALATION) at 08:55

## 2025-02-16 RX ADMIN — DIVALPROEX SODIUM 250 MG: 250 TABLET, DELAYED RELEASE ORAL at 21:14

## 2025-02-16 RX ADMIN — BUDESONIDE 250 MCG: 0.25 SUSPENSION RESPIRATORY (INHALATION) at 08:54

## 2025-02-16 RX ADMIN — SODIUM CHLORIDE, PRESERVATIVE FREE 10 ML: 5 INJECTION INTRAVENOUS at 08:37

## 2025-02-16 RX ADMIN — DULOXETINE 60 MG: 60 CAPSULE, DELAYED RELEASE ORAL at 08:35

## 2025-02-16 RX ADMIN — DIVALPROEX SODIUM 250 MG: 250 TABLET, DELAYED RELEASE ORAL at 08:35

## 2025-02-16 RX ADMIN — INSULIN GLARGINE 60 UNITS: 100 INJECTION, SOLUTION SUBCUTANEOUS at 21:31

## 2025-02-16 RX ADMIN — INSULIN LISPRO 2 UNITS: 100 INJECTION, SOLUTION INTRAVENOUS; SUBCUTANEOUS at 17:44

## 2025-02-16 RX ADMIN — INSULIN LISPRO 3 UNITS: 100 INJECTION, SOLUTION INTRAVENOUS; SUBCUTANEOUS at 12:16

## 2025-02-16 RX ADMIN — ARFORMOTEROL TARTRATE 15 MCG: 15 SOLUTION RESPIRATORY (INHALATION) at 08:54

## 2025-02-16 RX ADMIN — CLOPIDOGREL BISULFATE 75 MG: 75 TABLET ORAL at 08:33

## 2025-02-16 RX ADMIN — PANTOPRAZOLE SODIUM 40 MG: 40 TABLET, DELAYED RELEASE ORAL at 08:37

## 2025-02-16 RX ADMIN — WATER 2000 MG: 1 INJECTION INTRAMUSCULAR; INTRAVENOUS; SUBCUTANEOUS at 12:48

## 2025-02-16 RX ADMIN — LOSARTAN POTASSIUM 100 MG: 50 TABLET, FILM COATED ORAL at 08:35

## 2025-02-16 RX ADMIN — SODIUM CHLORIDE: 9 INJECTION, SOLUTION INTRAVENOUS at 17:49

## 2025-02-16 RX ADMIN — LEVOFLOXACIN 500 MG: 500 TABLET, FILM COATED ORAL at 14:15

## 2025-02-16 RX ADMIN — DOXYCYCLINE HYCLATE 100 MG: 100 CAPSULE ORAL at 08:35

## 2025-02-16 RX ADMIN — ENOXAPARIN SODIUM 30 MG: 100 INJECTION SUBCUTANEOUS at 21:14

## 2025-02-16 RX ADMIN — INSULIN LISPRO 3 UNITS: 100 INJECTION, SOLUTION INTRAVENOUS; SUBCUTANEOUS at 06:57

## 2025-02-16 RX ADMIN — ARFORMOTEROL TARTRATE 15 MCG: 15 SOLUTION RESPIRATORY (INHALATION) at 20:36

## 2025-02-16 RX ADMIN — SODIUM CHLORIDE 250 ML: 0.9 INJECTION, SOLUTION INTRAVENOUS at 00:22

## 2025-02-16 RX ADMIN — FENOFIBRATE 54 MG: 54 TABLET ORAL at 08:37

## 2025-02-16 RX ADMIN — METOPROLOL TARTRATE 100 MG: 50 TABLET, FILM COATED ORAL at 21:14

## 2025-02-16 RX ADMIN — SODIUM CHLORIDE: 9 INJECTION, SOLUTION INTRAVENOUS at 01:22

## 2025-02-16 RX ADMIN — GABAPENTIN 600 MG: 300 CAPSULE ORAL at 12:48

## 2025-02-16 RX ADMIN — ATORVASTATIN CALCIUM 40 MG: 40 TABLET, FILM COATED ORAL at 21:14

## 2025-02-16 RX ADMIN — HYDROCHLOROTHIAZIDE 25 MG: 25 TABLET ORAL at 08:35

## 2025-02-16 RX ADMIN — METOPROLOL TARTRATE 100 MG: 50 TABLET, FILM COATED ORAL at 08:35

## 2025-02-16 NOTE — CONSULTS
Surgery Consult  TONIA Mcneil MD FACS    Reason for consult: r/o cholecystitis    HPI  74 y.o. male pmhx CVA on asa/plavix, CAD, HTN, [facility] presented to ED w complaints of fall and weakness at his facility.  Also c/o sob.  Febrile to 100.9 last night.  Pt with altered mental status.  ER workup with ? PNA and elevated liver enzymes.  Blood cultures with klebsiella.  Infectious disease has been consulted for the positive blood cultures.  Had ruq u/s that showed distended gallbladder with pericholecystic fluid and sludge with negative murphys sign.  He's has been getting his plavix which was held today.  He is confused and unable to really answer any of my questions.  He knew he was in the hospital but couldn't really give me any more information.  Rocephin changed to levofloxacin today by DAYDAY Camacho up to 3.5 from 1.6      Past Medical History:   Diagnosis Date    Acute stroke due to ischemia (HCC) 7/26/2023    Anxiety     CAD in native artery 7/26/2023    Depression     Diabetes mellitus (HCC)     Hyperlipidemia     Hypertension     Moderate persistent asthma without complication 10/06/2015    Obesity     Sleep apnea     Type 2 diabetes mellitus without complication (HCC)     Ulceration 05/2012    right foot       Past Surgical History:   Procedure Laterality Date    BACK SURGERY  10/2019    COLONOSCOPY      CORONARY STENT PLACEMENT      HERNIA REPAIR      JOINT REPLACEMENT Left     left knee surgery x5    REVISION TOTAL KNEE ARTHROPLASTY Right 12/14/2018       Medications Prior to Admission:    Prior to Admission medications    Medication Sig Start Date End Date Taking? Authorizing Provider   divalproex (DEPAKOTE ER) 250 MG extended release tablet Take 1 tablet by mouth 2 times daily   Yes Provider, MD Caryn   aspirin 81 MG EC tablet Take 1 tablet by mouth nightly 11/12/24  Yes Marina Son, APRN - CNP   atorvastatin (LIPITOR) 40 MG tablet Take 1 tablet by mouth nightly 11/12/24  Yes Marina Son,

## 2025-02-16 NOTE — RT PROTOCOL NOTE
RT Nebulizer Bronchodilator Protocol Note    There is a bronchodilator order in the chart from a provider indicating to follow the RT Bronchodilator Protocol and there is an “Initiate RT Bronchodilator Protocol” order as well (see protocol at bottom of note).    CXR Findings:  XR CHEST PORTABLE    Result Date: 2/14/2025  No acute process.       The findings from the last RT Protocol Assessment were as follows:  Smoking: Smoker 15 pack years or more  Respiratory Pattern: Dyspnea on exertion or RR 21-25 bpm  Breath Sounds: Slightly diminished and/or crackles  Cough: Strong, spontaneous, non-productive  Indication for Bronchodilator Therapy:    Bronchodilator Assessment Score: 5    Aerosolized bronchodilator medication orders have been revised according to the RT Nebulizer Bronchodilator Protocol below.    Respiratory Therapist to perform RT Therapy Protocol Assessment initially then follow the protocol.  Repeat RT Therapy Protocol Assessment PRN for score 0-3 or on second treatment, BID, and PRN for scores above 3.    No Indications - adjust the frequency to every 6 hours PRN wheezing or bronchospasm, if no treatments needed after 48 hours then discontinue using Per Protocol order mode.     If indication present, adjust the RT bronchodilator orders based on the Bronchodilator Assessment Score as indicated below.  If a patient is on this medication at home then do not decrease Frequency below that used at home.    0-3 - enter or revise RT bronchodilator order(s) to equivalent RT Bronchodilator order with Frequency of every 4 hours PRN for wheezing or increased work of breathing using Per Protocol order mode.       4-6 - enter or revise RT Bronchodilator order(s) to two equivalent RT bronchodilator orders with one order with BID Frequency and one order with Frequency of every 4 hours PRN wheezing or increased work of breathing using Per Protocol order mode.         7-10 - enter or revise RT Bronchodilator order(s) to

## 2025-02-16 NOTE — PLAN OF CARE
Problem: ABCDS Injury Assessment  Goal: Absence of physical injury  2/16/2025 1058 by Armando Garnica RN  Outcome: Progressing  2/16/2025 0832 by Jacquelyn Lopez RN  Outcome: Progressing     Problem: Skin/Tissue Integrity  Goal: Skin integrity remains intact  Description: 1.  Monitor for areas of redness and/or skin breakdown  2.  Assess vascular access sites hourly  3.  Every 4-6 hours minimum:  Change oxygen saturation probe site  4.  Every 4-6 hours:  If on nasal continuous positive airway pressure, respiratory therapy assess nares and determine need for appliance change or resting period  2/16/2025 1058 by Armando Garnica RN  Outcome: Progressing  2/16/2025 0832 by Jacquelyn Lopez RN  Outcome: Progressing     Problem: Discharge Planning  Goal: Discharge to home or other facility with appropriate resources  2/16/2025 1058 by Armando Garnica RN  Outcome: Progressing  2/16/2025 0832 by Jacquelyn Lopez RN  Outcome: Progressing  Flowsheets (Taken 2/15/2025 2330)  Discharge to home or other facility with appropriate resources:   Identify barriers to discharge with patient and caregiver   Arrange for needed discharge resources and transportation as appropriate   Identify discharge learning needs (meds, wound care, etc)   Refer to discharge planning if patient needs post-hospital services based on physician order or complex needs related to functional status, cognitive ability or social support system     Problem: Chronic Conditions and Co-morbidities  Goal: Patient's chronic conditions and co-morbidity symptoms are monitored and maintained or improved  2/16/2025 1058 by Armando Garnica RN  Outcome: Progressing  2/16/2025 0832 by Jacquelyn Lopez RN  Outcome: Progressing  Flowsheets (Taken 2/15/2025 2330)  Care Plan - Patient's Chronic Conditions and Co-Morbidity Symptoms are Monitored and Maintained or Improved: Monitor and assess patient's chronic conditions and comorbid

## 2025-02-16 NOTE — PROGRESS NOTES
[facility]   Progress note      Subjective:    Eyes closed, does not open them on evaluation  Still with altered mental status  Objective:    BP (!) 182/72   Pulse 73   Temp 98.8 °F (37.1 °C) (Oral)   Resp 20   Ht 1.854 m (6' 1\")   Wt (!) 139.9 kg (308 lb 8 oz)   SpO2 96%   BMI 40.70 kg/m²     In: 0   Out: 1850   In: 0   Out: 1850 [Urine:1850]    General appearance: NAD, conversant  HEENT: AT/NC, MMM  Neck: FROM, supple  Lungs: Clear to auscultation  CV: RRR, no MRGs  Vasc: Radial pulses 2+  Abdomen: Soft, non-tender; no masses or HSM  Extremities: No peripheral edema or digital cyanosis  Skin: no rash, lesions or ulcers  Psych: Alert and oriented to person, place and time  Neuro: Alert and interactive     Recent Labs     02/14/25  0207 02/15/25  2202   WBC 6.5 5.9   HGB 12.8 12.4*   HCT 38.0 36.6*    191       Recent Labs     02/15/25  0200 02/15/25  2202 02/16/25  0815    134 129*   K 3.6 3.6 3.7   CL 99 96* 91*   CO2 26 23 24   BUN 23 24* 25*   CREATININE 0.9 0.9 0.9   CALCIUM 8.6 8.7 8.4*       Assessment:    Principal Problem:    Altered mental state  Resolved Problems:    * No resolved hospital problems. *      Plan:    74-year-old male with a history of a CVA and diabetes presents to the ED with complaints of fall at facility shortness of breath and weakness and is admitted to telemetry unit with     Altered mental status//Klebsiella bacteremia  -Supplement O2 demands keeping oxygen saturation greater than 92%.  Patient is currently utilizing 3 L O2 as his baseline is room air at facility  - infectious disease consultation for positive blood cultures with Klebsiella pneumonia-source unclear possible urinary tract versus abdominal  -DuoNebs   -Swallow study questionable aspiration pneumonia  -Strep/Legionella  Blood cultures pending     Diabetes Mellitus  -Monitor labs  -ISS glucose control  -Hypoglycemia protocol initiated    2/16/2025  Remains with altered mental

## 2025-02-16 NOTE — PLAN OF CARE
Problem: ABCDS Injury Assessment  Goal: Absence of physical injury  Outcome: Progressing     Problem: Skin/Tissue Integrity  Goal: Skin integrity remains intact  Description: 1.  Monitor for areas of redness and/or skin breakdown  2.  Assess vascular access sites hourly  3.  Every 4-6 hours minimum:  Change oxygen saturation probe site  4.  Every 4-6 hours:  If on nasal continuous positive airway pressure, respiratory therapy assess nares and determine need for appliance change or resting period  Outcome: Progressing     Problem: Discharge Planning  Goal: Discharge to home or other facility with appropriate resources  Outcome: Progressing  Flowsheets (Taken 2/15/2025 2330)  Discharge to home or other facility with appropriate resources:   Identify barriers to discharge with patient and caregiver   Arrange for needed discharge resources and transportation as appropriate   Identify discharge learning needs (meds, wound care, etc)   Refer to discharge planning if patient needs post-hospital services based on physician order or complex needs related to functional status, cognitive ability or social support system     Problem: Chronic Conditions and Co-morbidities  Goal: Patient's chronic conditions and co-morbidity symptoms are monitored and maintained or improved  Outcome: Progressing  Flowsheets (Taken 2/15/2025 2330)  Care Plan - Patient's Chronic Conditions and Co-Morbidity Symptoms are Monitored and Maintained or Improved: Monitor and assess patient's chronic conditions and comorbid symptoms for stability, deterioration, or improvement     Problem: Pain  Goal: Verbalizes/displays adequate comfort level or baseline comfort level  Outcome: Progressing     Problem: Safety - Adult  Goal: Free from fall injury  Outcome: Progressing

## 2025-02-16 NOTE — PROGRESS NOTES
Dr Mcneil requesting ASA & plavix be placed on hold if OK with attending. Also plan for MRCP. Spoke with Mitzy Son and OK to hold. Orders placed.

## 2025-02-16 NOTE — PROGRESS NOTES
Respiratory did not complete 0600 ABG's stated \" the last two were normal and another was not necessary.\"

## 2025-02-16 NOTE — PROGRESS NOTES
[facility]  [facility]  Progress Note    SUBJECTIVE:  Chief Complaint   Patient presents with    Shortness of Breath     sob     Patient is tolerating medications. No reported adverse drug reactions.  No nausea, vomiting, diarrhea.  Low-grade temp overnight-100.9 °F  Denies any complaints    Review of systems:  As stated above in the chief complaint, otherwise negative.    Medications:  Scheduled Meds:   insulin lispro  0-4 Units SubCUTAneous 4x Daily AC & HS    ipratropium 0.5 mg-albuterol 2.5 mg  1 Dose Inhalation BID RT    aspirin  81 mg Oral Nightly    atorvastatin  40 mg Oral Nightly    clopidogrel  75 mg Oral QAM    divalproex  250 mg Oral Lunch    divalproex  250 mg Oral BID    DULoxetine  60 mg Oral QAM    fenofibrate  54 mg Oral Daily    gabapentin  600 mg Oral 4x Daily    hydroCHLOROthiazide  25 mg Oral Daily    insulin glargine  60 Units SubCUTAneous BID    cetirizine  10 mg Oral Daily    losartan  100 mg Oral QAM    memantine  5 mg Oral BID    metFORMIN  1,000 mg Oral BID WC    metoprolol  100 mg Oral BID    pantoprazole  40 mg Oral QAM    tamsulosin  0.8 mg Oral Daily    sodium chloride flush  5-40 mL IntraVENous 2 times per day    enoxaparin  30 mg SubCUTAneous BID    cefTRIAXone (ROCEPHIN) IV  1,000 mg IntraVENous Q24H    And    doxycycline  100 mg Oral 2 times per day    budesonide  0.25 mg Nebulization BID RT    And    arformoterol tartrate  15 mcg Nebulization BID RT     Continuous Infusions:   dextrose      sodium chloride 75 mL/hr at 02/16/25 0122    sodium chloride       PRN Meds:prochlorperazine, metoprolol, glucose, dextrose bolus **OR** dextrose bolus, glucagon (rDNA), dextrose, albuterol, bisacodyl, fluticasone, melatonin, magnesium hydroxide, sodium chloride flush, sodium chloride, acetaminophen **OR** acetaminophen    OBJECTIVE:  BP (!) 173/107   Pulse 66   Temp 99.5 °F (37.5 °C) (Oral)   Resp 20   Ht 1.854 m (6' 1\")   Wt (!) 139.9 kg (308 lb 8 oz)    SpO2 93%   BMI 40.70 kg/m²   Temp  Av.8 °F (37.7 °C)  Min: 98.1 °F (36.7 °C)  Max: 100.9 °F (38.3 °C)  Constitutional: The patient is awake, alert, and oriented.  In no distress.  Skin: Warm and dry. No rashes were noted.   HEENT: Round and reactive pupils.  Moist mucous membranes.  No ulcerations or thrush.  Neck: Supple to movements.   Chest: No use of accessory muscles to breathe. Symmetrical expansion.  No wheezing, crackles or rhonchi.  Cardiovascular: S1 and S2 are rhythmic and regular. No murmurs appreciated.   Abdomen: Positive bowel sounds to auscultation. Benign to palpation. No masses felt. No hepatosplenomegaly.  Extremities: No clubbing, no cyanosis, no edema.  Lines: Peripheral.  External male Santos catheter with keyshawn-colored urine    Laboratory and Tests:  Reviewed    Radiology:  Reviewed    Microbiology:   Blood cultures 2025: Klebsiella pneumoniae  Streptococcus pneumoniae/Legionella urine Ag: negative  Viral panel: Negative    Recent Labs     02/15/25  0200   PROCAL 0.51*       ASSESSMENT:  Klebsiella bacteremia  Elevation of transaminases  Cholelithiasis, rule out cholangitis/cholecystitis  Possible unrecognized complicated UTI  Fevers associated to the above    PLAN:  Change Rocephin to Levofloxacin today -- elevation of transaminases could be associated to Ceftriaxone   Stop Doxycycline  RUQ ultrasound  Check final cultures  Monitor labs    RAÚL Aguilera - CNP  12:00 PM  2025    I have discussed the case, including pertinent history and physical  exam findings . I have seen and examined the patient and the key elements of the encounter have been performed by me. I agree with the assessment, plan and orders as documented.      Treatment plan as per my recommendation     Michael Cao MD, FACP  [facility]  2025  2:39 PM

## 2025-02-17 PROBLEM — K83.1 OBSTRUCTIVE JAUNDICE (HCC): Status: ACTIVE | Noted: 2025-02-14

## 2025-02-17 PROBLEM — K83.09 CHOLANGITIS (HCC): Status: ACTIVE | Noted: 2025-02-14

## 2025-02-17 LAB
ALBUMIN SERPL-MCNC: 3.1 G/DL (ref 3.5–5.2)
ALP SERPL-CCNC: 246 U/L (ref 40–129)
ALT SERPL-CCNC: 316 U/L (ref 0–40)
ANION GAP SERPL CALCULATED.3IONS-SCNC: 12 MMOL/L (ref 7–16)
AST SERPL-CCNC: 352 U/L (ref 0–39)
BASOPHILS # BLD: 0.01 K/UL (ref 0–0.2)
BASOPHILS NFR BLD: 0 % (ref 0–2)
BILIRUB DIRECT SERPL-MCNC: 3 MG/DL (ref 0–0.3)
BILIRUB INDIRECT SERPL-MCNC: 1.2 MG/DL (ref 0–1)
BILIRUB SERPL-MCNC: 4.2 MG/DL (ref 0–1.2)
BILIRUB SERPL-MCNC: 4.2 MG/DL (ref 0–1.2)
BUN SERPL-MCNC: 36 MG/DL (ref 6–23)
CALCIUM SERPL-MCNC: 8.6 MG/DL (ref 8.6–10.2)
CHLORIDE SERPL-SCNC: 96 MMOL/L (ref 98–107)
CO2 SERPL-SCNC: 26 MMOL/L (ref 22–29)
CREAT SERPL-MCNC: 1 MG/DL (ref 0.7–1.2)
EKG ATRIAL RATE: 120 BPM
EKG ATRIAL RATE: 74 BPM
EKG P AXIS: -21 DEGREES
EKG P AXIS: 64 DEGREES
EKG P-R INTERVAL: 128 MS
EKG P-R INTERVAL: 220 MS
EKG Q-T INTERVAL: 390 MS
EKG Q-T INTERVAL: 412 MS
EKG QRS DURATION: 102 MS
EKG QRS DURATION: 104 MS
EKG QTC CALCULATION (BAZETT): 457 MS
EKG QTC CALCULATION (BAZETT): 551 MS
EKG R AXIS: -40 DEGREES
EKG R AXIS: -43 DEGREES
EKG T AXIS: 20 DEGREES
EKG T AXIS: 50 DEGREES
EKG VENTRICULAR RATE: 120 BPM
EKG VENTRICULAR RATE: 74 BPM
EOSINOPHIL # BLD: 0.01 K/UL (ref 0.05–0.5)
EOSINOPHILS RELATIVE PERCENT: 0 % (ref 0–6)
ERYTHROCYTE [DISTWIDTH] IN BLOOD BY AUTOMATED COUNT: 14.5 % (ref 11.5–15)
GFR, ESTIMATED: 79 ML/MIN/1.73M2
GLUCOSE BLD-MCNC: 239 MG/DL (ref 74–99)
GLUCOSE BLD-MCNC: 240 MG/DL (ref 74–99)
GLUCOSE BLD-MCNC: 245 MG/DL (ref 74–99)
GLUCOSE BLD-MCNC: 259 MG/DL (ref 74–99)
GLUCOSE SERPL-MCNC: 271 MG/DL (ref 74–99)
HCT VFR BLD AUTO: 37.7 % (ref 37–54)
HGB BLD-MCNC: 12.3 G/DL (ref 12.5–16.5)
IMM GRANULOCYTES # BLD AUTO: 0.07 K/UL (ref 0–0.58)
IMM GRANULOCYTES NFR BLD: 1 % (ref 0–5)
INR PPP: 1.4
LYMPHOCYTES NFR BLD: 0.74 K/UL (ref 1.5–4)
LYMPHOCYTES RELATIVE PERCENT: 8 % (ref 20–42)
MCH RBC QN AUTO: 30.2 PG (ref 26–35)
MCHC RBC AUTO-ENTMCNC: 32.6 G/DL (ref 32–34.5)
MCV RBC AUTO: 92.6 FL (ref 80–99.9)
MONOCYTES NFR BLD: 1.33 K/UL (ref 0.1–0.95)
MONOCYTES NFR BLD: 14 % (ref 2–12)
NEUTROPHILS NFR BLD: 78 % (ref 43–80)
NEUTS SEG NFR BLD: 7.57 K/UL (ref 1.8–7.3)
PLATELET # BLD AUTO: 201 K/UL (ref 130–450)
PMV BLD AUTO: 10 FL (ref 7–12)
POTASSIUM SERPL-SCNC: 3.9 MMOL/L (ref 3.5–5)
PROT SERPL-MCNC: 7 G/DL (ref 6.4–8.3)
PROTHROMBIN TIME: 15 SEC (ref 9.3–12.4)
RBC # BLD AUTO: 4.07 M/UL (ref 3.8–5.8)
SODIUM SERPL-SCNC: 134 MMOL/L (ref 132–146)
WBC OTHER # BLD: 9.7 K/UL (ref 4.5–11.5)

## 2025-02-17 PROCEDURE — 85610 PROTHROMBIN TIME: CPT

## 2025-02-17 PROCEDURE — 93010 ELECTROCARDIOGRAM REPORT: CPT | Performed by: INTERNAL MEDICINE

## 2025-02-17 PROCEDURE — 94640 AIRWAY INHALATION TREATMENT: CPT

## 2025-02-17 PROCEDURE — 6370000000 HC RX 637 (ALT 250 FOR IP): Performed by: FAMILY MEDICINE

## 2025-02-17 PROCEDURE — 36415 COLL VENOUS BLD VENIPUNCTURE: CPT

## 2025-02-17 PROCEDURE — G0378 HOSPITAL OBSERVATION PER HR: HCPCS

## 2025-02-17 PROCEDURE — 85025 COMPLETE CBC W/AUTO DIFF WBC: CPT

## 2025-02-17 PROCEDURE — 6370000000 HC RX 637 (ALT 250 FOR IP): Performed by: REGISTERED NURSE

## 2025-02-17 PROCEDURE — 82248 BILIRUBIN DIRECT: CPT

## 2025-02-17 PROCEDURE — 6360000002 HC RX W HCPCS: Performed by: FAMILY MEDICINE

## 2025-02-17 PROCEDURE — 2700000000 HC OXYGEN THERAPY PER DAY

## 2025-02-17 PROCEDURE — 2580000003 HC RX 258: Performed by: STUDENT IN AN ORGANIZED HEALTH CARE EDUCATION/TRAINING PROGRAM

## 2025-02-17 PROCEDURE — 6370000000 HC RX 637 (ALT 250 FOR IP)

## 2025-02-17 PROCEDURE — 82962 GLUCOSE BLOOD TEST: CPT

## 2025-02-17 PROCEDURE — 80053 COMPREHEN METABOLIC PANEL: CPT

## 2025-02-17 RX ORDER — INDOMETHACIN 100 MG
100 SUPPOSITORY, RECTAL RECTAL ONCE
Status: COMPLETED | OUTPATIENT
Start: 2025-02-18 | End: 2025-02-18

## 2025-02-17 RX ORDER — SODIUM CHLORIDE, SODIUM LACTATE, POTASSIUM CHLORIDE, AND CALCIUM CHLORIDE .6; .31; .03; .02 G/100ML; G/100ML; G/100ML; G/100ML
800 INJECTION, SOLUTION INTRAVENOUS ONCE
Status: COMPLETED | OUTPATIENT
Start: 2025-02-18 | End: 2025-02-18

## 2025-02-17 RX ORDER — SODIUM CHLORIDE, SODIUM LACTATE, POTASSIUM CHLORIDE, CALCIUM CHLORIDE 600; 310; 30; 20 MG/100ML; MG/100ML; MG/100ML; MG/100ML
INJECTION, SOLUTION INTRAVENOUS CONTINUOUS
Status: DISCONTINUED | OUTPATIENT
Start: 2025-02-17 | End: 2025-02-19

## 2025-02-17 RX ADMIN — INSULIN LISPRO 1 UNITS: 100 INJECTION, SOLUTION INTRAVENOUS; SUBCUTANEOUS at 05:49

## 2025-02-17 RX ADMIN — BUDESONIDE 250 MCG: 0.25 SUSPENSION RESPIRATORY (INHALATION) at 08:23

## 2025-02-17 RX ADMIN — POTASSIUM BICARBONATE 20 MEQ: 782 TABLET, EFFERVESCENT ORAL at 17:02

## 2025-02-17 RX ADMIN — ARFORMOTEROL TARTRATE 15 MCG: 15 SOLUTION RESPIRATORY (INHALATION) at 08:23

## 2025-02-17 RX ADMIN — ATORVASTATIN CALCIUM 40 MG: 40 TABLET, FILM COATED ORAL at 21:38

## 2025-02-17 RX ADMIN — SODIUM CHLORIDE, POTASSIUM CHLORIDE, SODIUM LACTATE AND CALCIUM CHLORIDE: 600; 310; 30; 20 INJECTION, SOLUTION INTRAVENOUS at 21:32

## 2025-02-17 RX ADMIN — LEVOFLOXACIN 500 MG: 500 TABLET, FILM COATED ORAL at 10:32

## 2025-02-17 RX ADMIN — IPRATROPIUM BROMIDE AND ALBUTEROL SULFATE 1 DOSE: .5; 2.5 SOLUTION RESPIRATORY (INHALATION) at 08:23

## 2025-02-17 RX ADMIN — FENOFIBRATE 54 MG: 54 TABLET ORAL at 10:31

## 2025-02-17 RX ADMIN — METFORMIN HYDROCHLORIDE 1000 MG: 1000 TABLET ORAL at 17:03

## 2025-02-17 RX ADMIN — INSULIN GLARGINE 60 UNITS: 100 INJECTION, SOLUTION SUBCUTANEOUS at 21:36

## 2025-02-17 RX ADMIN — MEMANTINE HYDROCHLORIDE 5 MG: 5 TABLET, FILM COATED ORAL at 21:38

## 2025-02-17 RX ADMIN — GABAPENTIN 600 MG: 300 CAPSULE ORAL at 10:32

## 2025-02-17 RX ADMIN — INSULIN LISPRO 1 UNITS: 100 INJECTION, SOLUTION INTRAVENOUS; SUBCUTANEOUS at 16:37

## 2025-02-17 RX ADMIN — IPRATROPIUM BROMIDE AND ALBUTEROL SULFATE 1 DOSE: .5; 2.5 SOLUTION RESPIRATORY (INHALATION) at 21:16

## 2025-02-17 RX ADMIN — BUDESONIDE 250 MCG: 0.25 SUSPENSION RESPIRATORY (INHALATION) at 21:16

## 2025-02-17 RX ADMIN — GABAPENTIN 600 MG: 300 CAPSULE ORAL at 21:38

## 2025-02-17 RX ADMIN — DIVALPROEX SODIUM 250 MG: 250 TABLET, DELAYED RELEASE ORAL at 21:51

## 2025-02-17 RX ADMIN — DULOXETINE 60 MG: 60 CAPSULE, DELAYED RELEASE ORAL at 10:32

## 2025-02-17 RX ADMIN — GABAPENTIN 600 MG: 300 CAPSULE ORAL at 17:03

## 2025-02-17 RX ADMIN — SODIUM CHLORIDE, POTASSIUM CHLORIDE, SODIUM LACTATE AND CALCIUM CHLORIDE: 600; 310; 30; 20 INJECTION, SOLUTION INTRAVENOUS at 08:24

## 2025-02-17 RX ADMIN — MEMANTINE HYDROCHLORIDE 5 MG: 5 TABLET, FILM COATED ORAL at 10:31

## 2025-02-17 RX ADMIN — LOSARTAN POTASSIUM 100 MG: 50 TABLET, FILM COATED ORAL at 10:32

## 2025-02-17 RX ADMIN — CETIRIZINE HYDROCHLORIDE 10 MG: 10 TABLET, FILM COATED ORAL at 10:33

## 2025-02-17 RX ADMIN — METOPROLOL TARTRATE 100 MG: 50 TABLET, FILM COATED ORAL at 10:31

## 2025-02-17 RX ADMIN — PANTOPRAZOLE SODIUM 40 MG: 40 TABLET, DELAYED RELEASE ORAL at 11:02

## 2025-02-17 RX ADMIN — TAMSULOSIN HYDROCHLORIDE 0.8 MG: 0.4 CAPSULE ORAL at 10:32

## 2025-02-17 RX ADMIN — METOPROLOL TARTRATE 100 MG: 50 TABLET, FILM COATED ORAL at 21:37

## 2025-02-17 RX ADMIN — DIVALPROEX SODIUM 250 MG: 250 TABLET, DELAYED RELEASE ORAL at 10:29

## 2025-02-17 RX ADMIN — INSULIN LISPRO 1 UNITS: 100 INJECTION, SOLUTION INTRAVENOUS; SUBCUTANEOUS at 21:37

## 2025-02-17 RX ADMIN — ARFORMOTEROL TARTRATE 15 MCG: 15 SOLUTION RESPIRATORY (INHALATION) at 21:16

## 2025-02-17 RX ADMIN — HYDROCHLOROTHIAZIDE 25 MG: 25 TABLET ORAL at 11:01

## 2025-02-17 ASSESSMENT — PAIN SCALES - GENERAL: PAINLEVEL_OUTOF10: 0

## 2025-02-17 NOTE — PROGRESS NOTES
[facility]  [facility]  Progress Note    SUBJECTIVE:  Chief Complaint   Patient presents with    Shortness of Breath     sob     Patient is tolerating medications. No reported adverse drug reactions.  Resting in bed.  Denies any complaints  Says he is doing okay  Says he might have had abdominal pain--  he is a poor historian  Has been afebrile    Review of systems:  As stated above in the chief complaint, otherwise negative.    Medications:  Scheduled Meds:   levoFLOXacin  500 mg Oral Daily    insulin lispro  0-4 Units SubCUTAneous 4x Daily AC & HS    ipratropium 0.5 mg-albuterol 2.5 mg  1 Dose Inhalation BID RT    [Held by provider] aspirin  81 mg Oral Nightly    atorvastatin  40 mg Oral Nightly    [Held by provider] clopidogrel  75 mg Oral QAM    divalproex  250 mg Oral Lunch    divalproex  250 mg Oral BID    DULoxetine  60 mg Oral QAM    fenofibrate  54 mg Oral Daily    gabapentin  600 mg Oral 4x Daily    hydroCHLOROthiazide  25 mg Oral Daily    insulin glargine  60 Units SubCUTAneous BID    cetirizine  10 mg Oral Daily    losartan  100 mg Oral QAM    memantine  5 mg Oral BID    metFORMIN  1,000 mg Oral BID WC    metoprolol  100 mg Oral BID    pantoprazole  40 mg Oral QAM    tamsulosin  0.8 mg Oral Daily    sodium chloride flush  5-40 mL IntraVENous 2 times per day    enoxaparin  30 mg SubCUTAneous BID    budesonide  0.25 mg Nebulization BID RT    And    arformoterol tartrate  15 mcg Nebulization BID RT     Continuous Infusions:   lactated ringers 75 mL/hr at 02/17/25 0824    dextrose      sodium chloride Stopped (02/17/25 0820)    sodium chloride       PRN Meds:prochlorperazine, metoprolol, hydrALAZINE, glucose, dextrose bolus **OR** dextrose bolus, glucagon (rDNA), dextrose, albuterol, bisacodyl, fluticasone, melatonin, magnesium hydroxide, sodium chloride flush, sodium chloride, acetaminophen **OR** acetaminophen    OBJECTIVE:  BP (!) 155/62   Pulse 87   Temp 99.3 °F (37.4  for now.  We will follow with you.    Justin Liao MD  2/17/2025  1:39 PM

## 2025-02-17 NOTE — ACP (ADVANCE CARE PLANNING)
2/17/2025  Advance Care Planning   Healthcare Decision Maker:    Primary Decision Maker (Active): Fariba Mark - Spouse - [phone]    Click here to complete Healthcare Decision Makers including selection of the Healthcare Decision Maker Relationship (ie \"Primary\").

## 2025-02-17 NOTE — CARE COORDINATION
2/17/2025  Social Work Discharge Planning:AMS. SOB. Pt is from the [facility] where Pt is wc bound. Pt is on 6l o2 here and uses none at the facility. Wean as tolerated.MRCP today. Monitoring labs. Awaiting therapy evals. Transport form is in chart. Electronically signed by ELFEGO Arroyo on 2/17/2025 at 1:23 PM

## 2025-02-17 NOTE — PROGRESS NOTES
[facility]   Progress note      Subjective:    Resting comfortably in bed  No complaints on assessment    Objective:    BP (!) 165/83   Pulse 78   Temp 98.2 °F (36.8 °C) (Oral)   Resp 18   Ht 1.854 m (6' 1\")   Wt (!) 138.7 kg (305 lb 11.2 oz)   SpO2 96%   BMI 40.33 kg/m²     In: -   Out: 3100   In: -   Out: 3100 [Urine:3100]    General appearance: NAD, conversant  HEENT: AT/NC, MMM  Neck: FROM, supple  Lungs: Clear to auscultation  CV: RRR, no MRGs  Vasc: Radial pulses 2+  Abdomen: Soft, non-tender; no masses or HSM  Extremities: No peripheral edema or digital cyanosis  Skin: no rash, lesions or ulcers  Psych: Alert and oriented to person, place and time  Neuro: Alert and interactive     Recent Labs     02/15/25  2202 02/17/25  0928   WBC 5.9 9.7   HGB 12.4* 12.3*   HCT 36.6* 37.7    201       Recent Labs     02/15/25  2202 02/16/25  0815 02/17/25  1119    129* 134   K 3.6 3.7 3.9   CL 96* 91* 96*   CO2 23 24 26   BUN 24* 25* 36*   CREATININE 0.9 0.9 1.0   CALCIUM 8.7 8.4* 8.6       Assessment:    Principal Problem:    Altered mental state  Active Problems:    Cholangitis    Obstructive jaundice  Resolved Problems:    * No resolved hospital problems. *      Plan:    74-year-old male with a history of a CVA and diabetes presents to the ED with complaints of fall at facility shortness of breath and weakness and is admitted to telemetry unit with     Altered mental status//Klebsiella bacteremia  -Supplement O2 demands keeping oxygen saturation greater than 92%.  Patient is currently utilizing 3 L O2 as his baseline is room air at facility  - infectious disease consultation for positive blood cultures with Klebsiella pneumonia-source unclear possible urinary tract versus abdominal  -DuoNebs   -Swallow study questionable aspiration pneumonia  -Strep/Legionella  Blood cultures pending     Diabetes Mellitus  -Monitor labs  -ISS glucose control  -Hypoglycemia protocol

## 2025-02-17 NOTE — PLAN OF CARE
Problem: ABCDS Injury Assessment  Goal: Absence of physical injury  Outcome: Progressing     Problem: Skin/Tissue Integrity  Goal: Skin integrity remains intact  Description: 1.  Monitor for areas of redness and/or skin breakdown  2.  Assess vascular access sites hourly  3.  Every 4-6 hours minimum:  Change oxygen saturation probe site  4.  Every 4-6 hours:  If on nasal continuous positive airway pressure, respiratory therapy assess nares and determine need for appliance change or resting period  Outcome: Progressing     Problem: Discharge Planning  Goal: Discharge to home or other facility with appropriate resources  Outcome: Progressing  Flowsheets (Taken 2/17/2025 0131)  Discharge to home or other facility with appropriate resources: Identify barriers to discharge with patient and caregiver     Problem: Chronic Conditions and Co-morbidities  Goal: Patient's chronic conditions and co-morbidity symptoms are monitored and maintained or improved  Outcome: Progressing  Flowsheets (Taken 2/17/2025 0131)  Care Plan - Patient's Chronic Conditions and Co-Morbidity Symptoms are Monitored and Maintained or Improved: Monitor and assess patient's chronic conditions and comorbid symptoms for stability, deterioration, or improvement     Problem: Pain  Goal: Verbalizes/displays adequate comfort level or baseline comfort level  Outcome: Progressing     Problem: Safety - Adult  Goal: Free from fall injury  Outcome: Progressing

## 2025-02-17 NOTE — PROGRESS NOTES
GENERAL SURGERY  DAILY PROGRESS NOTE  2/17/2025  Cc:   Chief Complaint   Patient presents with    Shortness of Breath     sob       SUBJECTIVE:  No new complaints or overnight events. NPO for MRCP    OBJECTIVE:  BP (!) 155/62   Pulse 91   Temp 99.3 °F (37.4 °C)   Resp 18   Ht 1.854 m (6' 1\")   Wt (!) 138.7 kg (305 lb 11.2 oz)   SpO2 95%   BMI 40.33 kg/m²   I/O last 3 completed shifts:  In: -   Out: 2700 [Urine:2700]    GENERAL: No acute distress.  HEAD: NCAT.   EYES: Anicteric. Round symmetric pupils.  CV: RR.  LUNGS/CHEST: No increased work of breathing on RA.  ABDOMEN: Soft, no tenderness, non distended.    LABS:  CBC  Recent Labs     02/15/25  2202   WBC 5.9   RBC 4.03   HGB 12.4*   HCT 36.6*   MCV 90.8   MCH 30.8   MCHC 33.9   RDW 14.1      MPV 10.1     BMP  Recent Labs     02/15/25  0200 02/15/25  2202 02/16/25  0815    134 129*   K 3.6 3.6 3.7   CL 99 96* 91*   CO2 26 23 24   BUN 23 24* 25*   CREATININE 0.9 0.9 0.9   GLUCOSE 284* 269* 314*   CALCIUM 8.6 8.7 8.4*     Liver Function  Recent Labs     02/15/25  0200 02/16/25  0815 02/16/25  1749   ALKPHOS 101 208*  --    * 309*  --    * 342*  --    BILITOT 1.6* 3.5* 3.7*   BILIDIR  --   --  3.2*     No results for input(s): \"LIPASE\" in the last 72 hours.  No results for input(s): \"LACTATE\" in the last 72 hours.  No results for input(s): \"INR\" in the last 72 hours.    Invalid input(s): \"PT\", \"PTT\"    RADIOLOGY:  I have personally reviewed all relevant imaging:      ASSESSMENT/PLAN:  74 y.o. male with transaminitis and direct hyperbilirubinemia with klebsiella bacteremia     Direct hyperbilirubinemia, 3.2 yesterday  Am LFTs pending  NPO for MRCP today  +/- HIDA pending results  Percutaneous cholecystostomy tube if indicated pending results, not a good surgical candidate at this time      Yohan Amanda DO  General Surgery Resident, PGY-1    Electronically signed by Yohan Amanda DO on 2/17/25 at 7:21 AM EST    ATTENDING

## 2025-02-17 NOTE — CONSULTS
Consult received  Charts/labs reviewed  Extensive review of medical record  See orders; Will plan for ERCP tomorrow with Dr. Ansari pending endo availability. Orders placed.   Full consult to follow        Discussed with Dr. Reji Davidson, RAÚL - CNP 2/17/2025 3:04 PM for Dr. Ansari

## 2025-02-18 ENCOUNTER — ANESTHESIA (OUTPATIENT)
Dept: OPERATING ROOM | Age: 75
End: 2025-02-18
Payer: MEDICARE

## 2025-02-18 ENCOUNTER — APPOINTMENT (OUTPATIENT)
Dept: GENERAL RADIOLOGY | Age: 75
DRG: 871 | End: 2025-02-18
Payer: MEDICARE

## 2025-02-18 ENCOUNTER — ANESTHESIA EVENT (OUTPATIENT)
Dept: OPERATING ROOM | Age: 75
End: 2025-02-18
Payer: MEDICARE

## 2025-02-18 PROBLEM — R41.82 ALTERED MENTAL STATUS: Status: ACTIVE | Noted: 2025-02-18

## 2025-02-18 LAB
ALBUMIN SERPL-MCNC: 2.9 G/DL (ref 3.5–5.2)
ALP SERPL-CCNC: 285 U/L (ref 40–129)
ALT SERPL-CCNC: 295 U/L (ref 0–40)
ANION GAP SERPL CALCULATED.3IONS-SCNC: 13 MMOL/L (ref 7–16)
AST SERPL-CCNC: 351 U/L (ref 0–39)
B.E.: 0.9 MMOL/L (ref -3–3)
B.E.: 2 MMOL/L (ref -3–3)
BASOPHILS # BLD: 0.02 K/UL (ref 0–0.2)
BASOPHILS NFR BLD: 0 % (ref 0–2)
BILIRUB DIRECT SERPL-MCNC: 3.5 MG/DL (ref 0–0.3)
BILIRUB INDIRECT SERPL-MCNC: 1 MG/DL (ref 0–1)
BILIRUB SERPL-MCNC: 4.5 MG/DL (ref 0–1.2)
BUN SERPL-MCNC: 47 MG/DL (ref 6–23)
CALCIUM SERPL-MCNC: 8.8 MG/DL (ref 8.6–10.2)
CHLORIDE SERPL-SCNC: 97 MMOL/L (ref 98–107)
CO2 SERPL-SCNC: 25 MMOL/L (ref 22–29)
COHB: 0.5 % (ref 0–1.5)
COHB: 0.7 % (ref 0–1.5)
CREAT SERPL-MCNC: 1.2 MG/DL (ref 0.7–1.2)
CRITICAL: ABNORMAL
CRITICAL: ABNORMAL
DATE ANALYZED: ABNORMAL
DATE ANALYZED: ABNORMAL
DATE OF COLLECTION: ABNORMAL
DATE OF COLLECTION: ABNORMAL
EOSINOPHIL # BLD: 0.01 K/UL (ref 0.05–0.5)
EOSINOPHILS RELATIVE PERCENT: 0 % (ref 0–6)
ERYTHROCYTE [DISTWIDTH] IN BLOOD BY AUTOMATED COUNT: 14.8 % (ref 11.5–15)
FIO2: 50 %
GFR, ESTIMATED: 65 ML/MIN/1.73M2
GLUCOSE BLD-MCNC: 249 MG/DL (ref 74–99)
GLUCOSE BLD-MCNC: 256 MG/DL (ref 74–99)
GLUCOSE BLD-MCNC: 263 MG/DL (ref 74–99)
GLUCOSE BLD-MCNC: 267 MG/DL (ref 74–99)
GLUCOSE BLD-MCNC: 283 MG/DL (ref 74–99)
GLUCOSE SERPL-MCNC: 262 MG/DL (ref 74–99)
HCO3: 26.3 MMOL/L (ref 22–26)
HCO3: 27.3 MMOL/L (ref 22–26)
HCT VFR BLD AUTO: 37.4 % (ref 37–54)
HGB BLD-MCNC: 12.2 G/DL (ref 12.5–16.5)
HHB: 1.1 % (ref 0–5)
HHB: 4.9 % (ref 0–5)
IMM GRANULOCYTES # BLD AUTO: 0.07 K/UL (ref 0–0.58)
IMM GRANULOCYTES NFR BLD: 1 % (ref 0–5)
LAB: ABNORMAL
LAB: ABNORMAL
LIPASE SERPL-CCNC: 293 U/L (ref 13–60)
LYMPHOCYTES NFR BLD: 0.68 K/UL (ref 1.5–4)
LYMPHOCYTES RELATIVE PERCENT: 7 % (ref 20–42)
Lab: 1509
Lab: 1840
MCH RBC QN AUTO: 30.7 PG (ref 26–35)
MCHC RBC AUTO-ENTMCNC: 32.6 G/DL (ref 32–34.5)
MCV RBC AUTO: 94 FL (ref 80–99.9)
METHB: 0.3 % (ref 0–1.5)
METHB: 0.3 % (ref 0–1.5)
MODE: ABNORMAL
MODE: AC
MONOCYTES NFR BLD: 1.15 K/UL (ref 0.1–0.95)
MONOCYTES NFR BLD: 12 % (ref 2–12)
NEUTROPHILS NFR BLD: 80 % (ref 43–80)
NEUTS SEG NFR BLD: 7.63 K/UL (ref 1.8–7.3)
O2 CONTENT: 17.2 ML/DL
O2 CONTENT: 17.8 ML/DL
O2 SATURATION: 95.1 % (ref 92–98.5)
O2 SATURATION: 98.9 % (ref 92–98.5)
O2HB: 94.1 % (ref 94–97)
O2HB: 98.1 % (ref 94–97)
OPERATOR ID: 1661
OPERATOR ID: 46
PATIENT TEMP: 37 C
PATIENT TEMP: 37 C
PCO2: 39.8 MMHG (ref 35–45)
PCO2: 51 MMHG (ref 35–45)
PEEP/CPAP: 8 CMH2O
PFO2: 1.61 MMHG/%
PH BLOOD GAS: 7.35 (ref 7.35–7.45)
PH BLOOD GAS: 7.44 (ref 7.35–7.45)
PLATELET # BLD AUTO: 209 K/UL (ref 130–450)
PMV BLD AUTO: 10.5 FL (ref 7–12)
PO2: 139.3 MMHG (ref 75–100)
PO2: 80.7 MMHG (ref 75–100)
POTASSIUM SERPL-SCNC: 4.2 MMOL/L (ref 3.5–5)
PROT SERPL-MCNC: 7.1 G/DL (ref 6.4–8.3)
RBC # BLD AUTO: 3.98 M/UL (ref 3.8–5.8)
RI(T): 2.71
RR MECHANICAL: 16 B/MIN
SODIUM SERPL-SCNC: 135 MMOL/L (ref 132–146)
SOURCE, BLOOD GAS: ABNORMAL
SOURCE, BLOOD GAS: ABNORMAL
THB: 12.3 G/DL (ref 11.5–16.5)
THB: 13.4 G/DL (ref 11.5–16.5)
TIME ANALYZED: 1515
TIME ANALYZED: 1846
VT MECHANICAL: 500 ML
WBC OTHER # BLD: 9.6 K/UL (ref 4.5–11.5)

## 2025-02-18 PROCEDURE — 2500000003 HC RX 250 WO HCPCS: Performed by: STUDENT IN AN ORGANIZED HEALTH CARE EDUCATION/TRAINING PROGRAM

## 2025-02-18 PROCEDURE — C1769 GUIDE WIRE: HCPCS | Performed by: INTERNAL MEDICINE

## 2025-02-18 PROCEDURE — 2700000000 HC OXYGEN THERAPY PER DAY

## 2025-02-18 PROCEDURE — 82962 GLUCOSE BLOOD TEST: CPT

## 2025-02-18 PROCEDURE — 6370000000 HC RX 637 (ALT 250 FOR IP): Performed by: FAMILY MEDICINE

## 2025-02-18 PROCEDURE — 6370000000 HC RX 637 (ALT 250 FOR IP): Performed by: STUDENT IN AN ORGANIZED HEALTH CARE EDUCATION/TRAINING PROGRAM

## 2025-02-18 PROCEDURE — 3600007503: Performed by: INTERNAL MEDICINE

## 2025-02-18 PROCEDURE — C2617 STENT, NON-COR, TEM W/O DEL: HCPCS | Performed by: INTERNAL MEDICINE

## 2025-02-18 PROCEDURE — 2500000003 HC RX 250 WO HCPCS: Performed by: NURSE ANESTHETIST, CERTIFIED REGISTERED

## 2025-02-18 PROCEDURE — 93005 ELECTROCARDIOGRAM TRACING: CPT | Performed by: ANESTHESIOLOGY

## 2025-02-18 PROCEDURE — 74018 RADEX ABDOMEN 1 VIEW: CPT

## 2025-02-18 PROCEDURE — 3600007513: Performed by: INTERNAL MEDICINE

## 2025-02-18 PROCEDURE — 6360000002 HC RX W HCPCS: Performed by: NURSE ANESTHETIST, CERTIFIED REGISTERED

## 2025-02-18 PROCEDURE — 0F798DZ DILATION OF COMMON BILE DUCT WITH INTRALUMINAL DEVICE, VIA NATURAL OR ARTIFICIAL OPENING ENDOSCOPIC: ICD-10-PCS | Performed by: INTERNAL MEDICINE

## 2025-02-18 PROCEDURE — 2500000003 HC RX 250 WO HCPCS: Performed by: FAMILY MEDICINE

## 2025-02-18 PROCEDURE — 6360000002 HC RX W HCPCS: Performed by: FAMILY MEDICINE

## 2025-02-18 PROCEDURE — 85025 COMPLETE CBC W/AUTO DIFF WBC: CPT

## 2025-02-18 PROCEDURE — 2580000003 HC RX 258: Performed by: STUDENT IN AN ORGANIZED HEALTH CARE EDUCATION/TRAINING PROGRAM

## 2025-02-18 PROCEDURE — 2720000010 HC SURG SUPPLY STERILE: Performed by: INTERNAL MEDICINE

## 2025-02-18 PROCEDURE — 99291 CRITICAL CARE FIRST HOUR: CPT | Performed by: STUDENT IN AN ORGANIZED HEALTH CARE EDUCATION/TRAINING PROGRAM

## 2025-02-18 PROCEDURE — 83690 ASSAY OF LIPASE: CPT

## 2025-02-18 PROCEDURE — 82248 BILIRUBIN DIRECT: CPT

## 2025-02-18 PROCEDURE — 2709999900 HC NON-CHARGEABLE SUPPLY: Performed by: INTERNAL MEDICINE

## 2025-02-18 PROCEDURE — 0FC98ZZ EXTIRPATION OF MATTER FROM COMMON BILE DUCT, VIA NATURAL OR ARTIFICIAL OPENING ENDOSCOPIC: ICD-10-PCS | Performed by: INTERNAL MEDICINE

## 2025-02-18 PROCEDURE — 2000000000 HC ICU R&B

## 2025-02-18 PROCEDURE — 87081 CULTURE SCREEN ONLY: CPT

## 2025-02-18 PROCEDURE — 80053 COMPREHEN METABOLIC PANEL: CPT

## 2025-02-18 PROCEDURE — 6370000000 HC RX 637 (ALT 250 FOR IP): Performed by: NURSE PRACTITIONER

## 2025-02-18 PROCEDURE — 3700000001 HC ADD 15 MINUTES (ANESTHESIA): Performed by: INTERNAL MEDICINE

## 2025-02-18 PROCEDURE — 2580000003 HC RX 258: Performed by: NURSE PRACTITIONER

## 2025-02-18 PROCEDURE — 94002 VENT MGMT INPAT INIT DAY: CPT

## 2025-02-18 PROCEDURE — 3700000000 HC ANESTHESIA ATTENDED CARE: Performed by: INTERNAL MEDICINE

## 2025-02-18 PROCEDURE — 6370000000 HC RX 637 (ALT 250 FOR IP): Performed by: REGISTERED NURSE

## 2025-02-18 PROCEDURE — 82805 BLOOD GASES W/O2 SATURATION: CPT

## 2025-02-18 PROCEDURE — 2580000003 HC RX 258: Performed by: NURSE ANESTHETIST, CERTIFIED REGISTERED

## 2025-02-18 PROCEDURE — 94640 AIRWAY INHALATION TREATMENT: CPT

## 2025-02-18 PROCEDURE — 71045 X-RAY EXAM CHEST 1 VIEW: CPT

## 2025-02-18 DEVICE — BILIARY STENT
Type: IMPLANTABLE DEVICE | Status: FUNCTIONAL
Brand: ADVANIX™ BILIARY

## 2025-02-18 RX ORDER — FENTANYL CITRATE-0.9 % NACL/PF 10 MCG/ML
25-200 PLASTIC BAG, INJECTION (ML) INTRAVENOUS CONTINUOUS
Status: DISCONTINUED | OUTPATIENT
Start: 2025-02-18 | End: 2025-02-19

## 2025-02-18 RX ORDER — ROCURONIUM BROMIDE 10 MG/ML
INJECTION, SOLUTION INTRAVENOUS
Status: DISCONTINUED | OUTPATIENT
Start: 2025-02-18 | End: 2025-02-18 | Stop reason: SDUPTHER

## 2025-02-18 RX ORDER — INSULIN LISPRO 100 [IU]/ML
0-4 INJECTION, SOLUTION INTRAVENOUS; SUBCUTANEOUS EVERY 6 HOURS
Status: DISCONTINUED | OUTPATIENT
Start: 2025-02-19 | End: 2025-02-19

## 2025-02-18 RX ORDER — ONDANSETRON 2 MG/ML
INJECTION INTRAMUSCULAR; INTRAVENOUS
Status: DISCONTINUED | OUTPATIENT
Start: 2025-02-18 | End: 2025-02-18 | Stop reason: SDUPTHER

## 2025-02-18 RX ORDER — CHLORHEXIDINE GLUCONATE ORAL RINSE 1.2 MG/ML
15 SOLUTION DENTAL 2 TIMES DAILY
Status: DISCONTINUED | OUTPATIENT
Start: 2025-02-18 | End: 2025-02-19

## 2025-02-18 RX ORDER — DEXAMETHASONE SODIUM PHOSPHATE 4 MG/ML
INJECTION, SOLUTION INTRA-ARTICULAR; INTRALESIONAL; INTRAMUSCULAR; INTRAVENOUS; SOFT TISSUE
Status: DISCONTINUED | OUTPATIENT
Start: 2025-02-18 | End: 2025-02-18 | Stop reason: SDUPTHER

## 2025-02-18 RX ORDER — INSULIN LISPRO 100 [IU]/ML
0-8 INJECTION, SOLUTION INTRAVENOUS; SUBCUTANEOUS EVERY 6 HOURS
Status: DISCONTINUED | OUTPATIENT
Start: 2025-02-18 | End: 2025-02-19

## 2025-02-18 RX ORDER — SODIUM CHLORIDE 9 MG/ML
INJECTION, SOLUTION INTRAVENOUS
Status: DISCONTINUED | OUTPATIENT
Start: 2025-02-18 | End: 2025-02-18 | Stop reason: SDUPTHER

## 2025-02-18 RX ORDER — PROPOFOL 10 MG/ML
INJECTION, EMULSION INTRAVENOUS
Status: DISCONTINUED | OUTPATIENT
Start: 2025-02-18 | End: 2025-02-18 | Stop reason: SDUPTHER

## 2025-02-18 RX ADMIN — BUDESONIDE 250 MCG: 0.25 SUSPENSION RESPIRATORY (INHALATION) at 08:49

## 2025-02-18 RX ADMIN — SODIUM CHLORIDE, PRESERVATIVE FREE 10 ML: 5 INJECTION INTRAVENOUS at 08:34

## 2025-02-18 RX ADMIN — TAMSULOSIN HYDROCHLORIDE 0.8 MG: 0.4 CAPSULE ORAL at 08:35

## 2025-02-18 RX ADMIN — SODIUM CHLORIDE, PRESERVATIVE FREE 10 ML: 5 INJECTION INTRAVENOUS at 20:45

## 2025-02-18 RX ADMIN — HYDROCHLOROTHIAZIDE 25 MG: 25 TABLET ORAL at 08:33

## 2025-02-18 RX ADMIN — Medication 100 MG: at 12:41

## 2025-02-18 RX ADMIN — SODIUM CHLORIDE, POTASSIUM CHLORIDE, SODIUM LACTATE AND CALCIUM CHLORIDE: 600; 310; 30; 20 INJECTION, SOLUTION INTRAVENOUS at 20:55

## 2025-02-18 RX ADMIN — ARFORMOTEROL TARTRATE 15 MCG: 15 SOLUTION RESPIRATORY (INHALATION) at 19:40

## 2025-02-18 RX ADMIN — ONDANSETRON 4 MG: 2 INJECTION INTRAMUSCULAR; INTRAVENOUS at 16:21

## 2025-02-18 RX ADMIN — CETIRIZINE HYDROCHLORIDE 10 MG: 10 TABLET, FILM COATED ORAL at 08:33

## 2025-02-18 RX ADMIN — GABAPENTIN 600 MG: 300 CAPSULE ORAL at 08:33

## 2025-02-18 RX ADMIN — INSULIN LISPRO 1 UNITS: 100 INJECTION, SOLUTION INTRAVENOUS; SUBCUTANEOUS at 11:38

## 2025-02-18 RX ADMIN — PANTOPRAZOLE SODIUM 40 MG: 40 TABLET, DELAYED RELEASE ORAL at 08:34

## 2025-02-18 RX ADMIN — DIVALPROEX SODIUM 250 MG: 250 TABLET, DELAYED RELEASE ORAL at 08:33

## 2025-02-18 RX ADMIN — ROCURONIUM BROMIDE 50 MG: 10 INJECTION, SOLUTION INTRAVENOUS at 16:14

## 2025-02-18 RX ADMIN — DEXAMETHASONE SODIUM PHOSPHATE 10 MG: 4 INJECTION, SOLUTION INTRAMUSCULAR; INTRAVENOUS at 16:21

## 2025-02-18 RX ADMIN — PROPOFOL 150 MG: 10 INJECTION, EMULSION INTRAVENOUS at 16:14

## 2025-02-18 RX ADMIN — INSULIN GLARGINE 60 UNITS: 100 INJECTION, SOLUTION SUBCUTANEOUS at 08:33

## 2025-02-18 RX ADMIN — SODIUM CHLORIDE: 9 INJECTION, SOLUTION INTRAVENOUS at 16:06

## 2025-02-18 RX ADMIN — ARFORMOTEROL TARTRATE 15 MCG: 15 SOLUTION RESPIRATORY (INHALATION) at 08:49

## 2025-02-18 RX ADMIN — MEMANTINE HYDROCHLORIDE 5 MG: 5 TABLET, FILM COATED ORAL at 08:34

## 2025-02-18 RX ADMIN — GABAPENTIN 600 MG: 300 CAPSULE ORAL at 20:45

## 2025-02-18 RX ADMIN — SUGAMMADEX 300 MG: 100 INJECTION, SOLUTION INTRAVENOUS at 16:52

## 2025-02-18 RX ADMIN — INSULIN GLARGINE 60 UNITS: 100 INJECTION, SOLUTION SUBCUTANEOUS at 20:51

## 2025-02-18 RX ADMIN — ATORVASTATIN CALCIUM 40 MG: 40 TABLET, FILM COATED ORAL at 20:45

## 2025-02-18 RX ADMIN — Medication 50 MCG/HR: at 17:15

## 2025-02-18 RX ADMIN — LOSARTAN POTASSIUM 100 MG: 50 TABLET, FILM COATED ORAL at 08:34

## 2025-02-18 RX ADMIN — SODIUM CHLORIDE: 9 INJECTION, SOLUTION INTRAVENOUS at 11:40

## 2025-02-18 RX ADMIN — FENOFIBRATE 54 MG: 54 TABLET ORAL at 08:33

## 2025-02-18 RX ADMIN — IPRATROPIUM BROMIDE AND ALBUTEROL SULFATE 1 DOSE: .5; 2.5 SOLUTION RESPIRATORY (INHALATION) at 19:40

## 2025-02-18 RX ADMIN — METOPROLOL TARTRATE 100 MG: 50 TABLET, FILM COATED ORAL at 20:45

## 2025-02-18 RX ADMIN — IPRATROPIUM BROMIDE AND ALBUTEROL SULFATE 1 DOSE: .5; 2.5 SOLUTION RESPIRATORY (INHALATION) at 08:49

## 2025-02-18 RX ADMIN — CHLORHEXIDINE GLUCONATE 15 ML: 1.2 RINSE ORAL at 20:45

## 2025-02-18 RX ADMIN — SODIUM CHLORIDE, POTASSIUM CHLORIDE, SODIUM LACTATE AND CALCIUM CHLORIDE 800 ML: 600; 310; 30; 20 INJECTION, SOLUTION INTRAVENOUS at 12:43

## 2025-02-18 RX ADMIN — DULOXETINE 60 MG: 60 CAPSULE, DELAYED RELEASE ORAL at 08:33

## 2025-02-18 RX ADMIN — LEVOFLOXACIN 500 MG: 500 TABLET, FILM COATED ORAL at 08:34

## 2025-02-18 RX ADMIN — METOPROLOL TARTRATE 100 MG: 50 TABLET, FILM COATED ORAL at 08:34

## 2025-02-18 RX ADMIN — BUDESONIDE 250 MCG: 0.25 SUSPENSION RESPIRATORY (INHALATION) at 19:40

## 2025-02-18 RX ADMIN — MEMANTINE HYDROCHLORIDE 5 MG: 5 TABLET, FILM COATED ORAL at 20:51

## 2025-02-18 ASSESSMENT — PULMONARY FUNCTION TESTS
PIF_VALUE: 29
PIF_VALUE: 28
PIF_VALUE: 26
PIF_VALUE: 31
PIF_VALUE: 28
PIF_VALUE: 27
PIF_VALUE: 30
PIF_VALUE: 28
PIF_VALUE: 29

## 2025-02-18 ASSESSMENT — PAIN SCALES - GENERAL
PAINLEVEL_OUTOF10: 0

## 2025-02-18 ASSESSMENT — LIFESTYLE VARIABLES: SMOKING_STATUS: 0

## 2025-02-18 NOTE — FLOWSHEET NOTE
Patient admitted from Endo to 203, with the following belongings; glasses, pants, shirt, socks, placed on monitor, patient oriented to room and unit visiting hours.  Patient guide at bedside, reviewed patient rights and responsibilities. MRSA nasal swab obtained.  Bed alarm on.  Call light within reach.

## 2025-02-18 NOTE — CARE COORDINATION
2/18/2025  Social Work Discharge Planning:ERCP.  Pt is from the [facility] where Pt is wc bound. Pt continues on 6l o2 here and uses none at the facility. Wean as tolerated Monitoring labs. Awaiting therapy evals. Transport form is in chart. Electronically signed by ELFEGO Arroyo on 2/18/2025 at 10:07 AM

## 2025-02-18 NOTE — FLOWSHEET NOTE
4 Eyes Skin Assessment     NAME:  Stewart Mark  YOB: 1950  MEDICAL RECORD NUMBER:  [id number]    The patient is being assessed for  Admission    I agree that at least one RN has performed a thorough Head to Toe Skin Assessment on the patient. ALL assessment sites listed below have been assessed.      Areas assessed by both nurses:    Head, Face, Ears, Shoulders, Back, Chest, Arms, Elbows, Hands, Sacrum. Buttock, Coccyx, Ischium, and Legs. Feet and Heels        Does the Patient have a Wound? No noted wound(s)       Bienvenido Prevention initiated by RN: Yes  Wound Care Orders initiated by RN: No    Pressure Injury (Stage 3,4, Unstageable, DTI, NWPT, and Complex wounds) if present, place Wound referral order by RN under : No    New Ostomies, if present place, Ostomy referral order under : No     Nurse 1 eSignature: Electronically signed by Francesca Ding RN on 2/18/25 at 6:53 PM EST    **SHARE this note so that the co-signing nurse can place an eSignature**    Nurse 2 eSignature: Electronically signed by Estela Wallis RN on 2/18/25 at 6:53 PM EST

## 2025-02-18 NOTE — PLAN OF CARE
Problem: ABCDS Injury Assessment  Goal: Absence of physical injury  2/18/2025 1124 by Laura Ayala RN  Outcome: Progressing  2/17/2025 2330 by Selam Mendez RN  Outcome: Progressing     Problem: Skin/Tissue Integrity  Goal: Skin integrity remains intact  Description: 1.  Monitor for areas of redness and/or skin breakdown  2.  Assess vascular access sites hourly  3.  Every 4-6 hours minimum:  Change oxygen saturation probe site  4.  Every 4-6 hours:  If on nasal continuous positive airway pressure, respiratory therapy assess nares and determine need for appliance change or resting period  2/18/2025 1124 by Laura Ayala RN  Outcome: Progressing  2/17/2025 2330 by Selam Mendez RN  Outcome: Progressing     Problem: Discharge Planning  Goal: Discharge to home or other facility with appropriate resources  2/18/2025 1124 by Laura Ayala RN  Outcome: Progressing  2/17/2025 2330 by Selam Mendez RN  Outcome: Progressing     Problem: Chronic Conditions and Co-morbidities  Goal: Patient's chronic conditions and co-morbidity symptoms are monitored and maintained or improved  2/18/2025 1124 by Laura Ayala RN  Outcome: Progressing  2/17/2025 2330 by Selam Mendez RN  Outcome: Progressing     Problem: Pain  Goal: Verbalizes/displays adequate comfort level or baseline comfort level  2/17/2025 2330 by Selam Mendez RN  Outcome: Progressing     Problem: Safety - Adult  Goal: Free from fall injury  2/17/2025 2330 by Selam Mendez RN  Outcome: Progressing

## 2025-02-18 NOTE — PROCEDURES
PROCEDURE PERFORMED:  ERCP with any interventions as indicated     PREOPERATIVE DIAGNOSES:  Elevated liver chemistries, dilated common bile duct, Klebsiella septicemia      POSTOPERATIVE DIAGNOSES:  choledocholithiasis, cholangitis     ANESTHESIA:  General     DESCRIPTION OF PROCEDURE:  With the patient in supine position, the Olympus side-viewing video duodenoscope was introduced into the esophagus, advanced through the GE junction into the gastric body, advanced through the pylorus into duodenal bulb, second portion of duodenum, the ampulla was visualized. The sphinctertome over guide wire was utilized to cannulate the common bile duct. A brief cholangiogram was performed to confirm placement. Copious amounts of sludge and pustulous material was evacuated from the common bile duct with just wire cannulation. The CBD was dilated to ~12mm. A sphincterotomy was then performed and a  9-12mm extraction balloon utilized to remove stones and purulent material. Multiple sweeps of first the right and then the left common hepatic duct were performed. An occlusion cholangiogram was then performed after removal of stones and there did not appear to be any residual stones sludge or debris, I was unable to fill the gallbladder or note a flush and it did however appear to have an anterior spiral infront of the common bile duct. Good flow of bile and contrast was noted post balloon sweeps given cholangitis, decision was made to place a 10F x 7cm DPTS. Pictures were taken.  The scope was retrieved, area decompressed, and the scope was withdrawn  The patient tolerated the procedure well.    Blood loss was minimal and self limited      Assessment:  1) Choledocholithiasis and cholangitis - s/p sphincterotomy with balloon sweeps and placement of 10F x 7cm DPTS  2) Dilated common bile duct ~10mm     Plan:  1) Pt is to remain intubated and transfer to the ICU. Once awake and passes bedside swallow, ok for CLD  2) OK to resume

## 2025-02-18 NOTE — ANESTHESIA PRE PROCEDURE
Department of Anesthesiology  Preprocedure Note       Name:  Stewart Mark   Age:  74 y.o.  :  1950                                          MRN:  [id number]         Date:  2025      Surgeon: Surgeon(s):  Reji Ansari DO    Procedure: Procedure(s):  ENDOSCOPIC RETROGRADE CHOLANGIOPANCREATOGRAPHY    Medications prior to admission:   Prior to Admission medications    Medication Sig Start Date End Date Taking? Authorizing Provider   divalproex (DEPAKOTE ER) 250 MG extended release tablet Take 1 tablet by mouth 2 times daily   Yes Provider, MD Caryn   aspirin 81 MG EC tablet Take 1 tablet by mouth nightly 24  Yes Marina Son APRN - CNP   atorvastatin (LIPITOR) 40 MG tablet Take 1 tablet by mouth nightly 24  Yes Marina Son APRN - CNP   budesonide-formoterol (SYMBICORT) 160-4.5 MCG/ACT AERO Inhale 2 puffs into the lungs 2 times daily as needed (SOB) 24  Yes Marina Son APRN - CNP   vitamin D (VITAMIN D3) 50 MCG (2000) CAPS capsule Take 1 capsule by mouth 2 times daily  Patient taking differently: Take 1 capsule by mouth daily 24  Yes Marina Son APRN - CNP   clopidogrel (PLAVIX) 75 MG tablet Take 1 tablet by mouth every morning 24  Yes Marina Son APRN - CNP   Coenzyme Q10 (CO Q-10) 200 MG CAPS Take 1 capsule by mouth every morning 24  Yes Marina Son APRN - CNP   DULoxetine (CYMBALTA) 60 MG extended release capsule Take 1 capsule by mouth every morning 24  Yes Marina Son APRN - CNP   fenofibrate (TRICOR) 54 MG tablet Take 1 tablet by mouth every morning 24  Yes Marina Son APRN - CNP   gabapentin (NEURONTIN) 600 MG tablet Take 1 tablet by mouth 4 times daily for 120 days. 11/12/24 3/12/25 Yes Mraina Son APRN - CNP   hydroCHLOROthiazide (HYDRODIURIL) 25 MG tablet TAKE ONE(1) TABLET BY MOUTH ONCE DAILY 24  Yes Learn, Marina Annemarie, APRN - CNP   insulin glargine

## 2025-02-18 NOTE — PROGRESS NOTES
[facility]  [facility]  Progress Note    SUBJECTIVE:  Chief Complaint   Patient presents with    Shortness of Breath     sob     Patient is tolerating medications. No reported adverse drug reactions.  In bed.  Getting washed up.  Seems to be a little more awake today  No fever    Review of systems:  As stated above in the chief complaint, otherwise negative.    Medications:  Scheduled Meds:   lactated ringers  800 mL IntraVENous Once    levoFLOXacin  500 mg Oral Daily    insulin lispro  0-4 Units SubCUTAneous 4x Daily AC & HS    ipratropium 0.5 mg-albuterol 2.5 mg  1 Dose Inhalation BID RT    [Held by provider] aspirin  81 mg Oral Nightly    atorvastatin  40 mg Oral Nightly    [Held by provider] clopidogrel  75 mg Oral QAM    divalproex  250 mg Oral Lunch    divalproex  250 mg Oral BID    DULoxetine  60 mg Oral QAM    fenofibrate  54 mg Oral Daily    gabapentin  600 mg Oral 4x Daily    hydroCHLOROthiazide  25 mg Oral Daily    insulin glargine  60 Units SubCUTAneous BID    cetirizine  10 mg Oral Daily    losartan  100 mg Oral QAM    memantine  5 mg Oral BID    metFORMIN  1,000 mg Oral BID WC    metoprolol  100 mg Oral BID    pantoprazole  40 mg Oral QAM    tamsulosin  0.8 mg Oral Daily    sodium chloride flush  5-40 mL IntraVENous 2 times per day    [Held by provider] enoxaparin  30 mg SubCUTAneous BID    budesonide  0.25 mg Nebulization BID RT    And    arformoterol tartrate  15 mcg Nebulization BID RT     Continuous Infusions:   lactated ringers 75 mL/hr at 02/17/25 2132    dextrose      sodium chloride 75 mL/hr at 02/18/25 1140    sodium chloride       PRN Meds:prochlorperazine, metoprolol, hydrALAZINE, glucose, dextrose bolus **OR** dextrose bolus, glucagon (rDNA), dextrose, albuterol, bisacodyl, fluticasone, melatonin, magnesium hydroxide, sodium chloride flush, sodium chloride, acetaminophen **OR** acetaminophen    OBJECTIVE:  BP (!) 166/83   Pulse (!) 105   Temp 98.9 °F (37.2

## 2025-02-18 NOTE — CONSULTS
Critical Care Admit/Consult Note         Patient - Stewart Mark   MRN -  [id number]   [id number]   - 1950      Date of Admission -  2025  1:39 AM  Date of evaluation -  2025  OR POOL/NONE   Hospital Day - 0            ADMIT/CONSULT DETAILS     Reason for Admit/Consult   Post-operative care in the setting of decreased responsiveness    Consulting Service/Physician   Consulting - Liv Kaufman MD  Primary Care Physician - Liv Kaufman MD HPI   The patient is a 74 y.o. male with significant past medical history of sleep apnea, DM, CAD, stroke, asthma who presented initially for weakness and SOB on . He was A&O x3 on ED presentation, with his main findings being cholelithiasis, gallstones in the gallbladder neck, and gallbladder 4mm thickness without Lamar's sign. He was on 3L which is his baseline at his facility. Bili was elevated to 1.6 at the time, and ALT//157 respectively, procal 0.51. Blood cultures taken then grew klebsiella. He was put on rocephin+Doxy, which ID switched to levofloxacin on . Speech noted that he had generalized oral weakness and left labiobuccal weakness. On  he had a low greade fever 100.9. Surgery notes that he was confused , and bili went up to 3.5 from them too. On  his Bili increased again to 3.7, and GI was consulted for ERCP due to cholangitis concerns. MRCP was attempted, however patient did not fit in scanner. ERCP was planned . ID notes that patient seemed more awake  morning, as doo internal medicine notes. Floor nursing seems to suggest he was alert oriented and about his baseline. An afternoon RRT shows he was alert and awake to sternal rub, but was somnolent and fell asleep frequently. Anesthesia said pt not likely to be extubatable post-operatively so recommended ICU admission.     On my evaluation in PACU, patient is obtunded. Per PACU nursing he was unresponsive to sternal rub, ABGs, and only winces

## 2025-02-18 NOTE — PROGRESS NOTES
1445: Pt arrived to preop, pt unresponsive to verbal & tactile simuli. Dr Aguirre at bedside. Stat bgl -[id number]. Pt moved to pacu for monitoring.     1450: attempted to perfectserve/call Admitting physician, cannot get through to speak to physician, unable to leave VM. RRT called. Floor nurse came to pacu and stated pt has been the same status all day. Per surgeon ercp needs done stat and pt will need ICU post op. Stat abg's drawn and EKG done.  Dr Ansari updated and came to bedside.   1528:Dr Aguirre spoke to intensivist Dr Hays. Pt accepted to ICU. Bed Coordinator notified of ICU admission. Wife updated on pt condition and spoke with Dr Aguirre and Dr Ansari.  Phone consent given by wife.   1607: pt taken to OR 7

## 2025-02-18 NOTE — PROGRESS NOTES
Spiritual Health History and Assessment/Progress Note  [facility]    Crisis, Rapid Response,  ,      Name: Stewart Mark MRN: [id number]    Age: 74 y.o.     Sex: male   Language: English   Baptist: Jain   Altered mental state     Date: 2/18/2025                           Spiritual Assessment began in [facility] OR        Referral/Consult From: Multi-disciplinary team   Encounter Overview/Reason: Crisis  Service Provided For: Patient    Eugenie, Belief, Meaning:   Patient is connected with a eugenie tradition or spiritual practice  Family/Friends No family/friends present      Importance and Influence:  Patient unable to assess at this time  Family/Friends No family/friends present    Community:  Patient feels well-supported. Support system includes: Spouse/Partner  Family/Friends No family/friends present    Assessment and Plan of Care:     Patient Interventions include: Other: Responding to a code patient unable to participate or respond at this time, prayer offered for the patient and team addressing the patient' immediate needs.  Family/Friends Interventions include: No family/friends present    Patient Plan of Care: Spiritual Care available upon further referral  Family/Friends Plan of Care: No family/friends present    Electronically signed by Chaplain Mathieu on 2/18/2025 at 4:29 PM

## 2025-02-18 NOTE — SIGNIFICANT EVENT
RRT called for change in mentation, patient less responsive.  Per OR staff he was not waking to sternal rubbing.  Per bedside nurse he would wake up earlier but did not respond much and this was not far from his baseline.  Upon my evaluation he did wake up with vigorous sternal rub open his eyes and looked at me, he squeezed with both hands without focal deficit, but he did easily fall back asle and commands were followed inconsistently.  This does not appear to be stroke, this is likely progression of sepsis with septic encephalitis.  He was supposed to get an ERCP due to ascending cholangitis, we discussed this with anesthesia and GI, patient needs this done sooner rather than later.  Do agree with anesthesia that the patient is unlikely to be able to be extubated after the procedure and will likely need to go to ICU for further care.  Defer further management to anesthesia and GI team.    Please note that the withdrawal or failure to initiate urgent interventions for this patient would likely result in a life threatening deterioration or permanent disability.      Accordingly this patient received 30 minutes of critical care time, excluding separately billable procedures.    Electronically signed by Jaziel Coello MD on 2/18/2025 at 3:48 PM

## 2025-02-18 NOTE — PROGRESS NOTES
GENERAL SURGERY  DAILY PROGRESS NOTE  2/18/2025  Cc:   Chief Complaint   Patient presents with    Shortness of Breath     sob       SUBJECTIVE:  No new complaints or overnight events. NPO this morning for ERCP with GI    OBJECTIVE:  BP (!) 171/95   Pulse 98   Temp 98.8 °F (37.1 °C) (Axillary)   Resp 22   Ht 1.854 m (6' 1\")   Wt (!) 138.7 kg (305 lb 11.2 oz)   SpO2 93%   BMI 40.33 kg/m²   I/O last 3 completed shifts:  In: -   Out: 3500 [Urine:3500]    GENERAL: No acute distress.  HEAD: NCAT.   EYES: Anicteric. Round symmetric pupils.  CV: RR.  LUNGS/CHEST: No increased work of breathing on 4L.  ABDOMEN: Soft, no tenderness, non distended.    LABS:  CBC  Recent Labs     02/15/25  2202 02/17/25  0928   WBC 5.9 9.7   RBC 4.03 4.07   HGB 12.4* 12.3*   HCT 36.6* 37.7   MCV 90.8 92.6   MCH 30.8 30.2   MCHC 33.9 32.6   RDW 14.1 14.5    201   MPV 10.1 10.0     BMP  Recent Labs     02/15/25  2202 02/16/25  0815 02/17/25  1119    129* 134   K 3.6 3.7 3.9   CL 96* 91* 96*   CO2 23 24 26   BUN 24* 25* 36*   CREATININE 0.9 0.9 1.0   GLUCOSE 269* 314* 271*   CALCIUM 8.7 8.4* 8.6     Liver Function  Recent Labs     02/16/25  0815 02/16/25  1749 02/17/25  1119   ALKPHOS 208*  --  246*   *  --  316*   *  --  352*   BILITOT 3.5* 3.7* 4.2*  4.2*   BILIDIR  --  3.2* 3.0*     No results for input(s): \"LIPASE\" in the last 72 hours.  No results for input(s): \"LACTATE\" in the last 72 hours.  Recent Labs     02/17/25  1748   INR 1.4       RADIOLOGY:  I have personally reviewed all relevant imaging:      ASSESSMENT/PLAN:  74 y.o. male with transaminitis and direct hyperbilirubinemia with klebsiella bacteremia      - Direct hyperbilirubinemia, 3.0 yesterday   - awaiting AM labs   - plan for ERCP today with GI   - will evaluate need for HIDA pending clinical progression   - Percutaneous cholecystostomy tube if indicated pending results, not a good surgical candidate at this time   - discussed with

## 2025-02-18 NOTE — CONSULTS
[facility] Consult Note   MARLEY Stone with Reji Ansari D.O. Consult Note        Date of Service: 2/18/2025  Reason for Consult: cf cholangitis, direct hyper bili   Requesting Physician: Dr. Cr     CHIEF COMPLAINT:  SOB and weakness    History Obtained From:  patient, electronic medical record    HISTORY OF PRESENT ILLNESS:    Stewart Mark is a 74 y.o. male with significant past medical history of CVA, anxiety, CAD, depression, DM, HLD and sleep apnea presenting to ED for SOB and weakness and admitted with bacteremia.  Pt unable to provide HPI. Case discussed with nursing, extensive review of medical records obtained. Pt presents with AMS and SOB and admitted with klebsiella bacteremia. ID following unclear etiology upon admission. GS consulted rule out cholecystitis. Upon work up concerns for cholangitis therefore our service consulted. Pt seen, no apparent distress, unable to answer questions, no family currently at bedside. VSS on 4 L O2 NC.      Admission labs: , , , TB 2.0. Imaging: US GB- 1. Distended gallbladder with gallbladder neck gallstone. Sludge or pericholecystic fluid is also present. Gallbladder wall thickening up to 4 mm. Negative sonographic Lamar's sign. 2. Fatty infiltration of the liver.  CT abdomen pelvis W IV contrast- 1. Cholelithiasis but no evidence of cholecystitis. 2. Diverticulosis involving the descending and sigmoid colon with no evidence of diverticulitis. Labs today: waiting morning labs     Consultation for cf cholangitis, direct hyper bili.  Currently, pt in no apparent distress. Unable answer questions.      Past Medical History:        Diagnosis Date    Acute stroke due to ischemia (HCC) 7/26/2023    Anxiety     CAD in native artery 7/26/2023    Depression     Diabetes mellitus (HCC)     Hyperlipidemia     Hypertension     Moderate persistent asthma without complication 10/06/2015    Obesity

## 2025-02-18 NOTE — PROGRESS NOTES
Physical Therapy  Facility/Department: [facility] OR    Name: Stewart Mark  : 1950  MRN: [id number]  Date of Service: 2025    PT eval was attempted this afternoon and pt is out of the room and in the OR.  Will re-attempt PT eval another time.  Petr Barrera Jr., P.T.  License Number: [id number]

## 2025-02-18 NOTE — PROGRESS NOTES
[facility]   Progress note      Subjective:    Resting comfortably in bed  No family at bedside  A little more interactive today    Objective:    BP (!) 157/76   Pulse (!) 106   Temp 98 °F (36.7 °C)   Resp 24   Ht 1.854 m (6' 1\")   Wt (!) 138.7 kg (305 lb 11.2 oz)   SpO2 96%   BMI 40.33 kg/m²     In: -   Out: 1100   In: -   Out: 1100 [Urine:1100]    General appearance: NAD, conversant  HEENT: AT/NC, MMM  Neck: FROM, supple  Lungs: Clear to auscultation  CV: RRR, no MRGs  Vasc: Radial pulses 2+  Abdomen: Soft, non-tender; no masses or HSM  Extremities: No peripheral edema or digital cyanosis  Skin: no rash, lesions or ulcers  Psych: Alert and oriented to person, place and time  Neuro: Alert and interactive     Recent Labs     02/15/25  2202 02/17/25  0928 02/18/25  0806   WBC 5.9 9.7 9.6   HGB 12.4* 12.3* 12.2*   HCT 36.6* 37.7 37.4    201 209       Recent Labs     02/16/25  0815 02/17/25  1119 02/18/25  0806   * 134 135   K 3.7 3.9 4.2   CL 91* 96* 97*   CO2 24 26 25   BUN 25* 36* 47*   CREATININE 0.9 1.0 1.2   CALCIUM 8.4* 8.6 8.8       Assessment:    Principal Problem:    Altered mental state  Active Problems:    Cholangitis    Obstructive jaundice    Altered mental status  Resolved Problems:    * No resolved hospital problems. *      Plan:    74-year-old male with a history of a CVA and diabetes presents to the ED with complaints of fall at facility shortness of breath and weakness and is admitted to telemetry unit with     Altered mental status//Klebsiella bacteremia  -Supplement O2 demands keeping oxygen saturation greater than 92%.  Patient is currently utilizing 3 L O2 as his baseline is room air at facility  - infectious disease consultation for positive blood cultures with Klebsiella pneumonia-source unclear possible urinary tract versus abdominal  -DuoNebs   -Swallow study questionable aspiration pneumonia  -Strep/Legionella  Blood cultures pending     Diabetes

## 2025-02-19 ENCOUNTER — APPOINTMENT (OUTPATIENT)
Dept: CT IMAGING | Age: 75
DRG: 871 | End: 2025-02-19
Payer: MEDICARE

## 2025-02-19 ENCOUNTER — APPOINTMENT (OUTPATIENT)
Dept: GENERAL RADIOLOGY | Age: 75
DRG: 871 | End: 2025-02-19
Payer: MEDICARE

## 2025-02-19 LAB
ALBUMIN SERPL-MCNC: 2.5 G/DL (ref 3.5–5.2)
ALP SERPL-CCNC: 304 U/L (ref 40–129)
ALT SERPL-CCNC: 258 U/L (ref 0–40)
ANION GAP SERPL CALCULATED.3IONS-SCNC: 10 MMOL/L (ref 7–16)
AST SERPL-CCNC: 346 U/L (ref 0–39)
B.E.: 1.3 MMOL/L (ref -3–3)
BASOPHILS # BLD: 0.01 K/UL (ref 0–0.2)
BASOPHILS NFR BLD: 0 % (ref 0–2)
BILIRUB SERPL-MCNC: 3.9 MG/DL (ref 0–1.2)
BUN SERPL-MCNC: 57 MG/DL (ref 6–23)
CALCIUM SERPL-MCNC: 8.4 MG/DL (ref 8.6–10.2)
CHLORIDE SERPL-SCNC: 99 MMOL/L (ref 98–107)
CO2 SERPL-SCNC: 25 MMOL/L (ref 22–29)
COHB: 0.5 % (ref 0–1.5)
CREAT SERPL-MCNC: 1.1 MG/DL (ref 0.7–1.2)
CRITICAL: ABNORMAL
DATE ANALYZED: ABNORMAL
DATE OF COLLECTION: ABNORMAL
EOSINOPHIL # BLD: 0 K/UL (ref 0.05–0.5)
EOSINOPHILS RELATIVE PERCENT: 0 % (ref 0–6)
ERYTHROCYTE [DISTWIDTH] IN BLOOD BY AUTOMATED COUNT: 14.9 % (ref 11.5–15)
FIO2: 50 %
GFR, ESTIMATED: 74 ML/MIN/1.73M2
GLUCOSE BLD-MCNC: 247 MG/DL (ref 74–99)
GLUCOSE BLD-MCNC: 254 MG/DL (ref 74–99)
GLUCOSE BLD-MCNC: 286 MG/DL (ref 74–99)
GLUCOSE BLD-MCNC: 303 MG/DL (ref 74–99)
GLUCOSE BLD-MCNC: 323 MG/DL (ref 74–99)
GLUCOSE SERPL-MCNC: 305 MG/DL (ref 74–99)
HCO3: 26.2 MMOL/L (ref 22–26)
HCT VFR BLD AUTO: 33.3 % (ref 37–54)
HGB BLD-MCNC: 10.6 G/DL (ref 12.5–16.5)
HHB: 3.5 % (ref 0–5)
IMM GRANULOCYTES # BLD AUTO: 0.03 K/UL (ref 0–0.58)
IMM GRANULOCYTES NFR BLD: 0 % (ref 0–5)
INR PPP: 1.5
LAB: ABNORMAL
LIPASE SERPL-CCNC: 82 U/L (ref 13–60)
LYMPHOCYTES NFR BLD: 0.5 K/UL (ref 1.5–4)
LYMPHOCYTES RELATIVE PERCENT: 7 % (ref 20–42)
Lab: 450
MAGNESIUM SERPL-MCNC: 2.5 MG/DL (ref 1.6–2.6)
MCH RBC QN AUTO: 29.8 PG (ref 26–35)
MCHC RBC AUTO-ENTMCNC: 31.8 G/DL (ref 32–34.5)
MCV RBC AUTO: 93.5 FL (ref 80–99.9)
METHB: 0.3 % (ref 0–1.5)
MODE: AC
MONOCYTES NFR BLD: 0.56 K/UL (ref 0.1–0.95)
MONOCYTES NFR BLD: 7 % (ref 2–12)
NEUTROPHILS NFR BLD: 86 % (ref 43–80)
NEUTS SEG NFR BLD: 6.65 K/UL (ref 1.8–7.3)
O2 CONTENT: 16 ML/DL
O2 SATURATION: 96.5 % (ref 92–98.5)
O2HB: 95.7 % (ref 94–97)
OPERATOR ID: ABNORMAL
PATIENT TEMP: 37 C
PCO2: 42.7 MMHG (ref 35–45)
PEEP/CPAP: 8 CMH2O
PFO2: 1.74 MMHG/%
PH BLOOD GAS: 7.41 (ref 7.35–7.45)
PHOSPHATE SERPL-MCNC: 3.7 MG/DL (ref 2.5–4.5)
PLATELET # BLD AUTO: 201 K/UL (ref 130–450)
PMV BLD AUTO: 10.8 FL (ref 7–12)
PO2: 86.8 MMHG (ref 75–100)
POTASSIUM SERPL-SCNC: 4.4 MMOL/L (ref 3.5–5)
PROT SERPL-MCNC: 6.5 G/DL (ref 6.4–8.3)
PROTHROMBIN TIME: 15.4 SEC (ref 9.3–12.4)
RBC # BLD AUTO: 3.56 M/UL (ref 3.8–5.8)
RBC # BLD: ABNORMAL 10*6/UL
RI(T): 2.55
RR MECHANICAL: 22 B/MIN
SODIUM SERPL-SCNC: 134 MMOL/L (ref 132–146)
SOURCE, BLOOD GAS: ABNORMAL
THB: 11.8 G/DL (ref 11.5–16.5)
TIME ANALYZED: 457
VT MECHANICAL: 400 ML
WBC # BLD: ABNORMAL 10*3/UL
WBC # BLD: ABNORMAL 10*3/UL
WBC OTHER # BLD: 7.8 K/UL (ref 4.5–11.5)

## 2025-02-19 PROCEDURE — 87075 CULTR BACTERIA EXCEPT BLOOD: CPT

## 2025-02-19 PROCEDURE — 85610 PROTHROMBIN TIME: CPT

## 2025-02-19 PROCEDURE — 87070 CULTURE OTHR SPECIMN AEROBIC: CPT

## 2025-02-19 PROCEDURE — 2000000000 HC ICU R&B

## 2025-02-19 PROCEDURE — 82805 BLOOD GASES W/O2 SATURATION: CPT

## 2025-02-19 PROCEDURE — 97165 OT EVAL LOW COMPLEX 30 MIN: CPT

## 2025-02-19 PROCEDURE — 2700000000 HC OXYGEN THERAPY PER DAY

## 2025-02-19 PROCEDURE — 85025 COMPLETE CBC W/AUTO DIFF WBC: CPT

## 2025-02-19 PROCEDURE — 6370000000 HC RX 637 (ALT 250 FOR IP): Performed by: NURSE PRACTITIONER

## 2025-02-19 PROCEDURE — 84100 ASSAY OF PHOSPHORUS: CPT

## 2025-02-19 PROCEDURE — 2709999900 CT PERC CHOLECYSTOSTOMY

## 2025-02-19 PROCEDURE — 94003 VENT MGMT INPAT SUBQ DAY: CPT

## 2025-02-19 PROCEDURE — 6370000000 HC RX 637 (ALT 250 FOR IP): Performed by: FAMILY MEDICINE

## 2025-02-19 PROCEDURE — 94640 AIRWAY INHALATION TREATMENT: CPT

## 2025-02-19 PROCEDURE — 2580000003 HC RX 258: Performed by: STUDENT IN AN ORGANIZED HEALTH CARE EDUCATION/TRAINING PROGRAM

## 2025-02-19 PROCEDURE — 82962 GLUCOSE BLOOD TEST: CPT

## 2025-02-19 PROCEDURE — 6370000000 HC RX 637 (ALT 250 FOR IP): Performed by: STUDENT IN AN ORGANIZED HEALTH CARE EDUCATION/TRAINING PROGRAM

## 2025-02-19 PROCEDURE — 6360000002 HC RX W HCPCS: Performed by: RADIOLOGY

## 2025-02-19 PROCEDURE — 83690 ASSAY OF LIPASE: CPT

## 2025-02-19 PROCEDURE — 71045 X-RAY EXAM CHEST 1 VIEW: CPT

## 2025-02-19 PROCEDURE — 0F9430Z DRAINAGE OF GALLBLADDER WITH DRAINAGE DEVICE, PERCUTANEOUS APPROACH: ICD-10-PCS | Performed by: RADIOLOGY

## 2025-02-19 PROCEDURE — 6360000002 HC RX W HCPCS: Performed by: FAMILY MEDICINE

## 2025-02-19 PROCEDURE — 80053 COMPREHEN METABOLIC PANEL: CPT

## 2025-02-19 PROCEDURE — 6370000000 HC RX 637 (ALT 250 FOR IP): Performed by: REGISTERED NURSE

## 2025-02-19 PROCEDURE — 83735 ASSAY OF MAGNESIUM: CPT

## 2025-02-19 PROCEDURE — 97161 PT EVAL LOW COMPLEX 20 MIN: CPT

## 2025-02-19 PROCEDURE — 87205 SMEAR GRAM STAIN: CPT

## 2025-02-19 PROCEDURE — 2500000003 HC RX 250 WO HCPCS: Performed by: FAMILY MEDICINE

## 2025-02-19 PROCEDURE — 6370000000 HC RX 637 (ALT 250 FOR IP)

## 2025-02-19 RX ORDER — PANTOPRAZOLE SODIUM 40 MG/1
TABLET, DELAYED RELEASE ORAL
Status: COMPLETED
Start: 2025-02-19 | End: 2025-02-19

## 2025-02-19 RX ORDER — FENTANYL CITRATE 50 UG/ML
INJECTION, SOLUTION INTRAMUSCULAR; INTRAVENOUS PRN
Status: COMPLETED | OUTPATIENT
Start: 2025-02-19 | End: 2025-02-19

## 2025-02-19 RX ORDER — DIVALPROEX SODIUM 125 MG/1
250 CAPSULE, COATED PELLETS ORAL 2 TIMES DAILY
Status: DISCONTINUED | OUTPATIENT
Start: 2025-02-19 | End: 2025-02-20

## 2025-02-19 RX ORDER — INSULIN LISPRO 100 [IU]/ML
0-8 INJECTION, SOLUTION INTRAVENOUS; SUBCUTANEOUS
Status: DISCONTINUED | OUTPATIENT
Start: 2025-02-20 | End: 2025-02-20

## 2025-02-19 RX ORDER — LIDOCAINE HYDROCHLORIDE 20 MG/ML
INJECTION, SOLUTION INFILTRATION; PERINEURAL PRN
Status: COMPLETED | OUTPATIENT
Start: 2025-02-19 | End: 2025-02-19

## 2025-02-19 RX ADMIN — METOPROLOL TARTRATE 100 MG: 50 TABLET, FILM COATED ORAL at 20:43

## 2025-02-19 RX ADMIN — MEMANTINE HYDROCHLORIDE 5 MG: 5 TABLET, FILM COATED ORAL at 20:43

## 2025-02-19 RX ADMIN — DIVALPROEX SODIUM 250 MG: 125 CAPSULE, COATED PELLETS ORAL at 09:18

## 2025-02-19 RX ADMIN — GABAPENTIN 600 MG: 300 CAPSULE ORAL at 12:23

## 2025-02-19 RX ADMIN — HYDROCHLOROTHIAZIDE 25 MG: 25 TABLET ORAL at 09:17

## 2025-02-19 RX ADMIN — FENTANYL CITRATE 25 MCG: 50 INJECTION, SOLUTION INTRAMUSCULAR; INTRAVENOUS at 11:23

## 2025-02-19 RX ADMIN — SODIUM CHLORIDE, PRESERVATIVE FREE 10 ML: 5 INJECTION INTRAVENOUS at 09:19

## 2025-02-19 RX ADMIN — INSULIN LISPRO 4 UNITS: 100 INJECTION, SOLUTION INTRAVENOUS; SUBCUTANEOUS at 12:15

## 2025-02-19 RX ADMIN — METOPROLOL TARTRATE 100 MG: 50 TABLET, FILM COATED ORAL at 09:18

## 2025-02-19 RX ADMIN — GABAPENTIN 600 MG: 300 CAPSULE ORAL at 09:18

## 2025-02-19 RX ADMIN — ARFORMOTEROL TARTRATE 15 MCG: 15 SOLUTION RESPIRATORY (INHALATION) at 07:36

## 2025-02-19 RX ADMIN — CETIRIZINE HYDROCHLORIDE 10 MG: 10 TABLET, FILM COATED ORAL at 09:18

## 2025-02-19 RX ADMIN — SODIUM CHLORIDE, POTASSIUM CHLORIDE, SODIUM LACTATE AND CALCIUM CHLORIDE: 600; 310; 30; 20 INJECTION, SOLUTION INTRAVENOUS at 10:25

## 2025-02-19 RX ADMIN — PANTOPRAZOLE SODIUM 40 MG: 40 TABLET, DELAYED RELEASE ORAL at 12:22

## 2025-02-19 RX ADMIN — ATORVASTATIN CALCIUM 40 MG: 40 TABLET, FILM COATED ORAL at 20:42

## 2025-02-19 RX ADMIN — INSULIN LISPRO 2 UNITS: 100 INJECTION, SOLUTION INTRAVENOUS; SUBCUTANEOUS at 17:07

## 2025-02-19 RX ADMIN — BUDESONIDE 250 MCG: 0.25 SUSPENSION RESPIRATORY (INHALATION) at 07:36

## 2025-02-19 RX ADMIN — DIVALPROEX SODIUM 250 MG: 125 CAPSULE, COATED PELLETS ORAL at 21:14

## 2025-02-19 RX ADMIN — CHLORHEXIDINE GLUCONATE 15 ML: 1.2 RINSE ORAL at 09:19

## 2025-02-19 RX ADMIN — MEMANTINE HYDROCHLORIDE 5 MG: 5 TABLET, FILM COATED ORAL at 09:18

## 2025-02-19 RX ADMIN — INSULIN LISPRO 6 UNITS: 100 INJECTION, SOLUTION INTRAVENOUS; SUBCUTANEOUS at 00:21

## 2025-02-19 RX ADMIN — IPRATROPIUM BROMIDE AND ALBUTEROL SULFATE 1 DOSE: .5; 2.5 SOLUTION RESPIRATORY (INHALATION) at 19:36

## 2025-02-19 RX ADMIN — FENOFIBRATE 54 MG: 54 TABLET ORAL at 09:18

## 2025-02-19 RX ADMIN — LOSARTAN POTASSIUM 100 MG: 50 TABLET, FILM COATED ORAL at 09:18

## 2025-02-19 RX ADMIN — LEVOFLOXACIN 500 MG: 500 TABLET, FILM COATED ORAL at 09:18

## 2025-02-19 RX ADMIN — DULOXETINE 60 MG: 60 CAPSULE, DELAYED RELEASE ORAL at 09:18

## 2025-02-19 RX ADMIN — SODIUM CHLORIDE, PRESERVATIVE FREE 10 ML: 5 INJECTION INTRAVENOUS at 20:45

## 2025-02-19 RX ADMIN — INSULIN GLARGINE 60 UNITS: 100 INJECTION, SOLUTION SUBCUTANEOUS at 09:19

## 2025-02-19 RX ADMIN — IPRATROPIUM BROMIDE AND ALBUTEROL SULFATE 1 DOSE: .5; 2.5 SOLUTION RESPIRATORY (INHALATION) at 07:36

## 2025-02-19 RX ADMIN — GABAPENTIN 600 MG: 300 CAPSULE ORAL at 20:43

## 2025-02-19 RX ADMIN — BUDESONIDE 250 MCG: 0.25 SUSPENSION RESPIRATORY (INHALATION) at 19:36

## 2025-02-19 RX ADMIN — LIDOCAINE HYDROCHLORIDE 10 ML: 20 INJECTION, SOLUTION INFILTRATION; PERINEURAL at 11:16

## 2025-02-19 RX ADMIN — ARFORMOTEROL TARTRATE 15 MCG: 15 SOLUTION RESPIRATORY (INHALATION) at 19:36

## 2025-02-19 RX ADMIN — INSULIN LISPRO 6 UNITS: 100 INJECTION, SOLUTION INTRAVENOUS; SUBCUTANEOUS at 05:01

## 2025-02-19 RX ADMIN — TAMSULOSIN HYDROCHLORIDE 0.8 MG: 0.4 CAPSULE ORAL at 09:18

## 2025-02-19 RX ADMIN — FENTANYL CITRATE 50 MCG: 50 INJECTION, SOLUTION INTRAMUSCULAR; INTRAVENOUS at 11:14

## 2025-02-19 RX ADMIN — GABAPENTIN 600 MG: 300 CAPSULE ORAL at 17:07

## 2025-02-19 RX ADMIN — INSULIN GLARGINE 60 UNITS: 100 INJECTION, SOLUTION SUBCUTANEOUS at 20:50

## 2025-02-19 ASSESSMENT — PULMONARY FUNCTION TESTS
PIF_VALUE: 24
PIF_VALUE: 27
PIF_VALUE: 17
PIF_VALUE: 17
PIF_VALUE: 25
PIF_VALUE: 16
PIF_VALUE: 26
PIF_VALUE: 24
PIF_VALUE: 26
PIF_VALUE: 26
PIF_VALUE: 22

## 2025-02-19 ASSESSMENT — PAIN SCALES - GENERAL
PAINLEVEL_OUTOF10: 0
PAINLEVEL_OUTOF10: 0
PAINLEVEL_OUTOF10: 5
PAINLEVEL_OUTOF10: 0
PAINLEVEL_OUTOF10: 5
PAINLEVEL_OUTOF10: 5
PAINLEVEL_OUTOF10: 0
PAINLEVEL_OUTOF10: 5
PAINLEVEL_OUTOF10: 0

## 2025-02-19 ASSESSMENT — PAIN DESCRIPTION - LOCATION
LOCATION: ABDOMEN

## 2025-02-19 NOTE — PROGRESS NOTES
GENERAL SURGERY  DAILY PROGRESS NOTE  2/19/2025  Cc:   Chief Complaint   Patient presents with    Shortness of Breath     sob       SUBJECTIVE:  Intubated in ICU after ERCP with GI yesterday as pt had decreasing mentation prior to procedure. More awake this morning    OBJECTIVE:  /69   Pulse 59   Temp 96.9 °F (36.1 °C) (Axillary)   Resp 22   Ht 1.854 m (6' 1\")   Wt (!) 138.7 kg (305 lb 11.2 oz)   SpO2 96%   BMI 40.33 kg/m²   I/O last 3 completed shifts:  In: 600 [I.V.:600]  Out: 1100 [Urine:1100]    GENERAL: resting in bed, surrounded by life saving equipment. Following commands  HEAD: NCAT.   EYES: Anicteric. Round symmetric pupils.  CV: RR.  LUNGS/CHEST: intubated on ventilator  ABDOMEN: Soft, mild tenderness to palpation RUQ, non distended.    LABS:  CBC  Recent Labs     02/17/25  0928 02/18/25  0806   WBC 9.7 9.6   RBC 4.07 3.98   HGB 12.3* 12.2*   HCT 37.7 37.4   MCV 92.6 94.0   MCH 30.2 30.7   MCHC 32.6 32.6   RDW 14.5 14.8    209   MPV 10.0 10.5     BMP  Recent Labs     02/16/25  0815 02/17/25  1119 02/18/25  0806   * 134 135   K 3.7 3.9 4.2   CL 91* 96* 97*   CO2 24 26 25   BUN 25* 36* 47*   CREATININE 0.9 1.0 1.2   GLUCOSE 314* 271* 262*   CALCIUM 8.4* 8.6 8.8     Liver Function  Recent Labs     02/16/25  0815 02/16/25  1749 02/17/25  1119 02/18/25  0806   ALKPHOS 208*  --  246* 285*   *  --  316* 295*   *  --  352* 351*   BILITOT 3.5* 3.7* 4.2*  4.2* 4.5*   BILIDIR  --  3.2* 3.0* 3.5*     Recent Labs     02/18/25  0806   LIPASE 293*     No results for input(s): \"LACTATE\" in the last 72 hours.  Recent Labs     02/17/25  1748   INR 1.4       RADIOLOGY:  I have personally reviewed all relevant imaging:      ASSESSMENT/PLAN:  74 y.o. male with likely cholangitis and altered mental status s/p ERCP w stent placement 2/18      - monitor LFTs and bilirubin   - expect labs to downtrend as pt underwent ERCP with stent placement   - cystic duct appears obstructed based on ERCP

## 2025-02-19 NOTE — DISCHARGE INSTRUCTIONS
An Interventional Radiology drainage tube has been placed.Output needs to be measured and recorded daily, please bring this record with you for your follow up tube checks in Radiology.  On discharge, notify Radiology if transferred to a Facility, if not, patient may need Home Care. Follow up tube check date and time are ordered.  Any questions or concerns or  please call [phone].       Mari tube check on 4/2/25 at 9 am in radiology at [facility]

## 2025-02-19 NOTE — PROGRESS NOTES
PROGRESS NOTE        Patient Presents with/Seen in Consultation For      Reason for Consult: cf cholangitis, direct hyper bili   CHIEF COMPLAINT:  SOB and weakness   Subjective:     Patient seen in ICU intubated, awake, following commands  No family currently at bedside  Chart reviewed     Review of Systems  Aside from what was mentioned in the PMH and HPI, essentially unremarkable, all others negative.    Objective:     /65   Pulse 65   Temp 97.5 °F (36.4 °C) (Axillary)   Resp 16   Ht 1.854 m (6' 1\")   Wt (!) 139.9 kg (308 lb 6.8 oz)   SpO2 96%   BMI 40.69 kg/m²     General appearance: alert, awake, laying in bed, intubated in ICU, follows commands Eyes: conjunctiva pale, sclera anicteric. PERRL.  Lungs: clear to auscultation bilaterally, vent   Heart: regular rate and rhythm, no murmur, 2+ pulses; trace edema  Abdomen: soft, non-tender; bowel sounds normal; no masses,  no organomegaly  Extremities: extremities trace edema  Pulses: 2+ and symmetric  Skin: Skin color, texture, turgor normal.   Neurologic: ELBERT, shakes head yes and no to questions     fentaNYL (SUBLIMAZE) 1,000 mcg in sodium chloride 0.9% 100 mL infusion, Continuous  chlorhexidine (PERIDEX) 0.12 % solution 15 mL, BID  insulin lispro (HUMALOG,ADMELOG) injection vial 0-4 Units, Q6H  insulin lispro (HUMALOG,ADMELOG) injection vial 0-8 Units, Q6H  lactated ringers infusion, Continuous  prochlorperazine (COMPAZINE) injection 5 mg, Q6H PRN  metoprolol (LOPRESSOR) injection 2.5 mg, Q6H PRN  levoFLOXacin (LEVAQUIN) tablet 500 mg, Daily  hydrALAZINE (APRESOLINE) injection 10 mg, Q6H PRN  glucose chewable tablet 16 g, PRN  dextrose bolus 10% 125 mL, PRN   Or  dextrose bolus 10% 250 mL, PRN  glucagon injection 1 mg, PRN  dextrose 10 % infusion, Continuous PRN  ipratropium 0.5 mg-albuterol 2.5 mg (DUONEB) nebulizer solution 1 Dose, BID RT  albuterol (PROVENTIL) (2.5 MG/3ML) 0.083% nebulizer solution 2.5 mg, 4x Daily PRN  [Held by provider] aspirin EC  AM    K 4.4 02/19/2025 04:43 AM    CL 99 02/19/2025 04:43 AM    CO2 25 02/19/2025 04:43 AM    BUN 57 02/19/2025 04:43 AM    CREATININE 1.1 02/19/2025 04:43 AM    GFRAA >60 11/12/2020 03:30 AM    LABGLOM 74 02/19/2025 04:43 AM    LABGLOM >90 03/31/2024 08:07 AM    GLUCOSE 305 02/19/2025 04:43 AM    GLUCOSE 111 11/18/2011 12:00 PM    CALCIUM 8.4 02/19/2025 04:43 AM    BILITOT 3.9 02/19/2025 04:43 AM    ALKPHOS 304 02/19/2025 04:43 AM     02/19/2025 04:43 AM     02/19/2025 04:43 AM     Hepatic Function Panel:    Lab Results   Component Value Date/Time    ALKPHOS 304 02/19/2025 04:43 AM     02/19/2025 04:43 AM     02/19/2025 04:43 AM    BILITOT 3.9 02/19/2025 04:43 AM    BILIDIR 3.5 02/18/2025 08:06 AM    IBILI 1.0 02/18/2025 08:06 AM     No components found for: \"CHLPL\"    Lab Results   Component Value Date    TRIG 232 (H) 03/11/2024    TRIG 238 (H) 07/25/2023    TRIG 308 (H) 04/27/2017       Lab Results   Component Value Date    HDL 28 (L) 03/11/2024    HDL 31 (L) 07/25/2023    HDL 28 04/27/2017     No components found for: \"LDLCALC\"  No components found for: \"LABVLDL\"   PT/INR:    Lab Results   Component Value Date/Time    PROTIME 15.4 02/19/2025 06:50 AM    INR 1.5 02/19/2025 06:50 AM     IRON:  No results found for: \"IRON\"  Iron Saturation:  No components found for: \"PERCENTFE\"  FERRITIN:  No results found for: \"FERRITIN\"      Assessment:     Cholangitis  Choledocholithiasis   Hyperbilirubinemia  Elevated liver chemistries  ERCP 2/18/25- 1) Choledocholithiasis and cholangitis - s/p sphincterotomy with balloon sweeps and placement of 10F x 7cm DPTS. 2) Dilated common bile duct ~10mm   Klebsiella bacteremia    Plan:   Critical care per ICU  Antibiotics per ID  GS following- not a good surgical candidate- plan for PTC drain- defer to surgery   Supportive care  ERCP with stent removal to be arranged as OP approx 8-12 weeks, office to arrange   Trend labs  Will follow       Note: This report

## 2025-02-19 NOTE — PROGRESS NOTES
[facility]  [facility]  Progress Note    SUBJECTIVE:  Chief Complaint   Patient presents with    Shortness of Breath     sob     Events noted.  The patient underwent ERCP.  He became unresponsive and an RRT was called.  Transferred to the ICU and was intubated.  He is now extubated.    Review of systems:  As stated above in the chief complaint, otherwise negative.    Medications:  Scheduled Meds:   divalproex  250 mg Orogastric BID    pantoprazole (PROTONIX) 40 mg in sodium chloride (PF) 0.9 % 10 mL injection  40 mg IntraVENous Daily    chlorhexidine  15 mL Mouth/Throat BID    insulin lispro  0-8 Units SubCUTAneous Q6H    levoFLOXacin  500 mg Oral Daily    ipratropium 0.5 mg-albuterol 2.5 mg  1 Dose Inhalation BID RT    [Held by provider] aspirin  81 mg Oral Nightly    atorvastatin  40 mg Oral Nightly    [Held by provider] clopidogrel  75 mg Oral QAM    [Held by provider] divalproex  250 mg Oral BID    DULoxetine  60 mg Oral QAM    fenofibrate  54 mg Oral Daily    gabapentin  600 mg Oral 4x Daily    hydroCHLOROthiazide  25 mg Oral Daily    insulin glargine  60 Units SubCUTAneous BID    cetirizine  10 mg Oral Daily    losartan  100 mg Oral QAM    memantine  5 mg Oral BID    [Held by provider] metFORMIN  1,000 mg Oral BID WC    metoprolol  100 mg Oral BID    tamsulosin  0.8 mg Oral Daily    sodium chloride flush  5-40 mL IntraVENous 2 times per day    [Held by provider] enoxaparin  30 mg SubCUTAneous BID    budesonide  0.25 mg Nebulization BID RT    And    arformoterol tartrate  15 mcg Nebulization BID RT     Continuous Infusions:   fentaNYL Stopped (02/19/25 0935)    lactated ringers 75 mL/hr at 02/19/25 0607    dextrose      sodium chloride       PRN Meds:prochlorperazine, metoprolol, hydrALAZINE, glucose, dextrose bolus **OR** dextrose bolus, glucagon (rDNA), dextrose, albuterol, bisacodyl, fluticasone, melatonin, magnesium hydroxide, sodium chloride flush, sodium chloride,

## 2025-02-19 NOTE — PROGRESS NOTES
Patient regarding bloody output from PCT drain placed earlier.  Nursing states that was draining bilious fluid but then switched to sanguinous fluid about 75 cc.  Patient has some mild abdominal pain near the drain site but overall his pain is much improved following placement of the PCT.  Vital signs within normal limits and stable.  Continue to monitor drain output and hemodynamics

## 2025-02-19 NOTE — PATIENT CARE CONFERENCE
.  Intensive Care Daily Quality Rounding Checklist      ICU Team Members: bedside nurse, charge nurse, clinical pharmacist,, resident,RT    ICU Day #: NUMBER: 2    SOFA Score:9    Intubation Date: February 18    Ventilator Day #: NUMBER: 2    Central Line Insertion Date: NA        Day #:         Indication:      Arterial Line Insertion Date:  NA      Day #:     Temporary Hemodialysis Catheter Insertion Date:        Day #     DVT Prophylaxis:lovenox    GI Prophylaxis:protonix    Santos Catheter Insertion Date:  NA       Day #:       Indications:       Continued need (if yes, reason documented and discussed with physician):     Skin Issues/ Wounds and ordered treatment discussed on rounds: Y    Goals/ Plans for the Day:  labs, wean vent as tolerated    Reviewed plan and goals for day with patient and/or representative: Yes    **SHARE this note so that the rounding nurse may edit**

## 2025-02-19 NOTE — DISCHARGE INSTR - COC
Continuity of Care Form    Patient Name: Stewart Mark   :  1950  MRN:  [id number]    Admit date:  2025  Discharge date:  25    Code Status Order: Full Code   Advance Directives:   Advance Care Flowsheet Documentation        Date/Time Healthcare Directive Type of Healthcare Directive Copy in Chart Healthcare Agent Appointed Healthcare Agent's Name Healthcare Agent's Phone Number    25 0825 Unknown, patient unable to respond due to medical condition  --  --  --  --  --                     Admitting Physician:  Bridger Castelan DO  PCP: Liv Kaufman MD    Discharging Nurse: WANDA Posada RN  Discharging Hospital Unit/Room#: 0203/0203-A  Discharging Unit Phone Number: [phone]    Emergency Contact:   Extended Emergency Contact Information  Primary Emergency Contact: Fariba Mark  Address: [address] [place] [address]  Work Phone: [phone]  Mobile Phone: [phone]  Relation: Spouse  Preferred language: English   needed? No    Past Surgical History:  Past Surgical History:   Procedure Laterality Date    BACK SURGERY  10/2019    COLONOSCOPY      CORONARY STENT PLACEMENT      CT PERC CHOLECYSTOSTOMY  2025    CT PERC CHOLECYSTOSTOMY 2025 [facility] CT    ERCP N/A 2025    ENDOSCOPIC RETROGRADE CHOLANGIOPANCREATOGRAPHY SPHINCTER/PAPILLOTOMY performed by Reji Ansari DO at [facility] OR    ERCP  2025    ENDOSCOPIC RETROGRADE CHOLANGIOPANCREATOGRAPHY BALLOON SWEEP performed by Reji Ansari DO at [facility] OR    ERCP  2025    ENDOSCOPIC RETROGRADE CHOLANGIOPANCREATOGRAPHY STENT INSERTION performed by Reji Ansari DO at [facility] OR    HERNIA REPAIR      JOINT REPLACEMENT Left     left knee surgery x5    REVISION TOTAL KNEE ARTHROPLASTY Right 2018       Immunization History:   Immunization History   Administered Date(s) Administered    COVID-19, PFIZER PURPLE top, DILUTE for use, (age 12 y+), 30mcg/0.3mL

## 2025-02-19 NOTE — PROGRESS NOTES
Patient was extubated to 3 liters/min via nasal cannula. Breath Sounds post extubation were clear bilaterally. Stridor was not present post extubation. SPO2 was 96%. Vocal cords intact.      Performed by  Nely Betancourt RCP

## 2025-02-19 NOTE — PROGRESS NOTES
Physical Therapy  Facility/Department: [address] ICU  Physical Therapy Initial Assessment    Name: Stewart Mark  : 1950  MRN: [id number]  Date of Service: 2025        Patient Diagnosis(es): The primary encounter diagnosis was Shortness of breath. Diagnoses of Cholangitis (HCC) and Obstructive jaundice (HCC) were also pertinent to this visit.  Past Medical History:  has a past medical history of Acute stroke due to ischemia (HCC), Anxiety, CAD in native artery, Depression, Diabetes mellitus (HCC), Hyperlipidemia, Hypertension, Moderate persistent asthma without complication, Obesity, Sleep apnea, Type 2 diabetes mellitus without complication (HCC), and Ulceration.  Past Surgical History:  has a past surgical history that includes hernia repair; Colonoscopy; joint replacement (Left); Revision total knee arthroplasty (Right, 2018); back surgery (10/2019); Coronary stent placement; ERCP (N/A, 2025); ERCP (2025); and ERCP (2025).      Evaluating Therapist: Tasia Bello PT    Room #:  0203/0203-A  Diagnosis:  Shortness of breath [R06.02]  Altered mental state [R41.82]  Altered mental status [R41.82]  PMHx/PSHx:  HTN, Anxiety, CAD, DM  Procedure/Surgery: ERCP  bili drain   Precautions:  falls, O2, drain      Social:  Pt admitted from [facility]. States he uses wheelchair.States uses O2    Initial Evaluation  Date: 25 Treatment      Short Term/ Long Term   Goals   Was pt agreeable to Eval/treatment? With encouragement pt stated \"I dont care much for physical therapists\"      Does pt have pain? No c/o pain     Bed Mobility  Rolling: NT  Supine to sit: dependent of 2  Sit to supine: dependent of 2  Scooting: dependent of 2  Mod assist   Transfers Sit to stand: NT  Stand to sit: NT  Stand pivot: NT  Mod assist   Ambulation    NT  N/A pt non ambulatory    Stair Negotiation  Ascended and descended  NT   N/A   LE strength     2/5    3/5   balance      Dependent sitting balance, leans to

## 2025-02-19 NOTE — ANESTHESIA POSTPROCEDURE EVALUATION
Department of Anesthesiology  Postprocedure Note    Patient: Stewart Mark  MRN: [id number]  YOB: 1950  Date of evaluation: 2/18/2025    Procedure Summary       Date: 02/18/25 Room / Location: [address]    Anesthesia Start: 1606 Anesthesia Stop: 1704    Procedures:       ENDOSCOPIC RETROGRADE CHOLANGIOPANCREATOGRAPHY SPHINCTER/PAPILLOTOMY      ENDOSCOPIC RETROGRADE CHOLANGIOPANCREATOGRAPHY BALLOON SWEEP      ENDOSCOPIC RETROGRADE CHOLANGIOPANCREATOGRAPHY STENT INSERTION Diagnosis:       Cholangitis      Obstructive jaundice      (Cholangitis [K83.09])      (Obstructive jaundice [K83.1])    Surgeons: Reji Ansari DO Responsible Provider: Ethel Aguirre MD    Anesthesia Type: General ASA Status: 5 - Emergent            Anesthesia Type: General    Tammy Phase I: Tammy Score: 5    Tammy Phase II:      Anesthesia Post Evaluation    Patient location during evaluation: ICU  Patient participation: complete - patient cannot participate  Level of consciousness: sedated and ventilated  Airway patency: patent  Nausea & Vomiting: no vomiting  Cardiovascular status: hemodynamically stable  Respiratory status: ventilator and intubated  Hydration status: stable  Pain management: adequate and satisfactory to patient        No notable events documented.

## 2025-02-19 NOTE — PLAN OF CARE
Patient reaches towards ETT when left unrestrained. B/L soft wrist restraints to remain in place for patient safety.     Problem: Safety - Adult  Goal: Free from fall injury  Outcome: Progressing     Problem: Safety - Medical Restraint  Goal: Remains free of injury from restraints (Restraint for Interference with Medical Device)  Description: INTERVENTIONS:  1. Determine that other, less restrictive measures have been tried or would not be effective before applying the restraint  2. Evaluate the patient's condition at the time of restraint application  3. Inform patient/family regarding the reason for restraint  4. Q2H: Monitor safety, psychosocial status, comfort, nutrition and hydration  Outcome: Progressing  Flowsheets  Taken 2/19/2025 0400  Remains free of injury from restraints (restraint for interference with medical device):   Determine that other, less restrictive measures have been tried or would not be effective before applying the restraint   Every 2 hours: Monitor safety, psychosocial status, comfort, nutrition and hydration  Taken 2/19/2025 0200  Remains free of injury from restraints (restraint for interference with medical device):   Determine that other, less restrictive measures have been tried or would not be effective before applying the restraint   Every 2 hours: Monitor safety, psychosocial status, comfort, nutrition and hydration  Taken 2/19/2025 0000  Remains free of injury from restraints (restraint for interference with medical device):   Determine that other, less restrictive measures have been tried or would not be effective before applying the restraint   Every 2 hours: Monitor safety, psychosocial status, comfort, nutrition and hydration  Taken 2/18/2025 2200  Remains free of injury from restraints (restraint for interference with medical device):   Determine that other, less restrictive measures have been tried or would not be effective before applying the restraint   Every 2 hours:

## 2025-02-19 NOTE — OR NURSING
Patient brought to cat scan for insertion of cholecystostomy tube. Dr. Jefferson in to speak with the patient and also spoke to patient's wife about the procedure, all questions were answered and patient and wife are agreeable. Patient placed supine on cat scan table, images taken and reviewed by Dr. Jefferson. Patient prepped and draped, lidocaine given, and needle inserted to RUQ. #10 Cymro pigtail placed to RUQ, specimen obtained and sent for culture. 10 ml aspirated of bile colored fluid. Tube sutured in place with 0-0 prolene, and connected to bulb suction. Patient moved back onto cart. Nurse handoff report given and patient transported back to room with nurse.

## 2025-02-19 NOTE — PROGRESS NOTES
meds    Gastrointestinal   Cholangitis  -Noted on admission imaging  -Gradually worsening hepatic function, on admit , , Bili 2.0; 2/18 , , Bili 4.5. Repeat , , Alk 304, bili 3.9  -Could not obtain MRCP due to habitus  -2/18 underwent ERCP, with resultant copious pustulent and sludge material expressed. Stent placed.  -PTC with IR, appreciate IR  -Appreciate surgical consult    Renal   No acute complaints     Infectious Disease   Klebsiella culture  -Positive on admit cultures, with periodic fevers  -On levoquin per ID  -ID following, appreciat consult    Hematology/Oncology   Mild anemia  -12.2-12.4 during stay  -Monitor CBCs    Endocrine   T2DM  -Continue metformin  -Lantus 60U BID  -Humalog sliding scale  -POCT glucose    Social/Spiritual/DNR/Other   Code: FULL CODE    Will likely downgrade today or tomorrow after IR and after observation without his ET tube.    Mor Licona MD  PGY -1    2/19/2025  6:37 AM    I personally saw, examined and provided care for the patient. Radiographs, labs and medication list were reviewed by me independently. I spoke with bedside nursing, therapists and consultants. Critical care services and times documented are independent of procedures and multidisciplinary rounds with Residents. Additionally comprehensive, multidisciplinary rounds were conducted with the MICU team. The case was discussed in detail and plans for care were established. Review of Resident/WAN documentation was conducted and revisions were made as appropriate. I agree with the above documented exam, problem list and plan of care with the following additions:    Extubated this morning without incident.  Mentation largely improved after ERCP performed yesterday.  Monitor in the ICU for today, can likely be transferred out tomorrow.    During multidisciplinary team rounds the patient was seen, examined and discussed. This is confirmation that I have personally seen and  examined the patient and that the key elements of the encounter were performed by me (> 85 % time).  The medications & laboratory data and imagery was discussed and adjusted where necessary. Key issues of the case were discussed among consultants.       This patient has a high probability of sudden clinically significant deterioration. I managed/supervised life or organ supporting interventions that required frequent physician assessment. I devoted my full attention to the direct care of this patient for the period of time indicated below. In addition to above, time was devoted to teaching and to any procedure.     NOTE: This report, in part or full, may have been transcribed using voice recognition software. Every effort was made to ensure accuracy; however, inadvertent computerized transcription errors may be present. Please excuse any transcriptional grammatical or spelling errors that may have escaped my editorial review.    Total critical care time caring for this patient with life threatening, unstable organ failure, including direct patient contact, management of life support systems, review of data including imaging and labs, discussions with other team members and physicians is at least 38 min so far today, excluding procedures.    Javid Hays MD 5:44 PM 2/19/2025

## 2025-02-19 NOTE — PROGRESS NOTES
Per order, ETT advanced 2 cm.  Cuff deflated, ett moved from 24 cm to 26 cm.  Cuff re inflated and tube secured.

## 2025-02-19 NOTE — PROGRESS NOTES
Occupational Therapy  OCCUPATIONAL THERAPY INITIAL EVALUATION  [facility]  [address], [place]    Date: 2025     Patient Name: Stewart Mark  MRN: [id number]  : 1950  Room: [address] [place] [address]    Evaluating OT: Jeanine Robertson, OTR/L - [id number]    Referring Provider: Marina Son APRN - CNP  Specific Provider Orders/Date: \"OT eval and treat\" - 2025    Diagnosis: Shortness of breath [R06.02]  Altered mental state [R41.82]  Altered mental status [R41.82]      Surgery: Patient underwent ERCP on 2025. Patient underwent insertion of cholecystostomy tube on 2025.    Pertinent Medical History: anxiety, CAD, DM, HTN, obesity, sleep apnea, stroke, hyperlipidemia     Precautions: fall risk, O2 via nasal cannula    Assessment of Current Deficits:    [x] Functional mobility   [x] ADLs  [x] Strength               [x] Cognition   [x] Functional transfers   [x] IADLs         [x] Safety Awareness   [x] Endurance   [] Fine Motor Coordination  [x] Balance      [] Vision/Perception   [x] Sensation    [] Gross Motor Coordination [] ROM          [] Delirium                  [] Motor Control     OT PLAN OF CARE   OT POC is based on physician orders, patient diagnosis, and results of clinical assessment.  Frequency/Duration 2-5 days/week for 2-4 weeks PRN   Specific OT Treatment Interventions to Include:   * Instruction/training on adapted ADL techniques and AE recommendations to increase functional independence within precautions       * Training on energy conservation strategies, correct breathing pattern and techniques to improve independence/tolerance for self-care routine  * Functional transfer/mobility training/DME recommendations for increased independence, safety, and fall prevention  * Patient/Family education to increase follow through with safety techniques and functional independence  * Recommendation of environmental

## 2025-02-19 NOTE — PROGRESS NOTES
To CT for bili drain, pt. Tolerated well. Pt. is able to follow commands, He does repeat himself frequently. Bili drain emptied for 100cc of dark green fluid.

## 2025-02-19 NOTE — PROGRESS NOTES
[facility]   Progress note      Subjective:    Evaluated at bedside.  Patient doing slightly better since coming back from  for PTC drain placement.    On evaluation, patient appeared stable and not in acute distress.  Answers questions appropriately    Objective:    BP (!) 93/42   Pulse 78   Temp 97.5 °F (36.4 °C) (Axillary)   Resp 22   Ht 1.854 m (6' 1\")   Wt (!) 139.9 kg (308 lb 6.8 oz)   SpO2 96%   BMI 40.69 kg/m²     In: 6339.8 [I.V.:5612.9; NG/GT:60]  Out: 1400   In: 6339.8   Out: 1400 [Urine:1400]    General appearance: NAD, conversant  HEENT: AT/NC, MMM  Neck: FROM, supple  Lungs: Clear to auscultation  CV: RRR, no MRGs  Vasc: Radial pulses 2+  Abdomen: Soft, non-tender; no masses or HSM.  Drain in place.  Draining bloody fluid  Extremities: No peripheral edema or digital cyanosis  Skin: no rash, lesions or ulcers  Psych: Alert and oriented to person, place and time  Neuro: Alert and interactive     Recent Labs     02/17/25  0928 02/18/25  0806 02/19/25  0443   WBC 9.7 9.6 7.8   HGB 12.3* 12.2* 10.6*   HCT 37.7 37.4 33.3*    209 201       Recent Labs     02/17/25  1119 02/18/25  0806 02/19/25  0443    135 134   K 3.9 4.2 4.4   CL 96* 97* 99   CO2 26 25 25   BUN 36* 47* 57*   CREATININE 1.0 1.2 1.1   CALCIUM 8.6 8.8 8.4*       Assessment:    Principal Problem:    Altered mental state  Active Problems:    Cholangitis (HCC)    Obstructive jaundice (HCC)    Altered mental status  Resolved Problems:    * No resolved hospital problems. *      Plan:    74-year-old male with a history of a CVA and diabetes presents to the ED with complaints of fall at facility shortness of breath and weakness and is admitted to telemetry unit with     Altered mental status//Klebsiella bacteremia  -Supplement O2 demands keeping oxygen saturation greater than 92%.  Patient is currently utilizing 3 L O2 as his baseline is room air at facility  - infectious disease consultation for positive

## 2025-02-19 NOTE — CARE COORDINATION
Transition of Care-plan for drain placement in IR today. RRT in endo yesterday-moved to ICU. Patient was extubated this am, tolerating 3L of oxygen. Met with patient and his wife Fariba in room-plan for SNF at discharge. Patient is from [facility]. His wife requested [facility], as patient was there before and liked it. Referral called, awaiting approval. Discharge plan is accepting SNF.     Addendum-[facility] can accept-will wait on submitting pre-cert as it will come back same day and patient is not ready for discharge. PERFECTO, Hens and steffi form created.    Mayela WILDERN, RN  [facility]

## 2025-02-19 NOTE — PROGRESS NOTES
Physical Therapy  Facility/Department: [address] ICU      Name: Stewart Mark  : 1950  MRN: [id number]  Date of Service: 2025    Attempted to see pt for PT evaluation, pt off unit  Tasia Bello PT [id number]

## 2025-02-20 LAB
ALBUMIN SERPL-MCNC: 2.8 G/DL (ref 3.5–5.2)
ALP SERPL-CCNC: 298 U/L (ref 40–129)
ALT SERPL-CCNC: 227 U/L (ref 0–40)
AMMONIA PLAS-SCNC: 33 UMOL/L (ref 16–60)
ANION GAP SERPL CALCULATED.3IONS-SCNC: 10 MMOL/L (ref 7–16)
AST SERPL-CCNC: 282 U/L (ref 0–39)
BASOPHILS # BLD: 0.01 K/UL (ref 0–0.2)
BASOPHILS NFR BLD: 0 % (ref 0–2)
BILIRUB SERPL-MCNC: 2.3 MG/DL (ref 0–1.2)
BUN SERPL-MCNC: 52 MG/DL (ref 6–23)
CALCIUM SERPL-MCNC: 8.6 MG/DL (ref 8.6–10.2)
CHLORIDE SERPL-SCNC: 104 MMOL/L (ref 98–107)
CO2 SERPL-SCNC: 27 MMOL/L (ref 22–29)
CREAT SERPL-MCNC: 1.1 MG/DL (ref 0.7–1.2)
EKG ATRIAL RATE: 74 BPM
EKG P AXIS: 59 DEGREES
EKG P-R INTERVAL: 214 MS
EKG Q-T INTERVAL: 424 MS
EKG QRS DURATION: 104 MS
EKG QTC CALCULATION (BAZETT): 470 MS
EKG R AXIS: -38 DEGREES
EKG T AXIS: 16 DEGREES
EKG VENTRICULAR RATE: 74 BPM
EOSINOPHIL # BLD: 0 K/UL (ref 0.05–0.5)
EOSINOPHILS RELATIVE PERCENT: 0 % (ref 0–6)
ERYTHROCYTE [DISTWIDTH] IN BLOOD BY AUTOMATED COUNT: 15.1 % (ref 11.5–15)
GFR, ESTIMATED: 73 ML/MIN/1.73M2
GLUCOSE BLD-MCNC: 163 MG/DL (ref 74–99)
GLUCOSE BLD-MCNC: 175 MG/DL (ref 74–99)
GLUCOSE BLD-MCNC: 247 MG/DL (ref 74–99)
GLUCOSE BLD-MCNC: 276 MG/DL (ref 74–99)
GLUCOSE BLD-MCNC: 358 MG/DL (ref 74–99)
GLUCOSE SERPL-MCNC: 290 MG/DL (ref 74–99)
HCT VFR BLD AUTO: 32.6 % (ref 37–54)
HGB BLD-MCNC: 10.5 G/DL (ref 12.5–16.5)
IMM GRANULOCYTES # BLD AUTO: 0.06 K/UL (ref 0–0.58)
IMM GRANULOCYTES NFR BLD: 1 % (ref 0–5)
LIPASE SERPL-CCNC: 98 U/L (ref 13–60)
LYMPHOCYTES NFR BLD: 0.72 K/UL (ref 1.5–4)
LYMPHOCYTES RELATIVE PERCENT: 9 % (ref 20–42)
MAGNESIUM SERPL-MCNC: 2.9 MG/DL (ref 1.6–2.6)
MCH RBC QN AUTO: 30 PG (ref 26–35)
MCHC RBC AUTO-ENTMCNC: 32.2 G/DL (ref 32–34.5)
MCV RBC AUTO: 93.1 FL (ref 80–99.9)
MICROORGANISM SPEC CULT: NORMAL
MONOCYTES NFR BLD: 0.69 K/UL (ref 0.1–0.95)
MONOCYTES NFR BLD: 9 % (ref 2–12)
NEUTROPHILS NFR BLD: 81 % (ref 43–80)
NEUTS SEG NFR BLD: 6.18 K/UL (ref 1.8–7.3)
PHOSPHATE SERPL-MCNC: 2.7 MG/DL (ref 2.5–4.5)
PLATELET # BLD AUTO: 254 K/UL (ref 130–450)
PMV BLD AUTO: 10.5 FL (ref 7–12)
POTASSIUM SERPL-SCNC: 4.3 MMOL/L (ref 3.5–5)
PROT SERPL-MCNC: 6.7 G/DL (ref 6.4–8.3)
RBC # BLD AUTO: 3.5 M/UL (ref 3.8–5.8)
SODIUM SERPL-SCNC: 141 MMOL/L (ref 132–146)
SPECIMEN DESCRIPTION: NORMAL
WBC OTHER # BLD: 7.7 K/UL (ref 4.5–11.5)

## 2025-02-20 PROCEDURE — 83690 ASSAY OF LIPASE: CPT

## 2025-02-20 PROCEDURE — 2580000003 HC RX 258: Performed by: STUDENT IN AN ORGANIZED HEALTH CARE EDUCATION/TRAINING PROGRAM

## 2025-02-20 PROCEDURE — 6370000000 HC RX 637 (ALT 250 FOR IP): Performed by: FAMILY MEDICINE

## 2025-02-20 PROCEDURE — 82962 GLUCOSE BLOOD TEST: CPT

## 2025-02-20 PROCEDURE — 6370000000 HC RX 637 (ALT 250 FOR IP): Performed by: NURSE PRACTITIONER

## 2025-02-20 PROCEDURE — 80053 COMPREHEN METABOLIC PANEL: CPT

## 2025-02-20 PROCEDURE — 6360000002 HC RX W HCPCS: Performed by: STUDENT IN AN ORGANIZED HEALTH CARE EDUCATION/TRAINING PROGRAM

## 2025-02-20 PROCEDURE — 85025 COMPLETE CBC W/AUTO DIFF WBC: CPT

## 2025-02-20 PROCEDURE — 94640 AIRWAY INHALATION TREATMENT: CPT

## 2025-02-20 PROCEDURE — 93010 ELECTROCARDIOGRAM REPORT: CPT | Performed by: INTERNAL MEDICINE

## 2025-02-20 PROCEDURE — 6370000000 HC RX 637 (ALT 250 FOR IP): Performed by: STUDENT IN AN ORGANIZED HEALTH CARE EDUCATION/TRAINING PROGRAM

## 2025-02-20 PROCEDURE — 2060000000 HC ICU INTERMEDIATE R&B

## 2025-02-20 PROCEDURE — 82140 ASSAY OF AMMONIA: CPT

## 2025-02-20 PROCEDURE — 97530 THERAPEUTIC ACTIVITIES: CPT

## 2025-02-20 PROCEDURE — 6360000002 HC RX W HCPCS: Performed by: FAMILY MEDICINE

## 2025-02-20 PROCEDURE — 2700000000 HC OXYGEN THERAPY PER DAY

## 2025-02-20 PROCEDURE — 84100 ASSAY OF PHOSPHORUS: CPT

## 2025-02-20 PROCEDURE — 6370000000 HC RX 637 (ALT 250 FOR IP): Performed by: REGISTERED NURSE

## 2025-02-20 PROCEDURE — 83735 ASSAY OF MAGNESIUM: CPT

## 2025-02-20 PROCEDURE — 2500000003 HC RX 250 WO HCPCS: Performed by: FAMILY MEDICINE

## 2025-02-20 RX ORDER — PANTOPRAZOLE SODIUM 40 MG/1
40 TABLET, DELAYED RELEASE ORAL
Status: DISCONTINUED | OUTPATIENT
Start: 2025-02-21 | End: 2025-02-23 | Stop reason: HOSPADM

## 2025-02-20 RX ORDER — INSULIN LISPRO 100 [IU]/ML
0-16 INJECTION, SOLUTION INTRAVENOUS; SUBCUTANEOUS
Status: DISCONTINUED | OUTPATIENT
Start: 2025-02-20 | End: 2025-02-23 | Stop reason: HOSPADM

## 2025-02-20 RX ADMIN — DIVALPROEX SODIUM 250 MG: 250 TABLET, DELAYED RELEASE ORAL at 22:12

## 2025-02-20 RX ADMIN — GABAPENTIN 600 MG: 300 CAPSULE ORAL at 17:13

## 2025-02-20 RX ADMIN — MAGNESIUM HYDROXIDE 30 ML: 400 SUSPENSION ORAL at 08:46

## 2025-02-20 RX ADMIN — MEMANTINE HYDROCHLORIDE 5 MG: 5 TABLET, FILM COATED ORAL at 20:56

## 2025-02-20 RX ADMIN — INSULIN GLARGINE 60 UNITS: 100 INJECTION, SOLUTION SUBCUTANEOUS at 20:58

## 2025-02-20 RX ADMIN — CLOPIDOGREL BISULFATE 75 MG: 75 TABLET ORAL at 09:01

## 2025-02-20 RX ADMIN — SODIUM CHLORIDE, PRESERVATIVE FREE 10 ML: 5 INJECTION INTRAVENOUS at 08:48

## 2025-02-20 RX ADMIN — INSULIN GLARGINE 60 UNITS: 100 INJECTION, SOLUTION SUBCUTANEOUS at 08:47

## 2025-02-20 RX ADMIN — DIVALPROEX SODIUM 250 MG: 125 CAPSULE, COATED PELLETS ORAL at 08:45

## 2025-02-20 RX ADMIN — GABAPENTIN 600 MG: 300 CAPSULE ORAL at 12:50

## 2025-02-20 RX ADMIN — ARFORMOTEROL TARTRATE 15 MCG: 15 SOLUTION RESPIRATORY (INHALATION) at 21:00

## 2025-02-20 RX ADMIN — FENOFIBRATE 54 MG: 54 TABLET ORAL at 08:45

## 2025-02-20 RX ADMIN — BUDESONIDE 250 MCG: 0.25 SUSPENSION RESPIRATORY (INHALATION) at 21:00

## 2025-02-20 RX ADMIN — SODIUM CHLORIDE 40 MG: 9 INJECTION INTRAMUSCULAR; INTRAVENOUS; SUBCUTANEOUS at 08:46

## 2025-02-20 RX ADMIN — ARFORMOTEROL TARTRATE 15 MCG: 15 SOLUTION RESPIRATORY (INHALATION) at 08:17

## 2025-02-20 RX ADMIN — BUDESONIDE 250 MCG: 0.25 SUSPENSION RESPIRATORY (INHALATION) at 08:17

## 2025-02-20 RX ADMIN — ENOXAPARIN SODIUM 30 MG: 100 INJECTION SUBCUTANEOUS at 09:01

## 2025-02-20 RX ADMIN — HYDROCHLOROTHIAZIDE 25 MG: 25 TABLET ORAL at 08:43

## 2025-02-20 RX ADMIN — ENOXAPARIN SODIUM 30 MG: 100 INJECTION SUBCUTANEOUS at 20:57

## 2025-02-20 RX ADMIN — IPRATROPIUM BROMIDE AND ALBUTEROL SULFATE 1 DOSE: .5; 2.5 SOLUTION RESPIRATORY (INHALATION) at 21:00

## 2025-02-20 RX ADMIN — DULOXETINE 60 MG: 60 CAPSULE, DELAYED RELEASE ORAL at 08:44

## 2025-02-20 RX ADMIN — SODIUM CHLORIDE, PRESERVATIVE FREE 10 ML: 5 INJECTION INTRAVENOUS at 20:57

## 2025-02-20 RX ADMIN — IPRATROPIUM BROMIDE AND ALBUTEROL SULFATE 1 DOSE: .5; 2.5 SOLUTION RESPIRATORY (INHALATION) at 08:17

## 2025-02-20 RX ADMIN — LEVOFLOXACIN 500 MG: 500 TABLET, FILM COATED ORAL at 08:43

## 2025-02-20 RX ADMIN — INSULIN LISPRO 4 UNITS: 100 INJECTION, SOLUTION INTRAVENOUS; SUBCUTANEOUS at 06:24

## 2025-02-20 RX ADMIN — MEMANTINE HYDROCHLORIDE 5 MG: 5 TABLET, FILM COATED ORAL at 08:45

## 2025-02-20 RX ADMIN — TAMSULOSIN HYDROCHLORIDE 0.8 MG: 0.4 CAPSULE ORAL at 08:45

## 2025-02-20 RX ADMIN — METOPROLOL TARTRATE 100 MG: 50 TABLET, FILM COATED ORAL at 20:56

## 2025-02-20 RX ADMIN — LOSARTAN POTASSIUM 100 MG: 50 TABLET, FILM COATED ORAL at 08:45

## 2025-02-20 RX ADMIN — GABAPENTIN 600 MG: 300 CAPSULE ORAL at 20:56

## 2025-02-20 RX ADMIN — INSULIN LISPRO 8 UNITS: 100 INJECTION, SOLUTION INTRAVENOUS; SUBCUTANEOUS at 11:42

## 2025-02-20 RX ADMIN — METOPROLOL TARTRATE 100 MG: 50 TABLET, FILM COATED ORAL at 08:44

## 2025-02-20 RX ADMIN — GABAPENTIN 600 MG: 300 CAPSULE ORAL at 08:44

## 2025-02-20 RX ADMIN — ATORVASTATIN CALCIUM 40 MG: 40 TABLET, FILM COATED ORAL at 20:56

## 2025-02-20 RX ADMIN — CETIRIZINE HYDROCHLORIDE 10 MG: 10 TABLET, FILM COATED ORAL at 08:43

## 2025-02-20 RX ADMIN — ASPIRIN 81 MG: 81 TABLET, COATED ORAL at 20:56

## 2025-02-20 ASSESSMENT — PAIN SCALES - GENERAL
PAINLEVEL_OUTOF10: 0

## 2025-02-20 NOTE — PATIENT CARE CONFERENCE
.                       Intensive Care Daily Quality Rounding Checklist        ICU Team Members: bedside nurse, charge nurse, clinical pharmacist,, resident     ICU Day #: NUMBER: 3     SOFA Score:8     Intubation Date: February 18 - extubated February 19     Ventilator Day #:      Central Line Insertion Date: NA                                                    Day #:                                                     Indication:       Arterial Line Insertion Date:  NA                             Day #:      Temporary Hemodialysis Catheter Insertion Date:                               Day #      DVT Prophylaxis:lovenox    GI Prophylaxis:protonix     Santos Catheter Insertion Date:  NA                                        Day #:                              Indications:                              Continued need (if yes, reason documented and discussed with physician):      Skin Issues/ Wounds and ordered treatment discussed on rounds: Y     Goals/ Plans for the Day:  labs, transfer out of ICU     Reviewed plan and goals for day with patient and/or representative: Yes     **SHARE this note so that the rounding nurse may edit**

## 2025-02-20 NOTE — PROGRESS NOTES
GENERAL SURGERY  DAILY PROGRESS NOTE  2/20/2025  Cc:   Chief Complaint   Patient presents with    Shortness of Breath     sob       SUBJECTIVE:  Extubated yesterday. Had PCT tube done by IR. More awake per nursing however just got to sleep prior to exam    OBJECTIVE:  /65   Pulse 66   Temp 97.8 °F (36.6 °C) (Axillary)   Resp 16   Ht 1.854 m (6' 1\")   Wt (!) 139.9 kg (308 lb 6.8 oz)   SpO2 93%   BMI 40.69 kg/m²   I/O last 3 completed shifts:  In: 6579.8 [P.O.:240; I.V.:5612.9; NG/GT:60; IV Piggyback:666.9]  Out: 2785 [Urine:2500; Emesis/NG output:285]    GENERAL: resting in bed  HEAD: NCAT.   EYES: Anicteric. Round symmetric pupils.  CV: RR.  LUNGS/CHEST: no increased work of breathing on 3L  ABDOMEN: Soft, mild tenderness to palpation RUQ around drain site, drain with serosanguineous output , non distended.    LABS:  CBC  Recent Labs     02/17/25 0928 02/18/25  0806 02/19/25  0443   WBC 9.7 9.6 7.8   RBC 4.07 3.98 3.56*   HGB 12.3* 12.2* 10.6*   HCT 37.7 37.4 33.3*   MCV 92.6 94.0 93.5   MCH 30.2 30.7 29.8   MCHC 32.6 32.6 31.8*   RDW 14.5 14.8 14.9    209 201   MPV 10.0 10.5 10.8     BMP  Recent Labs     02/17/25 1119 02/18/25  0806 02/19/25  0443    135 134   K 3.9 4.2 4.4   CL 96* 97* 99   CO2 26 25 25   BUN 36* 47* 57*   CREATININE 1.0 1.2 1.1   GLUCOSE 271* 262* 305*   CALCIUM 8.6 8.8 8.4*     Liver Function  Recent Labs     02/17/25  1119 02/18/25  0806 02/19/25  0443   ALKPHOS 246* 285* 304*   * 295* 258*   * 351* 346*   BILITOT 4.2*  4.2* 4.5* 3.9*   BILIDIR 3.0* 3.5*  --      Recent Labs     02/19/25  0443   LIPASE 82*     No results for input(s): \"LACTATE\" in the last 72 hours.  Recent Labs     02/19/25  0650   INR 1.5       RADIOLOGY:  I have personally reviewed all relevant imaging:      ASSESSMENT/PLAN:  74 y.o. male with likely cholangitis and altered mental status s/p ERCP w stent placement 2/18, PCT 2/19     - monitor LFTs and bilirubin   - okay for diet as

## 2025-02-20 NOTE — PROGRESS NOTES
Physician Progress Note      PATIENT:               MARIANNA PLATA  CSN #:                  [id number]  :                       1950  ADMIT DATE:       2025 1:39 AM  DISCH DATE:  RESPONDING  PROVIDER #:        Liv Kaufman MD          QUERY TEXT:    Patient admitted with altered mental status and klebsiella bacteremia.   Documentation reflects \"...ER workup revealed imaging pneumonia...Swallow   study questionable aspiration pneumonia...\" per H&P 2/15.  If possible, please   document in the progress notes and discharge summary if Aspiration Pneumonia   was:    The medical record reflects the following:  Risk Factors: Hx of stroke, Advanced age >70  Clinical Indicators: H&P 2/15 \"...ER workup revealed imaging   pneumonia...Swallow study questionable aspiration pneumonia...\", Speech   Progress Note 2/15 \"...DYSPHAGIA DIAGNOSIS:  Clinical indicators of   minimal-mild  oral phase dysphagia  and Clinical indicators of possible   pharyngeal phase dysphagia...Assistance level:  Supervision is needed during   all oral intake to ensure use of swallow strats...\", Chest X-ray  \"...No   acute process...\", CTA Pulmonary  \"...Mild linear atelectatic changes in   the left lower lobe and mild compressive atelectasis in the right lower lobe   Treatment: Labs, IV/PO Abx, DuoNebs, ID Consult, Speech Consult, CT Chest, CTA   Pulmonary    Thank you,  Christiano Bryan, BSN, RN, CRCR  Clinical Documentation Integrity  [phone]  Options provided:  -- Aspiration pneumonia confirmed after study  -- Aspiration pneumonia ruled out after study  -- Other - I will add my own diagnosis  -- Disagree - Not applicable / Not valid  -- Disagree - Clinically unable to determine / Unknown  -- Refer to Clinical Documentation Reviewer    PROVIDER RESPONSE TEXT:    Aspiration pneumonia confirmed after study.    Query created by: Christiano Bryan on 2025 10:08 AM      QUERY TEXT:    Pt admitted with altered mental status and  klebsiella bacteremia. Pt also   noted to have total bilirubin 1.6-4.2 and \"Ascending cholangitis secondary to   choledocholithiasis\" per ID Progress Note 2/17. If possible, please document   in the progress notes and discharge summary if you are evaluating and / or   treating any of the following:    The medical record reflects the following:  Risk Factors: Klebsiella bacteremia, Hx of stroke, Advanced age >70  Clinical Indicators: H&P 2/15 \"...he is able to answer all my questions   appropriately without altered mental status....\", General Surgery Consult Note   2/16 \"...He is confused and unable to really answer any of my questions...\",   Progress Note 2/16 \"...Eyes closed, does not open them on evaluation...Still   with altered mental status...\", ID Progress Note 2/17 \"...Klebsiella   bacteremia...Ascending cholangitis secondary to   choledocholithiasis...Elevation of transaminases associated to the above...\",   Progress Note 2/17 \"...Alert and oriented to person, place and time... Alert   and   Treatment: Labs, 1.25L NS Bolus, IV/PO Abx, IVF, Consults- ID, General   Surgery, GI, Chest x-ray, CT Chest, CT Head, CTA Pulmonary, US Gallbladder    Thank you,  Christiano Bryan, BSN, RN, CRCR  Clinical Documentation Integrity  [phone]  Options provided:  -- Metabolic encephalopathy  -- Other - I will add my own diagnosis  -- Disagree - Not applicable / Not valid  -- Disagree - Clinically unable to determine / Unknown  -- Refer to Clinical Documentation Reviewer    PROVIDER RESPONSE TEXT:    This patient has metabolic encephalopathy.    Query created by: Christiano Bryan on 2/18/2025 10:08 AM      QUERY TEXT:    Pt admitted with altered mental status and  Klebsiella bacteremia. Pt noted to   have 2/14 Tmax 100.7, 2/14 heart rates , respiratory rates 10/29,   Procalcitonin 0.51-0.70, and Lactic acid 2.6-2.1. If possible, please document   in the progress notes and discharge summary if you are evaluating and

## 2025-02-20 NOTE — PROGRESS NOTES
[facility]  [facility]  Progress Note    SUBJECTIVE:  Chief Complaint   Patient presents with    Shortness of Breath     sob     The patient is in the ICU.  He is not on any vasopressors.  No fever.  Tolerating oral antibiotic.    Review of systems:  As stated above in the chief complaint, otherwise negative.    Medications:  Scheduled Meds:   insulin lispro  0-16 Units SubCUTAneous 4x Daily WC    pantoprazole (PROTONIX) 40 mg in sodium chloride (PF) 0.9 % 10 mL injection  40 mg IntraVENous Daily    levoFLOXacin  500 mg Oral Daily    ipratropium 0.5 mg-albuterol 2.5 mg  1 Dose Inhalation BID RT    aspirin  81 mg Oral Nightly    atorvastatin  40 mg Oral Nightly    clopidogrel  75 mg Oral QAM    divalproex  250 mg Oral BID    DULoxetine  60 mg Oral QAM    fenofibrate  54 mg Oral Daily    gabapentin  600 mg Oral 4x Daily    hydroCHLOROthiazide  25 mg Oral Daily    insulin glargine  60 Units SubCUTAneous BID    cetirizine  10 mg Oral Daily    losartan  100 mg Oral QAM    memantine  5 mg Oral BID    [Held by provider] metFORMIN  1,000 mg Oral BID WC    metoprolol  100 mg Oral BID    tamsulosin  0.8 mg Oral Daily    sodium chloride flush  5-40 mL IntraVENous 2 times per day    enoxaparin  30 mg SubCUTAneous BID    budesonide  0.25 mg Nebulization BID RT    And    arformoterol tartrate  15 mcg Nebulization BID RT     Continuous Infusions:   dextrose      sodium chloride       PRN Meds:prochlorperazine, metoprolol, hydrALAZINE, glucose, dextrose bolus **OR** dextrose bolus, glucagon (rDNA), dextrose, albuterol, bisacodyl, fluticasone, melatonin, magnesium hydroxide, sodium chloride flush, sodium chloride, acetaminophen **OR** acetaminophen    OBJECTIVE:  BP (!) 151/69   Pulse 74   Temp 97.8 °F (36.6 °C) (Axillary)   Resp 15   Ht 1.854 m (6' 1\")   Wt (!) 139.9 kg (308 lb 6.8 oz)   SpO2 96%   BMI 40.69 kg/m²   Temp  Av.7 °F (36.5 °C)  Min: 97.6 °F (36.4 °C)  Max: 97.8 °F (36.6

## 2025-02-20 NOTE — PROGRESS NOTES
Physical Therapy  Facility/Department: [address] ICU  Daily Treatment Note  NAME: Stewart Mark  : 1950  MRN: [id number]    Date of Service: 2025    Patient Diagnosis(es): The primary encounter diagnosis was Shortness of breath. Diagnoses of Cholangitis (HCC) and Obstructive jaundice (HCC) were also pertinent to this visit.    Evaluating Therapist: Tasia Bello PT     Room #:  0203/0203-A  Diagnosis:  Shortness of breath [R06.02]  Altered mental state [R41.82]  Altered mental status [R41.82]  PMHx/PSHx:  HTN, Anxiety, CAD, DM  Procedure/Surgery: ERCP  bili drain   Precautions:  falls, O2, drain        Social:  Pt admitted from [facility]. States he uses wheelchair.States uses O2     Initial Evaluation  Date: 25 Treatment      Short Term/ Long Term   Goals   Was pt agreeable to Eval/treatment? With encouragement pt stated \"I dont care much for physical therapists\"   yes     Does pt have pain? No c/o pain None reported      Bed Mobility  Rolling: NT  Supine to sit: dependent of 2  Sit to supine: dependent of 2  Scooting: dependent of 2  rolling:  Dep A  Supine to sit:  Dep A x 2  Sit to supine:  Dep A x 2  Scooting:  Max A to sitting EOB Mod assist   Transfers Sit to stand: NT  Stand to sit: NT  Stand pivot: NT  NT Mod assist   Ambulation    NT  NT N/Apt non ambulatory    Stair Negotiation  Ascended and descended  NT  NT  N/A   LE strength     2/5     3/5   balance      Dependent sitting balance, leans to right  sitting EOB Mod A.      AM-PAC Raw score                       Patient education  Pt educated on PT objectives during treatment session, posture while sitting EOB.      Patient response to education:   Pt verbalized understanding Pt demonstrated skill Pt requires further education in this area     yes     ASSESSMENT:    Comments:  Pt found and left in bed with call light in reach and nursing present.      Treatment:  Patient practiced and was instructed in the following

## 2025-02-20 NOTE — PROGRESS NOTES
[facility]  Department of Internal Medicine   Internal Medicine Residency   MICU Progress Note    Patient:  Stewart Mark 74 y.o. male  MRN: [id number]     Date of Service: 2/20/2025    Allergy: Prinivil [lisinopril] and Seasonal    Overnight Events: No acute overnight event    Subjective     Seems to be tolerating procedure well. Does not have current complaints this morning.     ROS: Denies fever, chills, chest pain, shortness of breath, N/V/C/D, dysuria or blood in stool or urine   Objective     VS: /65   Pulse 66   Temp 97.8 °F (36.6 °C) (Axillary)   Resp 16   Ht 1.854 m (6' 1\")   Wt (!) 139.9 kg (308 lb 6.8 oz)   SpO2 93%   BMI 40.69 kg/m²           I & O - 24hr:   Intake/Output Summary (Last 24 hours) at 2/20/2025 0631  Last data filed at 2/20/2025 0627  Gross per 24 hour   Intake 1314.17 ml   Output 2255 ml   Net -940.83 ml       Sedatives: None    Pressors: None    Oxygen: None    ABG:     Lab Results   Component Value Date/Time    PH 7.406 02/19/2025 04:50 AM    PCO2 42.7 02/19/2025 04:50 AM    PO2 86.8 02/19/2025 04:50 AM    HCO3 26.2 02/19/2025 04:50 AM    BE 1.3 02/19/2025 04:50 AM    THB 11.8 02/19/2025 04:50 AM    O2SAT 96.5 02/19/2025 04:50 AM       Physical Exam:  General Appearance: No acute distress.  Obese  Neuro: Round symmetric pupil that react to light bilaterally    Cardiovascular: regular rate and rhythm, S1 and S2 heard, no murmurs appreciated   Respiratory: Clear to auscultation bilaterally. No wheezing or crackles appreciated.  Abdomen: Soft, non-tender; bowel sounds normal; no masses, no organomegaly, rebound or guarding  Extremities: Extremities normal, no cyanosis, distal pulses intact, bilateral edema.  Lines & Urinary Catheter:     2 peripheral IV 02/19 and 02/14  External urinary catheter 02/18     Labs     Basic Labs    Complete Blood Count:   Recent Labs     02/18/25  0806 02/19/25  0443 02/20/25  0430   WBC 9.6 7.8 7.7   HGB 12.2* 10.6* 10.5*

## 2025-02-20 NOTE — PROGRESS NOTES
[facility]   Progress note      Subjective:    Evaluated at bedside.  No acute issues overnight.  Currently at baseline.  Patient downgraded to intermediate.  Awaits pre-CERT to [facility].        Objective:    BP (!) 156/73   Pulse 65   Temp 97.6 °F (36.4 °C) (Axillary)   Resp 20   Ht 1.854 m (6' 1\")   Wt (!) 139.9 kg (308 lb 6.8 oz)   SpO2 96%   BMI 40.69 kg/m²     In: 1554.2 [P.O.:480; I.V.:1074.2]  Out: 2255   In: 1554.2   Out: 2255 [Urine:1900]    General appearance: NAD, conversant  HEENT: AT/NC, MMM  Neck: FROM, supple  Lungs: Clear to auscultation  CV: RRR, no MRGs  Vasc: Radial pulses 2+  Abdomen: Soft, non-tender; no masses or HSM.  Drain in place.  Draining bloody fluid  Extremities: No peripheral edema or digital cyanosis  Skin: no rash, lesions or ulcers  Psych: Alert and oriented to person, place and time  Neuro: Alert and interactive     Recent Labs     02/18/25  0806 02/19/25  0443 02/20/25  0430   WBC 9.6 7.8 7.7   HGB 12.2* 10.6* 10.5*   HCT 37.4 33.3* 32.6*    201 254       Recent Labs     02/18/25  0806 02/19/25  0443 02/20/25  0430    134 141   K 4.2 4.4 4.3   CL 97* 99 104   CO2 25 25 27   BUN 47* 57* 52*   CREATININE 1.2 1.1 1.1   CALCIUM 8.8 8.4* 8.6       Assessment:    Principal Problem:    Altered mental state  Active Problems:    Cholangitis (HCC)    Obstructive jaundice (HCC)    Altered mental status  Resolved Problems:    * No resolved hospital problems. *      Plan:    74-year-old male with a history of a CVA and diabetes presents to the ED with complaints of fall at facility shortness of breath and weakness and is admitted to telemetry unit with     Altered mental status//Klebsiella bacteremia  -Supplement O2 demands keeping oxygen saturation greater than 92%.  Patient is currently utilizing 3 L O2 as his baseline is room air at facility  - infectious disease consultation for positive blood cultures with Klebsiella pneumonia-source unclear  possible urinary tract versus abdominal  -DuoNebs   -Swallow study questionable aspiration pneumonia  -Strep/Legionella  Blood cultures pending     Diabetes Mellitus  -Monitor labs  -ISS glucose control  -Hypoglycemia protocol initiated    2/16/2025  Remains with altered mental status  Obtain EKG as he sounds irregular-likely PACs/PVCs-sinus rhythm on telemetry monitor  Appreciate infectious disease consult-continuation of IV Rocephin for Klebsiella bacteremia  Await other cultures including urine  Gallbladder right upper quadrant reveals distended gallbladder with pericholecystic fluid and sludge negative sonographic sign-General Surgery evaluation-keep n.p.o.  Consult general surgery for above  Elevated blood pressures-add as needed meds    2/17/25  -Unable to complete MRCP due to body habitus, await input from general surgery  -ID following, remains on p.o. Levaquin  -Worsening LFTs and total bilirubin  -Started on LR at 75  -BPs remain elevated however improved with improved tachycardia  -Await further plans from GI and GS    2/18/25  -Remains on IVF LR at 75  -Remains on p.o. Levaquin  -Worsening total bilirubin today 4.5  -ERCP with GI today  -Remains hyperglycemic, will increase sliding scale for tighter glucose control  -Elevated lipase today at 293  -Diet at the discretion of GI/GS     2/19/2025  Respiratory failure.  Was intubated, now extubated.  96% on 3 L NC  Altered mental status.  Elevated BUN/abnormal LFT  Klebsiella bacteremia.  On Levaquin.  ID following  Cholangitis/Obstructive jaundice.  S/p ERCP.  S/p PTC drain.  Drain in bloody fluid.  Cholelithiasis.  As above  Hiatal hernia.  Stable.  On PPI  Left lower lobe calcified granulomas.  Monitor  History of diabetes.  A1c 8% 3/11/2024  Fatty liver.  Follow-up outpatient     2/20/2025  Respiratory failure.  Currently extubated.  96% on 2 L NC  Altered mental status.  Elevated BUN/ LFT.  Check ammonia level.  Will need outpatient neurology

## 2025-02-20 NOTE — PROGRESS NOTES
Upon assessment this morning, pt. Has his eyes closed, he opens them when name is called, answers questions appropriately, however, he repeats the last 2 words of all conversations. He talks to himself. He is aware of person and place, disoriented to all else and has short term memory loss. Skin integrity intact, turned and repositioned pt. Needs complete assist. Pt. After set up, was able to feed himself.

## 2025-02-20 NOTE — PROGRESS NOTES
Attempted to get pt. Out of bed with Physical therapy, pt. Unable to sit up on the edge of bed unassisted. Placed pt. Back to bed with max assist of 3. Pt. Began to yell out became belligerent and aggressive. Pt. Was redirected.

## 2025-02-20 NOTE — CARE COORDINATION
Transition of Care-Gen Surg following-s/p ERCP w stent placement 2/18, PCT 2/19 . Spoke with wife, she requested [facility], [place], however is totally dependent currently. PT/OT saw yesterday, [facility] accepted and will submit pre-cert today. Patient is now on oral antibiotics, anticipate discharge soon. PERFECTO, HENs and steffi form completed.    Mayela MARTINEZ, RN  [facility]

## 2025-02-20 NOTE — PLAN OF CARE
Problem: ABCDS Injury Assessment  Goal: Absence of physical injury  Outcome: Progressing     Problem: Skin/Tissue Integrity  Goal: Skin integrity remains intact  Description: 1.  Monitor for areas of redness and/or skin breakdown  2.  Assess vascular access sites hourly  3.  Every 4-6 hours minimum:  Change oxygen saturation probe site  4.  Every 4-6 hours:  If on nasal continuous positive airway pressure, respiratory therapy assess nares and determine need for appliance change or resting period  Outcome: Progressing  Flowsheets  Taken 2/19/2025 2100  Skin Integrity Remains Intact: Monitor for areas of redness and/or skin breakdown  Taken 2/19/2025 2000  Skin Integrity Remains Intact: Monitor for areas of redness and/or skin breakdown     Problem: Discharge Planning  Goal: Discharge to home or other facility with appropriate resources  Outcome: Progressing  Flowsheets (Taken 2/19/2025 2000)  Discharge to home or other facility with appropriate resources: Identify barriers to discharge with patient and caregiver     Problem: Chronic Conditions and Co-morbidities  Goal: Patient's chronic conditions and co-morbidity symptoms are monitored and maintained or improved  Outcome: Progressing  Flowsheets (Taken 2/19/2025 2000)  Care Plan - Patient's Chronic Conditions and Co-Morbidity Symptoms are Monitored and Maintained or Improved: Monitor and assess patient's chronic conditions and comorbid symptoms for stability, deterioration, or improvement     Problem: Pain  Goal: Verbalizes/displays adequate comfort level or baseline comfort level  Outcome: Progressing     Problem: Safety - Adult  Goal: Free from fall injury  Outcome: Progressing     Problem: Safety - Medical Restraint  Goal: Remains free of injury from restraints (Restraint for Interference with Medical Device)  Description: INTERVENTIONS:  1. Determine that other, less restrictive measures have been tried or would not be effective before applying the

## 2025-02-20 NOTE — PROGRESS NOTES
Occupational Therapy  OT BEDSIDE TREATMENT NOTE      Date:2025  Patient Name: Stewart Mark  MRN: [id number]  : 1950  Room: [address] [place], [place] [address]       Evaluating OT: Jeanine Robertson, OTR/ANGY - [id number]     Referring Provider: Marina Son APRN - CNP  Specific Provider Orders/Date: \"OT eval and treat\" - 2025     Diagnosis: Shortness of breath [R06.02]  Altered mental state [R41.82]  Altered mental status [R41.82]      Surgery: Patient underwent ERCP on 2025. Patient underwent insertion of cholecystostomy tube on 2025.     Pertinent Medical History: anxiety, CAD, DM, HTN, obesity, sleep apnea, stroke, hyperlipidemia      Precautions: fall risk, O2      Assessment of Current Deficits:    [x] Functional mobility             [x] ADLs          [x] Strength                  [x] Cognition   [x] Functional transfers           [x] IADLs         [x] Safety Awareness   [x] Endurance   [] Fine Motor Coordination    [x] Balance      [] Vision/Perception   [x] Sensation    [] Gross Motor Coordination [] ROM          [] Delirium                  [] Motor Control      OT PLAN OF CARE   OT POC is based on physician orders, patient diagnosis, and results of clinical assessment.  Frequency/Duration 2-5 days/week for 2-4 weeks PRN   Specific OT Treatment Interventions to Include:   * Instruction/training on adapted ADL techniques and AE recommendations to increase functional independence within precautions       * Training on energy conservation strategies, correct breathing pattern and techniques to improve independence/tolerance for self-care routine  * Functional transfer/mobility training/DME recommendations for increased independence, safety, and fall prevention  * Patient/Family education to increase follow through with safety techniques and functional independence  * Recommendation of environmental modifications for increased safety with functional transfers/mobility and ADLs  * Cognitive

## 2025-02-20 NOTE — PROGRESS NOTES
PROGRESS NOTE        Patient Presents with/Seen in Consultation For      Reason for Consult: cf cholangitis, direct hyper bili   CHIEF COMPLAINT:  SOB and weakness   Subjective:     Patient seen more awake and alert, eating breakfast  Report mild right sided abd discomfort   No nausea, tolerating diet  Unknown last BM    Review of Systems  Aside from what was mentioned in the PMH and HPI, essentially unremarkable, all others negative.    Objective:     BP (!) 152/69   Pulse 65   Temp 97.8 °F (36.6 °C) (Axillary)   Resp 15   Ht 1.854 m (6' 1\")   Wt (!) 139.9 kg (308 lb 6.8 oz)   SpO2 96%   BMI 40.69 kg/m²     General appearance: alert, awake, laying in bed,cooperative  Eyes: conjunctiva pale, sclera anicteric. PERRL.  Lungs: clear to auscultation bilaterally  Heart: regular rate and rhythm, no murmur, 2+ pulses; trace edema  Abdomen: soft, non-tender; bowel sounds normal; no masses,  no organomegaly, drain   Extremities: extremities trace edema  Pulses: 2+ and symmetric  Skin: Skin color, texture, turgor normal.   Neurologic: alert and oriented     insulin lispro (HUMALOG,ADMELOG) injection vial 0-16 Units, 4x Daily WC  divalproex (DEPAKOTE SPRINKLE) DR capsule 250 mg, BID  pantoprazole (PROTONIX) 40 mg in sodium chloride (PF) 0.9 % 10 mL injection, Daily  prochlorperazine (COMPAZINE) injection 5 mg, Q6H PRN  metoprolol (LOPRESSOR) injection 2.5 mg, Q6H PRN  levoFLOXacin (LEVAQUIN) tablet 500 mg, Daily  hydrALAZINE (APRESOLINE) injection 10 mg, Q6H PRN  glucose chewable tablet 16 g, PRN  dextrose bolus 10% 125 mL, PRN   Or  dextrose bolus 10% 250 mL, PRN  glucagon injection 1 mg, PRN  dextrose 10 % infusion, Continuous PRN  ipratropium 0.5 mg-albuterol 2.5 mg (DUONEB) nebulizer solution 1 Dose, BID RT  albuterol (PROVENTIL) (2.5 MG/3ML) 0.083% nebulizer solution 2.5 mg, 4x Daily PRN  [Held by provider] aspirin EC tablet 81 mg, Nightly  atorvastatin (LIPITOR) tablet 40 mg, Nightly  bisacodyl (DULCOLAX)

## 2025-02-21 ENCOUNTER — APPOINTMENT (OUTPATIENT)
Dept: GENERAL RADIOLOGY | Age: 75
DRG: 871 | End: 2025-02-21
Payer: MEDICARE

## 2025-02-21 LAB
ALBUMIN SERPL-MCNC: 2.8 G/DL (ref 3.5–5.2)
ALP SERPL-CCNC: 328 U/L (ref 40–129)
ALT SERPL-CCNC: 194 U/L (ref 0–40)
ANION GAP SERPL CALCULATED.3IONS-SCNC: 10 MMOL/L (ref 7–16)
AST SERPL-CCNC: 212 U/L (ref 0–39)
BASOPHILS # BLD: 0.02 K/UL (ref 0–0.2)
BASOPHILS NFR BLD: 0 % (ref 0–2)
BILIRUB SERPL-MCNC: 1.8 MG/DL (ref 0–1.2)
BNP SERPL-MCNC: 479 PG/ML (ref 0–125)
BUN SERPL-MCNC: 36 MG/DL (ref 6–23)
CALCIUM SERPL-MCNC: 9 MG/DL (ref 8.6–10.2)
CHLORIDE SERPL-SCNC: 101 MMOL/L (ref 98–107)
CO2 SERPL-SCNC: 28 MMOL/L (ref 22–29)
CREAT SERPL-MCNC: 0.8 MG/DL (ref 0.7–1.2)
D-DIMER QUANTITATIVE: 881 NG/ML DDU (ref 0–230)
EKG ATRIAL RATE: 118 BPM
EKG P-R INTERVAL: 216 MS
EKG Q-T INTERVAL: 328 MS
EKG QRS DURATION: 100 MS
EKG QTC CALCULATION (BAZETT): 459 MS
EKG R AXIS: -52 DEGREES
EKG T AXIS: 16 DEGREES
EKG VENTRICULAR RATE: 118 BPM
EOSINOPHIL # BLD: 0.03 K/UL (ref 0.05–0.5)
EOSINOPHILS RELATIVE PERCENT: 0 % (ref 0–6)
ERYTHROCYTE [DISTWIDTH] IN BLOOD BY AUTOMATED COUNT: 15.4 % (ref 11.5–15)
GFR, ESTIMATED: >90 ML/MIN/1.73M2
GLUCOSE BLD-MCNC: 109 MG/DL (ref 74–99)
GLUCOSE BLD-MCNC: 117 MG/DL (ref 74–99)
GLUCOSE BLD-MCNC: 124 MG/DL (ref 74–99)
GLUCOSE BLD-MCNC: 149 MG/DL (ref 74–99)
GLUCOSE BLD-MCNC: 183 MG/DL (ref 74–99)
GLUCOSE SERPL-MCNC: 138 MG/DL (ref 74–99)
HCT VFR BLD AUTO: 35.1 % (ref 37–54)
HGB BLD-MCNC: 11.5 G/DL (ref 12.5–16.5)
IMM GRANULOCYTES # BLD AUTO: 0.2 K/UL (ref 0–0.58)
IMM GRANULOCYTES NFR BLD: 3 % (ref 0–5)
LYMPHOCYTES NFR BLD: 1.31 K/UL (ref 1.5–4)
LYMPHOCYTES RELATIVE PERCENT: 17 % (ref 20–42)
MAGNESIUM SERPL-MCNC: 2.6 MG/DL (ref 1.6–2.6)
MCH RBC QN AUTO: 30.5 PG (ref 26–35)
MCHC RBC AUTO-ENTMCNC: 32.8 G/DL (ref 32–34.5)
MCV RBC AUTO: 93.1 FL (ref 80–99.9)
MICROORGANISM SPEC CULT: NO GROWTH
MICROORGANISM/AGENT SPEC: NORMAL
MONOCYTES NFR BLD: 0.94 K/UL (ref 0.1–0.95)
MONOCYTES NFR BLD: 13 % (ref 2–12)
NEUTROPHILS NFR BLD: 67 % (ref 43–80)
NEUTS SEG NFR BLD: 5.05 K/UL (ref 1.8–7.3)
PHOSPHATE SERPL-MCNC: 4 MG/DL (ref 2.5–4.5)
PLATELET # BLD AUTO: 309 K/UL (ref 130–450)
PMV BLD AUTO: 10 FL (ref 7–12)
POTASSIUM SERPL-SCNC: 4 MMOL/L (ref 3.5–5)
PROT SERPL-MCNC: 7.1 G/DL (ref 6.4–8.3)
RBC # BLD AUTO: 3.77 M/UL (ref 3.8–5.8)
SERVICE CMNT-IMP: NORMAL
SODIUM SERPL-SCNC: 139 MMOL/L (ref 132–146)
SPECIMEN DESCRIPTION: NORMAL
TSH SERPL DL<=0.05 MIU/L-ACNC: 6.32 UIU/ML (ref 0.27–4.2)
WBC OTHER # BLD: 7.6 K/UL (ref 4.5–11.5)

## 2025-02-21 PROCEDURE — 2700000000 HC OXYGEN THERAPY PER DAY

## 2025-02-21 PROCEDURE — 74018 RADEX ABDOMEN 1 VIEW: CPT

## 2025-02-21 PROCEDURE — 94640 AIRWAY INHALATION TREATMENT: CPT

## 2025-02-21 PROCEDURE — 6360000002 HC RX W HCPCS: Performed by: FAMILY MEDICINE

## 2025-02-21 PROCEDURE — 84443 ASSAY THYROID STIM HORMONE: CPT

## 2025-02-21 PROCEDURE — 85379 FIBRIN DEGRADATION QUANT: CPT

## 2025-02-21 PROCEDURE — 84100 ASSAY OF PHOSPHORUS: CPT

## 2025-02-21 PROCEDURE — 6370000000 HC RX 637 (ALT 250 FOR IP): Performed by: NURSE PRACTITIONER

## 2025-02-21 PROCEDURE — 2500000003 HC RX 250 WO HCPCS: Performed by: FAMILY MEDICINE

## 2025-02-21 PROCEDURE — 6360000002 HC RX W HCPCS: Performed by: STUDENT IN AN ORGANIZED HEALTH CARE EDUCATION/TRAINING PROGRAM

## 2025-02-21 PROCEDURE — 83735 ASSAY OF MAGNESIUM: CPT

## 2025-02-21 PROCEDURE — 93005 ELECTROCARDIOGRAM TRACING: CPT | Performed by: STUDENT IN AN ORGANIZED HEALTH CARE EDUCATION/TRAINING PROGRAM

## 2025-02-21 PROCEDURE — 6370000000 HC RX 637 (ALT 250 FOR IP): Performed by: REGISTERED NURSE

## 2025-02-21 PROCEDURE — 6370000000 HC RX 637 (ALT 250 FOR IP): Performed by: FAMILY MEDICINE

## 2025-02-21 PROCEDURE — 6370000000 HC RX 637 (ALT 250 FOR IP): Performed by: STUDENT IN AN ORGANIZED HEALTH CARE EDUCATION/TRAINING PROGRAM

## 2025-02-21 PROCEDURE — 85025 COMPLETE CBC W/AUTO DIFF WBC: CPT

## 2025-02-21 PROCEDURE — APPSS60 APP SPLIT SHARED TIME 46-60 MINUTES

## 2025-02-21 PROCEDURE — 2060000000 HC ICU INTERMEDIATE R&B

## 2025-02-21 PROCEDURE — 83880 ASSAY OF NATRIURETIC PEPTIDE: CPT

## 2025-02-21 PROCEDURE — 99222 1ST HOSP IP/OBS MODERATE 55: CPT | Performed by: INTERNAL MEDICINE

## 2025-02-21 PROCEDURE — 2500000003 HC RX 250 WO HCPCS: Performed by: NURSE PRACTITIONER

## 2025-02-21 PROCEDURE — 80053 COMPREHEN METABOLIC PANEL: CPT

## 2025-02-21 PROCEDURE — 6360000002 HC RX W HCPCS: Performed by: INTERNAL MEDICINE

## 2025-02-21 PROCEDURE — 82962 GLUCOSE BLOOD TEST: CPT

## 2025-02-21 PROCEDURE — 93010 ELECTROCARDIOGRAM REPORT: CPT | Performed by: INTERNAL MEDICINE

## 2025-02-21 RX ORDER — SENNA AND DOCUSATE SODIUM 50; 8.6 MG/1; MG/1
1 TABLET, FILM COATED ORAL 2 TIMES DAILY
Status: DISCONTINUED | OUTPATIENT
Start: 2025-02-21 | End: 2025-02-23 | Stop reason: HOSPADM

## 2025-02-21 RX ORDER — DIGOXIN 0.25 MG/ML
500 INJECTION INTRAMUSCULAR; INTRAVENOUS ONCE
Status: COMPLETED | OUTPATIENT
Start: 2025-02-21 | End: 2025-02-21

## 2025-02-21 RX ADMIN — ENOXAPARIN SODIUM 30 MG: 100 INJECTION SUBCUTANEOUS at 20:54

## 2025-02-21 RX ADMIN — INSULIN GLARGINE 60 UNITS: 100 INJECTION, SOLUTION SUBCUTANEOUS at 08:51

## 2025-02-21 RX ADMIN — MEMANTINE HYDROCHLORIDE 5 MG: 5 TABLET, FILM COATED ORAL at 09:41

## 2025-02-21 RX ADMIN — FENOFIBRATE 54 MG: 54 TABLET ORAL at 09:44

## 2025-02-21 RX ADMIN — ENOXAPARIN SODIUM 30 MG: 100 INJECTION SUBCUTANEOUS at 08:50

## 2025-02-21 RX ADMIN — IPRATROPIUM BROMIDE AND ALBUTEROL SULFATE 1 DOSE: .5; 2.5 SOLUTION RESPIRATORY (INHALATION) at 19:44

## 2025-02-21 RX ADMIN — MAGNESIUM HYDROXIDE 30 ML: 400 SUSPENSION ORAL at 17:04

## 2025-02-21 RX ADMIN — LEVOFLOXACIN 500 MG: 500 TABLET, FILM COATED ORAL at 09:40

## 2025-02-21 RX ADMIN — ATORVASTATIN CALCIUM 40 MG: 40 TABLET, FILM COATED ORAL at 20:54

## 2025-02-21 RX ADMIN — HYDROCHLOROTHIAZIDE 25 MG: 25 TABLET ORAL at 09:40

## 2025-02-21 RX ADMIN — SODIUM CHLORIDE, PRESERVATIVE FREE 10 ML: 5 INJECTION INTRAVENOUS at 08:50

## 2025-02-21 RX ADMIN — DIGOXIN 500 MCG: 0.25 INJECTION INTRAMUSCULAR; INTRAVENOUS at 17:04

## 2025-02-21 RX ADMIN — PANTOPRAZOLE SODIUM 40 MG: 40 TABLET, DELAYED RELEASE ORAL at 06:31

## 2025-02-21 RX ADMIN — BUDESONIDE 250 MCG: 0.25 SUSPENSION RESPIRATORY (INHALATION) at 08:57

## 2025-02-21 RX ADMIN — METOPROLOL TARTRATE 100 MG: 50 TABLET, FILM COATED ORAL at 20:54

## 2025-02-21 RX ADMIN — DIVALPROEX SODIUM 250 MG: 250 TABLET, DELAYED RELEASE ORAL at 09:44

## 2025-02-21 RX ADMIN — MEMANTINE HYDROCHLORIDE 5 MG: 5 TABLET, FILM COATED ORAL at 20:54

## 2025-02-21 RX ADMIN — CLOPIDOGREL BISULFATE 75 MG: 75 TABLET ORAL at 09:40

## 2025-02-21 RX ADMIN — ASPIRIN 81 MG: 81 TABLET, COATED ORAL at 20:54

## 2025-02-21 RX ADMIN — INSULIN GLARGINE 60 UNITS: 100 INJECTION, SOLUTION SUBCUTANEOUS at 20:56

## 2025-02-21 RX ADMIN — LOSARTAN POTASSIUM 100 MG: 50 TABLET, FILM COATED ORAL at 09:40

## 2025-02-21 RX ADMIN — IPRATROPIUM BROMIDE AND ALBUTEROL SULFATE 1 DOSE: .5; 2.5 SOLUTION RESPIRATORY (INHALATION) at 08:57

## 2025-02-21 RX ADMIN — GABAPENTIN 600 MG: 300 CAPSULE ORAL at 21:29

## 2025-02-21 RX ADMIN — TAMSULOSIN HYDROCHLORIDE 0.8 MG: 0.4 CAPSULE ORAL at 09:40

## 2025-02-21 RX ADMIN — INSULIN LISPRO 4 UNITS: 100 INJECTION, SOLUTION INTRAVENOUS; SUBCUTANEOUS at 12:35

## 2025-02-21 RX ADMIN — ARFORMOTEROL TARTRATE 15 MCG: 15 SOLUTION RESPIRATORY (INHALATION) at 08:57

## 2025-02-21 RX ADMIN — GABAPENTIN 600 MG: 300 CAPSULE ORAL at 12:36

## 2025-02-21 RX ADMIN — BUDESONIDE 250 MCG: 0.25 SUSPENSION RESPIRATORY (INHALATION) at 19:44

## 2025-02-21 RX ADMIN — DIVALPROEX SODIUM 250 MG: 250 TABLET, DELAYED RELEASE ORAL at 20:55

## 2025-02-21 RX ADMIN — GABAPENTIN 600 MG: 300 CAPSULE ORAL at 09:40

## 2025-02-21 RX ADMIN — METOPROLOL TARTRATE 100 MG: 50 TABLET, FILM COATED ORAL at 09:40

## 2025-02-21 RX ADMIN — DOCUSATE SODIUM 50 MG AND SENNOSIDES 8.6 MG 1 TABLET: 8.6; 5 TABLET, FILM COATED ORAL at 20:54

## 2025-02-21 RX ADMIN — CETIRIZINE HYDROCHLORIDE 10 MG: 10 TABLET, FILM COATED ORAL at 09:41

## 2025-02-21 RX ADMIN — ARFORMOTEROL TARTRATE 15 MCG: 15 SOLUTION RESPIRATORY (INHALATION) at 19:44

## 2025-02-21 RX ADMIN — DULOXETINE 60 MG: 60 CAPSULE, DELAYED RELEASE ORAL at 09:40

## 2025-02-21 RX ADMIN — SODIUM CHLORIDE, PRESERVATIVE FREE 10 ML: 5 INJECTION INTRAVENOUS at 20:55

## 2025-02-21 RX ADMIN — GABAPENTIN 600 MG: 300 CAPSULE ORAL at 17:04

## 2025-02-21 RX ADMIN — BISACODYL 10 MG: 10 SUPPOSITORY RECTAL at 17:01

## 2025-02-21 RX ADMIN — METOPROLOL TARTRATE 2.5 MG: 5 INJECTION INTRAVENOUS at 10:45

## 2025-02-21 ASSESSMENT — PAIN SCALES - GENERAL
PAINLEVEL_OUTOF10: 0

## 2025-02-21 NOTE — PROGRESS NOTES
Spiritual Health History and Assessment/Progress Note  [facility]    Attempted Encounter, Rapid Response,  ,  Patient was asleep when  rounded.  prayed for Patient and left prayer card.    Name: Stewart Mark MRN: [id number]    Age: 74 y.o.     Sex: male   Language: English   Bahai: Evangelical   Altered mental state     Date: 2/21/2025                           Spiritual Assessment began in [facility] 2 ICU        Referral/Consult From: Rounding   Encounter Overview/Reason: Attempted Encounter  Service Provided For: Patient (Patient was asleep.)    Eugenie, Belief, Meaning:   Patient unable to assess at this time  Family/Friends No family/friends present      Importance and Influence:  Patient unable to assess at this time  Family/Friends No family/friends present    Community:  Patient Other: N/A  Family/Friends No family/friends present    Assessment and Plan of Care:     Patient Interventions include: Other: N/A  Family/Friends Interventions include: No family/friends present    Patient Plan of Care: Spiritual Care available upon further referral  Family/Friends Plan of Care: Spiritual Care available upon further referral    Electronically signed by MURIEL Aceves on 2/21/2025 at 1:56 PM

## 2025-02-21 NOTE — CONSULTS
Inpatient Cardiology Consultation      Reason for Consult: Hx CAD, sustained tachycardia    Consulting Physician: Dr. Goldman    Requesting Physician: Dr. Castelan     Date of Consultation: 2/21/2025    History of Present Illness:   Stewart Mark  is a 74 y.o. year old male known to [facility] cardiology and follows with Dr. Maria (last OV 9/4/2024).    Patient presented to the ED on 2/14/2025 with complaints of shortness of breath. Patient presents from [facility] where a lift assist call was made as patient was unable to get out of his wheelchair.  He states that he sat in his wheelchair all day long and when he finally stood up with the help of the fire department he was short of breath.  Initial labs significant for , , bilirubin 2.0, lactic acid 2.6.  Imaging revealed distended gallbladder with gallbladder neck gallstone, sludge and pericholecystic fluid present.  CTA pulmonary revealed mild linear atelectatic changes in the left lower lobe and mild compressive atelectasis in the right lower lobe secondary to elevated right Heema diaphragm with no PE and no evidence of pneumonia.  Blood cultures obtained on 2/14/2025 positive for Klebsiella pneumoniae (resistant to Ampicillin.  Intermediate to Cefazolin.  Sensitive to all others).    RRT called 2/18/2025 for change in mentation prior to ERCP.  He underwent ERCP with Dr. Ansari 2/18/2025 revealing choledocholithiasis and cholangitis s/p sphincterotomy with balloon sweeps and placement of 10F x 7cm DPTS.  Patient was transferred to the ICU postoperatively.  Patient was extubated on 2/19/2025.  General surgery following with plans for possible outpatient cholecystectomy. ID following for Klebsiella septicemia associated to this and cholangitis secondary to choledocholithiasis, on ofloxacin.  On 2/21/2025 patient became tachycardic and cardiology consulted.    Patient is sitting up in bed and appears to be in no acute  Seasonal    Social History:    Patient lives at home with his wife. Former smoker - 2.5 PPD x 10 years, quit 1979. Denies ETOH or illicit drug use.       Family History:   Family History   Problem Relation Age of Onset    Cancer Mother     Cancer Sister        Review of Systems:   As per HPI    PHYSICAL EXAM:   BP (!) 149/90   Pulse (!) 125   Temp 98.8 °F (37.1 °C) (Axillary)   Resp 17   Ht 1.854 m (6' 1\")   Wt (!) 139.9 kg (308 lb 6.8 oz)   SpO2 95%   BMI 40.69 kg/m²   CONST:  Well developed, well nourished who appears of stated age. Awake, alert and cooperative. No apparent distress.   HEENT: Normocephalic, atraumatic. PERRLA. No JVD. No carotid bruit.   CHEST: Chest symmetrical and non-tender to palpation.   RESPIRATORY: Clear to auscultation bilaterally. On supplemental O2. No accessory muscle use.   CARDIOVASCULAR:     Heart Inspection- shows no noted pulsations  Heart Palpation- no heaves or thrills; PMI is non-displaced   Heart Auscultation- Tachycardic, no murmur. No s3, s4 or rub   PV: No lower extremity edema. No varicosities. Pedal pulses palpable  ABDOMEN: Soft, non-tender to light palpation. Bowel sounds present. No palpable masses   MS: Good muscle strength and tone. No atrophy or abnormal movements.   : Deferred  SKIN: Warm and dry no statis dermatitis or ulcers   NEURO / PSYCH: Oriented to person, place and time. Speech clear and appropriate. Follows all commands. Pleasant affect     DATA:    12 lead ECG:        Telemetry: Sinus rhythm with a rate in the 60's > @ 7:11 am HR increased to >110 (sinus tachycardia vs atrial tachycardia)    Imaging:  US GALLBLADDER RUQ   Final Result   1. Distended gallbladder with gallbladder neck gallstone. Sludge or   pericholecystic fluid is also present. Gallbladder wall thickening up to 4   mm. Negative sonographic Lamar's sign.   2. Fatty infiltration of the liver.           CT HEAD WO CONTRAST   Final Result   No acute intracranial abnormality.

## 2025-02-21 NOTE — PROGRESS NOTES
bacteremia  -Supplement O2 demands keeping oxygen saturation greater than 92%.  Patient is currently utilizing 3 L O2 as his baseline is room air at facility  - infectious disease consultation for positive blood cultures with Klebsiella pneumonia-source unclear possible urinary tract versus abdominal  -DuoNebs   -Swallow study questionable aspiration pneumonia  -Strep/Legionella  Blood cultures pending     Diabetes Mellitus  -Monitor labs  -ISS glucose control  -Hypoglycemia protocol initiated    2/16/2025  Remains with altered mental status  Obtain EKG as he sounds irregular-likely PACs/PVCs-sinus rhythm on telemetry monitor  Appreciate infectious disease consult-continuation of IV Rocephin for Klebsiella bacteremia  Await other cultures including urine  Gallbladder right upper quadrant reveals distended gallbladder with pericholecystic fluid and sludge negative sonographic sign-General Surgery evaluation-keep n.p.o.  Consult general surgery for above  Elevated blood pressures-add as needed meds    2/17/25  -Unable to complete MRCP due to body habitus, await input from general surgery  -ID following, remains on p.o. Levaquin  -Worsening LFTs and total bilirubin  -Started on LR at 75  -BPs remain elevated however improved with improved tachycardia  -Await further plans from GI and GS    2/18/25  -Remains on IVF LR at 75  -Remains on p.o. Levaquin  -Worsening total bilirubin today 4.5  -ERCP with GI today  -Remains hyperglycemic, will increase sliding scale for tighter glucose control  -Elevated lipase today at 293  -Diet at the discretion of GI/GS     2/19/2025  Respiratory failure.  Was intubated, now extubated.  96% on 3 L NC  Altered mental status.  Elevated BUN/abnormal LFT  Klebsiella bacteremia.  On Levaquin.  ID following  Cholangitis/Obstructive jaundice.  S/p ERCP.  S/p PTC drain.  Drain in bloody fluid.  Cholelithiasis.  As above  Hiatal hernia.  Stable.  On PPI  Left lower lobe calcified granulomas.   Monitor  History of diabetes.  A1c 8% 3/11/2024  Fatty liver.  Follow-up outpatient    2/20/2025  Respiratory failure.  Currently extubated.  96% on 2 L NC  Altered mental status.  Elevated BUN/ LFT.  Check ammonia level.  Will need outpatient neurology follow-up  Klebsiella bacteremia.  On Levaquin.  ID following  Cholangitis/Obstructive jaundice.  S/p ERCP.  S/p PTC drain.  Draining bloody fluid.  Cholelithiasis.  As above  Hiatal hernia.  Stable.  On PPI  Left lower lobe calcified granulomas.  Monitor  History of diabetes.  A1c 8% 3/11/2024  Fatty liver.  Follow-up outpatient      2/21/2025  Tachycardia. Will obtain EKG and consult cardiology  Respiratory failure.  Currently extubated.  95% on 3 L NC  Altered mental status.  Elevated BUN/ LFT.  Check ammonia level.  Will need outpatient neurology follow-up  Klebsiella bacteremia.  On Levaquin.  ID following  Cholangitis/Obstructive jaundice.  S/p ERCP.  S/p PTC drain.  Draining bloody fluid.  Cholelithiasis.  As above  Hiatal hernia.  Stable.  On PPI  Left lower lobe calcified granulomas.  Monitor  History of diabetes.  A1c 8% 3/11/2024  Fatty liver.  Follow-up outpatient  Constipation. Obtain KUB. Will start senokot BID. Hold for 2 bowel moment/24hrs    Code Status: Full code  Consultants: Infectious disease, GI and GS    DVT Prophylaxis SCD's  PT/OT  Discharge planning. Awaits pre-CERT to [facility].             Bridger Castelan,   11:49 AM  2/21/2025

## 2025-02-21 NOTE — PROGRESS NOTES
GENERAL SURGERY  DAILY PROGRESS NOTE  2/21/2025  Cc:   Chief Complaint   Patient presents with    Shortness of Breath     sob       SUBJECTIVE:  Resting in bed, appears confused    OBJECTIVE:  /71   Pulse 65   Temp 98.6 °F (37 °C) (Oral)   Resp 17   Ht 1.854 m (6' 1\")   Wt (!) 139.9 kg (308 lb 6.8 oz)   SpO2 96%   BMI 40.69 kg/m²   I/O last 3 completed shifts:  In: 1554.2 [P.O.:480; I.V.:1074.2]  Out: 3455 [Urine:3000; Emesis/NG output:455]    GENERAL: resting in bed  HEAD: NCAT.   EYES: Anicteric. Round symmetric pupils.  CV: RR.  LUNGS/CHEST: no increased work of breathing on 3L  ABDOMEN: Soft, mild tenderness to palpation RUQ around drain site, drain with sanguineobilious output, nondistended.    LABS:  CBC  Recent Labs     02/18/25 0806 02/19/25 0443 02/20/25 0430   WBC 9.6 7.8 7.7   RBC 3.98 3.56* 3.50*   HGB 12.2* 10.6* 10.5*   HCT 37.4 33.3* 32.6*   MCV 94.0 93.5 93.1   MCH 30.7 29.8 30.0   MCHC 32.6 31.8* 32.2   RDW 14.8 14.9 15.1*    201 254   MPV 10.5 10.8 10.5     BMP  Recent Labs     02/18/25  0806 02/19/25 0443 02/20/25  0430    134 141   K 4.2 4.4 4.3   CL 97* 99 104   CO2 25 25 27   BUN 47* 57* 52*   CREATININE 1.2 1.1 1.1   GLUCOSE 262* 305* 290*   CALCIUM 8.8 8.4* 8.6     Liver Function  Recent Labs     02/18/25 0806 02/19/25 0443 02/20/25  0430   ALKPHOS 285* 304* 298*   * 258* 227*   * 346* 282*   BILITOT 4.5* 3.9* 2.3*   BILIDIR 3.5*  --   --      Recent Labs     02/20/25  0430   LIPASE 98*     No results for input(s): \"LACTATE\" in the last 72 hours.  Recent Labs     02/19/25  0650   INR 1.5       RADIOLOGY:  I have personally reviewed all relevant imaging:      ASSESSMENT/PLAN:  74 y.o. male with likely cholangitis and altered mental status s/p ERCP w stent placement 2/18, PCT 2/19     - monitor LFTs and bilirubin as they continue to downtrend after procedures   - continue diet as tolerated from surgical POV   - ID following for Abx, currently

## 2025-02-21 NOTE — CARE COORDINATION
Transition of Care-Auth obtained this afternoon for [facility]-expires Sunday at 2300. Call to Unit to update and see if patient was medically ready for discharge. PERFECTO, HENS and transport form/envelope in soft chart.    Mayela WILDERN, RN  [facility]

## 2025-02-21 NOTE — PLAN OF CARE
Problem: Nutrition Deficit:  Goal: Optimize nutritional status  2/21/2025 1530 by Tracey Huertas, RN  Outcome: Not Progressing  2/21/2025 1321 by Jannet Richey RD, CNSC, LD  Flowsheets (Taken 2/21/2025 1321)  Nutrient intake appropriate for improving, restoring, or maintaining nutritional needs:   Assess nutritional status and recommend course of action   Monitor oral intake, labs, and treatment plans   Recommend appropriate diets, oral nutritional supplements, and vitamin/mineral supplements     Problem: ABCDS Injury Assessment  Goal: Absence of physical injury  Outcome: Progressing     Problem: Skin/Tissue Integrity  Goal: Skin integrity remains intact  Description: 1.  Monitor for areas of redness and/or skin breakdown  2.  Assess vascular access sites hourly  3.  Every 4-6 hours minimum:  Change oxygen saturation probe site  4.  Every 4-6 hours:  If on nasal continuous positive airway pressure, respiratory therapy assess nares and determine need for appliance change or resting period  Outcome: Progressing  Flowsheets (Taken 2/21/2025 0800)  Skin Integrity Remains Intact: Monitor for areas of redness and/or skin breakdown     Problem: Discharge Planning  Goal: Discharge to home or other facility with appropriate resources  Outcome: Progressing  Flowsheets (Taken 2/21/2025 0800)  Discharge to home or other facility with appropriate resources:   Identify barriers to discharge with patient and caregiver   Arrange for needed discharge resources and transportation as appropriate   Identify discharge learning needs (meds, wound care, etc)     Problem: Chronic Conditions and Co-morbidities  Goal: Patient's chronic conditions and co-morbidity symptoms are monitored and maintained or improved  Outcome: Progressing  Flowsheets (Taken 2/21/2025 0800)  Care Plan - Patient's Chronic Conditions and Co-Morbidity Symptoms are Monitored and Maintained or Improved: Monitor and assess patient's chronic conditions and

## 2025-02-21 NOTE — PLAN OF CARE
Problem: ABCDS Injury Assessment  Goal: Absence of physical injury  Outcome: Progressing  Flowsheets (Taken 2/20/2025 2100)  Absence of Physical Injury: Implement safety measures based on patient assessment     Problem: Skin/Tissue Integrity  Goal: Skin integrity remains intact  Description: 1.  Monitor for areas of redness and/or skin breakdown  2.  Assess vascular access sites hourly  3.  Every 4-6 hours minimum:  Change oxygen saturation probe site  4.  Every 4-6 hours:  If on nasal continuous positive airway pressure, respiratory therapy assess nares and determine need for appliance change or resting period  Outcome: Progressing  Flowsheets (Taken 2/20/2025 2100)  Skin Integrity Remains Intact: Monitor for areas of redness and/or skin breakdown     Problem: Discharge Planning  Goal: Discharge to home or other facility with appropriate resources  Outcome: Progressing

## 2025-02-21 NOTE — PROGRESS NOTES
Spiritual Health History and Assessment/Progress Note  [facility]     Encounter, Rituals, Rites and Sacraments, Rapid Response,  ,      Name: Stewart Mark MRN: [id number]    Age: 74 y.o.     Sex: male   Language: English   Oriental orthodox: Restorationism   Altered mental state     Date: 2/21/2025                           Spiritual Assessment began in [facility] 2W ICU        Referral/Consult From: Rounding   Encounter Overview/Reason:  Encounter, Rituals, Rites and Sacraments  Service Provided For: Patient    Eugenie, Belief, Meaning:   Patient is connected with a eugenie tradition or spiritual practice  Family/Friends No family/friends present      Importance and Influence:  Patient has no beliefs influential to healthcare decision-making identified during this visit  Family/Friends No family/friends present    Community:  Patient feels well-supported. Support system includes: Spouse/Partner  Family/Friends No family/friends present    Assessment and Plan of Care:     Patient Interventions include: Affirmed coping skills/support systems and Provided sacramental/Uatsdin ritual  Family/Friends Interventions include: No family/friends present    Patient Plan of Care: Spiritual Care available upon further referral  Family/Friends Plan of Care: Spiritual Care available upon further referral    Electronically signed by Chaplain Clemnet on 2/21/2025 at 2:55 PM

## 2025-02-21 NOTE — PROGRESS NOTES
Comprehensive Nutrition Assessment    Type and Reason for Visit:  Initial, LOS (ICU LOS)    Nutrition Recommendations/Plan:   Continue current diet and Ensure HP BID ONS, as tolerated  Will continue inpatient monitoring POC     Malnutrition Assessment:  Malnutrition Status:  At risk for malnutrition (02/21/25 1319)    Context:  Acute Illness     Findings of the 6 clinical characteristics of malnutrition:  Energy Intake:  75% or less of estimated energy requirements for 7 or more days  Weight Loss:  No weight loss     Body Fat Loss:  No body fat loss     Muscle Mass Loss:  Unable to assess (+1 edema may mask some losses)    Fluid Accumulation:  Unable to assess Extremities (multiple components)   Strength:  Not Performed    Nutrition Assessment:    Pt admit from [facility] 2/2 ascending cholangitis d/t choledocholithiasis with associated  Klebsiella septicemia. S/p ERCP with stent and 2/19 cholecystectomy. PMHx=DM, MI, CVA, obesity. Will add Ensure HP BID ONS to optimize intake, as pt has poor appetite and intake at most meals <50%. Continue inpatient monitoring POC.    Nutrition Related Findings:    Drowsy/follows commands, distended tender abd +BS, -I/O 1.4, +1 edema, wheelchair bound at [facility] Wound Type: Surgical Incision (red buttocks)       Current Nutrition Intake & Therapies:    Average Meal Intake: 26-50%  Average Supplements Intake: None Ordered  ADULT DIET; Regular; 4 carb choices (60 gm/meal)    Anthropometric Measures:  Height: 185.4 cm (6' 1\")  Ideal Body Weight (IBW): 184 lbs (84 kg)    Admission Body Weight: 138.7 kg (305 lb 12.5 oz) (2/17 bedscale)  Current Body Weight: 139.9 kg (308 lb 6.8 oz), 167.6 % IBW. Weight Source: Bed scale (2/19)  Current BMI (kg/m2): 40.7  Usual Body Weight: 139.3 kg (307 lb 1.6 oz) (9/4/2024)     % Weight Change (Calculated): 0.4                    BMI Categories: Obese Class 3 (BMI 40.0 or greater)    Estimated Daily Nutrient Needs:  Energy Requirements Based On:

## 2025-02-21 NOTE — PROGRESS NOTES
[facility]  [facility]  Progress Note    SUBJECTIVE:  Chief Complaint   Patient presents with    Shortness of Breath     sob     The patient is in the ICU.  No new complaint.  Answers every question with the same answer: \"Cold\".  No new problems reported by nursing.  No fevers.    Review of systems:  As stated above in the chief complaint, otherwise negative.    Medications:  Scheduled Meds:   insulin lispro  0-16 Units SubCUTAneous 4x Daily WC    pantoprazole  40 mg Oral QAM AC    levoFLOXacin  500 mg Oral Daily    ipratropium 0.5 mg-albuterol 2.5 mg  1 Dose Inhalation BID RT    aspirin  81 mg Oral Nightly    atorvastatin  40 mg Oral Nightly    clopidogrel  75 mg Oral QAM    divalproex  250 mg Oral BID    DULoxetine  60 mg Oral QAM    fenofibrate  54 mg Oral Daily    gabapentin  600 mg Oral 4x Daily    hydroCHLOROthiazide  25 mg Oral Daily    insulin glargine  60 Units SubCUTAneous BID    cetirizine  10 mg Oral Daily    losartan  100 mg Oral QAM    memantine  5 mg Oral BID    [Held by provider] metFORMIN  1,000 mg Oral BID WC    metoprolol  100 mg Oral BID    tamsulosin  0.8 mg Oral Daily    sodium chloride flush  5-40 mL IntraVENous 2 times per day    enoxaparin  30 mg SubCUTAneous BID    budesonide  0.25 mg Nebulization BID RT    And    arformoterol tartrate  15 mcg Nebulization BID RT     Continuous Infusions:   dextrose      sodium chloride       PRN Meds:prochlorperazine, metoprolol, hydrALAZINE, glucose, dextrose bolus **OR** dextrose bolus, glucagon (rDNA), dextrose, albuterol, bisacodyl, fluticasone, melatonin, magnesium hydroxide, sodium chloride flush, sodium chloride, acetaminophen **OR** acetaminophen    OBJECTIVE:  BP (!) 151/75   Pulse 69   Temp 98.6 °F (37 °C) (Oral)   Resp 16   Ht 1.854 m (6' 1\")   Wt (!) 139.9 kg (308 lb 6.8 oz)   SpO2 96%   BMI 40.69 kg/m²   Temp  Av.5 °F (36.9 °C)  Min: 97.6 °F (36.4 °C)  Max: 99.3 °F (37.4 °C)  Constitutional: The  patient is lying in bed in the ICU.  He is awake and alert.  Cantankerous.  Skin: Warm and dry. No rashes were noted.   HEENT: Round and reactive pupils.  Moist mucous membranes.  No ulcerations or thrush.  Neck: Supple to movements.   Chest: No respiratory distress.  No crackles.  Cardiovascular: S1 and S2 are rhythmic and regular. No murmurs appreciated.   Abdomen: Positive bowel sounds to auscultation.  Soft and benign to palpation.  Cholecystostomy tube with bloody fluid.  Extremities: No edema.  Lines: Peripheral.  External male Santos catheter.    Laboratory and Tests:  Reviewed    Radiology:  Reviewed    Microbiology:   Blood cultures 2/14/2025: Klebsiella pneumoniae (resistant to Ampicillin.  Intermediate to Cefazolin.  Sensitive to all others)  Streptococcus pneumoniae/Legionella urine Ag: negative  Viral panel: Negative  Biliary fluid 2/19/2025: Negative so far  Nares screen MRSA: Negative    ASSESSMENT:  Ascending cholangitis secondary to choledocholithiasis  Klebsiella septicemia associated to the above  Elevation of transaminases associated to the above-slightly improved  Fevers associated to the above  Status post ERCP with balloon sweep and stent 2/18/2025  Status post cholecystostomy tube placement 2/19/2025    PLAN:  Continue Levofloxacin until tomorrow  Surgery planning outpatient cholecystectomy  No further recommendations  ID is signed off    Spoke with nursing.    Justin Liao MD  8:49 AM  2/21/2025

## 2025-02-21 NOTE — PROGRESS NOTES
Patient sustaining tachycardia between 120-130 after morning lopressor as well as PRN. Dr Castelan call service notified and message with call back number left on secure line.

## 2025-02-21 NOTE — PROGRESS NOTES
PROGRESS NOTE        Patient Presents with/Seen in Consultation For      Reason for Consult: cf cholangitis, direct hyper bili   CHIEF COMPLAINT:  SOB and weakness   Subjective:     Patient seen lying in bed, asleep.  Easily awakens to name.  Patient somewhat drowsy.  Tolerating breakfast, poor appetite.  Patient denies any nausea vomiting and/or pain.  No bowel movement noted.  Plan of care reviewed.     Review of Systems  Aside from what was mentioned in the PMH and HPI, essentially unremarkable, all others negative.    Objective:     BP (!) 149/94   Pulse (!) 128   Temp 98.8 °F (37.1 °C) (Axillary)   Resp 21   Ht 1.854 m (6' 1\")   Wt (!) 139.9 kg (308 lb 6.8 oz)   SpO2 98%   BMI 40.69 kg/m²     General appearance: alert, awake, laying in bed,cooperative  Eyes: conjunctiva pale, sclera anicteric. PERRL.  Lungs: clear to auscultation bilaterally  Heart: regular rate and rhythm, no murmur, 2+ pulses; trace edema  Abdomen: soft, non-tender; bowel sounds normal; no masses,  no organomegaly, MARK drain intact right lower quad scant bloody drainage noted  Extremities: extremities trace edema  Pulses: 2+ and symmetric  Skin: Skin color, texture, turgor normal.   Neurologic: alert and oriented     insulin lispro (HUMALOG,ADMELOG) injection vial 0-16 Units, 4x Daily WC  pantoprazole (PROTONIX) tablet 40 mg, QAM AC  prochlorperazine (COMPAZINE) injection 5 mg, Q6H PRN  metoprolol (LOPRESSOR) injection 2.5 mg, Q6H PRN  levoFLOXacin (LEVAQUIN) tablet 500 mg, Daily  hydrALAZINE (APRESOLINE) injection 10 mg, Q6H PRN  glucose chewable tablet 16 g, PRN  dextrose bolus 10% 125 mL, PRN   Or  dextrose bolus 10% 250 mL, PRN  glucagon injection 1 mg, PRN  dextrose 10 % infusion, Continuous PRN  ipratropium 0.5 mg-albuterol 2.5 mg (DUONEB) nebulizer solution 1 Dose, BID RT  albuterol (PROVENTIL) (2.5 MG/3ML) 0.083% nebulizer solution 2.5 mg, 4x Daily PRN  aspirin EC tablet 81 mg, Nightly  atorvastatin (LIPITOR) tablet 40 mg,

## 2025-02-21 NOTE — PATIENT CARE CONFERENCE
Intensive Care Daily Quality Rounding Checklist        ICU Team Members: bedside nurse, charge nurse, clinical pharmacist,, resident     ICU Day #: NUMBER: 4 - Downgraded to Intermediate February 20     SOFA Score:2     Intubation Date: February 18 - extubated February 19     Ventilator Day #:      Central Line Insertion Date: NA                                                    Day #:                                                     Indication:       Arterial Line Insertion Date:  NA                             Day #:      Temporary Hemodialysis Catheter Insertion Date:                               Day #      DVT Prophylaxis:lovenox    GI Prophylaxis:protonix     Santos Catheter Insertion Date:  NA                                        Day #:                              Indications:                              Continued need (if yes, reason documented and discussed with physician):      Skin Issues/ Wounds and ordered treatment discussed on rounds: Y     Goals/ Plans for the Day:  labs, transfer out of ICU     Reviewed plan and goals for day with patient and/or representative: Yes     **SHARE this note so that the rounding nurse may edit**

## 2025-02-22 ENCOUNTER — APPOINTMENT (OUTPATIENT)
Dept: CT IMAGING | Age: 75
DRG: 871 | End: 2025-02-22
Payer: MEDICARE

## 2025-02-22 ENCOUNTER — APPOINTMENT (OUTPATIENT)
Dept: MRI IMAGING | Age: 75
DRG: 871 | End: 2025-02-22
Payer: MEDICARE

## 2025-02-22 ENCOUNTER — APPOINTMENT (OUTPATIENT)
Age: 75
DRG: 871 | End: 2025-02-22
Attending: INTERNAL MEDICINE
Payer: MEDICARE

## 2025-02-22 ENCOUNTER — APPOINTMENT (OUTPATIENT)
Dept: ULTRASOUND IMAGING | Age: 75
DRG: 871 | End: 2025-02-22
Payer: MEDICARE

## 2025-02-22 LAB
ALBUMIN SERPL-MCNC: 2.7 G/DL (ref 3.5–5.2)
ALP SERPL-CCNC: 317 U/L (ref 40–129)
ALT SERPL-CCNC: 145 U/L (ref 0–40)
ANION GAP SERPL CALCULATED.3IONS-SCNC: 12 MMOL/L (ref 7–16)
AST SERPL-CCNC: 155 U/L (ref 0–39)
BASOPHILS # BLD: 0.03 K/UL (ref 0–0.2)
BASOPHILS NFR BLD: 0 % (ref 0–2)
BILIRUB SERPL-MCNC: 1.6 MG/DL (ref 0–1.2)
BUN SERPL-MCNC: 38 MG/DL (ref 6–23)
CALCIUM SERPL-MCNC: 8.5 MG/DL (ref 8.6–10.2)
CHLORIDE SERPL-SCNC: 97 MMOL/L (ref 98–107)
CO2 SERPL-SCNC: 28 MMOL/L (ref 22–29)
CREAT SERPL-MCNC: 1 MG/DL (ref 0.7–1.2)
EOSINOPHIL # BLD: 0.05 K/UL (ref 0.05–0.5)
EOSINOPHILS RELATIVE PERCENT: 1 % (ref 0–6)
ERYTHROCYTE [DISTWIDTH] IN BLOOD BY AUTOMATED COUNT: 15.4 % (ref 11.5–15)
GFR, ESTIMATED: 80 ML/MIN/1.73M2
GLUCOSE BLD-MCNC: 103 MG/DL (ref 74–99)
GLUCOSE BLD-MCNC: 103 MG/DL (ref 74–99)
GLUCOSE BLD-MCNC: 112 MG/DL (ref 74–99)
GLUCOSE BLD-MCNC: 119 MG/DL (ref 74–99)
GLUCOSE BLD-MCNC: 119 MG/DL (ref 74–99)
GLUCOSE BLD-MCNC: 148 MG/DL (ref 74–99)
GLUCOSE SERPL-MCNC: 129 MG/DL (ref 74–99)
HCT VFR BLD AUTO: 34.2 % (ref 37–54)
HGB BLD-MCNC: 11.3 G/DL (ref 12.5–16.5)
IMM GRANULOCYTES # BLD AUTO: 0.4 K/UL (ref 0–0.58)
IMM GRANULOCYTES NFR BLD: 4 % (ref 0–5)
LYMPHOCYTES NFR BLD: 1.74 K/UL (ref 1.5–4)
LYMPHOCYTES RELATIVE PERCENT: 18 % (ref 20–42)
MAGNESIUM SERPL-MCNC: 2.5 MG/DL (ref 1.6–2.6)
MCH RBC QN AUTO: 30.1 PG (ref 26–35)
MCHC RBC AUTO-ENTMCNC: 33 G/DL (ref 32–34.5)
MCV RBC AUTO: 91.2 FL (ref 80–99.9)
MONOCYTES NFR BLD: 1.07 K/UL (ref 0.1–0.95)
MONOCYTES NFR BLD: 11 % (ref 2–12)
NEUTROPHILS NFR BLD: 66 % (ref 43–80)
NEUTS SEG NFR BLD: 6.26 K/UL (ref 1.8–7.3)
PHOSPHATE SERPL-MCNC: 5.4 MG/DL (ref 2.5–4.5)
PLATELET # BLD AUTO: 356 K/UL (ref 130–450)
PMV BLD AUTO: 10.1 FL (ref 7–12)
POTASSIUM SERPL-SCNC: 4.2 MMOL/L (ref 3.5–5)
PROT SERPL-MCNC: 6.9 G/DL (ref 6.4–8.3)
RBC # BLD AUTO: 3.75 M/UL (ref 3.8–5.8)
SODIUM SERPL-SCNC: 137 MMOL/L (ref 132–146)
WBC OTHER # BLD: 9.6 K/UL (ref 4.5–11.5)

## 2025-02-22 PROCEDURE — 6370000000 HC RX 637 (ALT 250 FOR IP): Performed by: STUDENT IN AN ORGANIZED HEALTH CARE EDUCATION/TRAINING PROGRAM

## 2025-02-22 PROCEDURE — 94640 AIRWAY INHALATION TREATMENT: CPT

## 2025-02-22 PROCEDURE — 76705 ECHO EXAM OF ABDOMEN: CPT

## 2025-02-22 PROCEDURE — 71275 CT ANGIOGRAPHY CHEST: CPT

## 2025-02-22 PROCEDURE — 6360000002 HC RX W HCPCS: Performed by: FAMILY MEDICINE

## 2025-02-22 PROCEDURE — 2580000003 HC RX 258: Performed by: STUDENT IN AN ORGANIZED HEALTH CARE EDUCATION/TRAINING PROGRAM

## 2025-02-22 PROCEDURE — 6360000002 HC RX W HCPCS: Performed by: STUDENT IN AN ORGANIZED HEALTH CARE EDUCATION/TRAINING PROGRAM

## 2025-02-22 PROCEDURE — 6370000000 HC RX 637 (ALT 250 FOR IP): Performed by: FAMILY MEDICINE

## 2025-02-22 PROCEDURE — 6360000004 HC RX CONTRAST MEDICATION: Performed by: RADIOLOGY

## 2025-02-22 PROCEDURE — 2700000000 HC OXYGEN THERAPY PER DAY

## 2025-02-22 PROCEDURE — 83735 ASSAY OF MAGNESIUM: CPT

## 2025-02-22 PROCEDURE — 84100 ASSAY OF PHOSPHORUS: CPT

## 2025-02-22 PROCEDURE — 82962 GLUCOSE BLOOD TEST: CPT

## 2025-02-22 PROCEDURE — 70450 CT HEAD/BRAIN W/O DYE: CPT

## 2025-02-22 PROCEDURE — 2060000000 HC ICU INTERMEDIATE R&B

## 2025-02-22 PROCEDURE — 80053 COMPREHEN METABOLIC PANEL: CPT

## 2025-02-22 PROCEDURE — 6370000000 HC RX 637 (ALT 250 FOR IP): Performed by: SPECIALIST

## 2025-02-22 PROCEDURE — 85025 COMPLETE CBC W/AUTO DIFF WBC: CPT

## 2025-02-22 PROCEDURE — 99233 SBSQ HOSP IP/OBS HIGH 50: CPT | Performed by: INTERNAL MEDICINE

## 2025-02-22 RX ORDER — SODIUM CHLORIDE, SODIUM LACTATE, POTASSIUM CHLORIDE, AND CALCIUM CHLORIDE .6; .31; .03; .02 G/100ML; G/100ML; G/100ML; G/100ML
500 INJECTION, SOLUTION INTRAVENOUS ONCE
Status: COMPLETED | OUTPATIENT
Start: 2025-02-22 | End: 2025-02-22

## 2025-02-22 RX ORDER — IOPAMIDOL 755 MG/ML
75 INJECTION, SOLUTION INTRAVASCULAR
Status: COMPLETED | OUTPATIENT
Start: 2025-02-22 | End: 2025-02-22

## 2025-02-22 RX ADMIN — MEMANTINE HYDROCHLORIDE 5 MG: 5 TABLET, FILM COATED ORAL at 22:09

## 2025-02-22 RX ADMIN — FENOFIBRATE 54 MG: 54 TABLET ORAL at 08:07

## 2025-02-22 RX ADMIN — METOPROLOL TARTRATE 100 MG: 50 TABLET, FILM COATED ORAL at 08:08

## 2025-02-22 RX ADMIN — HYDROCHLOROTHIAZIDE 25 MG: 25 TABLET ORAL at 08:08

## 2025-02-22 RX ADMIN — LEVOFLOXACIN 500 MG: 500 TABLET, FILM COATED ORAL at 08:09

## 2025-02-22 RX ADMIN — SODIUM CHLORIDE, POTASSIUM CHLORIDE, SODIUM LACTATE AND CALCIUM CHLORIDE 500 ML: 600; 310; 30; 20 INJECTION, SOLUTION INTRAVENOUS at 12:10

## 2025-02-22 RX ADMIN — ASPIRIN 81 MG: 81 TABLET, COATED ORAL at 22:09

## 2025-02-22 RX ADMIN — CETIRIZINE HYDROCHLORIDE 10 MG: 10 TABLET, FILM COATED ORAL at 08:08

## 2025-02-22 RX ADMIN — LOSARTAN POTASSIUM 100 MG: 50 TABLET, FILM COATED ORAL at 08:08

## 2025-02-22 RX ADMIN — INSULIN GLARGINE 60 UNITS: 100 INJECTION, SOLUTION SUBCUTANEOUS at 08:11

## 2025-02-22 RX ADMIN — DIVALPROEX SODIUM 250 MG: 250 TABLET, DELAYED RELEASE ORAL at 08:07

## 2025-02-22 RX ADMIN — BUDESONIDE 250 MCG: 0.25 SUSPENSION RESPIRATORY (INHALATION) at 08:53

## 2025-02-22 RX ADMIN — ATORVASTATIN CALCIUM 40 MG: 40 TABLET, FILM COATED ORAL at 22:09

## 2025-02-22 RX ADMIN — GABAPENTIN 600 MG: 300 CAPSULE ORAL at 22:09

## 2025-02-22 RX ADMIN — ENOXAPARIN SODIUM 30 MG: 100 INJECTION SUBCUTANEOUS at 22:09

## 2025-02-22 RX ADMIN — DOCUSATE SODIUM 50 MG AND SENNOSIDES 8.6 MG 1 TABLET: 8.6; 5 TABLET, FILM COATED ORAL at 08:08

## 2025-02-22 RX ADMIN — ENOXAPARIN SODIUM 30 MG: 100 INJECTION SUBCUTANEOUS at 08:07

## 2025-02-22 RX ADMIN — MEMANTINE HYDROCHLORIDE 5 MG: 5 TABLET, FILM COATED ORAL at 08:08

## 2025-02-22 RX ADMIN — ARFORMOTEROL TARTRATE 15 MCG: 15 SOLUTION RESPIRATORY (INHALATION) at 08:53

## 2025-02-22 RX ADMIN — GABAPENTIN 600 MG: 300 CAPSULE ORAL at 16:39

## 2025-02-22 RX ADMIN — GABAPENTIN 600 MG: 300 CAPSULE ORAL at 12:51

## 2025-02-22 RX ADMIN — ARFORMOTEROL TARTRATE 15 MCG: 15 SOLUTION RESPIRATORY (INHALATION) at 19:09

## 2025-02-22 RX ADMIN — IOPAMIDOL 75 ML: 755 INJECTION, SOLUTION INTRAVENOUS at 14:20

## 2025-02-22 RX ADMIN — METOPROLOL TARTRATE 100 MG: 50 TABLET, FILM COATED ORAL at 22:09

## 2025-02-22 RX ADMIN — DULOXETINE 60 MG: 60 CAPSULE, DELAYED RELEASE ORAL at 08:08

## 2025-02-22 RX ADMIN — IPRATROPIUM BROMIDE AND ALBUTEROL SULFATE 1 DOSE: .5; 2.5 SOLUTION RESPIRATORY (INHALATION) at 08:53

## 2025-02-22 RX ADMIN — TAMSULOSIN HYDROCHLORIDE 0.8 MG: 0.4 CAPSULE ORAL at 09:58

## 2025-02-22 RX ADMIN — DOCUSATE SODIUM 50 MG AND SENNOSIDES 8.6 MG 1 TABLET: 8.6; 5 TABLET, FILM COATED ORAL at 22:09

## 2025-02-22 RX ADMIN — DIVALPROEX SODIUM 250 MG: 250 TABLET, DELAYED RELEASE ORAL at 22:10

## 2025-02-22 RX ADMIN — GABAPENTIN 600 MG: 300 CAPSULE ORAL at 08:08

## 2025-02-22 RX ADMIN — IPRATROPIUM BROMIDE AND ALBUTEROL SULFATE 1 DOSE: .5; 2.5 SOLUTION RESPIRATORY (INHALATION) at 19:09

## 2025-02-22 RX ADMIN — PANTOPRAZOLE SODIUM 40 MG: 40 TABLET, DELAYED RELEASE ORAL at 06:57

## 2025-02-22 RX ADMIN — BUDESONIDE 250 MCG: 0.25 SUSPENSION RESPIRATORY (INHALATION) at 19:09

## 2025-02-22 NOTE — DISCHARGE SUMMARY
[facility]   Discharge summary   Patient ID:  Stewart Mark  [id number]  74 y.o.  1950    Admit date: 2/14/2025    Discharge date and time: 2/23/2025    Admission Diagnoses:   Patient Active Problem List   Diagnosis    Obstructive sleep apnea syndrome    Shortness of breath    Moderate persistent asthma without complication    Uncontrolled type 2 diabetes mellitus    CAD in native artery    HLD (hyperlipidemia)    Morbid obesity    Altered mental state    Cholangitis (HCC)    Obstructive jaundice (HCC)    Altered mental status       Discharge Diagnoses: cholangitis,     Consults: pulmonary/intensive care, GI, and general surgery    Procedures:     Hospital Course: The patient is a 74 y.o. male of Liv Kaufman MD with significant past medical history of  has a past medical history of Acute stroke due to ischemia (HCC), Anxiety, CAD in native artery, Depression, Diabetes mellitus (HCC), Hyperlipidemia, Hypertension, Moderate persistent asthma without complication, Obesity, Sleep apnea, Type 2 diabetes mellitus without complication (HCC), and Ulceration who presented with altered mental status     Altered mental status//Klebsiella bacteremia  -Supplement O2 demands keeping oxygen saturation greater than 92%.  Patient is currently utilizing 3 L O2 as his baseline is room air at facility  - infectious disease consultation for positive blood cultures with Klebsiella pneumonia-source unclear possible urinary tract versus abdominal  -DuoNebs   -Swallow study questionable aspiration pneumonia  -Strep/Legionella  Blood cultures pending     Diabetes Mellitus  -Monitor labs  -ISS glucose control  -Hypoglycemia protocol initiated     2/16/2025  Remains with altered mental status  Obtain EKG as he sounds irregular-likely PACs/PVCs-sinus rhythm on telemetry monitor  Appreciate infectious disease consult-continuation of IV Rocephin for Klebsiella bacteremia  Await other cultures including  is normal.  There are no enlarged mediastinal or hilar lymph nodes.  There are calcified left hilar lymph nodes.  There are coronary artery calcifications. Lung window images: Right lung: Mild compressive atelectasis secondary to elevated right hemidiaphragm. Otherwise no evidence of pneumonia. Left lung: Minor left lower lobe atelectatic changes.  Multiple left lower lobe calcified granulomas. No evidence of pneumonia. FINDINGS: 1. No evidence of pulmonary embolic disease. 2. Mild linear atelectatic changes in the left lower lobe and mild compressive atelectasis in the right lower lobe secondary to elevated right hemidiaphragm.     CT ABDOMEN PELVIS W IV CONTRAST Additional Contrast? None    Result Date: 2/14/2025  EXAMINATION: CT OF THE ABDOMEN AND PELVIS WITH CONTRAST2/14/2025 9:23 am TECHNIQUE: CT of the abdomen and pelvis was performed with the administration of intravenous contrast. Multiplanar reformatted images are provided for review. Automated exposure control, iterative reconstruction, and/or weight based adjustment of the mA/kV was utilized to reduce the radiation dose to as low as reasonably achievable. COMPARISON: CT abdomen and pelvis 12/23/2024. HISTORY: ORDERING SYSTEM PROVIDED HISTORY: Altered mental status, generalized weakness TECHNOLOGIST PROVIDED HISTORY: Reason for exam:->Altered mental status, generalized weakness Additional Contrast?->None Decision Support Exception - unselect if not a suspected or confirmed emergency medical condition->Emergency Medical Condition (MA) FINDINGS: There is a calcified granuloma in the anterior right upper lobe.  There are calcified left hilar lymph nodes and a calcified granuloma in the medial basilar left lower lobe.  There is no evidence of pneumonia or pleural or pericardial effusions. There is cholelithiasis with no evidence of cholecystitis.  The liver and pancreas and adrenal glands and spleen and stomach are unremarkable.  Kidneys enhance normally with

## 2025-02-22 NOTE — PLAN OF CARE
Problem: ABCDS Injury Assessment  Goal: Absence of physical injury  Outcome: Progressing  Flowsheets (Taken 2/21/2025 2000 by Arturo Marie, RN)  Absence of Physical Injury: Implement safety measures based on patient assessment     Problem: Skin/Tissue Integrity  Goal: Skin integrity remains intact  Description: 1.  Monitor for areas of redness and/or skin breakdown  2.  Assess vascular access sites hourly  3.  Every 4-6 hours minimum:  Change oxygen saturation probe site  4.  Every 4-6 hours:  If on nasal continuous positive airway pressure, respiratory therapy assess nares and determine need for appliance change or resting period  Outcome: Progressing  Flowsheets (Taken 2/21/2025 2000 by Arturo Marie, RN)  Skin Integrity Remains Intact: Monitor for areas of redness and/or skin breakdown     Problem: Discharge Planning  Goal: Discharge to home or other facility with appropriate resources  Outcome: Progressing  Flowsheets (Taken 2/21/2025 2000 by Arturo Marie RN)  Discharge to home or other facility with appropriate resources: Identify barriers to discharge with patient and caregiver     Problem: Chronic Conditions and Co-morbidities  Goal: Patient's chronic conditions and co-morbidity symptoms are monitored and maintained or improved  Outcome: Progressing  Flowsheets (Taken 2/21/2025 2000 by Arturo Marie, RN)  Care Plan - Patient's Chronic Conditions and Co-Morbidity Symptoms are Monitored and Maintained or Improved: Monitor and assess patient's chronic conditions and comorbid symptoms for stability, deterioration, or improvement     Problem: Pain  Goal: Verbalizes/displays adequate comfort level or baseline comfort level  Outcome: Progressing     Problem: Safety - Adult  Goal: Free from fall injury  Outcome: Progressing  Flowsheets (Taken 2/21/2025 2000 by Arturo Marie, RN)  Free From Fall Injury: Instruct family/caregiver on patient safety

## 2025-02-22 NOTE — PROGRESS NOTES
Dr. Castelan notified of left side becoming increasingly more weak, orders received for MRI to be done. Fariba (spouse) called and updated on plan of care.

## 2025-02-23 ENCOUNTER — APPOINTMENT (OUTPATIENT)
Age: 75
DRG: 871 | End: 2025-02-23
Attending: INTERNAL MEDICINE
Payer: MEDICARE

## 2025-02-23 VITALS
WEIGHT: 292.33 LBS | RESPIRATION RATE: 16 BRPM | TEMPERATURE: 99.1 F | HEIGHT: 73 IN | OXYGEN SATURATION: 95 % | DIASTOLIC BLOOD PRESSURE: 79 MMHG | HEART RATE: 84 BPM | SYSTOLIC BLOOD PRESSURE: 97 MMHG | BODY MASS INDEX: 38.74 KG/M2

## 2025-02-23 LAB
ALBUMIN SERPL-MCNC: 2.7 G/DL (ref 3.5–5.2)
ALP SERPL-CCNC: 287 U/L (ref 40–129)
ALT SERPL-CCNC: 110 U/L (ref 0–40)
ANION GAP SERPL CALCULATED.3IONS-SCNC: 10 MMOL/L (ref 7–16)
AST SERPL-CCNC: 119 U/L (ref 0–39)
BASOPHILS # BLD: 0.03 K/UL (ref 0–0.2)
BASOPHILS NFR BLD: 0 % (ref 0–2)
BILIRUB SERPL-MCNC: 1.5 MG/DL (ref 0–1.2)
BUN SERPL-MCNC: 34 MG/DL (ref 6–23)
CALCIUM SERPL-MCNC: 8.7 MG/DL (ref 8.6–10.2)
CHLORIDE SERPL-SCNC: 99 MMOL/L (ref 98–107)
CO2 SERPL-SCNC: 27 MMOL/L (ref 22–29)
CREAT SERPL-MCNC: 1 MG/DL (ref 0.7–1.2)
ECHO AO ASC DIAM: 3.6 CM
ECHO AO ASCENDING AORTA INDEX: 1.42 CM/M2
ECHO AV AREA PEAK VELOCITY: 3.4 CM2
ECHO AV AREA VTI: 3.8 CM2
ECHO AV AREA/BSA PEAK VELOCITY: 1.3 CM2/M2
ECHO AV AREA/BSA VTI: 1.5 CM2/M2
ECHO AV CUSP MM: 2.1 CM
ECHO AV MEAN GRADIENT: 3 MMHG
ECHO AV MEAN VELOCITY: 0.8 M/S
ECHO AV PEAK GRADIENT: 5 MMHG
ECHO AV PEAK VELOCITY: 1.1 M/S
ECHO AV VELOCITY RATIO: 0.82
ECHO AV VTI: 18.4 CM
ECHO BSA: 2.68 M2
ECHO EST RA PRESSURE: 3 MMHG
ECHO LA DIAMETER INDEX: 1.74 CM/M2
ECHO LA DIAMETER: 4.4 CM
ECHO LA VOL A-L A2C: 73 ML (ref 18–58)
ECHO LA VOL A-L A4C: 53 ML (ref 18–58)
ECHO LA VOL MOD A2C: 72 ML (ref 18–58)
ECHO LA VOL MOD A4C: 51 ML (ref 18–58)
ECHO LA VOLUME AREA LENGTH: 66 ML
ECHO LA VOLUME INDEX A-L A2C: 29 ML/M2 (ref 16–34)
ECHO LA VOLUME INDEX A-L A4C: 21 ML/M2 (ref 16–34)
ECHO LA VOLUME INDEX AREA LENGTH: 26 ML/M2 (ref 16–34)
ECHO LA VOLUME INDEX MOD A2C: 28 ML/M2 (ref 16–34)
ECHO LA VOLUME INDEX MOD A4C: 20 ML/M2 (ref 16–34)
ECHO LV E' LATERAL VELOCITY: 7 CM/S
ECHO LV E' SEPTAL VELOCITY: 6 CM/S
ECHO LV EF PHYSICIAN: 65 %
ECHO LV FRACTIONAL SHORTENING: 30 % (ref 28–44)
ECHO LV INTERNAL DIMENSION DIASTOLE INDEX: 1.58 CM/M2
ECHO LV INTERNAL DIMENSION DIASTOLIC: 4 CM (ref 4.2–5.9)
ECHO LV INTERNAL DIMENSION SYSTOLIC INDEX: 1.11 CM/M2
ECHO LV INTERNAL DIMENSION SYSTOLIC: 2.8 CM
ECHO LV ISOVOLUMETRIC RELAXATION TIME (IVRT): 110.7 MS
ECHO LV IVSD: 1.3 CM (ref 0.6–1)
ECHO LV IVSS: 1.5 CM
ECHO LV MASS 2D: 186.5 G (ref 88–224)
ECHO LV MASS INDEX 2D: 73.7 G/M2 (ref 49–115)
ECHO LV POSTERIOR WALL DIASTOLIC: 1.3 CM (ref 0.6–1)
ECHO LV POSTERIOR WALL SYSTOLIC: 1.5 CM
ECHO LV RELATIVE WALL THICKNESS RATIO: 0.65
ECHO LVOT AREA: 4.2 CM2
ECHO LVOT AV VTI INDEX: 0.92
ECHO LVOT DIAM: 2.3 CM
ECHO LVOT MEAN GRADIENT: 2 MMHG
ECHO LVOT PEAK GRADIENT: 3 MMHG
ECHO LVOT PEAK VELOCITY: 0.9 M/S
ECHO LVOT STROKE VOLUME INDEX: 27.7 ML/M2
ECHO LVOT SV: 70.2 ML
ECHO LVOT VTI: 16.9 CM
ECHO MV "A" WAVE DURATION: 148.8 MSEC
ECHO MV A VELOCITY: 0.67 M/S
ECHO MV AREA PHT: 4.1 CM2
ECHO MV AREA VTI: 3.6 CM2
ECHO MV E DECELERATION TIME (DT): 220.6 MS
ECHO MV E VELOCITY: 0.59 M/S
ECHO MV E/A RATIO: 0.88
ECHO MV E/E' LATERAL: 8.43
ECHO MV E/E' RATIO (AVERAGED): 9.13
ECHO MV E/E' SEPTAL: 9.83
ECHO MV LVOT VTI INDEX: 1.14
ECHO MV MAX VELOCITY: 0.8 M/S
ECHO MV MEAN GRADIENT: 1 MMHG
ECHO MV MEAN VELOCITY: 0.6 M/S
ECHO MV PEAK GRADIENT: 2 MMHG
ECHO MV PRESSURE HALF TIME (PHT): 53.6 MS
ECHO MV VTI: 19.3 CM
ECHO PV MAX VELOCITY: 0.8 M/S
ECHO PV MEAN GRADIENT: 2 MMHG
ECHO PV MEAN VELOCITY: 0.6 M/S
ECHO PV PEAK GRADIENT: 3 MMHG
ECHO PV VTI: 12.4 CM
ECHO PVEIN A DURATION: 107.3 MS
ECHO PVEIN A VELOCITY: 0.3 M/S
ECHO PVEIN PEAK D VELOCITY: 0.4 M/S
ECHO PVEIN PEAK S VELOCITY: 0.4 M/S
ECHO PVEIN S/D RATIO: 1
ECHO RV TAPSE: 2 CM (ref 1.7–?)
EOSINOPHIL # BLD: 0.11 K/UL (ref 0.05–0.5)
EOSINOPHILS RELATIVE PERCENT: 1 % (ref 0–6)
ERYTHROCYTE [DISTWIDTH] IN BLOOD BY AUTOMATED COUNT: 15.1 % (ref 11.5–15)
GFR, ESTIMATED: 80 ML/MIN/1.73M2
GLUCOSE BLD-MCNC: 102 MG/DL (ref 74–99)
GLUCOSE BLD-MCNC: 192 MG/DL (ref 74–99)
GLUCOSE BLD-MCNC: 96 MG/DL (ref 74–99)
GLUCOSE SERPL-MCNC: 95 MG/DL (ref 74–99)
HCT VFR BLD AUTO: 33.7 % (ref 37–54)
HGB BLD-MCNC: 10.9 G/DL (ref 12.5–16.5)
IMM GRANULOCYTES # BLD AUTO: 0.45 K/UL (ref 0–0.58)
IMM GRANULOCYTES NFR BLD: 5 % (ref 0–5)
LYMPHOCYTES NFR BLD: 1.57 K/UL (ref 1.5–4)
LYMPHOCYTES RELATIVE PERCENT: 16 % (ref 20–42)
MAGNESIUM SERPL-MCNC: 2.5 MG/DL (ref 1.6–2.6)
MCH RBC QN AUTO: 29.9 PG (ref 26–35)
MCHC RBC AUTO-ENTMCNC: 32.3 G/DL (ref 32–34.5)
MCV RBC AUTO: 92.3 FL (ref 80–99.9)
MONOCYTES NFR BLD: 0.97 K/UL (ref 0.1–0.95)
MONOCYTES NFR BLD: 10 % (ref 2–12)
NEUTROPHILS NFR BLD: 68 % (ref 43–80)
NEUTS SEG NFR BLD: 6.72 K/UL (ref 1.8–7.3)
PHOSPHATE SERPL-MCNC: 3.9 MG/DL (ref 2.5–4.5)
PLATELET # BLD AUTO: 355 K/UL (ref 130–450)
PMV BLD AUTO: 10 FL (ref 7–12)
POTASSIUM SERPL-SCNC: 4.2 MMOL/L (ref 3.5–5)
PROT SERPL-MCNC: 6.8 G/DL (ref 6.4–8.3)
RBC # BLD AUTO: 3.65 M/UL (ref 3.8–5.8)
SODIUM SERPL-SCNC: 136 MMOL/L (ref 132–146)
WBC OTHER # BLD: 9.9 K/UL (ref 4.5–11.5)

## 2025-02-23 PROCEDURE — 85025 COMPLETE CBC W/AUTO DIFF WBC: CPT

## 2025-02-23 PROCEDURE — 99232 SBSQ HOSP IP/OBS MODERATE 35: CPT | Performed by: INTERNAL MEDICINE

## 2025-02-23 PROCEDURE — 6370000000 HC RX 637 (ALT 250 FOR IP): Performed by: FAMILY MEDICINE

## 2025-02-23 PROCEDURE — 82962 GLUCOSE BLOOD TEST: CPT

## 2025-02-23 PROCEDURE — C8929 TTE W OR WO FOL WCON,DOPPLER: HCPCS

## 2025-02-23 PROCEDURE — 80053 COMPREHEN METABOLIC PANEL: CPT

## 2025-02-23 PROCEDURE — 6370000000 HC RX 637 (ALT 250 FOR IP): Performed by: STUDENT IN AN ORGANIZED HEALTH CARE EDUCATION/TRAINING PROGRAM

## 2025-02-23 PROCEDURE — 84100 ASSAY OF PHOSPHORUS: CPT

## 2025-02-23 PROCEDURE — 6360000002 HC RX W HCPCS: Performed by: FAMILY MEDICINE

## 2025-02-23 PROCEDURE — 2500000003 HC RX 250 WO HCPCS: Performed by: FAMILY MEDICINE

## 2025-02-23 PROCEDURE — 6360000002 HC RX W HCPCS: Performed by: STUDENT IN AN ORGANIZED HEALTH CARE EDUCATION/TRAINING PROGRAM

## 2025-02-23 PROCEDURE — 83735 ASSAY OF MAGNESIUM: CPT

## 2025-02-23 PROCEDURE — 93306 TTE W/DOPPLER COMPLETE: CPT | Performed by: INTERNAL MEDICINE

## 2025-02-23 PROCEDURE — 94640 AIRWAY INHALATION TREATMENT: CPT

## 2025-02-23 PROCEDURE — 2700000000 HC OXYGEN THERAPY PER DAY

## 2025-02-23 PROCEDURE — 6360000004 HC RX CONTRAST MEDICATION: Performed by: INTERNAL MEDICINE

## 2025-02-23 PROCEDURE — 6370000000 HC RX 637 (ALT 250 FOR IP): Performed by: SPECIALIST

## 2025-02-23 RX ADMIN — LEVOFLOXACIN 500 MG: 500 TABLET, FILM COATED ORAL at 10:17

## 2025-02-23 RX ADMIN — MEMANTINE HYDROCHLORIDE 5 MG: 5 TABLET, FILM COATED ORAL at 10:17

## 2025-02-23 RX ADMIN — INSULIN GLARGINE 60 UNITS: 100 INJECTION, SOLUTION SUBCUTANEOUS at 11:23

## 2025-02-23 RX ADMIN — SODIUM CHLORIDE, PRESERVATIVE FREE 10 ML: 5 INJECTION INTRAVENOUS at 00:00

## 2025-02-23 RX ADMIN — METOPROLOL TARTRATE 100 MG: 50 TABLET, FILM COATED ORAL at 10:17

## 2025-02-23 RX ADMIN — ARFORMOTEROL TARTRATE 15 MCG: 15 SOLUTION RESPIRATORY (INHALATION) at 08:09

## 2025-02-23 RX ADMIN — LOSARTAN POTASSIUM 100 MG: 50 TABLET, FILM COATED ORAL at 10:17

## 2025-02-23 RX ADMIN — GABAPENTIN 600 MG: 300 CAPSULE ORAL at 12:42

## 2025-02-23 RX ADMIN — DOCUSATE SODIUM 50 MG AND SENNOSIDES 8.6 MG 1 TABLET: 8.6; 5 TABLET, FILM COATED ORAL at 10:17

## 2025-02-23 RX ADMIN — CETIRIZINE HYDROCHLORIDE 10 MG: 10 TABLET, FILM COATED ORAL at 10:17

## 2025-02-23 RX ADMIN — HYDROCHLOROTHIAZIDE 25 MG: 25 TABLET ORAL at 10:17

## 2025-02-23 RX ADMIN — SODIUM CHLORIDE, PRESERVATIVE FREE 10 ML: 5 INJECTION INTRAVENOUS at 10:19

## 2025-02-23 RX ADMIN — INSULIN GLARGINE 30 UNITS: 100 INJECTION, SOLUTION SUBCUTANEOUS at 00:14

## 2025-02-23 RX ADMIN — SULFUR HEXAFLUORIDE 2 ML: 60.7; .19; .19 INJECTION, POWDER, LYOPHILIZED, FOR SUSPENSION INTRAVENOUS; INTRAVESICAL at 09:45

## 2025-02-23 RX ADMIN — ENOXAPARIN SODIUM 30 MG: 100 INJECTION SUBCUTANEOUS at 10:17

## 2025-02-23 RX ADMIN — PANTOPRAZOLE SODIUM 40 MG: 40 TABLET, DELAYED RELEASE ORAL at 05:52

## 2025-02-23 RX ADMIN — GABAPENTIN 600 MG: 300 CAPSULE ORAL at 10:17

## 2025-02-23 RX ADMIN — BUDESONIDE 250 MCG: 0.25 SUSPENSION RESPIRATORY (INHALATION) at 08:09

## 2025-02-23 RX ADMIN — TAMSULOSIN HYDROCHLORIDE 0.8 MG: 0.4 CAPSULE ORAL at 10:17

## 2025-02-23 RX ADMIN — DULOXETINE 60 MG: 60 CAPSULE, DELAYED RELEASE ORAL at 10:17

## 2025-02-23 RX ADMIN — IPRATROPIUM BROMIDE AND ALBUTEROL SULFATE 1 DOSE: .5; 2.5 SOLUTION RESPIRATORY (INHALATION) at 08:09

## 2025-02-23 RX ADMIN — DIVALPROEX SODIUM 250 MG: 250 TABLET, DELAYED RELEASE ORAL at 10:17

## 2025-02-23 RX ADMIN — FENOFIBRATE 54 MG: 54 TABLET ORAL at 10:17

## 2025-02-23 ASSESSMENT — PAIN SCALES - GENERAL: PAINLEVEL_OUTOF10: 0

## 2025-02-23 NOTE — PROGRESS NOTES
Page to Surgery, GI, and Cardio to check DC to [facility] - ok to DC - OP f/u     Electronically signed by Cynthia Posada RN on 2/23/2025 at 12:54 PM

## 2025-02-23 NOTE — PROGRESS NOTES
[facility] Inpatient Progress Note    Patient is a 74 y.o. male of Liv Kaufman MD seen in hospital follow up.     Chief complaint: Tachycardia/CAD    HPI: Some SOB. No CP.     Patient Active Problem List   Diagnosis    Obstructive sleep apnea syndrome    Shortness of breath    Moderate persistent asthma without complication    Uncontrolled type 2 diabetes mellitus    CAD in native artery    HLD (hyperlipidemia)    Morbid obesity    Altered mental state    Cholangitis (HCC)    Obstructive jaundice (HCC)    Altered mental status       Allergies   Allergen Reactions    Prinivil [Lisinopril] Cough    Seasonal Other (See Comments)     HAY FEVER       Current Facility-Administered Medications   Medication Dose Route Frequency Provider Last Rate Last Admin    sennosides-docusate sodium (SENOKOT-S) 8.6-50 MG tablet 1 tablet  1 tablet Oral BID Bridger Castelan DO   1 tablet at 02/22/25 2209    sulfur hexafluoride microspheres (LUMASON) 60.7-25 MG injection 2 mL  2 mL IntraVENous ONCE PRN Fozia Goldman DO        insulin lispro (HUMALOG,ADMELOG) injection vial 0-16 Units  0-16 Units SubCUTAneous 4x Daily  Ruslan Sibley, APRN - CNP   4 Units at 02/21/25 1235    pantoprazole (PROTONIX) tablet 40 mg  40 mg Oral QAM AC Javid Hays MD   40 mg at 02/23/25 0552    prochlorperazine (COMPAZINE) injection 5 mg  5 mg IntraVENous Q6H PRN Bebeto, April, APRN - CNP        metoprolol (LOPRESSOR) injection 2.5 mg  2.5 mg IntraVENous Q6H PRN Bebeto, April, APRN - CNP   2.5 mg at 02/21/25 1045    levoFLOXacin (LEVAQUIN) tablet 500 mg  500 mg Oral Daily Justin Liao MD   500 mg at 02/22/25 0809    hydrALAZINE (APRESOLINE) injection 10 mg  10 mg IntraVENous Q6H PRN Liv Kaufman MD        glucose chewable tablet 16 g  4 tablet Oral PRN Marina Son APRN - CNP        dextrose bolus 10% 125 mL  125 mL IntraVENous PRN Marina Son APRCODY - CNP        Or    dextrose bolus 10% 250 mL  250

## 2025-02-23 NOTE — PROGRESS NOTES
[facility]   Progress note      Subjective:    No acute event overnight. This morning patient appears to have decreased weakness and unable to move left arm.     MRI ordered but was unable to perform due to patient's head not fitting in, CT recommend which was obtain. No acute intracranial abnormality.     Objective:    BP (!) 147/73   Pulse 91   Temp 99.5 °F (37.5 °C) (Axillary)   Resp 25   Ht 1.854 m (6' 1\")   Wt (!) 139.9 kg (308 lb 6.8 oz)   SpO2 93%   BMI 40.69 kg/m²     In: 500   Out: 2030   In: 500   Out: 2030 [Urine:1850]    General appearance: NAD, conversant  HEENT: AT/NC, MMM  Neck: FROM, supple  Lungs: Clear to auscultation  CV: RRR, no MRGs  Vasc: Radial pulses 2+  Abdomen: Soft, non-tender; no masses or HSM.  Drain in place.  Draining bloody fluid  Extremities: No peripheral edema or digital cyanosis  Skin: no rash, lesions or ulcers  Psych: Alert and oriented to person, place and time  Neuro: Alert and interactive     Recent Labs     02/20/25  0430 02/21/25  0437 02/22/25  0500   WBC 7.7 7.6 9.6   HGB 10.5* 11.5* 11.3*   HCT 32.6* 35.1* 34.2*    309 356       Recent Labs     02/20/25  0430 02/21/25  0437 02/22/25  0500    139 137   K 4.3 4.0 4.2    101 97*   CO2 27 28 28   BUN 52* 36* 38*   CREATININE 1.1 0.8 1.0   CALCIUM 8.6 9.0 8.5*       Assessment:    Principal Problem:    Altered mental state  Active Problems:    Shortness of breath    Cholangitis (HCC)    Obstructive jaundice (HCC)    Altered mental status  Resolved Problems:    * No resolved hospital problems. *      Plan:    74-year-old male with a history of a CVA and diabetes presents to the ED with complaints of fall at facility shortness of breath and weakness and is admitted to telemetry unit with     Altered mental status//Klebsiella bacteremia  -Supplement O2 demands keeping oxygen saturation greater than 92%.  Patient is currently utilizing 3 L O2 as his baseline is room air at facility  -

## 2025-02-23 NOTE — PROGRESS NOTES
N2N called to Yumiko pablito [facility], wife Fariba updated of DC via Voicemail - patient updated and belongings gathered.     Electronically signed by Cynthia Posada RN on 2/23/2025 at 1:44 PM

## 2025-02-24 LAB
MICROORGANISM SPEC CULT: NORMAL
SPECIMEN DESCRIPTION: NORMAL

## 2025-02-27 ENCOUNTER — TELEPHONE (OUTPATIENT)
Dept: CARDIOLOGY CLINIC | Age: 75
End: 2025-02-27

## 2025-02-27 NOTE — PROGRESS NOTES
Physician Progress Note      PATIENT:               MARIANNA PLATA  [facility] #:                  [id number]  :                       1950  ADMIT DATE:       2025 1:39 AM  DISCH DATE:        2025 3:00 PM  RESPONDING  PROVIDER #:        Bridger Castelan DO          QUERY TEXT:    Pt admitted with altered mental status and klebsiella bacteremia. Per Query   Responses , Pt noted to have Sepsis and acute organ dysfunction of   metabolic encephalopathy. If possible, please document in progress notes and   discharge summary the relationship, if any, between Sepsis and metabolic   encephalopathy.    The medical record reflects the following:  Risk Factors: Klebsiella bacteremia, Sepsis, cholangitis, choledocholithiasis,   Hx of stroke, Advanced age >70  Clinical Indicators: H&P 2/15 \"...Altered mental status//Klebsiella   bacteremia...\", Progress Note  \"...Eyes closed, does not open them on   evaluation...Still with altered mental status...\",  ID Progress Note    \"...Klebsiella bacteremia...Ascending cholangitis secondary to   choledocholithiasis...\", Query Progress Notes  \"...This patient has   metabolic encephalopathy...This patient has sepsis, due to Klebsiella   pneumonia bacteremia, which was present on admission...\", Progress Note    \"...Altered mental status...Klebsiella bacteremia...Cholangitis/Obstructive   jaundic  Treatment: Labs, 1.25L NS Bolus, IV/PO Abx, IVF, Consults- ID, General   Surgery, GI, Chest x-ray, CT Chest, CT Head, CTA Pulmonary, s/p ERCP    Thank you,  Christiano Bryan, TINAN, RN, CRCR  Clinical Documentation Integrity  [phone]  Options provided:  -- Acute organ dysfunction of metabolic encephalopathy is associated with   sepsis  -- Acute organ dysfunction of metabolic encephalopathy is unrelated to sepsis  -- Other - I will add my own diagnosis  -- Disagree - Not applicable / Not valid  -- Disagree - Clinically unable to determine / Unknown  -- Refer to Clinical

## 2025-03-19 NOTE — ED NOTES
ED Course as of 03/19/25 0554   Fri Feb 14, 2025   0551 EKG showed normal sinus of 85 beats minute no signs of ST changes no ST elevation QTc 461.  No change in prior EKG.  EKG inter by myself [KK]   0712 Patient signed out to Dr. Hays [KK]   0742 Called patient's spouse who states he was sent into the emergency department from nursing home with the complaint of a fall from bed.  She was surprised about his fever and she states that he is intermittently confused but normally he is not. [CB]   0802 Added CT head and neck due to reported fall at nursing home [CB]   1102 Spoke w/ Dr. Kaufman's group who agreed with plan for admission . [CB]      ED Course User Index  [CB] Carter Novak DO  [KK] Emma Douglas MD     This patient was signed out to me by outgoing provider, he originally had presented for fall at his nursing facility.  While he was here pending respiratory panel testing, on initial evaluation patient was borderline hypoxic, very confused.  Wife contacted by resident physician working with me who noted that he is typically not very confused but sometimes gets intermittently confused.  He became hypoxic and ended up requiring oxygen in the emergency room, and given his development of fever was initially on antibiotics and admitted for suspected pneumonia with metabolic encephalopathy.     Juan Hays MD  03/19/25 0540

## 2025-03-20 ENCOUNTER — HOSPITAL ENCOUNTER (EMERGENCY)
Age: 75
Discharge: HOME | End: 2025-03-20
Attending: EMERGENCY MEDICINE
Payer: MEDICARE

## 2025-03-20 ENCOUNTER — APPOINTMENT (OUTPATIENT)
Dept: GENERAL RADIOLOGY | Age: 75
End: 2025-03-20
Payer: MEDICARE

## 2025-03-20 ENCOUNTER — APPOINTMENT (OUTPATIENT)
Dept: CT IMAGING | Age: 75
End: 2025-03-20
Payer: MEDICARE

## 2025-03-20 VITALS
HEIGHT: 73 IN | SYSTOLIC BLOOD PRESSURE: 146 MMHG | WEIGHT: 300 LBS | BODY MASS INDEX: 39.76 KG/M2 | TEMPERATURE: 97.7 F | HEART RATE: 90 BPM | RESPIRATION RATE: 16 BRPM | DIASTOLIC BLOOD PRESSURE: 86 MMHG | OXYGEN SATURATION: 98 %

## 2025-03-20 DIAGNOSIS — S56.919A STRAIN OF FOREARM, UNSPECIFIED LATERALITY, INITIAL ENCOUNTER: ICD-10-CM

## 2025-03-20 DIAGNOSIS — S09.90XA INJURY OF HEAD, INITIAL ENCOUNTER: Primary | ICD-10-CM

## 2025-03-20 PROCEDURE — 99284 EMERGENCY DEPT VISIT MOD MDM: CPT

## 2025-03-20 PROCEDURE — 72125 CT NECK SPINE W/O DYE: CPT

## 2025-03-20 PROCEDURE — 73090 X-RAY EXAM OF FOREARM: CPT

## 2025-03-20 PROCEDURE — 70450 CT HEAD/BRAIN W/O DYE: CPT

## 2025-03-20 NOTE — ED PROVIDER NOTES
HPI:  3/20/25,   Time: 11:15 AM EDT         Stewart Mark is a 74 y.o. male presenting to the ED for relation after having a witnessed fall out of his wheelchair.  Patient was trying to lean forward and fell forward striking his forehead.  There was no loss of conscious.  He does take anticoagulants.  Patient complains of mild forehead discomfort and right forearm discomfort which is mild as well.  Denies any other injuries.  Has no other complaints.    ROS:   Pertinent positives and negatives are stated within HPI, all other systems reviewed and are negative.  --------------------------------------------- PAST HISTORY ---------------------------------------------  Past Medical History:  has a past medical history of Acute stroke due to ischemia (HCC), Anxiety, CAD in native artery, Depression, Diabetes mellitus (HCC), Hyperlipidemia, Hypertension, Moderate persistent asthma without complication, Obesity, Sleep apnea, Type 2 diabetes mellitus without complication (HCC), and Ulceration.    Past Surgical History:  has a past surgical history that includes hernia repair; Colonoscopy; joint replacement (Left); Revision total knee arthroplasty (Right, 12/14/2018); back surgery (10/2019); Coronary stent placement; ERCP (N/A, 2/18/2025); ERCP (2/18/2025); ERCP (2/18/2025); and CT PERC CHOLECYSTOSTOMY (2/19/2025).    Social History:  reports that he quit smoking about 45 years ago. His smoking use included cigarettes. He started smoking about 53 years ago. He has a 24.9 pack-year smoking history. He has been exposed to tobacco smoke. He has never used smokeless tobacco. He reports that he does not currently use alcohol. He reports that he does not use drugs.    Family History: family history includes Cancer in his mother and sister.     The patient’s home medications have been reviewed.    Allergies: Prinivil [lisinopril] and Seasonal    -------------------------------------------------- RESULTS   No tenderness to palpation on the cervical or thoracic or lumbar spine  Extremities: Moves all extremities x 4. Warm and well perfused.  Mild muscular tenderness to proximal right forearm.  No palpable bony deformity.  Elbow and wrist are without tenderness or abnormality.  Skin: warm and dry without rash  Neurologic: GCS 15, no focal acute neurological deficit  Psych: Normal Affect      ------------------------------ ED COURSE/MEDICAL DECISION MAKING----------------------  Medications - No data to display         Medical Decision Making:    No obvious intracranial injury or form injury.  Discharged to home.  Patient advised to return to the emergency department should symptoms worsen. Advised to contact primary care physician to secure follow-up appointment within the next 1-2 days.        --------------------------------- IMPRESSION AND DISPOSITION ---------------------------------    IMPRESSION  1. Injury of head, initial encounter    2. Strain of forearm, unspecified laterality, initial encounter        DISPOSITION  Disposition: Discharge to nursing home  Patient condition is stable        Stewart Funez,   03/20/25 3108

## 2025-03-21 ENCOUNTER — HOSPITAL ENCOUNTER (EMERGENCY)
Age: 75
Discharge: HOME | End: 2025-03-21
Attending: STUDENT IN AN ORGANIZED HEALTH CARE EDUCATION/TRAINING PROGRAM
Payer: MEDICARE

## 2025-03-21 ENCOUNTER — APPOINTMENT (OUTPATIENT)
Dept: GENERAL RADIOLOGY | Age: 75
End: 2025-03-21
Payer: MEDICARE

## 2025-03-21 VITALS
WEIGHT: 290 LBS | BODY MASS INDEX: 38.43 KG/M2 | HEIGHT: 73 IN | SYSTOLIC BLOOD PRESSURE: 118 MMHG | OXYGEN SATURATION: 95 % | RESPIRATION RATE: 18 BRPM | TEMPERATURE: 98.6 F | DIASTOLIC BLOOD PRESSURE: 60 MMHG | HEART RATE: 91 BPM

## 2025-03-21 DIAGNOSIS — M25.511 ACUTE PAIN OF RIGHT SHOULDER: ICD-10-CM
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
## 2025-03-21 PROCEDURE — 6370000000 HC RX 637 (ALT 250 FOR IP): Performed by: STUDENT IN AN ORGANIZED HEALTH CARE EDUCATION/TRAINING PROGRAM

## 2025-03-21 PROCEDURE — 73030 X-RAY EXAM OF SHOULDER: CPT

## 2025-03-21 PROCEDURE — 99283 EMERGENCY DEPT VISIT LOW MDM: CPT

## 2025-03-21 PROCEDURE — 71045 X-RAY EXAM CHEST 1 VIEW: CPT

## 2025-03-21 RX ORDER — ACETAMINOPHEN 500 MG
1000 TABLET ORAL ONCE
Status: COMPLETED | OUTPATIENT
Start: 2025-03-21 | End: 2025-03-21

## 2025-03-21 RX ADMIN — ACETAMINOPHEN 1000 MG: 500 TABLET ORAL at 04:02

## 2025-03-21 ASSESSMENT — PAIN SCALES - GENERAL: PAINLEVEL_OUTOF10: 5

## 2025-03-21 ASSESSMENT — PAIN - FUNCTIONAL ASSESSMENT: PAIN_FUNCTIONAL_ASSESSMENT: NONE - DENIES PAIN

## 2025-03-21 NOTE — ED PROVIDER NOTES
[facility] EMERGENCY DEPARTMENT  EMERGENCY DEPARTMENT ENCOUNTER        Pt Name: Stewart Mark  MRN: [id number]  Birthdate 1950  Date of evaluation: 3/21/2025  Provider: Jewell Dickerson DO  PCP: Vy Dailey DO  Note Started: 7:49 AM EDT 3/21/25    CHIEF COMPLAINT       Chief Complaint   Patient presents with    Fall     Seen earlier today for a fall, pt discharged back to facility. Pt now stated he has 10/10 pain when moving right shoulder.        HISTORY OF PRESENT ILLNESS: 1 or more Elements   History From: patient    Limitations to history : None    Stewart Mark is a 74 y.o. male with a history of hypertension, hyperlipidemia, CAD, anxiety, depression presenting to the emergency department via EMS from nursing facility for a fall.  He was actually seen here earlier today for a fall and was discharged back to facility.  He states that he has pain when moving his right shoulder now.  Patient states that when he initially fell earlier today that he was readjusting himself in his wheelchair and he slid forward falling and landing forward on his face.  Patient did not experience any other injuries.  Patient then states after he got back to his facility he noticed pain in his right shoulder.  Patient states he only has the pain when he moves the shoulder.  Patient denies any chest pain, shortness of breath, lightheadedness, dizziness, syncope, dizziness, numbness, tingling, unilateral weakness, abdominal pain, nausea, vomiting, diarrhea, recent hospitalization, recent was, or other acute symptoms or concerns.    Nursing Notes were all reviewed and agreed with or any disagreements were addressed in the HPI.    REVIEW OF SYSTEMS :      Review of Systems    Positives and Pertinent negatives as per HPI.     SURGICAL HISTORY     Past Surgical History:   Procedure Laterality Date    BACK SURGERY  10/2019    COLONOSCOPY      CORONARY STENT PLACEMENT      CT PERC

## 2025-04-02 ENCOUNTER — HOSPITAL ENCOUNTER (OUTPATIENT)
Dept: GENERAL RADIOLOGY | Age: 75
Discharge: HOME | End: 2025-04-04
Payer: MEDICARE

## 2025-04-02 DIAGNOSIS — K81.9 CHOLECYSTITIS: ICD-10-CM

## 2025-04-02 PROCEDURE — 6360000004 HC RX CONTRAST MEDICATION: Performed by: RADIOLOGY

## 2025-04-02 PROCEDURE — 76000 FLUOROSCOPY <1 HR PHYS/QHP: CPT

## 2025-04-02 RX ORDER — IOPAMIDOL 755 MG/ML
30 INJECTION, SOLUTION INTRAVASCULAR
Status: COMPLETED | OUTPATIENT
Start: 2025-04-02 | End: 2025-04-02

## 2025-04-02 RX ADMIN — IOPAMIDOL 30 ML: 755 INJECTION, SOLUTION INTRAVENOUS at 14:26

## 2025-04-02 NOTE — FLOWSHEET NOTE
Patient arrival from facility to Radiology for cholecystostomy tube check. Patient placed on the fluoroscopy table and contrast injected. Images reviewed by Dr. Salgado. Tube is to stay in place per Dr. Salgado, exchange scheduled for 5/14 @ 0930, instructions sent back to facility.

## 2025-05-14 ENCOUNTER — HOSPITAL ENCOUNTER (OUTPATIENT)
Dept: GENERAL RADIOLOGY | Age: 75
Discharge: HOME | End: 2025-05-16
Payer: MEDICARE

## 2025-05-14 VITALS
SYSTOLIC BLOOD PRESSURE: 113 MMHG | OXYGEN SATURATION: 96 % | HEART RATE: 71 BPM | DIASTOLIC BLOOD PRESSURE: 59 MMHG | RESPIRATION RATE: 20 BRPM

## 2025-05-14 DIAGNOSIS — Z00.00 REGULAR CHECK-UP: ICD-10-CM

## 2025-05-14 PROCEDURE — C1729 CATH, DRAINAGE: HCPCS

## 2025-05-14 PROCEDURE — 6360000002 HC RX W HCPCS: Performed by: RADIOLOGY

## 2025-05-14 RX ORDER — LIDOCAINE HYDROCHLORIDE 20 MG/ML
INJECTION, SOLUTION INFILTRATION; PERINEURAL PRN
Status: COMPLETED | OUTPATIENT
Start: 2025-05-14 | End: 2025-05-14

## 2025-05-14 RX ADMIN — LIDOCAINE HYDROCHLORIDE 10 ML: 20 INJECTION, SOLUTION INFILTRATION; PERINEURAL at 09:32

## 2025-05-14 NOTE — OR NURSING
Patient taken to Fluoro and positioned supine on exam table. 10 FR drain present in RUQ, patient scanned and images reviewed by Dr. Salgado. Patient prepped and draped per protocol. RUQ 10 Korean valeriano tube exchanged by guidewire with fluoro guidance by Dr. Salgado. Patient re-scanned. Site cleansed and sutured with 2-0 silk, DSD and bulb suction. Patient tolerated well. Report called to [facility], patient discharged and transferred back to facility.

## 2025-05-17 ENCOUNTER — APPOINTMENT (OUTPATIENT)
Dept: CT IMAGING | Age: 75
End: 2025-05-17
Payer: MEDICARE

## 2025-05-17 ENCOUNTER — APPOINTMENT (OUTPATIENT)
Dept: GENERAL RADIOLOGY | Age: 75
End: 2025-05-17
Payer: MEDICARE

## 2025-05-17 ENCOUNTER — HOSPITAL ENCOUNTER (OUTPATIENT)
Age: 75
Setting detail: OBSERVATION
Discharge: NURSING, REHAB OR LONG-TERM CARE | End: 2025-05-19
Attending: EMERGENCY MEDICINE | Admitting: INTERNAL MEDICINE
Payer: MEDICARE

## 2025-05-17 DIAGNOSIS — N30.00 ACUTE CYSTITIS WITHOUT HEMATURIA: ICD-10-CM

## 2025-05-17 DIAGNOSIS — N17.9 AKI (ACUTE KIDNEY INJURY): Primary | ICD-10-CM

## 2025-05-17 PROBLEM — N39.0 UTI (URINARY TRACT INFECTION): Status: ACTIVE | Noted: 2025-05-17

## 2025-05-17 LAB
ABO + RH BLD: NORMAL
ALBUMIN SERPL-MCNC: 3.5 G/DL (ref 3.5–5.2)
ALP SERPL-CCNC: 212 U/L (ref 40–129)
ALT SERPL-CCNC: 58 U/L (ref 0–40)
ANION GAP SERPL CALCULATED.3IONS-SCNC: 15 MMOL/L (ref 7–16)
ARM BAND NUMBER: NORMAL
AST SERPL-CCNC: 69 U/L (ref 0–39)
BACTERIA URNS QL MICRO: ABNORMAL
BASOPHILS # BLD: 0.04 K/UL (ref 0–0.2)
BASOPHILS NFR BLD: 1 % (ref 0–2)
BILIRUB SERPL-MCNC: 0.4 MG/DL (ref 0–1.2)
BILIRUB UR QL STRIP: NEGATIVE
BLOOD BANK SAMPLE EXPIRATION: NORMAL
BLOOD GROUP ANTIBODIES SERPL: NEGATIVE
BUN SERPL-MCNC: 48 MG/DL (ref 6–23)
CALCIUM SERPL-MCNC: 10.3 MG/DL (ref 8.6–10.2)
CHLORIDE SERPL-SCNC: 99 MMOL/L (ref 98–107)
CHP ED QC CHECK: NORMAL
CLARITY UR: CLEAR
CO2 SERPL-SCNC: 27 MMOL/L (ref 22–29)
COLOR UR: YELLOW
CREAT SERPL-MCNC: 1.9 MG/DL (ref 0.7–1.2)
EOSINOPHIL # BLD: 0.29 K/UL (ref 0.05–0.5)
EOSINOPHILS RELATIVE PERCENT: 4 % (ref 0–6)
ERYTHROCYTE [DISTWIDTH] IN BLOOD BY AUTOMATED COUNT: 15.8 % (ref 11.5–15)
GFR, ESTIMATED: 38 ML/MIN/1.73M2
GLUCOSE BLD-MCNC: 102 MG/DL (ref 74–99)
GLUCOSE BLD-MCNC: 122 MG/DL
GLUCOSE BLD-MCNC: 122 MG/DL (ref 74–99)
GLUCOSE SERPL-MCNC: 127 MG/DL (ref 74–99)
GLUCOSE UR STRIP-MCNC: NEGATIVE MG/DL
HCT VFR BLD AUTO: 30 % (ref 37–54)
HGB BLD-MCNC: 9.3 G/DL (ref 12.5–16.5)
HGB UR QL STRIP.AUTO: ABNORMAL
IMM GRANULOCYTES # BLD AUTO: 0.05 K/UL (ref 0–0.58)
IMM GRANULOCYTES NFR BLD: 1 % (ref 0–5)
KETONES UR STRIP-MCNC: ABNORMAL MG/DL
LEUKOCYTE ESTERASE UR QL STRIP: ABNORMAL
LYMPHOCYTES NFR BLD: 1.96 K/UL (ref 1.5–4)
LYMPHOCYTES RELATIVE PERCENT: 25 % (ref 20–42)
MCH RBC QN AUTO: 27.3 PG (ref 26–35)
MCHC RBC AUTO-ENTMCNC: 31 G/DL (ref 32–34.5)
MCV RBC AUTO: 88 FL (ref 80–99.9)
MONOCYTES NFR BLD: 0.63 K/UL (ref 0.1–0.95)
MONOCYTES NFR BLD: 8 % (ref 2–12)
NEUTROPHILS NFR BLD: 63 % (ref 43–80)
NEUTS SEG NFR BLD: 5.03 K/UL (ref 1.8–7.3)
NITRITE UR QL STRIP: NEGATIVE
PH UR STRIP: 6 [PH] (ref 5–8)
PLATELET # BLD AUTO: 474 K/UL (ref 130–450)
PMV BLD AUTO: 9.5 FL (ref 7–12)
POTASSIUM SERPL-SCNC: 4.9 MMOL/L (ref 3.5–5)
PROT SERPL-MCNC: 8.3 G/DL (ref 6.4–8.3)
PROT UR STRIP-MCNC: 30 MG/DL
RBC # BLD AUTO: 3.41 M/UL (ref 3.8–5.8)
RBC #/AREA URNS HPF: ABNORMAL /HPF
SARS-COV-2 RDRP RESP QL NAA+PROBE: NOT DETECTED
SODIUM SERPL-SCNC: 141 MMOL/L (ref 132–146)
SP GR UR STRIP: 1.02 (ref 1–1.03)
SPECIMEN DESCRIPTION: NORMAL
TROPONIN I SERPL HS-MCNC: 81 NG/L (ref 0–22)
TROPONIN I SERPL HS-MCNC: 86 NG/L (ref 0–22)
UROBILINOGEN UR STRIP-ACNC: 0.2 EU/DL (ref 0–1)
WBC #/AREA URNS HPF: ABNORMAL /HPF
WBC OTHER # BLD: 8 K/UL (ref 4.5–11.5)

## 2025-05-17 PROCEDURE — G0378 HOSPITAL OBSERVATION PER HR: HCPCS

## 2025-05-17 PROCEDURE — 82962 GLUCOSE BLOOD TEST: CPT

## 2025-05-17 PROCEDURE — 87181 SC STD AGAR DILUTION PER AGT: CPT

## 2025-05-17 PROCEDURE — 6360000002 HC RX W HCPCS: Performed by: FAMILY MEDICINE

## 2025-05-17 PROCEDURE — 87635 SARS-COV-2 COVID-19 AMP PRB: CPT

## 2025-05-17 PROCEDURE — 71045 X-RAY EXAM CHEST 1 VIEW: CPT

## 2025-05-17 PROCEDURE — 81001 URINALYSIS AUTO W/SCOPE: CPT

## 2025-05-17 PROCEDURE — 87077 CULTURE AEROBIC IDENTIFY: CPT

## 2025-05-17 PROCEDURE — 86901 BLOOD TYPING SEROLOGIC RH(D): CPT

## 2025-05-17 PROCEDURE — 70450 CT HEAD/BRAIN W/O DYE: CPT

## 2025-05-17 PROCEDURE — 2580000003 HC RX 258: Performed by: FAMILY MEDICINE

## 2025-05-17 PROCEDURE — 84484 ASSAY OF TROPONIN QUANT: CPT

## 2025-05-17 PROCEDURE — 86900 BLOOD TYPING SEROLOGIC ABO: CPT

## 2025-05-17 PROCEDURE — 85025 COMPLETE CBC W/AUTO DIFF WBC: CPT

## 2025-05-17 PROCEDURE — 86850 RBC ANTIBODY SCREEN: CPT

## 2025-05-17 PROCEDURE — 93005 ELECTROCARDIOGRAM TRACING: CPT

## 2025-05-17 PROCEDURE — 94640 AIRWAY INHALATION TREATMENT: CPT

## 2025-05-17 PROCEDURE — 96374 THER/PROPH/DIAG INJ IV PUSH: CPT

## 2025-05-17 PROCEDURE — 87086 URINE CULTURE/COLONY COUNT: CPT

## 2025-05-17 PROCEDURE — 96372 THER/PROPH/DIAG INJ SC/IM: CPT

## 2025-05-17 PROCEDURE — 80053 COMPREHEN METABOLIC PANEL: CPT

## 2025-05-17 PROCEDURE — 2580000003 HC RX 258

## 2025-05-17 PROCEDURE — 96361 HYDRATE IV INFUSION ADD-ON: CPT

## 2025-05-17 PROCEDURE — 6360000002 HC RX W HCPCS

## 2025-05-17 PROCEDURE — 99285 EMERGENCY DEPT VISIT HI MDM: CPT

## 2025-05-17 PROCEDURE — 74176 CT ABD & PELVIS W/O CONTRAST: CPT

## 2025-05-17 PROCEDURE — 2500000003 HC RX 250 WO HCPCS

## 2025-05-17 PROCEDURE — 94664 DEMO&/EVAL PT USE INHALER: CPT

## 2025-05-17 PROCEDURE — 6370000000 HC RX 637 (ALT 250 FOR IP): Performed by: FAMILY MEDICINE

## 2025-05-17 RX ORDER — ARFORMOTEROL TARTRATE 15 UG/2ML
15 SOLUTION RESPIRATORY (INHALATION)
Status: DISCONTINUED | OUTPATIENT
Start: 2025-05-17 | End: 2025-05-19 | Stop reason: HOSPADM

## 2025-05-17 RX ORDER — SODIUM CHLORIDE 9 MG/ML
INJECTION, SOLUTION INTRAVENOUS CONTINUOUS
Status: DISCONTINUED | OUTPATIENT
Start: 2025-05-17 | End: 2025-05-19 | Stop reason: HOSPADM

## 2025-05-17 RX ORDER — DULOXETIN HYDROCHLORIDE 60 MG/1
60 CAPSULE, DELAYED RELEASE ORAL EVERY MORNING
Status: DISCONTINUED | OUTPATIENT
Start: 2025-05-18 | End: 2025-05-19 | Stop reason: HOSPADM

## 2025-05-17 RX ORDER — FENOFIBRATE 54 MG/1
54 TABLET ORAL DAILY
Status: DISCONTINUED | OUTPATIENT
Start: 2025-05-17 | End: 2025-05-19 | Stop reason: HOSPADM

## 2025-05-17 RX ORDER — FLUTICASONE PROPIONATE 50 MCG
1 SPRAY, SUSPENSION (ML) NASAL 2 TIMES DAILY PRN
Status: DISCONTINUED | OUTPATIENT
Start: 2025-05-17 | End: 2025-05-19 | Stop reason: HOSPADM

## 2025-05-17 RX ORDER — INSULIN GLARGINE 100 [IU]/ML
60 INJECTION, SOLUTION SUBCUTANEOUS 2 TIMES DAILY
Status: DISCONTINUED | OUTPATIENT
Start: 2025-05-17 | End: 2025-05-19 | Stop reason: HOSPADM

## 2025-05-17 RX ORDER — ONDANSETRON 2 MG/ML
4 INJECTION INTRAMUSCULAR; INTRAVENOUS EVERY 6 HOURS PRN
Status: DISCONTINUED | OUTPATIENT
Start: 2025-05-17 | End: 2025-05-19 | Stop reason: HOSPADM

## 2025-05-17 RX ORDER — BUDESONIDE 0.25 MG/2ML
0.25 INHALANT ORAL
Status: DISCONTINUED | OUTPATIENT
Start: 2025-05-17 | End: 2025-05-19 | Stop reason: HOSPADM

## 2025-05-17 RX ORDER — SODIUM CHLORIDE 0.9 % (FLUSH) 0.9 %
5-40 SYRINGE (ML) INJECTION EVERY 12 HOURS SCHEDULED
Status: DISCONTINUED | OUTPATIENT
Start: 2025-05-17 | End: 2025-05-19 | Stop reason: HOSPADM

## 2025-05-17 RX ORDER — ACETAMINOPHEN 650 MG/1
650 SUPPOSITORY RECTAL EVERY 6 HOURS PRN
Status: DISCONTINUED | OUTPATIENT
Start: 2025-05-17 | End: 2025-05-17 | Stop reason: DRUGHIGH

## 2025-05-17 RX ORDER — 0.9 % SODIUM CHLORIDE 0.9 %
1000 INTRAVENOUS SOLUTION INTRAVENOUS ONCE
Status: COMPLETED | OUTPATIENT
Start: 2025-05-17 | End: 2025-05-17

## 2025-05-17 RX ORDER — DEXTROSE MONOHYDRATE 100 MG/ML
INJECTION, SOLUTION INTRAVENOUS CONTINUOUS PRN
Status: DISCONTINUED | OUTPATIENT
Start: 2025-05-17 | End: 2025-05-19 | Stop reason: HOSPADM

## 2025-05-17 RX ORDER — INSULIN LISPRO 100 [IU]/ML
0-4 INJECTION, SOLUTION INTRAVENOUS; SUBCUTANEOUS
Status: DISCONTINUED | OUTPATIENT
Start: 2025-05-17 | End: 2025-05-19 | Stop reason: HOSPADM

## 2025-05-17 RX ORDER — ACETAMINOPHEN 325 MG/1
650 TABLET ORAL EVERY 6 HOURS PRN
Status: DISCONTINUED | OUTPATIENT
Start: 2025-05-17 | End: 2025-05-17 | Stop reason: DRUGHIGH

## 2025-05-17 RX ORDER — DIVALPROEX SODIUM 250 MG/1
250 TABLET, DELAYED RELEASE ORAL EVERY 12 HOURS SCHEDULED
Status: DISCONTINUED | OUTPATIENT
Start: 2025-05-17 | End: 2025-05-19 | Stop reason: HOSPADM

## 2025-05-17 RX ORDER — CETIRIZINE HYDROCHLORIDE 10 MG/1
10 TABLET ORAL DAILY
Status: DISCONTINUED | OUTPATIENT
Start: 2025-05-17 | End: 2025-05-19 | Stop reason: HOSPADM

## 2025-05-17 RX ORDER — ATORVASTATIN CALCIUM 40 MG/1
40 TABLET, FILM COATED ORAL NIGHTLY
Status: DISCONTINUED | OUTPATIENT
Start: 2025-05-17 | End: 2025-05-19 | Stop reason: HOSPADM

## 2025-05-17 RX ORDER — ASPIRIN 81 MG/1
81 TABLET ORAL NIGHTLY
Status: DISCONTINUED | OUTPATIENT
Start: 2025-05-17 | End: 2025-05-19 | Stop reason: HOSPADM

## 2025-05-17 RX ORDER — ACETAMINOPHEN 325 MG/1
650 TABLET ORAL EVERY 4 HOURS PRN
Status: DISCONTINUED | OUTPATIENT
Start: 2025-05-17 | End: 2025-05-19 | Stop reason: HOSPADM

## 2025-05-17 RX ORDER — ONDANSETRON 4 MG/1
4 TABLET, ORALLY DISINTEGRATING ORAL EVERY 8 HOURS PRN
Status: DISCONTINUED | OUTPATIENT
Start: 2025-05-17 | End: 2025-05-19 | Stop reason: HOSPADM

## 2025-05-17 RX ORDER — MEMANTINE HYDROCHLORIDE 5 MG/1
5 TABLET ORAL 2 TIMES DAILY
Status: DISCONTINUED | OUTPATIENT
Start: 2025-05-17 | End: 2025-05-19 | Stop reason: HOSPADM

## 2025-05-17 RX ORDER — SODIUM CHLORIDE 9 MG/ML
INJECTION, SOLUTION INTRAVENOUS PRN
Status: DISCONTINUED | OUTPATIENT
Start: 2025-05-17 | End: 2025-05-19 | Stop reason: HOSPADM

## 2025-05-17 RX ORDER — PANTOPRAZOLE SODIUM 40 MG/1
40 TABLET, DELAYED RELEASE ORAL EVERY MORNING
Status: DISCONTINUED | OUTPATIENT
Start: 2025-05-18 | End: 2025-05-19 | Stop reason: HOSPADM

## 2025-05-17 RX ORDER — CLOPIDOGREL BISULFATE 75 MG/1
75 TABLET ORAL EVERY MORNING
Status: DISCONTINUED | OUTPATIENT
Start: 2025-05-18 | End: 2025-05-19 | Stop reason: HOSPADM

## 2025-05-17 RX ORDER — METOPROLOL TARTRATE 50 MG
100 TABLET ORAL 2 TIMES DAILY
Status: DISCONTINUED | OUTPATIENT
Start: 2025-05-17 | End: 2025-05-19 | Stop reason: HOSPADM

## 2025-05-17 RX ORDER — GLUCAGON 1 MG/ML
1 KIT INJECTION PRN
Status: DISCONTINUED | OUTPATIENT
Start: 2025-05-17 | End: 2025-05-19 | Stop reason: HOSPADM

## 2025-05-17 RX ORDER — TAMSULOSIN HYDROCHLORIDE 0.4 MG/1
0.8 CAPSULE ORAL DAILY
Status: DISCONTINUED | OUTPATIENT
Start: 2025-05-17 | End: 2025-05-19 | Stop reason: HOSPADM

## 2025-05-17 RX ORDER — SODIUM CHLORIDE 0.9 % (FLUSH) 0.9 %
5-40 SYRINGE (ML) INJECTION PRN
Status: DISCONTINUED | OUTPATIENT
Start: 2025-05-17 | End: 2025-05-19 | Stop reason: HOSPADM

## 2025-05-17 RX ORDER — LOSARTAN POTASSIUM 50 MG/1
100 TABLET ORAL EVERY MORNING
Status: DISCONTINUED | OUTPATIENT
Start: 2025-05-18 | End: 2025-05-19 | Stop reason: HOSPADM

## 2025-05-17 RX ORDER — ENOXAPARIN SODIUM 100 MG/ML
30 INJECTION SUBCUTANEOUS 2 TIMES DAILY
Status: DISCONTINUED | OUTPATIENT
Start: 2025-05-17 | End: 2025-05-19 | Stop reason: HOSPADM

## 2025-05-17 RX ORDER — DIVALPROEX SODIUM 250 MG/1
250 TABLET, FILM COATED, EXTENDED RELEASE ORAL 2 TIMES DAILY
Status: DISCONTINUED | OUTPATIENT
Start: 2025-05-17 | End: 2025-05-17 | Stop reason: ALTCHOICE

## 2025-05-17 RX ADMIN — MEMANTINE HYDROCHLORIDE 5 MG: 5 TABLET, FILM COATED ORAL at 21:02

## 2025-05-17 RX ADMIN — ARFORMOTEROL TARTRATE 15 MCG: 15 SOLUTION RESPIRATORY (INHALATION) at 20:13

## 2025-05-17 RX ADMIN — WATER 1000 MG: 1 INJECTION INTRAMUSCULAR; INTRAVENOUS; SUBCUTANEOUS at 14:23

## 2025-05-17 RX ADMIN — ASPIRIN 81 MG: 81 TABLET, COATED ORAL at 21:02

## 2025-05-17 RX ADMIN — DIVALPROEX SODIUM 250 MG: 250 TABLET, DELAYED RELEASE ORAL at 21:01

## 2025-05-17 RX ADMIN — METOPROLOL TARTRATE 100 MG: 50 TABLET, FILM COATED ORAL at 21:02

## 2025-05-17 RX ADMIN — ATORVASTATIN CALCIUM 40 MG: 40 TABLET, FILM COATED ORAL at 21:02

## 2025-05-17 RX ADMIN — ENOXAPARIN SODIUM 30 MG: 100 INJECTION SUBCUTANEOUS at 20:04

## 2025-05-17 RX ADMIN — SODIUM CHLORIDE: 0.9 INJECTION, SOLUTION INTRAVENOUS at 17:52

## 2025-05-17 RX ADMIN — SODIUM CHLORIDE 1000 ML: 0.9 INJECTION, SOLUTION INTRAVENOUS at 16:27

## 2025-05-17 RX ADMIN — BUDESONIDE 250 MCG: 0.25 SUSPENSION RESPIRATORY (INHALATION) at 20:13

## 2025-05-17 ASSESSMENT — PAIN - FUNCTIONAL ASSESSMENT: PAIN_FUNCTIONAL_ASSESSMENT: NONE - DENIES PAIN

## 2025-05-17 NOTE — ED PROVIDER NOTES
by Reji Ansari DO at [facility] OR    ERCP  2/18/2025    ENDOSCOPIC RETROGRADE CHOLANGIOPANCREATOGRAPHY STENT INSERTION performed by Reji Ansari DO at [facility] OR    HERNIA REPAIR      JOINT REPLACEMENT Left     left knee surgery x5    REVISION TOTAL KNEE ARTHROPLASTY Right 12/14/2018       CURRENTMEDICATIONS       Current Discharge Medication List        CONTINUE these medications which have NOT CHANGED    Details   miconazole (MICOTIN) 2 % powder Apply 2 g topically 2 times daily -RIGHT GROIN-      acetaminophen (TYLENOL) 325 MG tablet Take 2 tablets by mouth every 4 hours as needed for Pain or Fever (TEMP >100F) Indications: NTE: 3G/24HRS      aspirin 81 MG EC tablet Take 1 tablet by mouth nightly  Qty: 30 tablet, Refills: 3      atorvastatin (LIPITOR) 40 MG tablet Take 1 tablet by mouth nightly  Qty: 30 tablet, Refills: 3      vitamin D (VITAMIN D3) 50 MCG (2000 UT) CAPS capsule Take 1 capsule by mouth 2 times daily  Qty: 30 capsule, Refills: 0      clopidogrel (PLAVIX) 75 MG tablet Take 1 tablet by mouth every morning  Qty: 30 tablet, Refills: 0      Coenzyme Q10 (CO Q-10) 200 MG CAPS Take 1 capsule by mouth every morning  Qty: 30 capsule, Refills: 3      fenofibrate (TRICOR) 54 MG tablet Take 1 tablet by mouth every morning  Qty: 30 tablet, Refills: 3      fluticasone (FLONASE) 50 MCG/ACT nasal spray 1 spray by Nasal route 2 times daily as needed for Rhinitis or Allergies  Qty: 16 g, Refills: 0      hydroCHLOROthiazide (HYDRODIURIL) 25 MG tablet TAKE ONE(1) TABLET BY MOUTH ONCE DAILY  Qty: 30 tablet, Refills: 3      loratadine (CLARITIN) 10 MG tablet Take 1 tablet by mouth daily  Qty: 30 tablet, Refills: 3      losartan (COZAAR) 100 MG tablet Take 1 tablet by mouth every morning  Qty: 30 tablet, Refills: 3      magnesium hydroxide (MILK OF MAGNESIA) 400 MG/5ML suspension Take 30 mLs by mouth daily as needed for Constipation      memantine (NAMENDA) 5 MG tablet Take 1 tablet by mouth 2 times daily  Qty: 180

## 2025-05-17 NOTE — ED NOTES
..ED to Inpatient Handoff Report    Notified Emily that electronic handoff available and patient ready for transport to room 545.    Safety Risks: Risk of falls    Patient in Restraints: no    Constant Observer or Patient : no    Telemetry Monitoring Ordered :Yes           Order to transfer to unit without monitor:YES    Last MEWS: 2 Time completed: 1627    Deterioration Index Score:   Predictive Model Details          29 (Normal)  Factor Value    Calculated 5/17/2025 16:36 41% Age 74 years old    Deterioration Index Model 23% Respiratory rate 21     15% Potassium 4.9 mmol/L     5% Pulse oximetry 100 %     4% Systolic 132     3% BUN abnormal (48 mg/dL)     3% Pulse 93     3% Hematocrit abnormal (30.0 %)     2% Sodium 141 mmol/L     2% Platelet count abnormal (474 k/uL)     0% WBC count 8.0 k/uL     0% Temperature 97.8 °F (36.6 °C)        Vitals:    05/17/25 1452 05/17/25 1522 05/17/25 1552 05/17/25 1627   BP: 134/78 (!) 124/106 135/78 (!) 132/59   Pulse: 85 85 89 93   Resp: 22 17 21 21   Temp:    97.8 °F (36.6 °C)   TempSrc:       SpO2: 98% 99% 99% 100%   Weight:       Height:             Opportunity for questions and clarification was provided.

## 2025-05-17 NOTE — PROGRESS NOTES
4 Eyes Skin Assessment     NAME:  Stewart Mark  YOB: 1950  MEDICAL RECORD NUMBER:  [id number]    The patient is being assessed for  Admission    I agree that at least one RN has performed a thorough Head to Toe Skin Assessment on the patient. ALL assessment sites listed below have been assessed.      Areas assessed by both nurses:    Head, Face, Ears, Shoulders, Back, Chest, Arms, Elbows, Hands, Sacrum. Buttock, Coccyx, Ischium, Legs. Feet and Heels, and Under Medical Devices         Does the Patient have a Wound? No noted wound(s)       Binevenido Prevention initiated by RN: No  Wound Care Orders initiated by RN: No    Pressure Injury (Stage 3,4, Unstageable, DTI, NWPT, and Complex wounds) if present, place Wound referral order by RN under : No    New Ostomies, if present place, Ostomy referral order under : No     Nurse 1 eSignature: Electronically signed by Susana Ramirez RN on 5/17/25 at 6:09 PM EDT    **SHARE this note so that the co-signing nurse can place an eSignature**    Nurse 2 eSignature: Electronically signed by Sterling Jean RN on 5/17/25 at 6:28 PM EDT

## 2025-05-18 LAB
ALBUMIN SERPL-MCNC: 3.5 G/DL (ref 3.5–5.2)
ALP SERPL-CCNC: 239 U/L (ref 40–129)
ALT SERPL-CCNC: 52 U/L (ref 0–40)
ANION GAP SERPL CALCULATED.3IONS-SCNC: 14 MMOL/L (ref 7–16)
AST SERPL-CCNC: 64 U/L (ref 0–39)
BASOPHILS # BLD: 0.03 K/UL (ref 0–0.2)
BASOPHILS NFR BLD: 0 % (ref 0–2)
BILIRUB SERPL-MCNC: 0.4 MG/DL (ref 0–1.2)
BUN SERPL-MCNC: 49 MG/DL (ref 6–23)
CALCIUM SERPL-MCNC: 9.6 MG/DL (ref 8.6–10.2)
CHLORIDE SERPL-SCNC: 101 MMOL/L (ref 98–107)
CO2 SERPL-SCNC: 25 MMOL/L (ref 22–29)
CREAT SERPL-MCNC: 1.5 MG/DL (ref 0.7–1.2)
EOSINOPHIL # BLD: 0.19 K/UL (ref 0.05–0.5)
EOSINOPHILS RELATIVE PERCENT: 2 % (ref 0–6)
ERYTHROCYTE [DISTWIDTH] IN BLOOD BY AUTOMATED COUNT: 15.7 % (ref 11.5–15)
GFR, ESTIMATED: 48 ML/MIN/1.73M2
GLUCOSE BLD-MCNC: 125 MG/DL (ref 74–99)
GLUCOSE BLD-MCNC: 145 MG/DL (ref 74–99)
GLUCOSE BLD-MCNC: 145 MG/DL (ref 74–99)
GLUCOSE BLD-MCNC: 213 MG/DL (ref 74–99)
GLUCOSE SERPL-MCNC: 93 MG/DL (ref 74–99)
HCT VFR BLD AUTO: 27.4 % (ref 37–54)
HGB BLD-MCNC: 8.7 G/DL (ref 12.5–16.5)
IMM GRANULOCYTES # BLD AUTO: 0.05 K/UL (ref 0–0.58)
IMM GRANULOCYTES NFR BLD: 1 % (ref 0–5)
LYMPHOCYTES NFR BLD: 1.95 K/UL (ref 1.5–4)
LYMPHOCYTES RELATIVE PERCENT: 21 % (ref 20–42)
MCH RBC QN AUTO: 27.6 PG (ref 26–35)
MCHC RBC AUTO-ENTMCNC: 31.8 G/DL (ref 32–34.5)
MCV RBC AUTO: 87 FL (ref 80–99.9)
MONOCYTES NFR BLD: 1.11 K/UL (ref 0.1–0.95)
MONOCYTES NFR BLD: 12 % (ref 2–12)
NEUTROPHILS NFR BLD: 64 % (ref 43–80)
NEUTS SEG NFR BLD: 5.96 K/UL (ref 1.8–7.3)
PLATELET # BLD AUTO: 484 K/UL (ref 130–450)
PMV BLD AUTO: 9.5 FL (ref 7–12)
POTASSIUM SERPL-SCNC: 4 MMOL/L (ref 3.5–5)
PROT SERPL-MCNC: 7.9 G/DL (ref 6.4–8.3)
RBC # BLD AUTO: 3.15 M/UL (ref 3.8–5.8)
SODIUM SERPL-SCNC: 140 MMOL/L (ref 132–146)
WBC OTHER # BLD: 9.3 K/UL (ref 4.5–11.5)

## 2025-05-18 PROCEDURE — 36415 COLL VENOUS BLD VENIPUNCTURE: CPT

## 2025-05-18 PROCEDURE — 2580000003 HC RX 258: Performed by: FAMILY MEDICINE

## 2025-05-18 PROCEDURE — 82962 GLUCOSE BLOOD TEST: CPT

## 2025-05-18 PROCEDURE — 80053 COMPREHEN METABOLIC PANEL: CPT

## 2025-05-18 PROCEDURE — 6360000002 HC RX W HCPCS: Performed by: FAMILY MEDICINE

## 2025-05-18 PROCEDURE — 96372 THER/PROPH/DIAG INJ SC/IM: CPT

## 2025-05-18 PROCEDURE — 96361 HYDRATE IV INFUSION ADD-ON: CPT

## 2025-05-18 PROCEDURE — 96376 TX/PRO/DX INJ SAME DRUG ADON: CPT

## 2025-05-18 PROCEDURE — 97161 PT EVAL LOW COMPLEX 20 MIN: CPT

## 2025-05-18 PROCEDURE — G0378 HOSPITAL OBSERVATION PER HR: HCPCS

## 2025-05-18 PROCEDURE — 2500000003 HC RX 250 WO HCPCS: Performed by: FAMILY MEDICINE

## 2025-05-18 PROCEDURE — 6370000000 HC RX 637 (ALT 250 FOR IP): Performed by: FAMILY MEDICINE

## 2025-05-18 PROCEDURE — 85025 COMPLETE CBC W/AUTO DIFF WBC: CPT

## 2025-05-18 PROCEDURE — 94640 AIRWAY INHALATION TREATMENT: CPT

## 2025-05-18 RX ADMIN — MEMANTINE HYDROCHLORIDE 5 MG: 5 TABLET, FILM COATED ORAL at 09:06

## 2025-05-18 RX ADMIN — INSULIN GLARGINE 60 UNITS: 100 INJECTION, SOLUTION SUBCUTANEOUS at 21:32

## 2025-05-18 RX ADMIN — DULOXETINE 60 MG: 60 CAPSULE, DELAYED RELEASE ORAL at 09:06

## 2025-05-18 RX ADMIN — INSULIN LISPRO 1 UNITS: 100 INJECTION, SOLUTION INTRAVENOUS; SUBCUTANEOUS at 21:33

## 2025-05-18 RX ADMIN — ARFORMOTEROL TARTRATE 15 MCG: 15 SOLUTION RESPIRATORY (INHALATION) at 09:43

## 2025-05-18 RX ADMIN — BUDESONIDE 250 MCG: 0.25 SUSPENSION RESPIRATORY (INHALATION) at 09:43

## 2025-05-18 RX ADMIN — ATORVASTATIN CALCIUM 40 MG: 40 TABLET, FILM COATED ORAL at 21:24

## 2025-05-18 RX ADMIN — ARFORMOTEROL TARTRATE 15 MCG: 15 SOLUTION RESPIRATORY (INHALATION) at 20:59

## 2025-05-18 RX ADMIN — DIVALPROEX SODIUM 250 MG: 250 TABLET, DELAYED RELEASE ORAL at 21:38

## 2025-05-18 RX ADMIN — PANTOPRAZOLE SODIUM 40 MG: 40 TABLET, DELAYED RELEASE ORAL at 09:05

## 2025-05-18 RX ADMIN — WATER 1000 MG: 1 INJECTION INTRAMUSCULAR; INTRAVENOUS; SUBCUTANEOUS at 15:26

## 2025-05-18 RX ADMIN — LOSARTAN POTASSIUM 100 MG: 50 TABLET, FILM COATED ORAL at 09:06

## 2025-05-18 RX ADMIN — MEMANTINE HYDROCHLORIDE 5 MG: 5 TABLET, FILM COATED ORAL at 21:24

## 2025-05-18 RX ADMIN — ENOXAPARIN SODIUM 30 MG: 100 INJECTION SUBCUTANEOUS at 21:24

## 2025-05-18 RX ADMIN — FENOFIBRATE 54 MG: 54 TABLET ORAL at 09:06

## 2025-05-18 RX ADMIN — ASPIRIN 81 MG: 81 TABLET, COATED ORAL at 21:24

## 2025-05-18 RX ADMIN — SODIUM CHLORIDE: 0.9 INJECTION, SOLUTION INTRAVENOUS at 21:44

## 2025-05-18 RX ADMIN — DIVALPROEX SODIUM 250 MG: 250 TABLET, DELAYED RELEASE ORAL at 09:05

## 2025-05-18 RX ADMIN — CLOPIDOGREL BISULFATE 75 MG: 75 TABLET, FILM COATED ORAL at 09:06

## 2025-05-18 RX ADMIN — CETIRIZINE HYDROCHLORIDE 10 MG: 10 TABLET, FILM COATED ORAL at 09:05

## 2025-05-18 RX ADMIN — METOPROLOL TARTRATE 100 MG: 50 TABLET, FILM COATED ORAL at 21:24

## 2025-05-18 RX ADMIN — ENOXAPARIN SODIUM 30 MG: 100 INJECTION SUBCUTANEOUS at 09:06

## 2025-05-18 RX ADMIN — TAMSULOSIN HYDROCHLORIDE 0.8 MG: 0.4 CAPSULE ORAL at 09:06

## 2025-05-18 RX ADMIN — INSULIN GLARGINE 60 UNITS: 100 INJECTION, SOLUTION SUBCUTANEOUS at 09:07

## 2025-05-18 RX ADMIN — METOPROLOL TARTRATE 100 MG: 50 TABLET, FILM COATED ORAL at 09:05

## 2025-05-18 RX ADMIN — BUDESONIDE 250 MCG: 0.25 SUSPENSION RESPIRATORY (INHALATION) at 20:59

## 2025-05-18 ASSESSMENT — PAIN SCALES - GENERAL: PAINLEVEL_OUTOF10: 9

## 2025-05-18 NOTE — H&P
[facility]   History and Physical      CHIEF COMPLAINT:  Altered Mental Status      Reason for Admission:  UTI (urinary tract infection) [N39.0]  GIUSEPPE (acute kidney injury) [N17.9]  Acute cystitis without hematuria [N30.00]    History Obtained From:  patient, electronic medical record    HISTORY OF PRESENT ILLNESS:        The patient is a 74 y.o. male of Black, Javid TRAVIS MD  has a past medical history ischemic stroke, anxiety, CAD, depression, diabetes, hyperlipidemia, hypertension, asthma, recently hospitalized 02/14 - 02/23/25 for cholangitis requiring biliary stent/drain.  Also evaluated on ER in March for a fall but was not admitted.  He presents from nursing facility for altered mental status.    At the ER, temperature was 97.8, treated with 19, HR 73, /66 out satting at 97% on room air.  Initial lab was concerning for creatinine of 1.9, calcium of 10.2 glucose of 127, he had elevated transaminases with alkaline phosphatase of 212, ALT 58 and AST 69.  CBC showed mild anemia with RBC of 3.4 and a hemoglobin of 9.3 no leukocytosis.  Urinalysis was concerning for UTI.  COVID-19 was negative.  CT abdomen and pelvis shows cholelithiasis with biliary stent in place.  Colonic fecal retention in rectosigmoid area.  CXR no acute process.  CT head without no acute process.  He received IV fluid resuscitation started on Rocephin and admitted for further evaluation    On eval, he was A&O X2. Not in acute distress.       All labs personally reviewed   All imaging personally reviewed     Past Medical History:        Diagnosis Date    Acute stroke due to ischemia (HCC) 7/26/2023    Anxiety     CAD in native artery 7/26/2023    Depression     Diabetes mellitus (HCC)     Hyperlipidemia     Hypertension     Moderate persistent asthma without complication 10/06/2015    Obesity     Sleep apnea     Type 2 diabetes mellitus without complication (HCC)     Ulceration 05/2012    right foot     Past Surgical

## 2025-05-19 VITALS
RESPIRATION RATE: 18 BRPM | HEIGHT: 73 IN | BODY MASS INDEX: 36.65 KG/M2 | DIASTOLIC BLOOD PRESSURE: 74 MMHG | HEART RATE: 78 BPM | OXYGEN SATURATION: 95 % | WEIGHT: 276.5 LBS | TEMPERATURE: 97.5 F | SYSTOLIC BLOOD PRESSURE: 138 MMHG

## 2025-05-19 LAB
ALBUMIN SERPL-MCNC: 3.3 G/DL (ref 3.5–5.2)
ALP SERPL-CCNC: 258 U/L (ref 40–129)
ALT SERPL-CCNC: 50 U/L (ref 0–40)
AMMONIA PLAS-SCNC: 36 UMOL/L (ref 16–60)
ANION GAP SERPL CALCULATED.3IONS-SCNC: 15 MMOL/L (ref 7–16)
AST SERPL-CCNC: 56 U/L (ref 0–39)
BASOPHILS # BLD: 0.03 K/UL (ref 0–0.2)
BASOPHILS NFR BLD: 1 % (ref 0–2)
BILIRUB SERPL-MCNC: 0.3 MG/DL (ref 0–1.2)
BUN SERPL-MCNC: 33 MG/DL (ref 6–23)
CALCIUM SERPL-MCNC: 9.5 MG/DL (ref 8.6–10.2)
CHLORIDE SERPL-SCNC: 103 MMOL/L (ref 98–107)
CO2 SERPL-SCNC: 21 MMOL/L (ref 22–29)
CREAT SERPL-MCNC: 1 MG/DL (ref 0.7–1.2)
EOSINOPHIL # BLD: 0.19 K/UL (ref 0.05–0.5)
EOSINOPHILS RELATIVE PERCENT: 3 % (ref 0–6)
ERYTHROCYTE [DISTWIDTH] IN BLOOD BY AUTOMATED COUNT: 15.6 % (ref 11.5–15)
GFR, ESTIMATED: 85 ML/MIN/1.73M2
GLUCOSE BLD-MCNC: 127 MG/DL (ref 74–99)
GLUCOSE BLD-MCNC: 154 MG/DL (ref 74–99)
GLUCOSE BLD-MCNC: 199 MG/DL (ref 74–99)
GLUCOSE SERPL-MCNC: 161 MG/DL (ref 74–99)
HCT VFR BLD AUTO: 26.7 % (ref 37–54)
HGB BLD-MCNC: 8.7 G/DL (ref 12.5–16.5)
IMM GRANULOCYTES # BLD AUTO: 0.03 K/UL (ref 0–0.58)
IMM GRANULOCYTES NFR BLD: 1 % (ref 0–5)
LYMPHOCYTES NFR BLD: 1.64 K/UL (ref 1.5–4)
LYMPHOCYTES RELATIVE PERCENT: 25 % (ref 20–42)
MCH RBC QN AUTO: 28 PG (ref 26–35)
MCHC RBC AUTO-ENTMCNC: 32.6 G/DL (ref 32–34.5)
MCV RBC AUTO: 85.9 FL (ref 80–99.9)
MONOCYTES NFR BLD: 0.68 K/UL (ref 0.1–0.95)
MONOCYTES NFR BLD: 10 % (ref 2–12)
NEUTROPHILS NFR BLD: 61 % (ref 43–80)
NEUTS SEG NFR BLD: 4.04 K/UL (ref 1.8–7.3)
PLATELET # BLD AUTO: 472 K/UL (ref 130–450)
PMV BLD AUTO: 9.5 FL (ref 7–12)
POTASSIUM SERPL-SCNC: 3.7 MMOL/L (ref 3.5–5)
PROT SERPL-MCNC: 7.5 G/DL (ref 6.4–8.3)
RBC # BLD AUTO: 3.11 M/UL (ref 3.8–5.8)
SODIUM SERPL-SCNC: 139 MMOL/L (ref 132–146)
WBC OTHER # BLD: 6.6 K/UL (ref 4.5–11.5)

## 2025-05-19 PROCEDURE — G0378 HOSPITAL OBSERVATION PER HR: HCPCS

## 2025-05-19 PROCEDURE — 97165 OT EVAL LOW COMPLEX 30 MIN: CPT

## 2025-05-19 PROCEDURE — 6370000000 HC RX 637 (ALT 250 FOR IP): Performed by: FAMILY MEDICINE

## 2025-05-19 PROCEDURE — 2580000003 HC RX 258: Performed by: FAMILY MEDICINE

## 2025-05-19 PROCEDURE — 36415 COLL VENOUS BLD VENIPUNCTURE: CPT

## 2025-05-19 PROCEDURE — 85025 COMPLETE CBC W/AUTO DIFF WBC: CPT

## 2025-05-19 PROCEDURE — 96372 THER/PROPH/DIAG INJ SC/IM: CPT

## 2025-05-19 PROCEDURE — 96376 TX/PRO/DX INJ SAME DRUG ADON: CPT

## 2025-05-19 PROCEDURE — 82962 GLUCOSE BLOOD TEST: CPT

## 2025-05-19 PROCEDURE — 6360000002 HC RX W HCPCS: Performed by: FAMILY MEDICINE

## 2025-05-19 PROCEDURE — 94640 AIRWAY INHALATION TREATMENT: CPT

## 2025-05-19 PROCEDURE — 96361 HYDRATE IV INFUSION ADD-ON: CPT

## 2025-05-19 PROCEDURE — 82140 ASSAY OF AMMONIA: CPT

## 2025-05-19 PROCEDURE — 80053 COMPREHEN METABOLIC PANEL: CPT

## 2025-05-19 PROCEDURE — 2500000003 HC RX 250 WO HCPCS: Performed by: FAMILY MEDICINE

## 2025-05-19 RX ADMIN — FENOFIBRATE 54 MG: 54 TABLET ORAL at 08:01

## 2025-05-19 RX ADMIN — INSULIN LISPRO 1 UNITS: 100 INJECTION, SOLUTION INTRAVENOUS; SUBCUTANEOUS at 06:23

## 2025-05-19 RX ADMIN — METOPROLOL TARTRATE 100 MG: 50 TABLET, FILM COATED ORAL at 08:01

## 2025-05-19 RX ADMIN — CETIRIZINE HYDROCHLORIDE 10 MG: 10 TABLET, FILM COATED ORAL at 08:00

## 2025-05-19 RX ADMIN — WATER 1000 MG: 1 INJECTION INTRAMUSCULAR; INTRAVENOUS; SUBCUTANEOUS at 14:29

## 2025-05-19 RX ADMIN — CLOPIDOGREL BISULFATE 75 MG: 75 TABLET, FILM COATED ORAL at 08:00

## 2025-05-19 RX ADMIN — DIVALPROEX SODIUM 250 MG: 250 TABLET, DELAYED RELEASE ORAL at 08:01

## 2025-05-19 RX ADMIN — LOSARTAN POTASSIUM 100 MG: 50 TABLET, FILM COATED ORAL at 08:00

## 2025-05-19 RX ADMIN — PANTOPRAZOLE SODIUM 40 MG: 40 TABLET, DELAYED RELEASE ORAL at 08:01

## 2025-05-19 RX ADMIN — DULOXETINE 60 MG: 60 CAPSULE, DELAYED RELEASE ORAL at 08:00

## 2025-05-19 RX ADMIN — BUDESONIDE 250 MCG: 0.25 SUSPENSION RESPIRATORY (INHALATION) at 09:17

## 2025-05-19 RX ADMIN — ARFORMOTEROL TARTRATE 15 MCG: 15 SOLUTION RESPIRATORY (INHALATION) at 09:17

## 2025-05-19 RX ADMIN — SODIUM CHLORIDE: 0.9 INJECTION, SOLUTION INTRAVENOUS at 11:28

## 2025-05-19 RX ADMIN — TAMSULOSIN HYDROCHLORIDE 0.8 MG: 0.4 CAPSULE ORAL at 08:00

## 2025-05-19 RX ADMIN — INSULIN GLARGINE 60 UNITS: 100 INJECTION, SOLUTION SUBCUTANEOUS at 08:33

## 2025-05-19 RX ADMIN — MEMANTINE HYDROCHLORIDE 5 MG: 5 TABLET, FILM COATED ORAL at 08:01

## 2025-05-19 RX ADMIN — ENOXAPARIN SODIUM 30 MG: 100 INJECTION SUBCUTANEOUS at 08:00

## 2025-05-19 NOTE — PLAN OF CARE
Problem: Discharge Planning  Goal: Discharge to home or other facility with appropriate resources  5/19/2025 0310 by Jacquelyn Lopez, RN  Outcome: Progressing  Flowsheets (Taken 5/19/2025 0234)  Discharge to home or other facility with appropriate resources: Identify barriers to discharge with patient and caregiver     Problem: Skin/Tissue Integrity  Goal: Skin integrity remains intact  Description: 1.  Monitor for areas of redness and/or skin breakdown2.  Assess vascular access sites hourly3.  Every 4-6 hours minimum:  Change oxygen saturation probe site4.  Every 4-6 hours:  If on nasal continuous positive airway pressure, respiratory therapy assess nares and determine need for appliance change or resting period  5/19/2025 0310 by Jacquelyn Lopez, RN  Outcome: Progressing

## 2025-05-19 NOTE — CARE COORDINATION
Chart reviewed for transition of care at discharge . Admitted with a UTI. He is on IV Rocephin q 24 hours. Patient is a LTC bed hold at [facility]  per liaison. He will not need anything to return. Spoke with patient who is agreeable to return. Ambulance form is in the envelope on the soft chart. PERFECTO and destination updated. CM will follow.  Electronically signed by Patel Ramos RN on 5/19/2025 at 10:12 AM    Addendum: Discharge order noted. Transport arranged with [facility] for 430-630 pm today. Updated RN, patient, liaison, and wife Fariba. MB

## 2025-05-19 NOTE — PROGRESS NOTES
[facility]                                Progress note    Subjective:    Resting in bed  Feels tired and fatigued  Unsure if he has had chills or urinary complaints, \"I just don't remember\"     Objective:    /74   Pulse 78   Temp 97.5 °F (36.4 °C) (Oral)   Resp 18   Ht 1.854 m (6' 1\")   Wt 125.4 kg (276 lb 8 oz)   SpO2 95%   BMI 36.48 kg/m²     In: -   Out: 490   In: -   Out: 490 [Urine:350]    General appearance: NAD, somnolent   HEENT: AT/NC, MMM  Neck: FROM, supple  Lungs: Clear to auscultation  CV: tachycardia, no MRGs  Vasc: Radial pulses 2+  Abdomen: Soft, non-tender; no masses or HSM  Extremities: No peripheral edema or digital cyanosis  Skin: no rash, lesions or ulcers  Psych: Alert and oriented to person and time   Neuro: somnolent    gary      Recent Labs     05/17/25  1132 05/18/25  0140 05/19/25  0614   WBC 8.0 9.3 6.6   HGB 9.3* 8.7* 8.7*   HCT 30.0* 27.4* 26.7*   * 484* 472*       Recent Labs     05/17/25  1132 05/18/25  0140 05/19/25  0614    140 139   K 4.9 4.0 3.7   CL 99 101 103   CO2 27 25 21*   BUN 48* 49* 33*   CREATININE 1.9* 1.5* 1.0   CALCIUM 10.3* 9.6 9.5       Assessment:    Principal Problem:    UTI (urinary tract infection)  Resolved Problems:    * No resolved hospital problems. *      Plan:  74 y.o. patient of Javid Connelly MD admitted with      Assessment  UTI (urinary tract infection)   GISUEPPE (acute kidney injury)   History of diabetes  History of hyperlipidemia  History of left lower lobe calcified granuloma  History of cholangitis/obstructive jaundice  History of fatty liver     Plan  Started on IV Rocephin.  Continue and await urine culture  Initial creatinine of 1.9 now improved to 1.5 with GFR of 61.  Continue to monitor.  Avoid nephrotoxic agent  Start sliding scale correctional insulin.  Monitor blood glucose level  Continue atorvastatin  Monitor     Review and restart home medications as appropriate     5/19/25  -+ urine cx,

## 2025-05-19 NOTE — PLAN OF CARE
Problem: Chronic Conditions and Co-morbidities  Goal: Patient's chronic conditions and co-morbidity symptoms are monitored and maintained or improved  Outcome: Progressing     Problem: Discharge Planning  Goal: Discharge to home or other facility with appropriate resources  Outcome: Progressing  Flowsheets (Taken 5/19/2025 0234)  Discharge to home or other facility with appropriate resources: Identify barriers to discharge with patient and caregiver     Problem: Skin/Tissue Integrity  Goal: Skin integrity remains intact  Description: 1.  Monitor for areas of redness and/or skin breakdown2.  Assess vascular access sites hourly3.  Every 4-6 hours minimum:  Change oxygen saturation probe site4.  Every 4-6 hours:  If on nasal continuous positive airway pressure, respiratory therapy assess nares and determine need for appliance change or resting period  Outcome: Progressing     Problem: Safety - Adult  Goal: Free from fall injury  Outcome: Progressing

## 2025-05-19 NOTE — PROGRESS NOTES
Occupational Therapy  OCCUPATIONAL THERAPY INITIAL EVALUATION    [facility]   [address], [place]         Date:2025                                                  Patient Name: Stewart Mark    MRN: [id number]    : 1950    Room: [address] [place] [address]      Evaluating OT: Comfort Swann OTR/L [id number]       Referring Provider:Marina Son APRN - CNP     Specific Provider Orders/Date: OT eval and treat 25      Diagnosis: UTI     Surgery: none      Pertinent Medical History: CVA,CAD,Depression,HTN,DM,Back Surgery , BTKA         Precautions:  Fall Risk, biliary Drain    Assessment of current deficits    [x] Functional mobility  [x]ADLs  [x] Strength               [x]Cognition    [x] Functional transfers   [x] IADLs         [x] Safety Awareness   [x]Endurance    [x] Fine Coordination              [x] Balance      [] Vision/perception   []Sensation     [x]Gross Motor Coordination  [] ROM  [] Delirium                   [] Motor Control     OT PLAN OF CARE   OT POC based on physician orders, patient diagnosis and results of clinical assessment    Frequency/Duration 1-3 days/wk for 2 weeks PRN   Specific OT Treatment Interventions to include:   * Instruction/training on adapted ADL techniques and AE recommendations to increase functional independence within precautions       * Training on energy conservation strategies, correct breathing pattern and techniques to improve independence/tolerance for self-care routine  * Functional transfer/mobility training/DME recommendations for increased independence, safety, and fall prevention  * Patient/Family education to increase follow through with safety techniques and functional independence  * Recommendation of environmental modifications for increased safety with functional transfers/mobility and ADLs  * Therapeutic exercise to improve motor endurance, ROM, and functional strength for

## 2025-05-19 NOTE — CONSULTS
[facility]  [facility]  Consultation Note     Admit Date: 5/17/2025 11:02 AM    Reason for Consult:   abx asaf    Attending Physician:  Liv Kaufman MD    HISTORY OF PRESENT ILLNESS:             The history is obtained from extensive review of available past medical records. The patient is a 74 y.o. male who is previously known to the ID service.    Patient presented to the ED at [facility] on 5/17/2025 with altered mental status.  He was cold, clammy and pale on presentation.  He denied any complaints.  He was afebrile.  Vital signs were unremarkable.  Chest x-ray showed no acute findings, CT of the head showed no acute abnormality.  CT of the abdomen pelvis showed cholelithiasis with a contracted gallbladder and a percutaneous pigtail catheter.  No fluid collection noted.  Santos catheter was noted and a decompressed urinary bladder.  He has a chronic Santos catheter.  On exam he denies any complaints and says he is feeling better.  Labs on admission showed a WBC of 8.  Urine culture returned positive for Pseudomonas.  Urine in the Santos catheter is clear.  There is no suprapubic pain or CVA tenderness.  He was treated with Rocephin.  ID was asked to see in consultation.    Past Medical History:      February 2025: Admitted to [facility] from a nursing facility with fall weakness and fatigue.  Blood cultures turned positive for Klebsiella.  He was treated with Ceftriaxone.  LFTs started to trend up so he was transitioned to Levaquin.  Found to have a ascending cholangitis secondary to choledocholithiasis.  He had an ERCP.  A Rosalie tube was placed.  He was continued on Levaquin for 7 days.  ID signed off.        Diagnosis Date    Acute stroke due to ischemia (HCC) 7/26/2023    Anxiety     CAD in native artery 7/26/2023    Depression     Diabetes mellitus (HCC)     Hyperlipidemia     Hypertension     Moderate persistent asthma without complication 10/06/2015    Obesity

## 2025-05-19 NOTE — DISCHARGE INSTR - COC
Continuity of Care Form    Patient Name: Stewart Mark   :  1950  MRN:  [id number]    Admit date:  2025  Discharge date:  ***    Code Status Order: DNR-CCA   Advance Directives:     Admitting Physician:  Liv Kaufman MD  PCP: Javid Connelly MD    Discharging Nurse: ***  Discharging Hospital Unit/Room#: 0545/0545-A  Discharging Unit Phone Number: ***    Emergency Contact:   Extended Emergency Contact Information  Primary Emergency Contact: Fariba Mark  Address: [address] [place] [address]  Work Phone: [phone]  Mobile Phone: [phone]  Relation: Spouse  Preferred language: English   needed? No    Past Surgical History:  Past Surgical History:   Procedure Laterality Date    BACK SURGERY  10/2019    COLONOSCOPY      CORONARY STENT PLACEMENT      CT PERC CHOLECYSTOSTOMY  2025    CT PERC CHOLECYSTOSTOMY 2025 [facility] CT    ERCP N/A 2025    ENDOSCOPIC RETROGRADE CHOLANGIOPANCREATOGRAPHY SPHINCTER/PAPILLOTOMY performed by Reji Ansari DO at [facility] OR    ERCP  2025    ENDOSCOPIC RETROGRADE CHOLANGIOPANCREATOGRAPHY BALLOON SWEEP performed by Reji Ansari DO at [facility] OR    ERCP  2025    ENDOSCOPIC RETROGRADE CHOLANGIOPANCREATOGRAPHY STENT INSERTION performed by Reji Ansari DO at [facility] OR    HERNIA REPAIR      JOINT REPLACEMENT Left     left knee surgery x5    REVISION TOTAL KNEE ARTHROPLASTY Right 2018       Immunization History:   Immunization History   Administered Date(s) Administered    COVID-19, PFIZER PURPLE top, DILUTE for use, (age 12 y+), 30mcg/0.3mL 2021, 2021, 12/10/2021    Influenza Vaccine, unspecified formulation 10/01/2012, 2013, 2014, 10/06/2015, 2016, 10/15/2017    Influenza Virus Vaccine 10/01/2012, 2013, 2014, 10/01/2015, 2016, 10/09/2017, 10/18/2018    Influenza, AFLURIA (age 3 y+), FLUZONE, (age 6 mo+), Quadv MDV, 0.5mL

## 2025-05-19 NOTE — DISCHARGE SUMMARY
Additional Contrast? None  Result Date: 5/17/2025  EXAMINATION: CT OF THE ABDOMEN AND PELVIS WITHOUT CONTRAST 5/17/2025 12:55 pm TECHNIQUE: CT of the abdomen and pelvis was performed without the administration of intravenous contrast. Multiplanar reformatted images are provided for review. Automated exposure control, iterative reconstruction, and/or weight based adjustment of the mA/kV was utilized to reduce the radiation dose to as low as reasonably achievable. COMPARISON: 02/14/2015 HISTORY: ORDERING SYSTEM PROVIDED HISTORY: ams, drain in RUQ TECHNOLOGIST PROVIDED HISTORY: Reason for exam:->ams, drain in RUQ Additional Contrast?->None Decision Support Exception - unselect if not a suspected or confirmed emergency medical condition->Emergency Medical Condition (MA) FINDINGS: Lower Chest: Visualized lungs are normal. Organs:   Cholelithiasis in a contracted gallbladder.  Percutaneous pigtail catheter noted in the region of the gallbladder fossa.  No evidence for gallbladder fossa fluid collection.  The liver, spleen kidneys,, adrenal glands, pancreas are normal.  Biliary stent in place. GI/Bowel: Colonic fecal retention with stool distended lower rectosigmoid. Normal small bowel.  The appendix is not visualized. Pelvis: Santos catheter in a decompressed urinary bladder. Peritoneum/Retroperitoneum: No free fluid or free air. Bones/soft tissues: Degenerative changes lumbar spine and bilateral hip joints.  Posterior spinal fixation hardware lower lumbar spine with associated laminectomies.     1. Cholelithiasis in a contracted gallbladder. Percutaneous pigtail catheter noted in the region of the gallbladder fossa. No evidence for gallbladder fossa fluid collection. 2. Biliary stent in place. 3. Colonic fecal retention with stool distended lower rectosigmoid.     CT HEAD WO CONTRAST  Result Date: 5/17/2025  EXAMINATION: CT OF THE HEAD WITHOUT CONTRAST  5/17/2025 12:55 pm TECHNIQUE: CT of the head was performed without

## 2025-05-20 LAB
EKG ATRIAL RATE: 72 BPM
EKG P AXIS: 49 DEGREES
EKG P-R INTERVAL: 208 MS
EKG Q-T INTERVAL: 424 MS
EKG QRS DURATION: 92 MS
EKG QTC CALCULATION (BAZETT): 464 MS
EKG R AXIS: -33 DEGREES
EKG T AXIS: 43 DEGREES
EKG VENTRICULAR RATE: 72 BPM
MICROORGANISM SPEC CULT: ABNORMAL
SERVICE CMNT-IMP: ABNORMAL
SPECIMEN DESCRIPTION: ABNORMAL

## 2025-05-20 PROCEDURE — 93010 ELECTROCARDIOGRAM REPORT: CPT | Performed by: INTERNAL MEDICINE

## 2025-06-13 ENCOUNTER — APPOINTMENT (OUTPATIENT)
Dept: ULTRASOUND IMAGING | Age: 75
DRG: 683 | End: 2025-06-13
Payer: MEDICARE

## 2025-06-13 ENCOUNTER — HOSPITAL ENCOUNTER (INPATIENT)
Age: 75
LOS: 3 days | Discharge: ANOTHER HOSPITAL | DRG: 683 | End: 2025-06-16
Attending: EMERGENCY MEDICINE | Admitting: STUDENT IN AN ORGANIZED HEALTH CARE EDUCATION/TRAINING PROGRAM
Payer: MEDICARE

## 2025-06-13 ENCOUNTER — APPOINTMENT (OUTPATIENT)
Dept: CT IMAGING | Age: 75
DRG: 683 | End: 2025-06-13
Payer: MEDICARE

## 2025-06-13 ENCOUNTER — APPOINTMENT (OUTPATIENT)
Dept: GENERAL RADIOLOGY | Age: 75
DRG: 683 | End: 2025-06-13
Payer: MEDICARE

## 2025-06-13 DIAGNOSIS — N30.00 ACUTE CYSTITIS WITHOUT HEMATURIA: ICD-10-CM

## 2025-06-13 DIAGNOSIS — F03.90 DEMENTIA, UNSPECIFIED DEMENTIA SEVERITY, UNSPECIFIED DEMENTIA TYPE, UNSPECIFIED WHETHER BEHAVIORAL, PSYCHOTIC, OR MOOD DISTURBANCE OR ANXIETY (HCC): Primary | ICD-10-CM

## 2025-06-13 DIAGNOSIS — T85.518A CHOLECYSTOSTOMY TUBE DYSFUNCTION, INITIAL ENCOUNTER: ICD-10-CM

## 2025-06-13 DIAGNOSIS — K81.1 CHOLECYSTITIS, CHRONIC: ICD-10-CM

## 2025-06-13 DIAGNOSIS — N17.9 AKI (ACUTE KIDNEY INJURY): ICD-10-CM

## 2025-06-13 LAB
ALBUMIN SERPL-MCNC: 3.7 G/DL (ref 3.5–5.2)
ALP SERPL-CCNC: 84 U/L (ref 40–129)
ALT SERPL-CCNC: 10 U/L (ref 0–40)
ANION GAP SERPL CALCULATED.3IONS-SCNC: 14 MMOL/L (ref 7–16)
ANION GAP SERPL CALCULATED.3IONS-SCNC: 17 MMOL/L (ref 7–16)
AST SERPL-CCNC: 15 U/L (ref 0–39)
B PARAP IS1001 DNA NPH QL NAA+NON-PROBE: NOT DETECTED
B PERT DNA SPEC QL NAA+PROBE: NOT DETECTED
B-OH-BUTYR SERPL-MCNC: 0.11 MMOL/L (ref 0.02–0.27)
BACTERIA URNS QL MICRO: ABNORMAL
BASOPHILS # BLD: 0.04 K/UL (ref 0–0.2)
BASOPHILS NFR BLD: 0 % (ref 0–2)
BILIRUB SERPL-MCNC: 0.2 MG/DL (ref 0–1.2)
BILIRUB UR QL STRIP: NEGATIVE
BNP SERPL-MCNC: 218 PG/ML (ref 0–125)
BUN SERPL-MCNC: 50 MG/DL (ref 6–23)
BUN SERPL-MCNC: 53 MG/DL (ref 6–23)
C PNEUM DNA NPH QL NAA+NON-PROBE: NOT DETECTED
CALCIUM SERPL-MCNC: 9.3 MG/DL (ref 8.6–10.2)
CALCIUM SERPL-MCNC: 9.7 MG/DL (ref 8.6–10.2)
CHLORIDE SERPL-SCNC: 102 MMOL/L (ref 98–107)
CHLORIDE SERPL-SCNC: 105 MMOL/L (ref 98–107)
CLARITY UR: CLEAR
CO2 SERPL-SCNC: 23 MMOL/L (ref 22–29)
CO2 SERPL-SCNC: 23 MMOL/L (ref 22–29)
COLOR UR: YELLOW
CREAT SERPL-MCNC: 1.4 MG/DL (ref 0.7–1.2)
CREAT SERPL-MCNC: 1.7 MG/DL (ref 0.7–1.2)
EOSINOPHIL # BLD: 0.11 K/UL (ref 0.05–0.5)
EOSINOPHILS RELATIVE PERCENT: 1 % (ref 0–6)
EPI CELLS #/AREA URNS HPF: ABNORMAL /HPF
ERYTHROCYTE [DISTWIDTH] IN BLOOD BY AUTOMATED COUNT: 16.4 % (ref 11.5–15)
FLUAV RNA NPH QL NAA+NON-PROBE: NOT DETECTED
FLUBV RNA NPH QL NAA+NON-PROBE: NOT DETECTED
GFR, ESTIMATED: 43 ML/MIN/1.73M2
GFR, ESTIMATED: 54 ML/MIN/1.73M2
GLUCOSE BLD-MCNC: 61 MG/DL (ref 74–99)
GLUCOSE BLD-MCNC: 91 MG/DL (ref 74–99)
GLUCOSE SERPL-MCNC: 203 MG/DL (ref 74–99)
GLUCOSE SERPL-MCNC: 65 MG/DL (ref 74–99)
GLUCOSE UR STRIP-MCNC: NEGATIVE MG/DL
HADV DNA NPH QL NAA+NON-PROBE: NOT DETECTED
HCOV 229E RNA NPH QL NAA+NON-PROBE: NOT DETECTED
HCOV HKU1 RNA NPH QL NAA+NON-PROBE: NOT DETECTED
HCOV NL63 RNA NPH QL NAA+NON-PROBE: NOT DETECTED
HCOV OC43 RNA NPH QL NAA+NON-PROBE: NOT DETECTED
HCT VFR BLD AUTO: 29.8 % (ref 37–54)
HGB BLD-MCNC: 9.1 G/DL (ref 12.5–16.5)
HGB UR QL STRIP.AUTO: ABNORMAL
HMPV RNA NPH QL NAA+NON-PROBE: NOT DETECTED
HPIV1 RNA NPH QL NAA+NON-PROBE: NOT DETECTED
HPIV2 RNA NPH QL NAA+NON-PROBE: NOT DETECTED
HPIV3 RNA NPH QL NAA+NON-PROBE: NOT DETECTED
HPIV4 RNA NPH QL NAA+NON-PROBE: NOT DETECTED
IMM GRANULOCYTES # BLD AUTO: 0.05 K/UL (ref 0–0.58)
IMM GRANULOCYTES NFR BLD: 1 % (ref 0–5)
KETONES UR STRIP-MCNC: ABNORMAL MG/DL
LACTATE BLDV-SCNC: 2 MMOL/L (ref 0.5–2.2)
LACTATE BLDV-SCNC: 2.8 MMOL/L (ref 0.5–1.9)
LEUKOCYTE ESTERASE UR QL STRIP: ABNORMAL
LIPASE SERPL-CCNC: 142 U/L (ref 13–60)
LYMPHOCYTES NFR BLD: 1.71 K/UL (ref 1.5–4)
LYMPHOCYTES RELATIVE PERCENT: 18 % (ref 20–42)
M PNEUMO DNA NPH QL NAA+NON-PROBE: NOT DETECTED
MCH RBC QN AUTO: 27 PG (ref 26–35)
MCHC RBC AUTO-ENTMCNC: 30.5 G/DL (ref 32–34.5)
MCV RBC AUTO: 88.4 FL (ref 80–99.9)
MONOCYTES NFR BLD: 0.84 K/UL (ref 0.1–0.95)
MONOCYTES NFR BLD: 9 % (ref 2–12)
NEUTROPHILS NFR BLD: 71 % (ref 43–80)
NEUTS SEG NFR BLD: 6.56 K/UL (ref 1.8–7.3)
NITRITE UR QL STRIP: NEGATIVE
PH UR STRIP: 5.5 [PH] (ref 5–8)
PH VENOUS: 7.39 (ref 7.35–7.45)
PLATELET # BLD AUTO: 483 K/UL (ref 130–450)
PMV BLD AUTO: 9.6 FL (ref 7–12)
POTASSIUM SERPL-SCNC: 4.1 MMOL/L (ref 3.5–5)
POTASSIUM SERPL-SCNC: 4.7 MMOL/L (ref 3.5–5)
PROT SERPL-MCNC: 7.9 G/DL (ref 6.4–8.3)
PROT UR STRIP-MCNC: ABNORMAL MG/DL
RBC # BLD AUTO: 3.37 M/UL (ref 3.8–5.8)
RBC #/AREA URNS HPF: ABNORMAL /HPF
RSV RNA NPH QL NAA+NON-PROBE: NOT DETECTED
RV+EV RNA NPH QL NAA+NON-PROBE: NOT DETECTED
SARS-COV-2 RDRP RESP QL NAA+PROBE: NOT DETECTED
SARS-COV-2 RNA NPH QL NAA+NON-PROBE: NOT DETECTED
SODIUM SERPL-SCNC: 142 MMOL/L (ref 132–146)
SODIUM SERPL-SCNC: 142 MMOL/L (ref 132–146)
SP GR UR STRIP: 1.01 (ref 1–1.03)
SPECIMEN DESCRIPTION: NORMAL
SPECIMEN DESCRIPTION: NORMAL
TROPONIN I SERPL HS-MCNC: 52 NG/L (ref 0–22)
TROPONIN I SERPL HS-MCNC: 61 NG/L (ref 0–22)
UROBILINOGEN UR STRIP-ACNC: 0.2 EU/DL (ref 0–1)
WBC #/AREA URNS HPF: ABNORMAL /HPF
WBC OTHER # BLD: 9.3 K/UL (ref 4.5–11.5)

## 2025-06-13 PROCEDURE — 83880 ASSAY OF NATRIURETIC PEPTIDE: CPT

## 2025-06-13 PROCEDURE — 2500000003 HC RX 250 WO HCPCS: Performed by: INTERNAL MEDICINE

## 2025-06-13 PROCEDURE — 6360000004 HC RX CONTRAST MEDICATION: Performed by: RADIOLOGY

## 2025-06-13 PROCEDURE — 82010 KETONE BODYS QUAN: CPT

## 2025-06-13 PROCEDURE — 84484 ASSAY OF TROPONIN QUANT: CPT

## 2025-06-13 PROCEDURE — 6370000000 HC RX 637 (ALT 250 FOR IP)

## 2025-06-13 PROCEDURE — 81001 URINALYSIS AUTO W/SCOPE: CPT

## 2025-06-13 PROCEDURE — 70450 CT HEAD/BRAIN W/O DYE: CPT

## 2025-06-13 PROCEDURE — 87040 BLOOD CULTURE FOR BACTERIA: CPT

## 2025-06-13 PROCEDURE — 93005 ELECTROCARDIOGRAM TRACING: CPT | Performed by: EMERGENCY MEDICINE

## 2025-06-13 PROCEDURE — 6360000002 HC RX W HCPCS

## 2025-06-13 PROCEDURE — 96365 THER/PROPH/DIAG IV INF INIT: CPT

## 2025-06-13 PROCEDURE — 82800 BLOOD PH: CPT

## 2025-06-13 PROCEDURE — 51702 INSERT TEMP BLADDER CATH: CPT

## 2025-06-13 PROCEDURE — 2060000000 HC ICU INTERMEDIATE R&B

## 2025-06-13 PROCEDURE — 71045 X-RAY EXAM CHEST 1 VIEW: CPT

## 2025-06-13 PROCEDURE — 85025 COMPLETE CBC W/AUTO DIFF WBC: CPT

## 2025-06-13 PROCEDURE — 87086 URINE CULTURE/COLONY COUNT: CPT

## 2025-06-13 PROCEDURE — 83605 ASSAY OF LACTIC ACID: CPT

## 2025-06-13 PROCEDURE — 99285 EMERGENCY DEPT VISIT HI MDM: CPT

## 2025-06-13 PROCEDURE — 80048 BASIC METABOLIC PNL TOTAL CA: CPT

## 2025-06-13 PROCEDURE — 6360000002 HC RX W HCPCS: Performed by: EMERGENCY MEDICINE

## 2025-06-13 PROCEDURE — 74177 CT ABD & PELVIS W/CONTRAST: CPT

## 2025-06-13 PROCEDURE — 2580000003 HC RX 258: Performed by: EMERGENCY MEDICINE

## 2025-06-13 PROCEDURE — 80053 COMPREHEN METABOLIC PANEL: CPT

## 2025-06-13 PROCEDURE — 87635 SARS-COV-2 COVID-19 AMP PRB: CPT

## 2025-06-13 PROCEDURE — 2500000003 HC RX 250 WO HCPCS

## 2025-06-13 PROCEDURE — 71275 CT ANGIOGRAPHY CHEST: CPT

## 2025-06-13 PROCEDURE — 82962 GLUCOSE BLOOD TEST: CPT

## 2025-06-13 PROCEDURE — 2580000003 HC RX 258

## 2025-06-13 PROCEDURE — 0202U NFCT DS 22 TRGT SARS-COV-2: CPT

## 2025-06-13 PROCEDURE — 83690 ASSAY OF LIPASE: CPT

## 2025-06-13 PROCEDURE — 76705 ECHO EXAM OF ABDOMEN: CPT

## 2025-06-13 RX ORDER — CETIRIZINE HYDROCHLORIDE 10 MG/1
10 TABLET ORAL DAILY
Status: DISCONTINUED | OUTPATIENT
Start: 2025-06-14 | End: 2025-06-16 | Stop reason: HOSPADM

## 2025-06-13 RX ORDER — SODIUM CHLORIDE 0.9 % (FLUSH) 0.9 %
10 SYRINGE (ML) INJECTION PRN
Status: DISCONTINUED | OUTPATIENT
Start: 2025-06-13 | End: 2025-06-16 | Stop reason: HOSPADM

## 2025-06-13 RX ORDER — LOSARTAN POTASSIUM 50 MG/1
100 TABLET ORAL EVERY MORNING
Status: DISCONTINUED | OUTPATIENT
Start: 2025-06-14 | End: 2025-06-16 | Stop reason: HOSPADM

## 2025-06-13 RX ORDER — INSULIN GLARGINE 100 [IU]/ML
60 INJECTION, SOLUTION SUBCUTANEOUS 2 TIMES DAILY
Status: DISCONTINUED | OUTPATIENT
Start: 2025-06-13 | End: 2025-06-16 | Stop reason: HOSPADM

## 2025-06-13 RX ORDER — POTASSIUM CHLORIDE 7.45 MG/ML
10 INJECTION INTRAVENOUS PRN
Status: DISCONTINUED | OUTPATIENT
Start: 2025-06-13 | End: 2025-06-16 | Stop reason: HOSPADM

## 2025-06-13 RX ORDER — DIVALPROEX SODIUM 250 MG/1
250 TABLET, FILM COATED, EXTENDED RELEASE ORAL 2 TIMES DAILY
Status: DISCONTINUED | OUTPATIENT
Start: 2025-06-13 | End: 2025-06-16 | Stop reason: HOSPADM

## 2025-06-13 RX ORDER — ACETAMINOPHEN 325 MG/1
650 TABLET ORAL EVERY 6 HOURS PRN
Status: DISCONTINUED | OUTPATIENT
Start: 2025-06-13 | End: 2025-06-16 | Stop reason: HOSPADM

## 2025-06-13 RX ORDER — BISACODYL 10 MG
10 SUPPOSITORY, RECTAL RECTAL DAILY PRN
COMMUNITY

## 2025-06-13 RX ORDER — ASPIRIN 81 MG/1
81 TABLET ORAL NIGHTLY
Status: DISCONTINUED | OUTPATIENT
Start: 2025-06-13 | End: 2025-06-16 | Stop reason: HOSPADM

## 2025-06-13 RX ORDER — ONDANSETRON 2 MG/ML
4 INJECTION INTRAMUSCULAR; INTRAVENOUS EVERY 6 HOURS PRN
Status: DISCONTINUED | OUTPATIENT
Start: 2025-06-13 | End: 2025-06-16 | Stop reason: HOSPADM

## 2025-06-13 RX ORDER — BUDESONIDE 0.5 MG/2ML
0.5 INHALANT ORAL
Status: DISCONTINUED | OUTPATIENT
Start: 2025-06-13 | End: 2025-06-16 | Stop reason: HOSPADM

## 2025-06-13 RX ORDER — PANTOPRAZOLE SODIUM 40 MG/1
40 TABLET, DELAYED RELEASE ORAL EVERY MORNING
Status: DISCONTINUED | OUTPATIENT
Start: 2025-06-14 | End: 2025-06-16 | Stop reason: HOSPADM

## 2025-06-13 RX ORDER — CHOLECALCIFEROL (VITAMIN D3) 50 MCG
2000 TABLET ORAL DAILY
Status: DISCONTINUED | OUTPATIENT
Start: 2025-06-14 | End: 2025-06-16 | Stop reason: HOSPADM

## 2025-06-13 RX ORDER — SODIUM CHLORIDE 9 MG/ML
INJECTION, SOLUTION INTRAVENOUS PRN
Status: DISCONTINUED | OUTPATIENT
Start: 2025-06-13 | End: 2025-06-16 | Stop reason: HOSPADM

## 2025-06-13 RX ORDER — MEMANTINE HYDROCHLORIDE 5 MG/1
5 TABLET ORAL 2 TIMES DAILY
Status: DISCONTINUED | OUTPATIENT
Start: 2025-06-13 | End: 2025-06-16 | Stop reason: HOSPADM

## 2025-06-13 RX ORDER — IOPAMIDOL 755 MG/ML
75 INJECTION, SOLUTION INTRAVASCULAR
Status: COMPLETED | OUTPATIENT
Start: 2025-06-13 | End: 2025-06-13

## 2025-06-13 RX ORDER — ARFORMOTEROL TARTRATE 15 UG/2ML
15 SOLUTION RESPIRATORY (INHALATION)
Status: DISCONTINUED | OUTPATIENT
Start: 2025-06-13 | End: 2025-06-16 | Stop reason: HOSPADM

## 2025-06-13 RX ORDER — 0.9 % SODIUM CHLORIDE 0.9 %
1000 INTRAVENOUS SOLUTION INTRAVENOUS ONCE
Status: COMPLETED | OUTPATIENT
Start: 2025-06-13 | End: 2025-06-13

## 2025-06-13 RX ORDER — CLOPIDOGREL BISULFATE 75 MG/1
75 TABLET ORAL EVERY MORNING
Status: DISCONTINUED | OUTPATIENT
Start: 2025-06-14 | End: 2025-06-16 | Stop reason: HOSPADM

## 2025-06-13 RX ORDER — MIRTAZAPINE 15 MG/1
7.5 TABLET, FILM COATED ORAL NIGHTLY
COMMUNITY

## 2025-06-13 RX ORDER — ONDANSETRON 4 MG/1
4 TABLET, ORALLY DISINTEGRATING ORAL EVERY 8 HOURS PRN
Status: DISCONTINUED | OUTPATIENT
Start: 2025-06-13 | End: 2025-06-16 | Stop reason: HOSPADM

## 2025-06-13 RX ORDER — TAMSULOSIN HYDROCHLORIDE 0.4 MG/1
0.8 CAPSULE ORAL DAILY
Status: DISCONTINUED | OUTPATIENT
Start: 2025-06-14 | End: 2025-06-16 | Stop reason: HOSPADM

## 2025-06-13 RX ORDER — DULOXETIN HYDROCHLORIDE 60 MG/1
60 CAPSULE, DELAYED RELEASE ORAL EVERY MORNING
Status: DISCONTINUED | OUTPATIENT
Start: 2025-06-14 | End: 2025-06-16 | Stop reason: HOSPADM

## 2025-06-13 RX ORDER — ALBUTEROL SULFATE 90 UG/1
2 INHALANT RESPIRATORY (INHALATION) EVERY 6 HOURS PRN
Status: DISCONTINUED | OUTPATIENT
Start: 2025-06-13 | End: 2025-06-13 | Stop reason: CLARIF

## 2025-06-13 RX ORDER — ALBUTEROL SULFATE 0.83 MG/ML
2.5 SOLUTION RESPIRATORY (INHALATION) EVERY 6 HOURS PRN
Status: DISCONTINUED | OUTPATIENT
Start: 2025-06-13 | End: 2025-06-16 | Stop reason: HOSPADM

## 2025-06-13 RX ORDER — SODIUM CHLORIDE 9 MG/ML
INJECTION, SOLUTION INTRAVENOUS CONTINUOUS
Status: DISCONTINUED | OUTPATIENT
Start: 2025-06-13 | End: 2025-06-16 | Stop reason: HOSPADM

## 2025-06-13 RX ORDER — MAGNESIUM SULFATE 1 G/100ML
1000 INJECTION INTRAVENOUS PRN
Status: DISCONTINUED | OUTPATIENT
Start: 2025-06-13 | End: 2025-06-16 | Stop reason: HOSPADM

## 2025-06-13 RX ORDER — HYDROCHLOROTHIAZIDE 25 MG/1
25 TABLET ORAL DAILY
Status: DISCONTINUED | OUTPATIENT
Start: 2025-06-14 | End: 2025-06-16 | Stop reason: HOSPADM

## 2025-06-13 RX ORDER — INSULIN LISPRO 100 [IU]/ML
0-4 INJECTION, SOLUTION INTRAVENOUS; SUBCUTANEOUS
Status: DISCONTINUED | OUTPATIENT
Start: 2025-06-13 | End: 2025-06-16 | Stop reason: HOSPADM

## 2025-06-13 RX ORDER — GABAPENTIN 300 MG/1
600 CAPSULE ORAL 4 TIMES DAILY
Status: DISCONTINUED | OUTPATIENT
Start: 2025-06-13 | End: 2025-06-16 | Stop reason: HOSPADM

## 2025-06-13 RX ORDER — METOPROLOL TARTRATE 50 MG
100 TABLET ORAL 2 TIMES DAILY
Status: DISCONTINUED | OUTPATIENT
Start: 2025-06-13 | End: 2025-06-16 | Stop reason: HOSPADM

## 2025-06-13 RX ORDER — FENOFIBRATE 54 MG/1
54 TABLET ORAL DAILY
Status: DISCONTINUED | OUTPATIENT
Start: 2025-06-14 | End: 2025-06-16 | Stop reason: HOSPADM

## 2025-06-13 RX ORDER — SODIUM CHLORIDE 0.9 % (FLUSH) 0.9 %
5-40 SYRINGE (ML) INJECTION EVERY 12 HOURS SCHEDULED
Status: DISCONTINUED | OUTPATIENT
Start: 2025-06-13 | End: 2025-06-16 | Stop reason: HOSPADM

## 2025-06-13 RX ORDER — ATORVASTATIN CALCIUM 40 MG/1
40 TABLET, FILM COATED ORAL NIGHTLY
Status: DISCONTINUED | OUTPATIENT
Start: 2025-06-13 | End: 2025-06-16 | Stop reason: HOSPADM

## 2025-06-13 RX ADMIN — MICONAZOLE NITRATE: 20 POWDER TOPICAL at 23:40

## 2025-06-13 RX ADMIN — SODIUM CHLORIDE 1000 ML: 9 INJECTION, SOLUTION INTRAVENOUS at 13:02

## 2025-06-13 RX ADMIN — METOPROLOL TARTRATE 100 MG: 50 TABLET, FILM COATED ORAL at 22:05

## 2025-06-13 RX ADMIN — MEMANTINE HYDROCHLORIDE 5 MG: 5 TABLET, FILM COATED ORAL at 22:05

## 2025-06-13 RX ADMIN — IOPAMIDOL 75 ML: 755 INJECTION, SOLUTION INTRAVENOUS at 14:20

## 2025-06-13 RX ADMIN — ATORVASTATIN CALCIUM 40 MG: 40 TABLET, FILM COATED ORAL at 22:05

## 2025-06-13 RX ADMIN — SODIUM CHLORIDE: 0.9 INJECTION, SOLUTION INTRAVENOUS at 22:11

## 2025-06-13 RX ADMIN — DIVALPROEX SODIUM 250 MG: 250 TABLET, FILM COATED, EXTENDED RELEASE ORAL at 22:05

## 2025-06-13 RX ADMIN — PIPERACILLIN AND TAZOBACTAM 4500 MG: 4; .5 INJECTION, POWDER, LYOPHILIZED, FOR SOLUTION INTRAVENOUS at 17:24

## 2025-06-13 RX ADMIN — GABAPENTIN 600 MG: 300 CAPSULE ORAL at 22:30

## 2025-06-13 RX ADMIN — Medication 9 MG: at 22:05

## 2025-06-13 RX ADMIN — SODIUM CHLORIDE 1000 ML: 9 INJECTION, SOLUTION INTRAVENOUS at 15:33

## 2025-06-13 RX ADMIN — PIPERACILLIN AND TAZOBACTAM 3375 MG: 3; .375 INJECTION, POWDER, LYOPHILIZED, FOR SOLUTION INTRAVENOUS at 23:40

## 2025-06-13 RX ADMIN — SODIUM CHLORIDE, PRESERVATIVE FREE 10 ML: 5 INJECTION INTRAVENOUS at 22:06

## 2025-06-13 ASSESSMENT — PAIN - FUNCTIONAL ASSESSMENT: PAIN_FUNCTIONAL_ASSESSMENT: NONE - DENIES PAIN

## 2025-06-13 NOTE — ED NOTES
Patient presented to ED with urinary catheter (gary) in place. Gary removed and replaced to obtain sterile urine sample for urinalysis and urine culture.

## 2025-06-13 NOTE — ED NOTES
ED to Inpatient Handoff Report    Notified nursing staff on 4W that electronic handoff available and patient ready for transport to room 0420.    Safety Risks: Memory Problems, Hearing problems, and Risk of falls    Patient in Restraints: no    Constant Observer or Patient : no    Telemetry Monitoring Ordered :Yes           Order to transfer to unit without monitor:NO    Last MEWS: 1 Time completed: 1912    Deterioration Index Score:   Predictive Model Details          35 (Caution)  Factor Value    Calculated 6/13/2025 19:30 34% Age 74 years old    Deterioration Index Model 26% Neurological exam X     19% Tyson coma scale 14     4% Sodium 142 mmol/L     3% Systolic 132     3% BUN abnormal (50 mg/dL)     3% Hematocrit abnormal (29.8 %)     3% Pulse 93     2% Respiratory rate 17     2% WBC count 9.3 k/uL     2% Potassium 4.1 mmol/L     1% Platelet count abnormal (483 k/uL)     0% Temperature 97.5 °F (36.4 °C)     0% Blood pH 7.390     0% Pulse oximetry 95 %        Vitals:    06/13/25 1620 06/13/25 1724 06/13/25 1910 06/13/25 1912   BP: 125/70 125/72 132/84 132/84   Pulse: 83 89 93 93   Resp: 17 20 17 17   Temp:    97.5 °F (36.4 °C)   TempSrc:       SpO2: 100% 95% 95% 95%   Weight:       Height:             Opportunity for questions and clarification was provided.

## 2025-06-13 NOTE — CONSULTS
GENERAL SURGERY  CONSULT NOTE  6/13/2025    Physician Consulted: Dr. Mcneil    Reason for Consult: R/O cholecystitis   Referring Physician: Dr. Douglas     HPI  74 y.o. male pmhx CVA on asa/plavix, CAD, HTN, JOS[facility] MIGUEL presented to ED from facility with low O2 saturation. w complaints of fall and weakness at his facility.  Altered, which appears to be his baseline. CTAP was completed with concern for acute cholecystitis, prior CBD stent remains in place. Patient is a poor historian and history is obtained from the chart.     Known to our team in February of this year when he was seen for cholangitis s/p ERCP and stent placement 2/18 followed by PCT 2/19. Patient reportedly removed the tube himself two days ago.     On plavix at home, no anticoagulants     Afebrile, hemodynamically stable on RA.   Latate 2.8 to 2, Cr 1.7  Lipase 142, bili 0.2.   Wbc 9.3.     US abdomen pending         Past Medical History:   Diagnosis Date    Acute stroke due to ischemia (HCC) 7/26/2023    Anxiety     CAD in native artery 7/26/2023    Depression     Diabetes mellitus (HCC)     Hyperlipidemia     Hypertension     Moderate persistent asthma without complication 10/06/2015    Obesity     Sleep apnea     Type 2 diabetes mellitus without complication (HCC)     Ulceration 05/2012    right foot       Past Surgical History:   Procedure Laterality Date    BACK SURGERY  10/2019    COLONOSCOPY      CORONARY STENT PLACEMENT      CT PERC CHOLECYSTOSTOMY  2/19/2025    CT PERC CHOLECYSTOSTOMY 2/19/2025 [facility] CT    ERCP N/A 2/18/2025    ENDOSCOPIC RETROGRADE CHOLANGIOPANCREATOGRAPHY SPHINCTER/PAPILLOTOMY performed by Reji Ansari DO at [facility] OR    ERCP  2/18/2025    ENDOSCOPIC RETROGRADE CHOLANGIOPANCREATOGRAPHY BALLOON SWEEP performed by Reji Ansari DO at [facility] OR    ERCP  2/18/2025    ENDOSCOPIC RETROGRADE CHOLANGIOPANCREATOGRAPHY ST[facility]    HERNIA REPAIR      JOINT REPLACEMENT Left      glargine (BASAGLAR KWIKPEN) 100 UNIT/ML injection pen Inject 60 Units into the skin 2 times daily 11/12/24   Marina Son APRN - CNP   loratadine (CLARITIN) 10 MG tablet Take 1 tablet by mouth daily 11/12/24   Marina Son APRN - CNP   losartan (COZAAR) 100 MG tablet Take 1 tablet by mouth every morning 11/12/24   Marina Son APRN - CNP   magnesium hydroxide (MILK OF MAGNESIA) 400 MG/5ML suspension Take 30 mLs by mouth daily as needed for Constipation 11/12/24   Marina Son APRN - CNP   melatonin 10 MG CAPS capsule Take 1 capsule by mouth nightly as needed (sleep) 11/12/24   Marina Son APRN - CNP   memantine (NAMENDA) 5 MG tablet Take 1 tablet by mouth 2 times daily 11/12/24   Marina Son APRN - CNP   metFORMIN (GLUCOPHAGE) 500 MG tablet Take 2 tablets by mouth 2 times daily (with meals) 11/12/24   Marina Son APRN - CNP   metoprolol (LOPRESSOR) 100 MG tablet Take 1 tablet by mouth 2 times daily 11/12/24   Marina Son APRN - CNP   Multiple Vitamins-Minerals (CENTRUM SILVER 50+MEN) TABS Take 1 tablet by mouth every morning 11/12/24   Marina Son APRN - CNP   pantoprazole (PROTONIX) 40 MG tablet Take 1 tablet by mouth every morning 11/12/24   Marina Son APRN - CNP   tamsulosin (FLOMAX) 0.4 MG capsule Take 2 capsules by mouth daily 11/12/24   Marina Son APRN - CNP   albuterol sulfate HFA (PROVENTIL;VENTOLIN;PROAIR) 108 (90 Base) MCG/ACT inhaler Inhale 2 puffs into the lungs 4 times daily as needed for Wheezing  Patient taking differently: Inhale 2 puffs into the lungs every 6 hours as needed for Shortness of Breath 6/27/24   Marina Son APRN - CNP   Menthol, Topical Analgesic, (BIOFREEZE) 4 % GEL Apply 1 application  topically every 4 hours as needed (PAIN)    Provider, Historical, MD       Allergies   Allergen Reactions    Environmental/Seasonal Other (See Comments)     HAY FEVER    Prinivil [Lisinopril] Cough       Family

## 2025-06-13 NOTE — ED PROVIDER NOTES
heterogeneous liver parenchyma.      Cholelithiasis with gallbladder wall thickening pericholecystic fluid.   Common bile duct at the upper limits of normal.         CTA PULMONARY W CONTRAST   Final Result   No evidence of pulmonary embolism or acute pulmonary abnormality.      Cholelithiasis with gallbladder wall thickening.  Correlate with right upper   quadrant pain.         CT HEAD WO CONTRAST   Final Result   No acute intracranial abnormality.         CT ABDOMEN PELVIS W IV CONTRAST Additional Contrast? None   Final Result   1. Cholelithiasis with suggestion of gallbladder wall thickening and pericholecystic edema. A classic stent is seen in the common bile duct, which appears to be of a normal caliber.   2. Subtle fat stranding around the bladder, possibly signifying cystitis. Clinical and laboratory correlation recommended.            XR CHEST PORTABLE   Final Result   No acute process.           No results found.    No results found.    PROCEDURES   Unless otherwise noted below, none          CRITICAL CARE TIME (.cct)     CRITICAL CARE:  36 MINUTES.          Please note that the withdrawal or failure to initiate urgent interventions for this patient would likely result in a life threatening deterioration or permanent disability.  Accordingly this patient received the above mentioned time, excluding separately billable procedures.           PAST MEDICAL HISTORY/Chronic Conditions Affecting Care      has a past medical history of Acute stroke due to ischemia (HCC) (7/26/2023), Anxiety, CAD in native artery (7/26/2023), Depression, Diabetes mellitus (HCC), Hyperlipidemia, Hypertension, Moderate persistent asthma without complication (10/06/2015), Obesity, Sleep apnea, Type 2 diabetes mellitus without complication (HCC), and Ulceration (05/2012).     EMERGENCY DEPARTMENT COURSE    Vitals:    Vitals:    06/13/25 1503 06/13/25 1535 06/13/25 1620 06/13/25 1724   BP: 107/65 118/67 125/70 125/72   Pulse: 82 80 83 89  cholecystostomy tube yesterday Dr. Mcneil is aware that is his general surgeon.  He also follows with Dr. Ansari.  Patient was not hypoxic vital signs were stable no tachycardia  EKG shows normal sinus rhythm at 76 beats minute no signs of ST changes no STEMI.  Patient's pH was normal at 7.39 beta-hydroxybutyrate was normal.  Glucose is 203 anion gap of 17 patient was not in DKA mild GIUSEPPE with a creatinine of 1.7 he was given a second liter of IV fluids first troponin was 61-second was 52 no NSTEMI initial lactic acid was elevated 2.8 after IV fluids and improved down to 2 normal white blood cell count at 9 hemoglobin stable at 9.1.  LFTs were normal lipase was minimally elevated at 142 BNP was normal urine culture was sent needed small leuks 1+ bacteria blood cultures again were sent respiratory panel was negative COVID was negative.  CTA of the chest showed no PE no focal infiltrate CT of the abdomen pelvis showed gallstones with gallbladder wall thickening again similar to patient's previous CT head showed no acute abnormality.  There is a stent in the common bile duct.  Ultrasound showed hepatomegaly and persistent gallstones with gallbladder wall thickening and pericholecystic fluid.  Common bile duct upper limits of normal.  General surgery was consulted.  I spoke to Dr. Mcneil.  His team came to evaluate the patient patient was covered with IV Zosyn.  I spoke to GI for Dr. Ansari he will provide consult in house.  I spoke to medicine team accepted the patient for admission to Dr. Mccann with GI and general surgery consult in house.  Vital signs were stable upon admission no tachycardia no hypotension.  Wife is at bedside and agreeable to plan for admission.        Social determinants affecting disposition:   Patient has PCP general surgery and GI for follow-up.      ED Course as of 06/13/25 1837   Fri Jun 13, 2025   1312 Patient's wife arrived and state patient has a history baseline dementia is normally

## 2025-06-14 ENCOUNTER — APPOINTMENT (OUTPATIENT)
Dept: CT IMAGING | Age: 75
DRG: 683 | End: 2025-06-14
Payer: MEDICARE

## 2025-06-14 LAB
ALBUMIN SERPL-MCNC: 3.2 G/DL (ref 3.5–5.2)
ALP SERPL-CCNC: 68 U/L (ref 40–129)
ALT SERPL-CCNC: 8 U/L (ref 0–40)
AMMONIA PLAS-SCNC: 14 UMOL/L (ref 16–60)
ANION GAP SERPL CALCULATED.3IONS-SCNC: 15 MMOL/L (ref 7–16)
AST SERPL-CCNC: 18 U/L (ref 0–39)
BASOPHILS # BLD: 0.04 K/UL (ref 0–0.2)
BASOPHILS NFR BLD: 1 % (ref 0–2)
BILIRUB SERPL-MCNC: 0.2 MG/DL (ref 0–1.2)
BUN SERPL-MCNC: 46 MG/DL (ref 6–23)
CALCIUM SERPL-MCNC: 8.9 MG/DL (ref 8.6–10.2)
CHLORIDE SERPL-SCNC: 108 MMOL/L (ref 98–107)
CO2 SERPL-SCNC: 22 MMOL/L (ref 22–29)
CREAT SERPL-MCNC: 1.4 MG/DL (ref 0.7–1.2)
EOSINOPHIL # BLD: 0.2 K/UL (ref 0.05–0.5)
EOSINOPHILS RELATIVE PERCENT: 3 % (ref 0–6)
ERYTHROCYTE [DISTWIDTH] IN BLOOD BY AUTOMATED COUNT: 16.6 % (ref 11.5–15)
GFR, ESTIMATED: 55 ML/MIN/1.73M2
GLUCOSE BLD-MCNC: 157 MG/DL (ref 74–99)
GLUCOSE BLD-MCNC: 54 MG/DL (ref 74–99)
GLUCOSE BLD-MCNC: 70 MG/DL (ref 74–99)
GLUCOSE BLD-MCNC: 79 MG/DL (ref 74–99)
GLUCOSE BLD-MCNC: 87 MG/DL (ref 74–99)
GLUCOSE SERPL-MCNC: 78 MG/DL (ref 74–99)
HCT VFR BLD AUTO: 24.7 % (ref 37–54)
HGB BLD-MCNC: 7.8 G/DL (ref 12.5–16.5)
IMM GRANULOCYTES # BLD AUTO: 0.03 K/UL (ref 0–0.58)
IMM GRANULOCYTES NFR BLD: 1 % (ref 0–5)
INR PPP: 1.3
LYMPHOCYTES NFR BLD: 1.78 K/UL (ref 1.5–4)
LYMPHOCYTES RELATIVE PERCENT: 28 % (ref 20–42)
MAGNESIUM SERPL-MCNC: 1.8 MG/DL (ref 1.6–2.6)
MCH RBC QN AUTO: 27.1 PG (ref 26–35)
MCHC RBC AUTO-ENTMCNC: 31.6 G/DL (ref 32–34.5)
MCV RBC AUTO: 85.8 FL (ref 80–99.9)
MICROORGANISM SPEC CULT: ABNORMAL
MICROORGANISM SPEC CULT: ABNORMAL
MONOCYTES NFR BLD: 0.54 K/UL (ref 0.1–0.95)
MONOCYTES NFR BLD: 9 % (ref 2–12)
NEUTROPHILS NFR BLD: 59 % (ref 43–80)
NEUTS SEG NFR BLD: 3.71 K/UL (ref 1.8–7.3)
PHOSPHATE SERPL-MCNC: 4.2 MG/DL (ref 2.5–4.5)
PLATELET # BLD AUTO: 364 K/UL (ref 130–450)
PMV BLD AUTO: 9.4 FL (ref 7–12)
POTASSIUM SERPL-SCNC: 4 MMOL/L (ref 3.5–5)
PROT SERPL-MCNC: 6.7 G/DL (ref 6.4–8.3)
PROTHROMBIN TIME: 13.6 SEC (ref 9.3–12.4)
RBC # BLD AUTO: 2.88 M/UL (ref 3.8–5.8)
SERVICE CMNT-IMP: ABNORMAL
SODIUM SERPL-SCNC: 145 MMOL/L (ref 132–146)
SPECIMEN DESCRIPTION: ABNORMAL
WBC OTHER # BLD: 6.3 K/UL (ref 4.5–11.5)

## 2025-06-14 PROCEDURE — 85610 PROTHROMBIN TIME: CPT

## 2025-06-14 PROCEDURE — 2580000003 HC RX 258

## 2025-06-14 PROCEDURE — 82962 GLUCOSE BLOOD TEST: CPT

## 2025-06-14 PROCEDURE — 74175 CTA ABDOMEN W/CONTRAST: CPT

## 2025-06-14 PROCEDURE — 85025 COMPLETE CBC W/AUTO DIFF WBC: CPT

## 2025-06-14 PROCEDURE — 94664 DEMO&/EVAL PT USE INHALER: CPT

## 2025-06-14 PROCEDURE — 2060000000 HC ICU INTERMEDIATE R&B

## 2025-06-14 PROCEDURE — 6360000002 HC RX W HCPCS

## 2025-06-14 PROCEDURE — 6370000000 HC RX 637 (ALT 250 FOR IP): Performed by: NURSE PRACTITIONER

## 2025-06-14 PROCEDURE — 94640 AIRWAY INHALATION TREATMENT: CPT

## 2025-06-14 PROCEDURE — 2580000003 HC RX 258: Performed by: STUDENT IN AN ORGANIZED HEALTH CARE EDUCATION/TRAINING PROGRAM

## 2025-06-14 PROCEDURE — 99222 1ST HOSP IP/OBS MODERATE 55: CPT | Performed by: STUDENT IN AN ORGANIZED HEALTH CARE EDUCATION/TRAINING PROGRAM

## 2025-06-14 PROCEDURE — 6360000004 HC RX CONTRAST MEDICATION: Performed by: RADIOLOGY

## 2025-06-14 PROCEDURE — 2580000003 HC RX 258: Performed by: NURSE PRACTITIONER

## 2025-06-14 PROCEDURE — 83735 ASSAY OF MAGNESIUM: CPT

## 2025-06-14 PROCEDURE — 82140 ASSAY OF AMMONIA: CPT

## 2025-06-14 PROCEDURE — 80053 COMPREHEN METABOLIC PANEL: CPT

## 2025-06-14 PROCEDURE — 6370000000 HC RX 637 (ALT 250 FOR IP)

## 2025-06-14 PROCEDURE — 2500000003 HC RX 250 WO HCPCS

## 2025-06-14 PROCEDURE — 84100 ASSAY OF PHOSPHORUS: CPT

## 2025-06-14 RX ORDER — 0.9 % SODIUM CHLORIDE 0.9 %
500 INTRAVENOUS SOLUTION INTRAVENOUS ONCE
Status: COMPLETED | OUTPATIENT
Start: 2025-06-14 | End: 2025-06-14

## 2025-06-14 RX ORDER — GLUCAGON 1 MG/ML
1 KIT INJECTION PRN
Status: DISCONTINUED | OUTPATIENT
Start: 2025-06-14 | End: 2025-06-16 | Stop reason: HOSPADM

## 2025-06-14 RX ORDER — IOPAMIDOL 755 MG/ML
75 INJECTION, SOLUTION INTRAVASCULAR
Status: COMPLETED | OUTPATIENT
Start: 2025-06-14 | End: 2025-06-14

## 2025-06-14 RX ORDER — DEXTROSE MONOHYDRATE 100 MG/ML
INJECTION, SOLUTION INTRAVENOUS CONTINUOUS PRN
Status: DISCONTINUED | OUTPATIENT
Start: 2025-06-14 | End: 2025-06-16 | Stop reason: HOSPADM

## 2025-06-14 RX ADMIN — DIVALPROEX SODIUM 250 MG: 250 TABLET, FILM COATED, EXTENDED RELEASE ORAL at 21:01

## 2025-06-14 RX ADMIN — ATORVASTATIN CALCIUM 40 MG: 40 TABLET, FILM COATED ORAL at 21:01

## 2025-06-14 RX ADMIN — SODIUM CHLORIDE 500 ML: 0.9 INJECTION, SOLUTION INTRAVENOUS at 13:53

## 2025-06-14 RX ADMIN — SODIUM CHLORIDE 500 ML: 0.9 INJECTION, SOLUTION INTRAVENOUS at 01:12

## 2025-06-14 RX ADMIN — PIPERACILLIN AND TAZOBACTAM 3375 MG: 3; .375 INJECTION, POWDER, LYOPHILIZED, FOR SOLUTION INTRAVENOUS at 17:25

## 2025-06-14 RX ADMIN — METOPROLOL TARTRATE 100 MG: 50 TABLET, FILM COATED ORAL at 21:00

## 2025-06-14 RX ADMIN — MICONAZOLE NITRATE: 20 POWDER TOPICAL at 21:02

## 2025-06-14 RX ADMIN — SODIUM CHLORIDE, PRESERVATIVE FREE 10 ML: 5 INJECTION INTRAVENOUS at 21:01

## 2025-06-14 RX ADMIN — PIPERACILLIN AND TAZOBACTAM 3375 MG: 3; .375 INJECTION, POWDER, LYOPHILIZED, FOR SOLUTION INTRAVENOUS at 10:19

## 2025-06-14 RX ADMIN — ARFORMOTEROL TARTRATE 15 MCG: 15 SOLUTION RESPIRATORY (INHALATION) at 20:02

## 2025-06-14 RX ADMIN — IOPAMIDOL 75 ML: 755 INJECTION, SOLUTION INTRAVENOUS at 11:47

## 2025-06-14 RX ADMIN — MICONAZOLE NITRATE: 20 POWDER TOPICAL at 10:20

## 2025-06-14 RX ADMIN — GABAPENTIN 600 MG: 300 CAPSULE ORAL at 21:00

## 2025-06-14 RX ADMIN — BUDESONIDE 500 MCG: 0.5 SUSPENSION RESPIRATORY (INHALATION) at 08:28

## 2025-06-14 RX ADMIN — SODIUM CHLORIDE: 0.9 INJECTION, SOLUTION INTRAVENOUS at 14:58

## 2025-06-14 RX ADMIN — SODIUM CHLORIDE 500 ML: 0.9 INJECTION, SOLUTION INTRAVENOUS at 02:29

## 2025-06-14 RX ADMIN — BUDESONIDE 500 MCG: 0.5 SUSPENSION RESPIRATORY (INHALATION) at 20:02

## 2025-06-14 RX ADMIN — ARFORMOTEROL TARTRATE 15 MCG: 15 SOLUTION RESPIRATORY (INHALATION) at 08:28

## 2025-06-14 RX ADMIN — SODIUM CHLORIDE: 0.9 INJECTION, SOLUTION INTRAVENOUS at 09:25

## 2025-06-14 RX ADMIN — Medication 9 MG: at 21:00

## 2025-06-14 RX ADMIN — MEMANTINE HYDROCHLORIDE 5 MG: 5 TABLET, FILM COATED ORAL at 21:00

## 2025-06-14 RX ADMIN — SODIUM CHLORIDE, PRESERVATIVE FREE 10 ML: 5 INJECTION INTRAVENOUS at 10:20

## 2025-06-14 RX ADMIN — ACETAMINOPHEN 650 MG: 325 TABLET ORAL at 23:50

## 2025-06-14 RX ADMIN — PIPERACILLIN AND TAZOBACTAM 3375 MG: 3; .375 INJECTION, POWDER, LYOPHILIZED, FOR SOLUTION INTRAVENOUS at 23:54

## 2025-06-14 RX ADMIN — SODIUM CHLORIDE: 0.9 INJECTION, SOLUTION INTRAVENOUS at 17:29

## 2025-06-14 ASSESSMENT — PAIN - FUNCTIONAL ASSESSMENT: PAIN_FUNCTIONAL_ASSESSMENT: ACTIVITIES ARE NOT PREVENTED

## 2025-06-14 ASSESSMENT — PAIN DESCRIPTION - ORIENTATION: ORIENTATION: LEFT

## 2025-06-14 ASSESSMENT — PAIN DESCRIPTION - DESCRIPTORS: DESCRIPTORS: ACHING

## 2025-06-14 ASSESSMENT — PAIN SCALES - WONG BAKER: WONGBAKER_NUMERICALRESPONSE: NO HURT

## 2025-06-14 ASSESSMENT — PAIN DESCRIPTION - LOCATION: LOCATION: LEG

## 2025-06-14 ASSESSMENT — PAIN SCALES - GENERAL: PAINLEVEL_OUTOF10: 10

## 2025-06-14 NOTE — PLAN OF CARE
Problem: Chronic Conditions and Co-morbidities  Goal: Patient's chronic conditions and co-morbidity symptoms are monitored and maintained or improved  Outcome: Progressing     Problem: Discharge Planning  Goal: Discharge to home or other facility with appropriate resources  Outcome: Progressing     Problem: Confusion  Goal: Confusion, delirium, dementia, or psychosis is improved or at baseline  Description: INTERVENTIONS:  1. Assess for possible contributors to thought disturbance, including medications, impaired vision or hearing, underlying metabolic abnormalities, dehydration, psychiatric diagnoses, and notify attending LIP  2. [place] high risk fall precautions, as indicated  3. Provide frequent short contacts to provide reality reorientation, refocusing and direction  4. Decrease environmental stimuli, including noise as appropriate  5. Monitor and intervene to maintain adequate nutrition, hydration, elimination, sleep and activity  6. If unable to ensure safety without constant attention obtain sitter and review sitter guidelines with assigned personnel  7. Initiate Psychosocial CNS and Spiritual Care consult, as indicated  Outcome: Progressing     Problem: Safety - Adult  Goal: Free from fall injury  Outcome: Progressing     Problem: Skin/Tissue Integrity  Goal: Skin integrity remains intact  Description: 1.  Monitor for areas of redness and/or skin breakdown  2.  Assess vascular access sites hourly  3.  Every 4-6 hours minimum:  Change oxygen saturation probe site  4.  Every 4-6 hours:  If on nasal continuous positive airway pressure, respiratory therapy assess nares and determine need for appliance change or resting period  Outcome: Progressing

## 2025-06-14 NOTE — PROGRESS NOTES
Notified Dr. Castelan regarding patient lethargic and low blood pressure. This RN notified that he is coming to see the patient.

## 2025-06-14 NOTE — H&P
[facility]   History and Physical      CHIEF COMPLAINT:  Shortness of Breath (Per facility, patient SPO2 60% on RA this morning. Pt does not wear O2 at baseline. Pt pulled out gallbladder drain 2 days ago. )      Reason for Admission:  Cholecystitis, chronic [K81.1]  GIUSEPPE (acute kidney injury) [N17.9]  Acute cystitis without hematuria [N30.00]  Cholecystostomy tube dysfunction, initial encounter [T85.554W]  Dementia, unspecified dementia severity, unspecified dementia type, unspecified whether behavioral, psychotic, or mood disturbance or anxiety (HCC) [F03.90]    History Obtained From:  patient, electronic medical record    HISTORY OF PRESENT ILLNESS:        The patient is a 74 y.o. male of Javid Connelly MD  has a past medical history ischemic stroke, anxiety, CAD, depression, diabetes, hyperlipidemia, hypertension, asthma, recently hospitalized 02/14 - 02/23/25 for cholangitis requiring biliary stent/drain.  Also evaluated on ER in March for a fall but was not admitted.  He was admitted 05/17 -05/19/2025 for altered mental status and UTI and questionable UTI at that time, it was determined that antibiotic treatment was warranted, patient was discharged back to [facility].  He now presents with shortness of breath, was satting at 60% on room air at the facility, normally does not wear oxygen at baseline.  Of note, patient had gallbladder stent removed recently.    In the ER, temp was 97.5, RR 17, HR 87, he was hypotensive with blood pressure of 71/55 and was satting at 98% on high fowlers.  Labs show CKD, lactic acid was 2.8 but improved after fluid.  Urinalysis concerning for UTI    Patient received IV fluid bolus 2 L and Zosyn.  His blood pressure improved to 147/134.  He was admitted for further evaluation.         All labs personally reviewed   All imaging personally reviewed     Past Medical History:        Diagnosis Date    Acute stroke due to ischemia (HCC) 7/26/2023    Anxiety     CAD in

## 2025-06-14 NOTE — CONSULTS
Hepatobiliary and Pancreatic Surgery Attending History and Physical    Patient's Name/Date of Birth: Stewart Mark /1950 (74 y.o.)    Date: June 14, 2025     CC:Chronic cholecystitis s/p cholecystostomy possible [place]    HPI:  Mr. Mark is a 74-year-old male past medical history CVA on aspirin and Plavix, CAD, hypertension, [facility] in a nursing home presented from the ER with hypoxia reported fall and weakness.  Has a history of chronic cholecystitis with multiple cholecystostomy tube placements.  Last cholecystostomy tube was replaced February 19 which showed a patent cystic duct, cholelithiasis and there was bile in the drain at that time.  Also had an ERCP with plastic stent placement with Dr. Ansari.  Had choledocholithiasis.  Had repeat drain study in May showing again a patent cystic duct and drain was removed at the facility.  Labs this admission show normal LFTs.  Bilirubin is 0.2.  Does have CKD.  WBC is normal.  INR was 1.5 in February.  Patient is very lethargic and unable to provide a medical history thus history is taken from the chart.  Has had prior hernia repair but no other surgeries per report other than percutaneous cholecystostomy tubes.    Past Medical History:   Diagnosis Date    Acute stroke due to ischemia (HCC) 7/26/2023    Anxiety     CAD in native artery 7/26/2023    Depression     Diabetes mellitus (HCC)     Hyperlipidemia     Hypertension     Moderate persistent asthma without complication 10/06/2015    Obesity     Sleep apnea     Type 2 diabetes mellitus without complication (HCC)     Ulceration 05/2012    right foot       Past Surgical History:   Procedure Laterality Date    BACK SURGERY  10/2019    COLONOSCOPY      CORONARY STENT PLACEMENT      CT PERC CHOLECYSTOSTOMY  2/19/2025    CT PERC CHOLECYSTOSTOMY 2/19/2025 [facility] CT    ERCP N/A 2/18/2025    ENDOSCOPIC RETROGRADE CHOLANGIOPANCREATOGRAPHY SPHINCTER/PAPILLOTOMY performed by Reji Ansari DO at [facility] OR    ERCP

## 2025-06-14 NOTE — CONSULTS
[facility] Consult Note   MARLEY Stone with Reji Ansari D.O. Consult Note      Date of Service: 6/14/2025  Reason for Consult: Patient with history of cholangitis and cholecystostomy tube   Requesting Physician: Dr. Douglas     CHIEF COMPLAINT: Low saturation, fall and weakness    History Obtained From:  patient, electronic medical record    HISTORY OF PRESENT ILLNESS:    Stewart Mark is a 74 y.o. male with significant past medical history of CVA on aspirin and Plavix, CAD, hypertension and JOSÉ MIGUEL presenting to ED for from facility with low O2 saturation, frequent falls and weakness and admitted  for further workup.  Patient is a poor historian, limited HPI by patient.  No family at bedside.  Patient previously seen by our service in February for ERCP and had PTC drain placed on 2/19.  Drain has since been removed, unknown details, patient reportedly removed it himself.  Surgery following with plans for cholecystectomy.  Patient in no apparent distress.  No abdominal pain with palpation.  Afebrile, hemodynamically stable.    Admission labs: BUN 53, creatinine 1.7, lipase 142, hemoglobin 9.1.   Imaging: US abdomen-  Hepatomegaly at 18.9 cm with heterogeneous liver parenchyma. Cholelithiasis with gallbladder wall thickening pericholecystic fluid. Common bile duct at the upper limits of normal. CT abdomen pelvis W IV contrast- 1. Cholelithiasis with suggestion of gallbladder wall thickening and pericholecystic edema. A classic stent is seen in the common bile duct, which appears to be of a normal caliber. 2. Subtle fat stranding around the bladder, possibly signifying cystitis. Clinical and laboratory correlation recommended.   Labs today: Hemoglobin 7.8, BUN 46, creatinine 1.4    Consultation for patient with history of cholangitis and cholecystostomy tube.  Pt is known to our service, last seen in February for ERCP. ERCP 2/18/25- 1) Choledocholithiasis and

## 2025-06-14 NOTE — PROGRESS NOTES
This patient's CrCl is 65 mL/min.  Please consider reducing the gabapentin dose to 600 mg three times daily.  Jimbo Perez RPh 6/14/2025 6:36 AM

## 2025-06-14 NOTE — PLAN OF CARE
Problem: Safety - Adult  Goal: Free from fall injury  6/14/2025 1047 by Mellissa Huynh RN  Outcome: Progressing  6/14/2025 0005 by Nikolas Sánchez RN  Outcome: Progressing     Problem: Confusion  Goal: Confusion, delirium, dementia, or psychosis is improved or at baseline  Description: INTERVENTIONS:  1. Assess for possible contributors to thought disturbance, including medications, impaired vision or hearing, underlying metabolic abnormalities, dehydration, psychiatric diagnoses, and notify attending LIP  2. [place] high risk fall precautions, as indicated  3. Provide frequent short contacts to provide reality reorientation, refocusing and direction  4. Decrease environmental stimuli, including noise as appropriate  5. Monitor and intervene to maintain adequate nutrition, hydration, elimination, sleep and activity  6. If unable to ensure safety without constant attention obtain sitter and review sitter guidelines with assigned personnel  7. Initiate Psychosocial CNS and Spiritual Care consult, as indicated  6/14/2025 1047 by Mellissa Huynh RN  Outcome: Progressing  6/14/2025 0005 by Nikolas Sánchez, RN  Outcome: Progressing

## 2025-06-15 LAB
ALBUMIN SERPL-MCNC: 3.3 G/DL (ref 3.5–5.2)
ALP SERPL-CCNC: 86 U/L (ref 40–129)
ALT SERPL-CCNC: 10 U/L (ref 0–40)
ANION GAP SERPL CALCULATED.3IONS-SCNC: 12 MMOL/L (ref 7–16)
AST SERPL-CCNC: 23 U/L (ref 0–39)
BASOPHILS # BLD: 0.03 K/UL (ref 0–0.2)
BASOPHILS NFR BLD: 0 % (ref 0–2)
BILIRUB SERPL-MCNC: 0.2 MG/DL (ref 0–1.2)
BUN SERPL-MCNC: 30 MG/DL (ref 6–23)
CALCIUM SERPL-MCNC: 9.2 MG/DL (ref 8.6–10.2)
CHLORIDE SERPL-SCNC: 108 MMOL/L (ref 98–107)
CO2 SERPL-SCNC: 24 MMOL/L (ref 22–29)
CREAT SERPL-MCNC: 1.1 MG/DL (ref 0.7–1.2)
EOSINOPHIL # BLD: 0.33 K/UL (ref 0.05–0.5)
EOSINOPHILS RELATIVE PERCENT: 4 % (ref 0–6)
ERYTHROCYTE [DISTWIDTH] IN BLOOD BY AUTOMATED COUNT: 16.5 % (ref 11.5–15)
GFR, ESTIMATED: 69 ML/MIN/1.73M2
GLUCOSE BLD-MCNC: 128 MG/DL (ref 74–99)
GLUCOSE BLD-MCNC: 171 MG/DL (ref 74–99)
GLUCOSE BLD-MCNC: 181 MG/DL (ref 74–99)
GLUCOSE BLD-MCNC: 223 MG/DL (ref 74–99)
GLUCOSE SERPL-MCNC: 140 MG/DL (ref 74–99)
HCT VFR BLD AUTO: 26.3 % (ref 37–54)
HGB BLD-MCNC: 8.4 G/DL (ref 12.5–16.5)
IMM GRANULOCYTES # BLD AUTO: 0.05 K/UL (ref 0–0.58)
IMM GRANULOCYTES NFR BLD: 1 % (ref 0–5)
LYMPHOCYTES NFR BLD: 1.51 K/UL (ref 1.5–4)
LYMPHOCYTES RELATIVE PERCENT: 20 % (ref 20–42)
MCH RBC QN AUTO: 27.3 PG (ref 26–35)
MCHC RBC AUTO-ENTMCNC: 31.9 G/DL (ref 32–34.5)
MCV RBC AUTO: 85.4 FL (ref 80–99.9)
MONOCYTES NFR BLD: 0.73 K/UL (ref 0.1–0.95)
MONOCYTES NFR BLD: 10 % (ref 2–12)
NEUTROPHILS NFR BLD: 65 % (ref 43–80)
NEUTS SEG NFR BLD: 4.91 K/UL (ref 1.8–7.3)
PLATELET # BLD AUTO: 401 K/UL (ref 130–450)
PMV BLD AUTO: 9.4 FL (ref 7–12)
POTASSIUM SERPL-SCNC: 4.4 MMOL/L (ref 3.5–5)
PROT SERPL-MCNC: 6.9 G/DL (ref 6.4–8.3)
RBC # BLD AUTO: 3.08 M/UL (ref 3.8–5.8)
SODIUM SERPL-SCNC: 144 MMOL/L (ref 132–146)
WBC OTHER # BLD: 7.6 K/UL (ref 4.5–11.5)

## 2025-06-15 PROCEDURE — 6370000000 HC RX 637 (ALT 250 FOR IP): Performed by: NURSE PRACTITIONER

## 2025-06-15 PROCEDURE — 2500000003 HC RX 250 WO HCPCS

## 2025-06-15 PROCEDURE — 82962 GLUCOSE BLOOD TEST: CPT

## 2025-06-15 PROCEDURE — 80053 COMPREHEN METABOLIC PANEL: CPT

## 2025-06-15 PROCEDURE — 2060000000 HC ICU INTERMEDIATE R&B

## 2025-06-15 PROCEDURE — 6360000002 HC RX W HCPCS

## 2025-06-15 PROCEDURE — 99232 SBSQ HOSP IP/OBS MODERATE 35: CPT | Performed by: STUDENT IN AN ORGANIZED HEALTH CARE EDUCATION/TRAINING PROGRAM

## 2025-06-15 PROCEDURE — 85025 COMPLETE CBC W/AUTO DIFF WBC: CPT

## 2025-06-15 PROCEDURE — 6370000000 HC RX 637 (ALT 250 FOR IP)

## 2025-06-15 PROCEDURE — 2580000003 HC RX 258

## 2025-06-15 PROCEDURE — 94640 AIRWAY INHALATION TREATMENT: CPT

## 2025-06-15 RX ADMIN — GABAPENTIN 600 MG: 300 CAPSULE ORAL at 21:05

## 2025-06-15 RX ADMIN — BUDESONIDE 500 MCG: 0.5 SUSPENSION RESPIRATORY (INHALATION) at 08:11

## 2025-06-15 RX ADMIN — MEMANTINE HYDROCHLORIDE 5 MG: 5 TABLET, FILM COATED ORAL at 21:05

## 2025-06-15 RX ADMIN — SODIUM CHLORIDE, PRESERVATIVE FREE 10 ML: 5 INJECTION INTRAVENOUS at 21:08

## 2025-06-15 RX ADMIN — GABAPENTIN 600 MG: 300 CAPSULE ORAL at 08:30

## 2025-06-15 RX ADMIN — PANTOPRAZOLE SODIUM 40 MG: 40 TABLET, DELAYED RELEASE ORAL at 08:29

## 2025-06-15 RX ADMIN — METOPROLOL TARTRATE 100 MG: 50 TABLET, FILM COATED ORAL at 08:29

## 2025-06-15 RX ADMIN — GABAPENTIN 600 MG: 300 CAPSULE ORAL at 17:32

## 2025-06-15 RX ADMIN — SODIUM CHLORIDE: 0.9 INJECTION, SOLUTION INTRAVENOUS at 04:40

## 2025-06-15 RX ADMIN — BUDESONIDE 500 MCG: 0.5 SUSPENSION RESPIRATORY (INHALATION) at 20:37

## 2025-06-15 RX ADMIN — ARFORMOTEROL TARTRATE 15 MCG: 15 SOLUTION RESPIRATORY (INHALATION) at 20:37

## 2025-06-15 RX ADMIN — ARFORMOTEROL TARTRATE 15 MCG: 15 SOLUTION RESPIRATORY (INHALATION) at 08:11

## 2025-06-15 RX ADMIN — Medication 2000 UNITS: at 08:37

## 2025-06-15 RX ADMIN — MICONAZOLE NITRATE: 20 POWDER TOPICAL at 21:08

## 2025-06-15 RX ADMIN — PIPERACILLIN AND TAZOBACTAM 3375 MG: 3; .375 INJECTION, POWDER, LYOPHILIZED, FOR SOLUTION INTRAVENOUS at 17:30

## 2025-06-15 RX ADMIN — TAMSULOSIN HYDROCHLORIDE 0.8 MG: 0.4 CAPSULE ORAL at 08:29

## 2025-06-15 RX ADMIN — INSULIN GLARGINE 60 UNITS: 100 INJECTION, SOLUTION SUBCUTANEOUS at 21:13

## 2025-06-15 RX ADMIN — MICONAZOLE NITRATE: 20 POWDER TOPICAL at 08:30

## 2025-06-15 RX ADMIN — METOPROLOL TARTRATE 100 MG: 50 TABLET, FILM COATED ORAL at 21:05

## 2025-06-15 RX ADMIN — PIPERACILLIN AND TAZOBACTAM 3375 MG: 3; .375 INJECTION, POWDER, LYOPHILIZED, FOR SOLUTION INTRAVENOUS at 08:31

## 2025-06-15 RX ADMIN — GABAPENTIN 600 MG: 300 CAPSULE ORAL at 12:28

## 2025-06-15 RX ADMIN — DIVALPROEX SODIUM 250 MG: 250 TABLET, FILM COATED, EXTENDED RELEASE ORAL at 08:30

## 2025-06-15 RX ADMIN — DULOXETINE 60 MG: 60 CAPSULE, DELAYED RELEASE ORAL at 08:29

## 2025-06-15 RX ADMIN — DIVALPROEX SODIUM 250 MG: 250 TABLET, FILM COATED, EXTENDED RELEASE ORAL at 21:05

## 2025-06-15 RX ADMIN — MEMANTINE HYDROCHLORIDE 5 MG: 5 TABLET, FILM COATED ORAL at 08:30

## 2025-06-15 RX ADMIN — ATORVASTATIN CALCIUM 40 MG: 40 TABLET, FILM COATED ORAL at 21:05

## 2025-06-15 RX ADMIN — SODIUM CHLORIDE: 0.9 INJECTION, SOLUTION INTRAVENOUS at 17:39

## 2025-06-15 RX ADMIN — FENOFIBRATE 54 MG: 54 TABLET ORAL at 08:30

## 2025-06-15 RX ADMIN — INSULIN LISPRO 1 UNITS: 100 INJECTION, SOLUTION INTRAVENOUS; SUBCUTANEOUS at 17:34

## 2025-06-15 RX ADMIN — Medication 9 MG: at 21:05

## 2025-06-15 RX ADMIN — CETIRIZINE HYDROCHLORIDE 10 MG: 10 TABLET, FILM COATED ORAL at 08:29

## 2025-06-15 RX ADMIN — INSULIN LISPRO 1 UNITS: 100 INJECTION, SOLUTION INTRAVENOUS; SUBCUTANEOUS at 21:10

## 2025-06-15 RX ADMIN — SODIUM CHLORIDE, PRESERVATIVE FREE 10 ML: 5 INJECTION INTRAVENOUS at 08:30

## 2025-06-15 NOTE — PLAN OF CARE
Problem: Confusion  Goal: Confusion, delirium, dementia, or psychosis is improved or at baseline  Description: INTERVENTIONS:  1. Assess for possible contributors to thought disturbance, including medications, impaired vision or hearing, underlying metabolic abnormalities, dehydration, psychiatric diagnoses, and notify attending LIP  2. [place] high risk fall precautions, as indicated  3. Provide frequent short contacts to provide reality reorientation, refocusing and direction  4. Decrease environmental stimuli, including noise as appropriate  5. Monitor and intervene to maintain adequate nutrition, hydration, elimination, sleep and activity  6. If unable to ensure safety without constant attention obtain sitter and review sitter guidelines with assigned personnel  7. Initiate Psychosocial CNS and Spiritual Care consult, as indicated  Outcome: Progressing     Problem: Safety - Adult  Goal: Free from fall injury  Outcome: Progressing

## 2025-06-15 NOTE — PROGRESS NOTES
Hepatobiliary and Pancreatic Surgery Progress Note    CC: chronic cholecystitis, choledocholithiasis    Subjective: Patient seems much more alert today. Denies abdominal pain.     OBJECTIVE      Physical    BP (!) 119/45   Pulse 66   Temp 97.6 °F (36.4 °C) (Oral)   Resp 18   Ht 1.854 m (6' 1\")   Wt 125.2 kg (276 lb)   SpO2 97%   BMI 36.41 kg/m²       General appearance: appears in no acute distress, Oriented to person but not time or place  Lungs:respiratory effort normal without accessory numbers  Heart: no pedal edema  Abdomen: soft, nondistended, nontympanic, no guarding, no peritoneal signs, normoactive bowel sounds  Extremities: ROM diminished,     ASSESSMENT: 73 yo M with chronic cholecystitis with choledocholithiasis s/p multiple Percutaneous cholecystostomy tube placements and ERCPs with biliary stent.    PLAN:    - I reviewed their images prior to our office visit and we also reviewed them together today.  - Discussed with the patient and his wife, my recommendation is for robotic cholecystectomy with possible biliary reconstruction if Mirizzi syndrome is identified intra-op.   - We discussed the risks of the procedure including bleeding infection, damage to surrounding structures, bile leak, bile duct injury, risks of anesthesia and risk of open conversion, They state understanding and agree to proceed  - Will try to coordinate surgery on Tuesday in [place], [place]  - Discussed with his wife, since he is somewhat disoriented, she will sign his consents.     Thank you for the consultation and allowing me to take part in Mr. Mark's care.    Greater than or equal to 35 minutes was spent providing face-to-face patient care, including:  and coordinating care, reviewing the chart, labs, and diagnostics, as well as medical decision making. Greater than 50% of this time was spent instructing and counseling the patient face to face regarding findings and

## 2025-06-15 NOTE — ACP (ADVANCE CARE PLANNING)
Advance Care Planning   Healthcare Decision Maker:    Primary Decision Maker (Active): Fariba Mark - Spouse - [phone]    Click here to complete Healthcare Decision Makers including selection of the Healthcare Decision Maker Relationship (ie \"Primary\").

## 2025-06-15 NOTE — CARE COORDINATION
Social Work/Discharge Planning:  Met with patient to follow up on discharge planning.   also called his wife Fariba and completed assessment.  Patient admitted from [facility] and plans to return to facility at discharge.  PTA facility uses a indra lift to transfer to [facility].  Fariba states he will have gallbladder surgery Tuesday at [facility].  Ambulance form in soft chart.  Message sent to [facility] via [employer] to confirm if pre-cert needed to return.  Will continue to follow and assist with discharge planning.  Electronically signed by ELFEGO Sequeira on 6/15/2025 at 1:38 PM

## 2025-06-15 NOTE — PROGRESS NOTES
PROGRESS NOTE        Patient Presents with/Seen in Consultation For      Reason for Consult: Patient with history of cholangitis and cholecystostomy tube   CHIEF COMPLAINT: Low saturation, fall and weakness   Subjective:     Patient seen laying in bed no apparent distress.  Denies abdominal pain or nausea.  Tolerating diet.  Plan of care discussed with patient.    Review of Systems  Aside from what was mentioned in the PMH and HPI, essentially unremarkable, all others negative.    Objective:     BP (!) 119/45   Pulse 66   Temp 97.6 °F (36.4 °C) (Oral)   Resp 18   Ht 1.854 m (6' 1\")   Wt 125.2 kg (276 lb)   SpO2 97%   BMI 36.41 kg/m²     General appearance: alert, awake, laying in bed, and cooperative, dementia  Eyes: conjunctiva pale, sclera anicteric. PERRL.  Lungs: clear to auscultation bilaterally  Heart: regular rate and rhythm, no murmur, 2+ pulses; no edema  Abdomen: soft, non-tender; bowel sounds normal; no masses,  no organomegaly  Extremities: extremities without edema  Pulses: 2+ and symmetric  Skin: Skin color, texture, turgor normal.   Neurologic: A&O X 3, hx dementia    glucose chewable tablet 16 g, PRN  dextrose bolus 10% 125 mL, PRN   Or  dextrose bolus 10% 250 mL, PRN  glucagon injection 1 mg, PRN  dextrose 10 % infusion, Continuous PRN  [Held by provider] aspirin EC tablet 81 mg, Nightly  atorvastatin (LIPITOR) tablet 40 mg, Nightly  [Held by provider] clopidogrel (PLAVIX) tablet 75 mg, QAM  divalproex (DEPAKOTE ER) extended release tablet 250 mg, BID  DULoxetine (CYMBALTA) extended release capsule 60 mg, QAM  fenofibrate (TRICOR) tablet 54 mg, Daily  gabapentin (NEURONTIN) capsule 600 mg, 4x Daily  [Held by provider] hydroCHLOROthiazide (HYDRODIURIL) tablet 25 mg, Daily  insulin glargine (LANTUS) injection vial 60 Units, BID  cetirizine (ZYRTEC) tablet 10 mg, Daily  [Held by provider] losartan (COZAAR) tablet 100 mg, QAM  melatonin tablet 9 mg, Nightly PRN  memantine (NAMENDA) tablet 5 mg,  BID  pantoprazole (PROTONIX) tablet 40 mg, QAM  tamsulosin (FLOMAX) capsule 0.8 mg, Daily  vitamin D (CHOLECALCIFEROL) tablet 2,000 Units, Daily  piperacillin-tazobactam (ZOSYN) 3,375 mg in sodium chloride 0.9 % 50 mL IVPB, Q8H  0.9 % sodium chloride infusion, Continuous  sodium chloride flush 0.9 % injection 5-40 mL, 2 times per day  sodium chloride flush 0.9 % injection 10 mL, PRN  0.9 % sodium chloride infusion, PRN  potassium chloride 10 mEq/100 mL IVPB (Peripheral Line), PRN  magnesium sulfate 1000 mg in dextrose 5% 100 mL IVPB, PRN  ondansetron (ZOFRAN-ODT) disintegrating tablet 4 mg, Q8H PRN   Or  ondansetron (ZOFRAN) injection 4 mg, Q6H PRN  metoprolol tartrate (LOPRESSOR) tablet 100 mg, BID  insulin lispro (HUMALOG,ADMELOG) injection vial 0-4 Units, 4x Daily AC & HS  albuterol (PROVENTIL) (2.5 MG/3ML) 0.083% nebulizer solution 2.5 mg, Q6H PRN  budesonide (PULMICORT) nebulizer suspension 500 mcg, BID RT   And  arformoterol tartrate (BROVANA) nebulizer solution 15 mcg, BID RT  miconazole (MICOTIN) 2 % powder, BID  acetaminophen (TYLENOL) tablet 650 mg, Q6H PRN         Data Review  CBC:   Lab Results   Component Value Date/Time    WBC 7.6 06/15/2025 01:35 AM    RBC 3.08 06/15/2025 01:35 AM    HGB 8.4 06/15/2025 01:35 AM    HCT 26.3 06/15/2025 01:35 AM    MCV 85.4 06/15/2025 01:35 AM    MCH 27.3 06/15/2025 01:35 AM    MCHC 31.9 06/15/2025 01:35 AM    RDW 16.5 06/15/2025 01:35 AM     06/15/2025 01:35 AM    MPV 9.4 06/15/2025 01:35 AM     CMP:    Lab Results   Component Value Date/Time     06/15/2025 01:35 AM    K 4.4 06/15/2025 01:35 AM     06/15/2025 01:35 AM    CO2 24 06/15/2025 01:35 AM    BUN 30 06/15/2025 01:35 AM    CREATININE 1.1 06/15/2025 01:35 AM    GFRAA >60 11/12/2020 03:30 AM    LABGLOM 69 06/15/2025 01:35 AM    LABGLOM >90 03/31/2024 08:07 AM    GLUCOSE 140 06/15/2025 01:35 AM    GLUCOSE 111 11/18/2011 12:00 PM    CALCIUM 9.2 06/15/2025 01:35 AM    BILITOT 0.2 06/15/2025 01:35 AM

## 2025-06-15 NOTE — DISCHARGE INSTR - COC
Continuity of Care Form    Patient Name: Stewart Mark   :  1950  MRN:  [id number]    Admit date:  2025  Discharge date:  ***    Code Status Order: DNR-CCA   Advance Directives:     Admitting Physician:  Bridger Castelan DO  PCP: Javid Connelly MD    Discharging Nurse: ***  Discharging Hospital Unit/Room#: 0420/0420-A  Discharging Unit Phone Number: ***    Emergency Contact:   Extended Emergency Contact Information  Primary Emergency Contact: Fariba Mark  Address: [address] [place] [address]  Work Phone: [phone]  Mobile Phone: [phone]  Relation: Spouse  Preferred language: English   needed? No    Past Surgical History:  Past Surgical History:   Procedure Laterality Date    BACK SURGERY  10/2019    COLONOSCOPY      CORONARY STENT PLACEMENT      CT PERC CHOLECYSTOSTOMY  2025    CT PERC CHOLECYSTOSTOMY 2025 [facility] CT    ERCP N/A 2025    ENDOSCOPIC RETROGRADE CHOLANGIOPANCREATOGRAPHY SPHINCTER/PAPILLOTOMY performed by Reji Ansari DO at [facility] OR    ERCP  2025    ENDOSCOPIC RETROGRADE CHOLANGIOPANCREATOGRAPHY BALLOON SWEEP performed by Reji Ansari DO at [facility] OR    ERCP  2025    ENDOSCOPIC RETROGRADE CHOLANGIOPANCREATOGRAPHY STENT INSERTION performed by Reji Ansari DO at [facility] OR    HERNIA REPAIR      JOINT REPLACEMENT Left     left knee surgery x5    REVISION TOTAL KNEE ARTHROPLASTY Right 2018       Immunization History:   Immunization History   Administered Date(s) Administered    COVID-19, PFIZER PURPLE top, DILUTE for use, (age 12 y+), 30mcg/0.3mL 2021, 2021, 12/10/2021    Influenza Vaccine, unspecified formulation 10/01/2012, 2013, 2014, 10/06/2015, 2016, 10/15/2017    Influenza Virus Vaccine 10/01/2012, 2013, 2014, 10/01/2015, 2016, 10/09/2017, 10/18/2018    Influenza, AFLURIA (age 3 y+), FLUZONE, (age 6 mo+), Quadv MDV, 0.5mL  10/01/2012, 09/23/2013, 10/06/2015    Influenza, FLUARIX, FLULAVAL, FLUZONE (age 6 mo+) and AFLURIA, (age 3 y+), Quadv PF, 0.5mL 10/15/2017, 09/25/2020, 10/14/2021    Influenza, FLUZONE High Dose (age 65 y+), IM, Quadv, 0.7mL 10/14/2021    Influenza, FLUZONE High Dose, (age 65 y+), IM, Trivalent PF, 0.5mL 09/24/2018    Pneumococcal, PCV-13, PREVNAR 13, (age 6w+), IM, 0.5mL 12/02/2014, 09/21/2016    Pneumococcal, PPSV23, PNEUMOVAX 23, (age 2y+), SC/IM, 0.5mL 07/24/2020, 04/04/2022    TDaP, ADACEL (age 10y-64y), BOOSTRIX (age 10y+), IM, 0.5mL 04/10/2012, 09/26/2018       Active Problems:  Patient Active Problem List   Diagnosis Code    Obstructive sleep apnea syndrome G47.33    Shortness of breath R06.02    Moderate persistent asthma without complication J45.40    Uncontrolled type 2 diabetes mellitus [id number]    CAD in native artery I25.10    HLD (hyperlipidemia) E78.5    Morbid obesity (HCC) E66.01    Altered mental state R41.82    Cholangitis (HCC) K83.09    Obstructive jaundice (HCC) K83.1    Altered mental status R41.82    UTI (urinary tract infection) N39.0    GIUSEPPE (acute kidney injury) N17.9       Isolation/Infection:   Isolation            No Isolation          Patient Infection Status    No active infections.   Resolved       Infection Onset Added Last Indicated Last Indicated By Resolved Resolved By    MRSA 06/12/23 06/14/23 06/12/23 Culture, Wound 07/27/23 Grace Swain, RN    Wound-right 2nd digit 6/12/23 - per RN healed completely                         Nurse Assessment:  Last Vital Signs: /73   Pulse 65   Temp 97.8 °F (36.6 °C) (Oral)   Resp 20   Ht 1.854 m (6' 1\")   Wt 125.2 kg (276 lb)   SpO2 98%   BMI 36.41 kg/m²     Last documented pain score (0-10 scale): Pain Level: 10  Last Weight:   Wt Readings from Last 1 Encounters:   06/13/25 125.2 kg (276 lb)     Mental Status:  {IP PT MENTAL STATUS:20030}    IV Access:  {[facility] IV ACCESS:[id number]}    Nursing Mobility/ADLs:  Walking   {P

## 2025-06-15 NOTE — PROGRESS NOTES
[facility]   Progress note      Subjective:      Patient seen and examined at bedside. The patient is awake and alert.    Overnight events reviewed.  Patient is intermittently confused.  Denies acute complaints.  Aware that cholecystectomy is planned.        Medications Prior to Admission:    Medications Prior to Admission: bisacodyl (DULCOLAX) 10 MG suppository, Place 1 suppository rectally daily as needed for Constipation  mirtazapine (REMERON) 15 MG tablet, Take 0.5 tablets by mouth nightly  miconazole (MICOTIN) 2 % powder, Apply 2 g topically 2 times daily -RIGHT GROIN-  acetaminophen (TYLENOL) 325 MG tablet, Take 2 tablets by mouth every 4 hours as needed for Pain or Fever (TEMP >100F) Indications: NTE: 3G/24HRS  divalproex (DEPAKOTE ER) 250 MG extended release tablet, Take 2 tablets by mouth 2 times daily  aspirin 81 MG EC tablet, Take 1 tablet by mouth nightly  atorvastatin (LIPITOR) 40 MG tablet, Take 1 tablet by mouth nightly  budesonide-formoterol (SYMBICORT) 160-4.5 MCG/ACT AERO, Inhale 2 puffs into the lungs 2 times daily as needed (SOB)  vitamin D (VITAMIN D3) 50 MCG (2000 UT) CAPS capsule, Take 1 capsule by mouth 2 times daily (Patient taking differently: Take 1 capsule by mouth daily)  clopidogrel (PLAVIX) 75 MG tablet, Take 1 tablet by mouth every morning  Coenzyme Q10 (CO Q-10) 200 MG CAPS, Take 1 capsule by mouth every morning  DULoxetine (CYMBALTA) 60 MG extended release capsule, Take 1 capsule by mouth every morning  fenofibrate (TRICOR) 54 MG tablet, Take 1 tablet by mouth every morning  gabapentin (NEURONTIN) 600 MG tablet, Take 1 tablet by mouth 4 times daily for 120 days.  hydroCHLOROthiazide (HYDRODIURIL) 25 MG tablet, TAKE ONE(1) TABLET BY MOUTH ONCE DAILY  insulin glargine (BASAGLAR KWIKPEN) 100 UNIT/ML injection pen, Inject 60 Units into the skin 2 times daily  loratadine (CLARITIN) 10 MG tablet, Take 1 tablet by mouth daily  losartan (COZAAR) 100 MG tablet, Take 1  tablet by mouth every morning  magnesium hydroxide (MILK OF MAGNESIA) 400 MG/5ML suspension, Take 30 mLs by mouth daily as needed for Constipation  melatonin 10 MG CAPS capsule, Take 1 capsule by mouth nightly as needed (sleep)  memantine (NAMENDA) 5 MG tablet, Take 1 tablet by mouth 2 times daily  metFORMIN (GLUCOPHAGE) 500 MG tablet, Take 2 tablets by mouth 2 times daily (with meals)  metoprolol (LOPRESSOR) 100 MG tablet, Take 1 tablet by mouth 2 times daily  Multiple Vitamins-Minerals (CENTRUM SILVER 50+MEN) TABS, Take 1 tablet by mouth every morning  pantoprazole (PROTONIX) 40 MG tablet, Take 1 tablet by mouth every morning  tamsulosin (FLOMAX) 0.4 MG capsule, Take 2 capsules by mouth daily (Patient taking differently: Take 2 capsules by mouth nightly)  albuterol sulfate HFA (PROVENTIL;VENTOLIN;PROAIR) 108 (90 Base) MCG/ACT inhaler, Inhale 2 puffs into the lungs 4 times daily as needed for Wheezing (Patient taking differently: Inhale 2 puffs into the lungs every 6 hours as needed for Shortness of Breath)  Menthol, Topical Analgesic, (BIOFREEZE) 4 % GEL, Apply 1 application  topically every 4 hours as needed (PAIN)    Current Medications    Current Facility-Administered Medications:     glucose chewable tablet 16 g, 4 tablet, Oral, PRN, Bebeto, April, APRN - CNP    dextrose bolus 10% 125 mL, 125 mL, IntraVENous, PRN **OR** dextrose bolus 10% 250 mL, 250 mL, IntraVENous, PRN, Bebeto, April, APRN - CNP    glucagon injection 1 mg, 1 mg, SubCUTAneous, PRN, [place], April, APRN - CNP    dextrose 10 % infusion, , IntraVENous, Continuous PRN, Bebeto, April, APRN - HERVE    [Held by provider] aspirin EC tablet 81 mg, 81 mg, Oral, Nightly, Felicia Redding APRN - CNP    atorvastatin (LIPITOR) tablet 40 mg, 40 mg, Oral, Nightly, Felicia Redding APRN - CNP, 40 mg at 06/14/25 2101    [Held by provider] clopidogrel (PLAVIX) tablet 75 mg, 75 mg, Oral, BRITNEY, Felicia Redding, APRN - CNP

## 2025-06-15 NOTE — PROGRESS NOTES
GENERAL SURGERY  DAILY PROGRESS NOTE  6/15/2025    Chief Complaint   Patient presents with    Shortness of Breath     Per facility, patient SPO2 60% on RA this morning. Pt does not wear O2 at baseline. Pt pulled out gallbladder drain 2 days ago.        ATTENDING PHYSICIAN PROGRESS NOTE      I have examined the patient and performed the key aspects of physical exam.  I have independently reviewed the record including all pertinent and new radiology images and laboratory findings as well as reviewed all pertinent provider documentation), and discussed the case with the surgical team.  I agree with the assessment and plan with the following additions, corrections, and changes. 14pt review of symptoms completed and negative except as mentioned.      Subjective:  Naeo  More alert today  Saying he wants to leave    Objective:  /73   Pulse 65   Temp 97.8 °F (36.6 °C) (Oral)   Resp 20   Ht 1.854 m (6' 1\")   Wt 125.2 kg (276 lb)   SpO2 98%   BMI 36.41 kg/m²     Gen: aox3, nad  Cvs: rrr  Lungs: non labored  Abd: soft, nt/nd, no r/g/r      Assessment/Plan:  74 y.o. male with chronic cholecystitis with choledocholithiasis s/p Percutaneous cholecystostomy tube placements and ERCPs with biliary stent.     Appreciate [facility] eval; defer tx to them  Surgery will sign off  Please re notify if needed    TONIA Mcneil MD [facility]  Minimally Invasive General Surgery and Endoscopy  [facility] Dig[facility]ive [facility] and General Surgery  [address] [place] [address], [address]  [facility] [address]  O: [phone]  F: [phone]      Electronically signed by William Mcneil MD on 6/15/2025 at 1:00 PM

## 2025-06-16 ENCOUNTER — APPOINTMENT (OUTPATIENT)
Dept: CT IMAGING | Age: 75
DRG: 683 | End: 2025-06-16
Payer: MEDICARE

## 2025-06-16 ENCOUNTER — HOSPITAL ENCOUNTER (INPATIENT)
Age: 75
LOS: 7 days | Discharge: HOME | DRG: 417 | End: 2025-06-23
Attending: INTERNAL MEDICINE | Admitting: INTERNAL MEDICINE
Payer: MEDICARE

## 2025-06-16 ENCOUNTER — PREP FOR PROCEDURE (OUTPATIENT)
Dept: ONCOLOGY | Age: 75
End: 2025-06-16

## 2025-06-16 VITALS
TEMPERATURE: 97.5 F | HEART RATE: 59 BPM | OXYGEN SATURATION: 96 % | WEIGHT: 276 LBS | SYSTOLIC BLOOD PRESSURE: 123 MMHG | BODY MASS INDEX: 36.58 KG/M2 | DIASTOLIC BLOOD PRESSURE: 61 MMHG | HEIGHT: 73 IN | RESPIRATION RATE: 18 BRPM

## 2025-06-16 DIAGNOSIS — K80.50 CHOLEDOCHOLITHIASIS: Primary | ICD-10-CM

## 2025-06-16 DIAGNOSIS — K81.1 CHRONIC CHOLECYSTITIS: ICD-10-CM

## 2025-06-16 DIAGNOSIS — K83.1 OBSTRUCTIVE JAUNDICE (HCC): ICD-10-CM

## 2025-06-16 PROBLEM — R56.9 SEIZURES (HCC): Status: ACTIVE | Noted: 2025-06-16

## 2025-06-16 PROBLEM — F03.90 DEMENTIA (HCC): Status: ACTIVE | Noted: 2025-06-16

## 2025-06-16 PROBLEM — E11.9 CONTROLLED TYPE 2 DIABETES MELLITUS WITHOUT COMPLICATION (HCC): Status: ACTIVE | Noted: 2025-06-16

## 2025-06-16 PROBLEM — K81.9 CHOLECYSTITIS: Status: ACTIVE | Noted: 2025-02-14

## 2025-06-16 PROBLEM — R56.9 SEIZURE (HCC): Status: ACTIVE | Noted: 2025-06-16

## 2025-06-16 PROBLEM — I10 PRIMARY HYPERTENSION: Status: ACTIVE | Noted: 2025-06-16

## 2025-06-16 LAB
ALBUMIN SERPL-MCNC: 3.1 G/DL (ref 3.5–5.2)
ALBUMIN SERPL-MCNC: 3.3 G/DL (ref 3.5–5.2)
ALP SERPL-CCNC: 81 U/L (ref 40–129)
ALP SERPL-CCNC: 84 U/L (ref 40–129)
ALT SERPL-CCNC: 12 U/L (ref 0–40)
ALT SERPL-CCNC: 14 U/L (ref 0–40)
AMMONIA PLAS-SCNC: 39 UMOL/L (ref 16–60)
ANION GAP SERPL CALCULATED.3IONS-SCNC: 11 MMOL/L (ref 7–16)
ANION GAP SERPL CALCULATED.3IONS-SCNC: 14 MMOL/L (ref 7–16)
AST SERPL-CCNC: 21 U/L (ref 0–39)
AST SERPL-CCNC: 21 U/L (ref 0–39)
BASOPHILS # BLD: 0.02 K/UL (ref 0–0.2)
BASOPHILS # BLD: 0.03 K/UL (ref 0–0.2)
BASOPHILS NFR BLD: 0 % (ref 0–2)
BASOPHILS NFR BLD: 1 % (ref 0–2)
BILIRUB SERPL-MCNC: 0.2 MG/DL (ref 0–1.2)
BILIRUB SERPL-MCNC: <0.2 MG/DL (ref 0–1.2)
BUN SERPL-MCNC: 21 MG/DL (ref 6–23)
BUN SERPL-MCNC: 22 MG/DL (ref 6–23)
CALCIUM SERPL-MCNC: 8.9 MG/DL (ref 8.6–10.2)
CALCIUM SERPL-MCNC: 8.9 MG/DL (ref 8.6–10.2)
CHLORIDE SERPL-SCNC: 109 MMOL/L (ref 98–107)
CHLORIDE SERPL-SCNC: 110 MMOL/L (ref 98–107)
CK SERPL-CCNC: 50 U/L (ref 20–200)
CO2 SERPL-SCNC: 22 MMOL/L (ref 22–29)
CO2 SERPL-SCNC: 24 MMOL/L (ref 22–29)
CREAT SERPL-MCNC: 0.9 MG/DL (ref 0.7–1.2)
CREAT SERPL-MCNC: 1 MG/DL (ref 0.7–1.2)
EKG ATRIAL RATE: 76 BPM
EKG P AXIS: 63 DEGREES
EKG P-R INTERVAL: 224 MS
EKG Q-T INTERVAL: 400 MS
EKG QRS DURATION: 104 MS
EKG QTC CALCULATION (BAZETT): 450 MS
EKG R AXIS: -37 DEGREES
EKG T AXIS: 47 DEGREES
EKG VENTRICULAR RATE: 76 BPM
EOSINOPHIL # BLD: 0.3 K/UL (ref 0.05–0.5)
EOSINOPHIL # BLD: 0.3 K/UL (ref 0.05–0.5)
EOSINOPHILS RELATIVE PERCENT: 6 % (ref 0–6)
EOSINOPHILS RELATIVE PERCENT: 7 % (ref 0–6)
ERYTHROCYTE [DISTWIDTH] IN BLOOD BY AUTOMATED COUNT: 16.4 % (ref 11.5–15)
ERYTHROCYTE [DISTWIDTH] IN BLOOD BY AUTOMATED COUNT: 16.5 % (ref 11.5–15)
GFR, ESTIMATED: 81 ML/MIN/1.73M2
GFR, ESTIMATED: >90 ML/MIN/1.73M2
GLUCOSE BLD-MCNC: 103 MG/DL (ref 74–99)
GLUCOSE BLD-MCNC: 164 MG/DL (ref 74–99)
GLUCOSE BLD-MCNC: 167 MG/DL (ref 74–99)
GLUCOSE BLD-MCNC: 186 MG/DL (ref 74–99)
GLUCOSE BLD-MCNC: 190 MG/DL (ref 74–99)
GLUCOSE BLD-MCNC: 92 MG/DL (ref 74–99)
GLUCOSE SERPL-MCNC: 163 MG/DL (ref 74–99)
GLUCOSE SERPL-MCNC: 169 MG/DL (ref 74–99)
HCT VFR BLD AUTO: 26.5 % (ref 37–54)
HCT VFR BLD AUTO: 26.6 % (ref 37–54)
HGB BLD-MCNC: 8.1 G/DL (ref 12.5–16.5)
HGB BLD-MCNC: 8.4 G/DL (ref 12.5–16.5)
IMM GRANULOCYTES # BLD AUTO: 0.04 K/UL (ref 0–0.58)
IMM GRANULOCYTES # BLD AUTO: 0.04 K/UL (ref 0–0.58)
IMM GRANULOCYTES NFR BLD: 1 % (ref 0–5)
IMM GRANULOCYTES NFR BLD: 1 % (ref 0–5)
INR PPP: 1.2
LACTATE BLDV-SCNC: 1.4 MMOL/L (ref 0.5–2.2)
LYMPHOCYTES NFR BLD: 1.49 K/UL (ref 1.5–4)
LYMPHOCYTES NFR BLD: 1.55 K/UL (ref 1.5–4)
LYMPHOCYTES RELATIVE PERCENT: 31 % (ref 20–42)
LYMPHOCYTES RELATIVE PERCENT: 32 % (ref 20–42)
MAGNESIUM SERPL-MCNC: 1.6 MG/DL (ref 1.6–2.6)
MCH RBC QN AUTO: 26.9 PG (ref 26–35)
MCH RBC QN AUTO: 27.3 PG (ref 26–35)
MCHC RBC AUTO-ENTMCNC: 30.5 G/DL (ref 32–34.5)
MCHC RBC AUTO-ENTMCNC: 31.7 G/DL (ref 32–34.5)
MCV RBC AUTO: 86 FL (ref 80–99.9)
MCV RBC AUTO: 88.4 FL (ref 80–99.9)
MONOCYTES NFR BLD: 0.4 K/UL (ref 0.1–0.95)
MONOCYTES NFR BLD: 0.54 K/UL (ref 0.1–0.95)
MONOCYTES NFR BLD: 11 % (ref 2–12)
MONOCYTES NFR BLD: 9 % (ref 2–12)
NEUTROPHILS NFR BLD: 51 % (ref 43–80)
NEUTROPHILS NFR BLD: 52 % (ref 43–80)
NEUTS SEG NFR BLD: 2.39 K/UL (ref 1.8–7.3)
NEUTS SEG NFR BLD: 2.57 K/UL (ref 1.8–7.3)
PARTIAL THROMBOPLASTIN TIME: 33.9 SEC (ref 24.5–35.1)
PHOSPHATE SERPL-MCNC: 3.7 MG/DL (ref 2.5–4.5)
PLATELET # BLD AUTO: 383 K/UL (ref 130–450)
PLATELET # BLD AUTO: 402 K/UL (ref 130–450)
PMV BLD AUTO: 9.3 FL (ref 7–12)
PMV BLD AUTO: 9.4 FL (ref 7–12)
POTASSIUM SERPL-SCNC: 3.9 MMOL/L (ref 3.5–5)
POTASSIUM SERPL-SCNC: 4 MMOL/L (ref 3.5–5)
PROLACTIN SERPL-MCNC: 9.19 NG/ML
PROT SERPL-MCNC: 6.6 G/DL (ref 6.4–8.3)
PROT SERPL-MCNC: 6.9 G/DL (ref 6.4–8.3)
PROTHROMBIN TIME: 12.9 SEC (ref 9.3–12.4)
RBC # BLD AUTO: 3.01 M/UL (ref 3.8–5.8)
RBC # BLD AUTO: 3.08 M/UL (ref 3.8–5.8)
SODIUM SERPL-SCNC: 144 MMOL/L (ref 132–146)
SODIUM SERPL-SCNC: 146 MMOL/L (ref 132–146)
TROPONIN I SERPL HS-MCNC: 32 NG/L (ref 0–22)
VALPROATE SERPL-MCNC: 14 UG/ML (ref 50–100)
WBC OTHER # BLD: 4.6 K/UL (ref 4.5–11.5)
WBC OTHER # BLD: 5 K/UL (ref 4.5–11.5)

## 2025-06-16 PROCEDURE — 84100 ASSAY OF PHOSPHORUS: CPT

## 2025-06-16 PROCEDURE — 2580000003 HC RX 258

## 2025-06-16 PROCEDURE — 6370000000 HC RX 637 (ALT 250 FOR IP)

## 2025-06-16 PROCEDURE — 36415 COLL VENOUS BLD VENIPUNCTURE: CPT

## 2025-06-16 PROCEDURE — 94640 AIRWAY INHALATION TREATMENT: CPT

## 2025-06-16 PROCEDURE — 93010 ELECTROCARDIOGRAM REPORT: CPT | Performed by: INTERNAL MEDICINE

## 2025-06-16 PROCEDURE — 2140000000 HC CCU INTERMEDIATE R&B

## 2025-06-16 PROCEDURE — 99285 EMERGENCY DEPT VISIT HI MDM: CPT | Performed by: PSYCHIATRY & NEUROLOGY

## 2025-06-16 PROCEDURE — 80053 COMPREHEN METABOLIC PANEL: CPT

## 2025-06-16 PROCEDURE — 99232 SBSQ HOSP IP/OBS MODERATE 35: CPT | Performed by: STUDENT IN AN ORGANIZED HEALTH CARE EDUCATION/TRAINING PROGRAM

## 2025-06-16 PROCEDURE — 70450 CT HEAD/BRAIN W/O DYE: CPT

## 2025-06-16 PROCEDURE — 6360000004 HC RX CONTRAST MEDICATION: Performed by: RADIOLOGY

## 2025-06-16 PROCEDURE — 0042T CT BRAIN PERFUSION: CPT

## 2025-06-16 PROCEDURE — 2500000003 HC RX 250 WO HCPCS

## 2025-06-16 PROCEDURE — 6370000000 HC RX 637 (ALT 250 FOR IP): Performed by: NURSE PRACTITIONER

## 2025-06-16 PROCEDURE — APPSS45 APP SPLIT SHARED TIME 31-45 MINUTES: Performed by: CLINICAL NURSE SPECIALIST

## 2025-06-16 PROCEDURE — 70498 CT ANGIOGRAPHY NECK: CPT

## 2025-06-16 PROCEDURE — 6360000002 HC RX W HCPCS

## 2025-06-16 PROCEDURE — 84484 ASSAY OF TROPONIN QUANT: CPT

## 2025-06-16 PROCEDURE — 4A03X5D MEASUREMENT OF ARTERIAL FLOW, INTRACRANIAL, EXTERNAL APPROACH: ICD-10-PCS | Performed by: PSYCHIATRY & NEUROLOGY

## 2025-06-16 PROCEDURE — 83605 ASSAY OF LACTIC ACID: CPT

## 2025-06-16 PROCEDURE — 82550 ASSAY OF CK (CPK): CPT

## 2025-06-16 PROCEDURE — 99291 CRITICAL CARE FIRST HOUR: CPT | Performed by: INTERNAL MEDICINE

## 2025-06-16 PROCEDURE — 70496 CT ANGIOGRAPHY HEAD: CPT

## 2025-06-16 PROCEDURE — 2580000003 HC RX 258: Performed by: INTERNAL MEDICINE

## 2025-06-16 PROCEDURE — 83735 ASSAY OF MAGNESIUM: CPT

## 2025-06-16 PROCEDURE — 80164 ASSAY DIPROPYLACETIC ACD TOT: CPT

## 2025-06-16 PROCEDURE — 84146 ASSAY OF PROLACTIN: CPT

## 2025-06-16 PROCEDURE — 82140 ASSAY OF AMMONIA: CPT

## 2025-06-16 PROCEDURE — 85025 COMPLETE CBC W/AUTO DIFF WBC: CPT

## 2025-06-16 PROCEDURE — 6360000002 HC RX W HCPCS: Performed by: INTERNAL MEDICINE

## 2025-06-16 PROCEDURE — 85730 THROMBOPLASTIN TIME PARTIAL: CPT

## 2025-06-16 PROCEDURE — 82962 GLUCOSE BLOOD TEST: CPT

## 2025-06-16 PROCEDURE — 85610 PROTHROMBIN TIME: CPT

## 2025-06-16 RX ORDER — CLOPIDOGREL BISULFATE 75 MG/1
75 TABLET ORAL EVERY MORNING
Status: DISCONTINUED | OUTPATIENT
Start: 2025-06-17 | End: 2025-06-23 | Stop reason: HOSPADM

## 2025-06-16 RX ORDER — ONDANSETRON 2 MG/ML
4 INJECTION INTRAMUSCULAR; INTRAVENOUS EVERY 6 HOURS PRN
Status: DISCONTINUED | OUTPATIENT
Start: 2025-06-16 | End: 2025-06-23 | Stop reason: HOSPADM

## 2025-06-16 RX ORDER — MAGNESIUM SULFATE 1 G/100ML
1000 INJECTION INTRAVENOUS PRN
Status: DISCONTINUED | OUTPATIENT
Start: 2025-06-16 | End: 2025-06-23 | Stop reason: HOSPADM

## 2025-06-16 RX ORDER — PANTOPRAZOLE SODIUM 40 MG/1
40 TABLET, DELAYED RELEASE ORAL EVERY MORNING
Status: DISCONTINUED | OUTPATIENT
Start: 2025-06-17 | End: 2025-06-23 | Stop reason: HOSPADM

## 2025-06-16 RX ORDER — ACETAMINOPHEN 325 MG/1
650 TABLET ORAL EVERY 6 HOURS PRN
Status: DISCONTINUED | OUTPATIENT
Start: 2025-06-16 | End: 2025-06-23 | Stop reason: HOSPADM

## 2025-06-16 RX ORDER — GABAPENTIN 300 MG/1
600 CAPSULE ORAL 4 TIMES DAILY
Status: DISCONTINUED | OUTPATIENT
Start: 2025-06-17 | End: 2025-06-23 | Stop reason: HOSPADM

## 2025-06-16 RX ORDER — GLUCAGON 1 MG/ML
1 KIT INJECTION PRN
Status: DISCONTINUED | OUTPATIENT
Start: 2025-06-16 | End: 2025-06-23 | Stop reason: HOSPADM

## 2025-06-16 RX ORDER — ARFORMOTEROL TARTRATE 15 UG/2ML
15 SOLUTION RESPIRATORY (INHALATION)
Status: DISCONTINUED | OUTPATIENT
Start: 2025-06-17 | End: 2025-06-23 | Stop reason: HOSPADM

## 2025-06-16 RX ORDER — FENOFIBRATE 54 MG/1
54 TABLET ORAL DAILY
Status: DISCONTINUED | OUTPATIENT
Start: 2025-06-17 | End: 2025-06-23 | Stop reason: HOSPADM

## 2025-06-16 RX ORDER — CETIRIZINE HYDROCHLORIDE 10 MG/1
10 TABLET ORAL DAILY
Status: DISCONTINUED | OUTPATIENT
Start: 2025-06-17 | End: 2025-06-23 | Stop reason: HOSPADM

## 2025-06-16 RX ORDER — SODIUM CHLORIDE 9 MG/ML
INJECTION, SOLUTION INTRAVENOUS CONTINUOUS
Status: DISCONTINUED | OUTPATIENT
Start: 2025-06-17 | End: 2025-06-19

## 2025-06-16 RX ORDER — ALBUTEROL SULFATE 0.83 MG/ML
2.5 SOLUTION RESPIRATORY (INHALATION) EVERY 6 HOURS PRN
Status: DISCONTINUED | OUTPATIENT
Start: 2025-06-16 | End: 2025-06-23 | Stop reason: HOSPADM

## 2025-06-16 RX ORDER — LEVETIRACETAM 500 MG/5ML
500 INJECTION, SOLUTION, CONCENTRATE INTRAVENOUS EVERY 12 HOURS
Status: DISCONTINUED | OUTPATIENT
Start: 2025-06-17 | End: 2025-06-21

## 2025-06-16 RX ORDER — INSULIN GLARGINE 100 [IU]/ML
60 INJECTION, SOLUTION SUBCUTANEOUS 2 TIMES DAILY
Status: DISCONTINUED | OUTPATIENT
Start: 2025-06-17 | End: 2025-06-23 | Stop reason: HOSPADM

## 2025-06-16 RX ORDER — DEXTROSE MONOHYDRATE 100 MG/ML
INJECTION, SOLUTION INTRAVENOUS CONTINUOUS PRN
Status: DISCONTINUED | OUTPATIENT
Start: 2025-06-16 | End: 2025-06-23 | Stop reason: HOSPADM

## 2025-06-16 RX ORDER — BUDESONIDE 0.5 MG/2ML
0.5 INHALANT ORAL
Status: DISCONTINUED | OUTPATIENT
Start: 2025-06-17 | End: 2025-06-23 | Stop reason: HOSPADM

## 2025-06-16 RX ORDER — POTASSIUM CHLORIDE 7.45 MG/ML
10 INJECTION INTRAVENOUS PRN
Status: DISCONTINUED | OUTPATIENT
Start: 2025-06-16 | End: 2025-06-23 | Stop reason: HOSPADM

## 2025-06-16 RX ORDER — ASPIRIN 81 MG/1
81 TABLET ORAL NIGHTLY
Status: DISCONTINUED | OUTPATIENT
Start: 2025-06-17 | End: 2025-06-23 | Stop reason: HOSPADM

## 2025-06-16 RX ORDER — HYDROCHLOROTHIAZIDE 25 MG/1
25 TABLET ORAL DAILY
Status: DISCONTINUED | OUTPATIENT
Start: 2025-06-17 | End: 2025-06-23 | Stop reason: HOSPADM

## 2025-06-16 RX ORDER — TAMSULOSIN HYDROCHLORIDE 0.4 MG/1
0.8 CAPSULE ORAL DAILY
Status: DISCONTINUED | OUTPATIENT
Start: 2025-06-17 | End: 2025-06-23 | Stop reason: HOSPADM

## 2025-06-16 RX ORDER — MEMANTINE HYDROCHLORIDE 5 MG/1
5 TABLET ORAL 2 TIMES DAILY
Status: DISCONTINUED | OUTPATIENT
Start: 2025-06-17 | End: 2025-06-23 | Stop reason: HOSPADM

## 2025-06-16 RX ORDER — CHOLECALCIFEROL (VITAMIN D3) 50 MCG
2000 TABLET ORAL DAILY
Status: DISCONTINUED | OUTPATIENT
Start: 2025-06-17 | End: 2025-06-23 | Stop reason: HOSPADM

## 2025-06-16 RX ORDER — ATORVASTATIN CALCIUM 40 MG/1
40 TABLET, FILM COATED ORAL NIGHTLY
Status: DISCONTINUED | OUTPATIENT
Start: 2025-06-17 | End: 2025-06-23 | Stop reason: HOSPADM

## 2025-06-16 RX ORDER — DIVALPROEX SODIUM 250 MG/1
250 TABLET, FILM COATED, EXTENDED RELEASE ORAL 2 TIMES DAILY
Status: DISCONTINUED | OUTPATIENT
Start: 2025-06-17 | End: 2025-06-23 | Stop reason: HOSPADM

## 2025-06-16 RX ORDER — METOPROLOL TARTRATE 50 MG
100 TABLET ORAL 2 TIMES DAILY
Status: DISCONTINUED | OUTPATIENT
Start: 2025-06-17 | End: 2025-06-23 | Stop reason: HOSPADM

## 2025-06-16 RX ORDER — DULOXETIN HYDROCHLORIDE 30 MG/1
60 CAPSULE, DELAYED RELEASE ORAL EVERY MORNING
Status: DISCONTINUED | OUTPATIENT
Start: 2025-06-17 | End: 2025-06-23 | Stop reason: HOSPADM

## 2025-06-16 RX ORDER — SODIUM CHLORIDE 0.9 % (FLUSH) 0.9 %
10 SYRINGE (ML) INJECTION PRN
Status: DISCONTINUED | OUTPATIENT
Start: 2025-06-16 | End: 2025-06-23 | Stop reason: HOSPADM

## 2025-06-16 RX ORDER — IOPAMIDOL 755 MG/ML
100 INJECTION, SOLUTION INTRAVASCULAR
Status: COMPLETED | OUTPATIENT
Start: 2025-06-16 | End: 2025-06-16

## 2025-06-16 RX ORDER — INSULIN LISPRO 100 [IU]/ML
0-4 INJECTION, SOLUTION INTRAVENOUS; SUBCUTANEOUS
Status: DISCONTINUED | OUTPATIENT
Start: 2025-06-17 | End: 2025-06-23 | Stop reason: HOSPADM

## 2025-06-16 RX ORDER — LOSARTAN POTASSIUM 50 MG/1
100 TABLET ORAL EVERY MORNING
Status: DISCONTINUED | OUTPATIENT
Start: 2025-06-17 | End: 2025-06-23 | Stop reason: HOSPADM

## 2025-06-16 RX ORDER — SODIUM CHLORIDE 0.9 % (FLUSH) 0.9 %
5-40 SYRINGE (ML) INJECTION EVERY 12 HOURS SCHEDULED
Status: DISCONTINUED | OUTPATIENT
Start: 2025-06-17 | End: 2025-06-23 | Stop reason: HOSPADM

## 2025-06-16 RX ORDER — ONDANSETRON 4 MG/1
4 TABLET, ORALLY DISINTEGRATING ORAL EVERY 8 HOURS PRN
Status: DISCONTINUED | OUTPATIENT
Start: 2025-06-16 | End: 2025-06-23 | Stop reason: HOSPADM

## 2025-06-16 RX ADMIN — SODIUM CHLORIDE: 0.9 INJECTION, SOLUTION INTRAVENOUS at 13:29

## 2025-06-16 RX ADMIN — Medication 2000 UNITS: at 09:00

## 2025-06-16 RX ADMIN — ARFORMOTEROL TARTRATE 15 MCG: 15 SOLUTION RESPIRATORY (INHALATION) at 20:24

## 2025-06-16 RX ADMIN — PIPERACILLIN AND TAZOBACTAM 3375 MG: 3; .375 INJECTION, POWDER, LYOPHILIZED, FOR SOLUTION INTRAVENOUS at 07:49

## 2025-06-16 RX ADMIN — GABAPENTIN 600 MG: 300 CAPSULE ORAL at 20:11

## 2025-06-16 RX ADMIN — BUDESONIDE 500 MCG: 0.5 SUSPENSION RESPIRATORY (INHALATION) at 20:24

## 2025-06-16 RX ADMIN — SODIUM CHLORIDE, PRESERVATIVE FREE 10 ML: 5 INJECTION INTRAVENOUS at 20:12

## 2025-06-16 RX ADMIN — TAMSULOSIN HYDROCHLORIDE 0.8 MG: 0.4 CAPSULE ORAL at 09:00

## 2025-06-16 RX ADMIN — ATORVASTATIN CALCIUM 40 MG: 40 TABLET, FILM COATED ORAL at 20:12

## 2025-06-16 RX ADMIN — DULOXETINE 60 MG: 60 CAPSULE, DELAYED RELEASE ORAL at 08:59

## 2025-06-16 RX ADMIN — INSULIN LISPRO 1 UNITS: 100 INJECTION, SOLUTION INTRAVENOUS; SUBCUTANEOUS at 11:30

## 2025-06-16 RX ADMIN — PANTOPRAZOLE SODIUM 40 MG: 40 TABLET, DELAYED RELEASE ORAL at 09:00

## 2025-06-16 RX ADMIN — PIPERACILLIN AND TAZOBACTAM 3375 MG: 3; .375 INJECTION, POWDER, LYOPHILIZED, FOR SOLUTION INTRAVENOUS at 00:16

## 2025-06-16 RX ADMIN — GABAPENTIN 600 MG: 300 CAPSULE ORAL at 09:00

## 2025-06-16 RX ADMIN — ARFORMOTEROL TARTRATE 15 MCG: 15 SOLUTION RESPIRATORY (INHALATION) at 08:05

## 2025-06-16 RX ADMIN — MICONAZOLE NITRATE: 20 POWDER TOPICAL at 09:06

## 2025-06-16 RX ADMIN — METOPROLOL TARTRATE 100 MG: 50 TABLET, FILM COATED ORAL at 08:59

## 2025-06-16 RX ADMIN — MICONAZOLE NITRATE: 20 POWDER TOPICAL at 20:13

## 2025-06-16 RX ADMIN — IOPAMIDOL 100 ML: 755 INJECTION, SOLUTION INTRAVENOUS at 13:57

## 2025-06-16 RX ADMIN — GABAPENTIN 600 MG: 300 CAPSULE ORAL at 17:35

## 2025-06-16 RX ADMIN — FENOFIBRATE 54 MG: 54 TABLET ORAL at 09:04

## 2025-06-16 RX ADMIN — LEVETIRACETAM 4500 MG: 100 INJECTION INTRAVENOUS at 15:28

## 2025-06-16 RX ADMIN — CETIRIZINE HYDROCHLORIDE 10 MG: 10 TABLET, FILM COATED ORAL at 08:59

## 2025-06-16 RX ADMIN — ACETAMINOPHEN 650 MG: 325 TABLET ORAL at 17:34

## 2025-06-16 RX ADMIN — DIVALPROEX SODIUM 250 MG: 250 TABLET, FILM COATED, EXTENDED RELEASE ORAL at 09:04

## 2025-06-16 RX ADMIN — INSULIN GLARGINE 60 UNITS: 100 INJECTION, SOLUTION SUBCUTANEOUS at 09:06

## 2025-06-16 RX ADMIN — MEMANTINE HYDROCHLORIDE 5 MG: 5 TABLET, FILM COATED ORAL at 20:11

## 2025-06-16 RX ADMIN — BUDESONIDE 500 MCG: 0.5 SUSPENSION RESPIRATORY (INHALATION) at 08:05

## 2025-06-16 RX ADMIN — MEMANTINE HYDROCHLORIDE 5 MG: 5 TABLET, FILM COATED ORAL at 08:59

## 2025-06-16 RX ADMIN — PIPERACILLIN AND TAZOBACTAM 3375 MG: 3; .375 INJECTION, POWDER, LYOPHILIZED, FOR SOLUTION INTRAVENOUS at 15:23

## 2025-06-16 RX ADMIN — SODIUM CHLORIDE, PRESERVATIVE FREE 10 ML: 5 INJECTION INTRAVENOUS at 09:00

## 2025-06-16 RX ADMIN — DIVALPROEX SODIUM 250 MG: 250 TABLET, FILM COATED, EXTENDED RELEASE ORAL at 20:12

## 2025-06-16 ASSESSMENT — PAIN DESCRIPTION - ORIENTATION: ORIENTATION: LEFT

## 2025-06-16 ASSESSMENT — PAIN DESCRIPTION - DESCRIPTORS: DESCRIPTORS: ACHING

## 2025-06-16 ASSESSMENT — PAIN SCALES - GENERAL: PAINLEVEL_OUTOF10: 3

## 2025-06-16 ASSESSMENT — PAIN DESCRIPTION - LOCATION: LOCATION: HIP

## 2025-06-16 ASSESSMENT — PAIN - FUNCTIONAL ASSESSMENT: PAIN_FUNCTIONAL_ASSESSMENT: ACTIVITIES ARE NOT PREVENTED

## 2025-06-16 NOTE — VIRTUAL HEALTH
[facility] Stroke and Telestroke Consult for  [facility] Stroke Alert through [employer] @ 1359  6/16/2025 5:15 PM    Pt Name: Stewart Mark  MRN: [id number]  YOB: 1950  Date of evaluation: 6/16/2025  Primary Care Physician: Javid Connelly MD  Reason for Evaluation: Stroke evaluation with Phone Consult, Discussion and Review of imaging    Stewart Mark is a 74 y.o. male who presented with hypoxia and weakness. Patient has a hx of CVA on Aspirin and Plavix which is been currently held for gall bladder surgery tommorow. Patient was noted o have forced right head deviation associated with tonic movement of both arm and non verabal. Patient symptoms improved where he was more awake and tracking still mute and not following commands and directions. CT head showed no obvious ICH or large hypodensity. CTP- no perfusion mismatch, CTA shows no LVO, moderate right P2 stenosis        LKW: 1137 am   NIH:  20    Allergies  is allergic to environmental/seasonal and prinivil [lisinopril].  Medications  Prior to Admission medications    Medication Sig Start Date End Date Taking? Authorizing Provider   bisacodyl (DULCOLAX) 10 MG suppository Place 1 suppository rectally daily as needed for Constipation   Yes ProviderCaryn MD   mirtazapine (REMERON) 15 MG tablet Take 0.5 tablets by mouth nightly   Yes ProviderCaryn MD   miconazole (MICOTIN) 2 % powder Apply 2 g topically 2 times daily -RIGHT GROIN- 2/23/25   Bridger Castelan DO   acetaminophen (TYLENOL) 325 MG tablet Take 2 tablets by mouth every 4 hours as needed for Pain or Fever (TEMP >100F) Indications: NTE: 3G/24HRS    ProviderCaryn MD   divalproex (DEPAKOTE ER) 250 MG extended release tablet Take 2 tablets by mouth 2 times daily    ProviderCaryn MD   aspirin 81 MG EC tablet Take 1 tablet by mouth nightly 11/12/24   Marina Son, APRN - CNP   atorvastatin (LIPITOR) 40 MG tablet Take 1

## 2025-06-16 NOTE — PROGRESS NOTES
Patient's wife in the room, she explained to him that he needs to talk to staff and they are trying to help him. Patient began communicating with nurse and Dr. Castelan. Both wife and patient state that they want to still have the surgery and be transferred to [place] after the procedure. Patient able to answer questions and able to feed self with minimal set up at this time. All needs met. Call light within reach.

## 2025-06-16 NOTE — PLAN OF CARE
Problem: Chronic Conditions and Co-morbidities  Goal: Patient's chronic conditions and co-morbidity symptoms are monitored and maintained or improved  Outcome: Progressing     Problem: Discharge Planning  Goal: Discharge to home or other facility with appropriate resources  Outcome: Progressing     Problem: Confusion  Goal: Confusion, delirium, dementia, or psychosis is improved or at baseline  Description: INTERVENTIONS:  1. Assess for possible contributors to thought disturbance, including medications, impaired vision or hearing, underlying metabolic abnormalities, dehydration, psychiatric diagnoses, and notify attending LIP  2. [place] high risk fall precautions, as indicated  3. Provide frequent short contacts to provide reality reorientation, refocusing and direction  4. Decrease environmental stimuli, including noise as appropriate  5. Monitor and intervene to maintain adequate nutrition, hydration, elimination, sleep and activity  6. If unable to ensure safety without constant attention obtain sitter and review sitter guidelines with assigned personnel  7. Initiate Psychosocial CNS and Spiritual Care consult, as indicated  6/15/2025 2244 by Nikolas Sánchez RN  Outcome: Progressing  6/15/2025 0858 by Mellissa Huynh RN  Outcome: Progressing     Problem: Safety - Adult  Goal: Free from fall injury  6/15/2025 2244 by Nikolas Sánchez RN  Outcome: Progressing  6/15/2025 0858 by Mellissa Huynh RN  Outcome: Progressing     Problem: Skin/Tissue Integrity  Goal: Skin integrity remains intact  Description: 1.  Monitor for areas of redness and/or skin breakdown  2.  Assess vascular access sites hourly  3.  Every 4-6 hours minimum:  Change oxygen saturation probe site  4.  Every 4-6 hours:  If on nasal continuous positive airway pressure, respiratory therapy assess nares and determine need for appliance change or resting period  Outcome: Progressing  Flowsheets (Taken 6/15/2025 0855 by Mellissa Huynh

## 2025-06-16 NOTE — SIGNIFICANT EVENT
RRT note     CTSP for ams        Pt last know well at 1130     Nursing noted pt to be nonresponsive and rrt called           PHYSICAL EXAM:     Vitals:  /62   Pulse (!) 101   Temp 98.1 °F (36.7 °C) (Axillary)   Resp 16   Ht 1.854 m (6' 1\")   Wt 125.2 kg (276 lb)   SpO2 94%   BMI 36.41 kg/m²      General:  pt does not respond to noxious stimuli. Pt straight in bed. Pt has head fixed to left. Difficult to attempt to bring pt's head to midline.   HEENT:  Mucous membranes moist. No erythema, rhinorrhea, or post-nasal drip noted.  Neck:  No carotid bruits.  Heart:  Rhythm regular at rate of 98  Lungs:  CTA.  No wheeze, rales, or rhonchi  Abdomen:  Positive bowel sounds positive.  Soft.  Non-tender. No guarding, rebound or rigidity.  Breast/Rectal/Genitourinary: not pertinent.    Extremities:  Negative for lower extremity edema  Skin:  Warm and dry  Vascular: 2/4 Dorsalis Pedis pulses bilaterally.  Neuro:  pt unable to cooperate with exam due to mental status. Head deviation to right as noted above. Pt will appear to have random spontaneous eye opening and closing. Pt noted to have tremor(?mild clonus) of b/l forearm.      Labs ordered and obtained.  Imaging ordered. Did escort pt with rrt team to ct scan. While there discussed with neurologist.     Neurologist with see pt via bot when pt returns to room     Differential can include     Seizure  Acute intracranial bleed  Cva  Medication effect. Pt had recent depakote adjusted and is on neurontin 600 mg 4 times a day    Did escort pt back to room. During this time, did exam a few times. Pt had started to perform eom exam without flaw but then did not move eyes next time I did exam. Also noted was increased tone b/l of extremities     During all exams, the most consistent findings was his head was turned to right and was not able to be moved to midline    Neurologist came on the tv. Updated neurologist. Did assist neurologist in exam  Pt was able to do eom exam

## 2025-06-16 NOTE — PLAN OF CARE
Problem: Chronic Conditions and Co-morbidities  Goal: Patient's chronic conditions and co-morbidity symptoms are monitored and maintained or improved  Outcome: Progressing     Problem: Discharge Planning  Goal: Discharge to home or other facility with appropriate resources  Outcome: Progressing     Problem: Confusion  Goal: Confusion, delirium, dementia, or psychosis is improved or at baseline  Description: INTERVENTIONS:  1. Assess for possible contributors to thought disturbance, including medications, impaired vision or hearing, underlying metabolic abnormalities, dehydration, psychiatric diagnoses, and notify attending LIP  2. [place] high risk fall precautions, as indicated  3. Provide frequent short contacts to provide reality reorientation, refocusing and direction  4. Decrease environmental stimuli, including noise as appropriate  5. Monitor and intervene to maintain adequate nutrition, hydration, elimination, sleep and activity  6. If unable to ensure safety without constant attention obtain sitter and review sitter guidelines with assigned personnel  7. Initiate Psychosocial CNS and Spiritual Care consult, as indicated  Outcome: Progressing     Problem: Safety - Adult  Goal: Free from fall injury  Outcome: Progressing     Problem: Skin/Tissue Integrity  Goal: Skin integrity remains intact  Description: 1.  Monitor for areas of redness and/or skin breakdown  2.  Assess vascular access sites hourly  3.  Every 4-6 hours minimum:  Change oxygen saturation probe site  4.  Every 4-6 hours:  If on nasal continuous positive airway pressure, respiratory therapy assess nares and determine need for appliance change or resting period  Outcome: Progressing     Problem: Pain  Goal: Verbalizes/displays adequate comfort level or baseline comfort level  Outcome: Progressing

## 2025-06-16 NOTE — PROGRESS NOTES
Found patient unresponsive to sternal rub and vocal commands. Called RRT. Patient unresponsive. Took patient downstairs for CT. Patient attempting to pull arms away from staff during IV insertion. Not answering verbally but tracking and looking at staff. After CT returned to floor, patient following commands for eye movement. But not responding. Wiggles toes and fingers.

## 2025-06-16 NOTE — PROGRESS NOTES
Spiritual Health History and Assessment/Progress Note  [facility]     Encounter, Attempted Encounter (Patient was unavailable for a visit),  ,  ,      Name: Stewart Mark MRN: [id number]    Age: 74 y.o.     Sex: male   Language: English   Restoration: Muslim   GIUSEPPE (acute kidney injury)     Date: 6/16/2025                           Spiritual Assessment began in [address] INTERNAL MEDICINE 2        Referral/Consult From: Rounding   Encounter Overview/Reason:  Encounter, Attempted Encounter (Patient was unavailable for a visit)  Service Provided For: Patient    Eugenie, Belief, Meaning:   Patient unable to assess at this time  Family/Friends No family/friends present      Importance and Influence:  Patient unable to assess at this time  Family/Friends No family/friends present    Community:  Patient Other: Unable to assess  Family/Friends No family/friends present    Assessment and Plan of Care:     Patient Interventions include: Other: None  Family/Friends Interventions include: No family/friends present    Patient Plan of Care: Spiritual Care available upon further referral  Family/Friends Plan of Care: Spiritual Care available upon further referral    Electronically signed by Chaplain Clement on 6/16/2025 at 3:44 PM

## 2025-06-16 NOTE — PROGRESS NOTES
PCA's called nurse to room because patient is talking to them. Nurse entered room, patient would not vocally communicate with nurse. Asked PCA to return to room. Patient wouldn't vocally communicate but would nod head. Nurse asked if patient wasn't talking because he was mad at nursing for having to put in an IV. Patient nods in agreement. Nurse left room and patient began talking to PCA about his needs.

## 2025-06-16 NOTE — PROGRESS NOTES
Hepatobiliary and Pancreatic Surgery Progress Note    CC: chronic cholecystitis, choledocholithiasis    Subjective: Patient drowsy. Denies abdominal pain, nausea, or vomiting. Afebrile.     OBJECTIVE      Physical    /69   Pulse 62   Temp 97.5 °F (36.4 °C) (Oral)   Resp 18   Ht 1.854 m (6' 1\")   Wt 125.2 kg (276 lb)   SpO2 96%   BMI 36.41 kg/m²       General appearance: appears in no acute distress, Drowsy, awakes to verbal and tactile stim., Oriented to person but not time or place  Lungs:respiratory effort normal without accessory numbers  Heart: no pedal edema  Abdomen: soft, nondistended, nontender, nontympanic, no guarding, no peritoneal signs, normoactive bowel sounds  Extremities: ROM diminished     ASSESSMENT: 73 yo M with chronic cholecystitis with choledocholithiasis s/p multiple Percutaneous cholecystostomy tube placements and ERCPs with biliary stent.    PLAN:    - I reviewed their images prior to our office visit and we also reviewed them together today.  - Re-Discussed with the patient recommendation is for robotic cholecystectomy with possible biliary reconstruction if Mirizzi syndrome is identified intra-op.   - Will try to coordinate surgery on Tuesday in [place], will need transfer downtown today, PCP to arrange transfer  - Dr. Wilson discussed with his wife, since he is somewhat disoriented, she will sign his consents.     Thank you for the consultation and allowing me to take part in Mr. Mark's care.    Greater than or equal to 35 minutes was spent providing face-to-face patient care, including:  and coordinating care, reviewing the chart, labs, and diagnostics, as well as medical decision making. Greater than 50% of this time was spent instructing and counseling the patient face to face regarding findings and recommendations.    Case discussed and plan developed with Dr. Katie Ingram, APRN - CNP   6/16/2025 9:55 AM     Attending Physician

## 2025-06-16 NOTE — PROGRESS NOTES
Dr. Castelan notified to initiate patient transfer to [facility] for surgery tomorrow.  Jayne James RN

## 2025-06-16 NOTE — PROGRESS NOTES
Spiritual Health History and Assessment/Progress Note  [facility],  ,  ,      Name: Stewart Mark MRN: [id number]    Age: 74 y.o.     Sex: male   Language: English   Sabianist: Adventism   GIUSEPPE (acute kidney injury)     Date: 6/16/2025                           Spiritual Assessment began in [address] INTERNAL MEDICINE 2        Referral/Consult From: Multi-disciplinary team   Encounter Overview/Reason: Crisis  Service Provided For: Patient    Eugenie, Belief, Meaning:   Patient is connected with a eugenie tradition or spiritual practice  Family/Friends No family/friends present      Importance and Influence:  Patient unable to assess at this time  Family/Friends No family/friends present    Community:  Patient is connected with a spiritual community and feels well-supported. Support system includes: Spouse/Partner  Family/Friends No family/friends present    Assessment and Plan of Care:     Patient Interventions include: Provided sacramental/Oriental orthodox ritual  Family/Friends Interventions include: No family/friends present    Patient Plan of Care: Spiritual Care available upon further referral  Family/Friends Plan of Care: No family/friends present    Electronically signed by Chaplain Mathieu on 6/16/2025 at 1:45 PM

## 2025-06-17 PROBLEM — K81.1 CHRONIC CHOLECYSTITIS: Status: ACTIVE | Noted: 2025-02-14

## 2025-06-17 LAB
ANION GAP SERPL CALCULATED.3IONS-SCNC: 11 MMOL/L (ref 7–16)
BASOPHILS # BLD: 0.04 K/UL (ref 0–0.2)
BASOPHILS NFR BLD: 1 % (ref 0–2)
BUN SERPL-MCNC: 16 MG/DL (ref 8–23)
CALCIUM SERPL-MCNC: 8.6 MG/DL (ref 8.8–10.2)
CHLORIDE SERPL-SCNC: 112 MMOL/L (ref 98–107)
CO2 SERPL-SCNC: 23 MMOL/L (ref 22–29)
CREAT SERPL-MCNC: 0.8 MG/DL (ref 0.7–1.2)
DATE LAST DOSE: ABNORMAL
EOSINOPHIL # BLD: 0.31 K/UL (ref 0.05–0.5)
EOSINOPHILS RELATIVE PERCENT: 5 % (ref 0–6)
ERYTHROCYTE [DISTWIDTH] IN BLOOD BY AUTOMATED COUNT: 16.2 % (ref 11.5–15)
GFR, ESTIMATED: >90 ML/MIN/1.73M2
GLUCOSE BLD-MCNC: 137 MG/DL (ref 74–99)
GLUCOSE BLD-MCNC: 147 MG/DL (ref 74–99)
GLUCOSE BLD-MCNC: 66 MG/DL (ref 74–99)
GLUCOSE BLD-MCNC: 74 MG/DL (ref 74–99)
GLUCOSE BLD-MCNC: 89 MG/DL (ref 74–99)
GLUCOSE SERPL-MCNC: 68 MG/DL (ref 74–99)
HCT VFR BLD AUTO: 26.8 % (ref 37–54)
HGB BLD-MCNC: 8.2 G/DL (ref 12.5–16.5)
IMM GRANULOCYTES # BLD AUTO: 0.03 K/UL (ref 0–0.58)
IMM GRANULOCYTES NFR BLD: 1 % (ref 0–5)
LACTATE BLDV-SCNC: 1 MMOL/L (ref 0.5–2.2)
LYMPHOCYTES NFR BLD: 1.65 K/UL (ref 1.5–4)
LYMPHOCYTES RELATIVE PERCENT: 27 % (ref 20–42)
MAGNESIUM SERPL-MCNC: 1.6 MG/DL (ref 1.6–2.4)
MCH RBC QN AUTO: 26.6 PG (ref 26–35)
MCHC RBC AUTO-ENTMCNC: 30.6 G/DL (ref 32–34.5)
MCV RBC AUTO: 87 FL (ref 80–99.9)
MONOCYTES NFR BLD: 0.58 K/UL (ref 0.1–0.95)
MONOCYTES NFR BLD: 9 % (ref 2–12)
NEUTROPHILS NFR BLD: 58 % (ref 43–80)
NEUTS SEG NFR BLD: 3.56 K/UL (ref 1.8–7.3)
PLATELET # BLD AUTO: 377 K/UL (ref 130–450)
PMV BLD AUTO: 9.2 FL (ref 7–12)
POTASSIUM SERPL-SCNC: 3.4 MMOL/L (ref 3.5–5.1)
RBC # BLD AUTO: 3.08 M/UL (ref 3.8–5.8)
SODIUM SERPL-SCNC: 146 MMOL/L (ref 136–145)
TME LAST DOSE: ABNORMAL H
VALPROATE SERPL-MCNC: 10 UG/ML (ref 50–100)
VANCOMYCIN DOSE: ABNORMAL MG
WBC OTHER # BLD: 6.2 K/UL (ref 4.5–11.5)

## 2025-06-17 PROCEDURE — 80048 BASIC METABOLIC PNL TOTAL CA: CPT

## 2025-06-17 PROCEDURE — 36415 COLL VENOUS BLD VENIPUNCTURE: CPT

## 2025-06-17 PROCEDURE — 83605 ASSAY OF LACTIC ACID: CPT

## 2025-06-17 PROCEDURE — 2580000003 HC RX 258

## 2025-06-17 PROCEDURE — APPSS45 APP SPLIT SHARED TIME 31-45 MINUTES: Performed by: CLINICAL NURSE SPECIALIST

## 2025-06-17 PROCEDURE — 6360000002 HC RX W HCPCS

## 2025-06-17 PROCEDURE — 80164 ASSAY DIPROPYLACETIC ACD TOT: CPT

## 2025-06-17 PROCEDURE — 94640 AIRWAY INHALATION TREATMENT: CPT

## 2025-06-17 PROCEDURE — 2500000003 HC RX 250 WO HCPCS

## 2025-06-17 PROCEDURE — 82962 GLUCOSE BLOOD TEST: CPT

## 2025-06-17 PROCEDURE — 85025 COMPLETE CBC W/AUTO DIFF WBC: CPT

## 2025-06-17 PROCEDURE — 6370000000 HC RX 637 (ALT 250 FOR IP): Performed by: INTERNAL MEDICINE

## 2025-06-17 PROCEDURE — 6370000000 HC RX 637 (ALT 250 FOR IP)

## 2025-06-17 PROCEDURE — 99232 SBSQ HOSP IP/OBS MODERATE 35: CPT | Performed by: STUDENT IN AN ORGANIZED HEALTH CARE EDUCATION/TRAINING PROGRAM

## 2025-06-17 PROCEDURE — 2140000000 HC CCU INTERMEDIATE R&B

## 2025-06-17 PROCEDURE — 83735 ASSAY OF MAGNESIUM: CPT

## 2025-06-17 RX ADMIN — SODIUM CHLORIDE: 0.9 INJECTION, SOLUTION INTRAVENOUS at 10:13

## 2025-06-17 RX ADMIN — DIVALPROEX SODIUM 250 MG: 250 TABLET, EXTENDED RELEASE ORAL at 21:44

## 2025-06-17 RX ADMIN — PIPERACILLIN AND TAZOBACTAM 3375 MG: 3; .375 INJECTION, POWDER, LYOPHILIZED, FOR SOLUTION INTRAVENOUS at 23:59

## 2025-06-17 RX ADMIN — ATORVASTATIN CALCIUM 40 MG: 40 TABLET, FILM COATED ORAL at 21:44

## 2025-06-17 RX ADMIN — PIPERACILLIN AND TAZOBACTAM 3375 MG: 3; .375 INJECTION, POWDER, LYOPHILIZED, FOR SOLUTION INTRAVENOUS at 16:40

## 2025-06-17 RX ADMIN — LEVETIRACETAM 500 MG: 100 INJECTION INTRAVENOUS at 23:57

## 2025-06-17 RX ADMIN — GABAPENTIN 600 MG: 300 CAPSULE ORAL at 21:44

## 2025-06-17 RX ADMIN — METOPROLOL TARTRATE 100 MG: 50 TABLET, FILM COATED ORAL at 08:18

## 2025-06-17 RX ADMIN — CETIRIZINE HYDROCHLORIDE 10 MG: 10 TABLET, FILM COATED ORAL at 08:16

## 2025-06-17 RX ADMIN — DULOXETINE 60 MG: 30 CAPSULE, DELAYED RELEASE ORAL at 08:17

## 2025-06-17 RX ADMIN — PANTOPRAZOLE SODIUM 40 MG: 40 TABLET, DELAYED RELEASE ORAL at 08:16

## 2025-06-17 RX ADMIN — SODIUM CHLORIDE: 0.9 INJECTION, SOLUTION INTRAVENOUS at 20:46

## 2025-06-17 RX ADMIN — DIVALPROEX SODIUM 250 MG: 250 TABLET, EXTENDED RELEASE ORAL at 09:42

## 2025-06-17 RX ADMIN — TAMSULOSIN HYDROCHLORIDE 0.8 MG: 0.4 CAPSULE ORAL at 08:16

## 2025-06-17 RX ADMIN — LEVETIRACETAM 500 MG: 100 INJECTION INTRAVENOUS at 11:11

## 2025-06-17 RX ADMIN — METOPROLOL TARTRATE 100 MG: 50 TABLET, FILM COATED ORAL at 21:44

## 2025-06-17 RX ADMIN — PETROLATUM: 420 OINTMENT TOPICAL at 09:41

## 2025-06-17 RX ADMIN — BUDESONIDE 500 MCG: 0.5 SUSPENSION RESPIRATORY (INHALATION) at 20:59

## 2025-06-17 RX ADMIN — PIPERACILLIN AND TAZOBACTAM 3375 MG: 3; .375 INJECTION, POWDER, LYOPHILIZED, FOR SOLUTION INTRAVENOUS at 08:59

## 2025-06-17 RX ADMIN — FENOFIBRATE 54 MG: 54 TABLET ORAL at 10:58

## 2025-06-17 RX ADMIN — GABAPENTIN 600 MG: 300 CAPSULE ORAL at 16:44

## 2025-06-17 RX ADMIN — INSULIN GLARGINE 60 UNITS: 100 INJECTION, SOLUTION SUBCUTANEOUS at 21:45

## 2025-06-17 RX ADMIN — MEMANTINE 5 MG: 5 TABLET ORAL at 09:42

## 2025-06-17 RX ADMIN — Medication 2000 UNITS: at 08:16

## 2025-06-17 RX ADMIN — MICONAZOLE NITRATE: 20 POWDER TOPICAL at 21:43

## 2025-06-17 RX ADMIN — MICONAZOLE NITRATE: 20 POWDER TOPICAL at 08:19

## 2025-06-17 RX ADMIN — PIPERACILLIN AND TAZOBACTAM 3375 MG: 3; .375 INJECTION, POWDER, LYOPHILIZED, FOR SOLUTION INTRAVENOUS at 01:06

## 2025-06-17 RX ADMIN — GABAPENTIN 600 MG: 300 CAPSULE ORAL at 08:17

## 2025-06-17 RX ADMIN — SODIUM CHLORIDE: 0.9 INJECTION, SOLUTION INTRAVENOUS at 01:05

## 2025-06-17 RX ADMIN — MEMANTINE 5 MG: 5 TABLET ORAL at 21:43

## 2025-06-17 RX ADMIN — BUDESONIDE 500 MCG: 0.5 SUSPENSION RESPIRATORY (INHALATION) at 07:26

## 2025-06-17 RX ADMIN — GABAPENTIN 600 MG: 300 CAPSULE ORAL at 12:29

## 2025-06-17 RX ADMIN — ARFORMOTEROL TARTRATE 15 MCG: 15 SOLUTION RESPIRATORY (INHALATION) at 07:26

## 2025-06-17 RX ADMIN — ARFORMOTEROL TARTRATE 15 MCG: 15 SOLUTION RESPIRATORY (INHALATION) at 20:59

## 2025-06-17 RX ADMIN — SODIUM CHLORIDE, PRESERVATIVE FREE 10 ML: 5 INJECTION INTRAVENOUS at 21:44

## 2025-06-17 RX ADMIN — PETROLATUM: 420 OINTMENT TOPICAL at 21:44

## 2025-06-17 RX ADMIN — LEVETIRACETAM 500 MG: 100 INJECTION INTRAVENOUS at 01:03

## 2025-06-17 NOTE — CONSULTS
33.9 24.5 - 35.1 sec   Protime-INR    Collection Time: 06/16/25  1:45 PM   Result Value Ref Range    Protime 12.9 (H) 9.3 - 12.4 sec    INR 1.2    Comprehensive Metabolic Panel    Collection Time: 06/16/25  1:45 PM   Result Value Ref Range    Sodium 144 132 - 146 mmol/L    Potassium 4.0 3.5 - 5.0 mmol/L    Chloride 109 (H) 98 - 107 mmol/L    CO2 24 22 - 29 mmol/L    Anion Gap 11 7 - 16 mmol/L    Glucose 163 (H) 74 - 99 mg/dL    BUN 21 6 - 23 mg/dL    Creatinine 0.9 0.70 - 1.20 mg/dL    Est, Glom Filt Rate >90 >60 mL/min/1.73m2    Calcium 8.9 8.6 - 10.2 mg/dL    Total Protein 6.9 6.4 - 8.3 g/dL    Albumin 3.3 (L) 3.5 - 5.2 g/dL    Total Bilirubin 0.2 0.0 - 1.2 mg/dL    Alkaline Phosphatase 84 40 - 129 U/L    ALT 14 0 - 40 U/L    AST 21 0 - 39 U/L   Magnesium    Collection Time: 06/16/25  1:45 PM   Result Value Ref Range    Magnesium 1.6 1.6 - 2.6 mg/dL   Phosphorus    Collection Time: 06/16/25  1:45 PM   Result Value Ref Range    Phosphorus 3.7 2.5 - 4.5 mg/dL   CBC with Auto Differential    Collection Time: 06/16/25  1:45 PM   Result Value Ref Range    WBC 4.6 4.5 - 11.5 k/uL    RBC 3.08 (L) 3.80 - 5.80 m/uL    Hemoglobin 8.4 (L) 12.5 - 16.5 g/dL    Hematocrit 26.5 (L) 37.0 - 54.0 %    MCV 86.0 80.0 - 99.9 fL    MCH 27.3 26.0 - 35.0 pg    MCHC 31.7 (L) 32.0 - 34.5 g/dL    RDW 16.5 (H) 11.5 - 15.0 %    Platelets 402 130 - 450 k/uL    MPV 9.4 7.0 - 12.0 fL    Neutrophils % 52 43.0 - 80.0 %    Lymphocytes % 32 20.0 - 42.0 %    Monocytes % 9 2.0 - 12.0 %    Eosinophils % 7 (H) 0 - 6 %    Basophils % 0 0.0 - 2.0 %    Immature Granulocytes % 1 0.0 - 5.0 %    Neutrophils Absolute 2.39 1.80 - 7.30 k/uL    Lymphocytes Absolute 1.49 (L) 1.50 - 4.00 k/uL    Monocytes Absolute 0.40 0.10 - 0.95 k/uL    Eosinophils Absolute 0.30 0.05 - 0.50 k/uL    Basophils Absolute 0.02 0.00 - 0.20 k/uL    Immature Granulocytes Absolute 0.04 0.00 - 0.58 k/uL   Ammonia    Collection Time: 06/16/25  1:45 PM   Result Value Ref Range    Ammonia 39

## 2025-06-17 NOTE — DISCHARGE INSTR - COC
Continuity of Care Form    Patient Name: Stewart Mark   :  1950  MRN:  [id number]    Admit date:  2025  Discharge date:  25    Code Status Order: DNR-CCA   Advance Directives:     Admitting Physician:  Liv Kaufman MD  PCP: Javid Connelly MD    Discharging Nurse: Abida HERNANDEZ RN BSN  Discharging Hospital Unit/Room#: 6510/6510-A  Discharging Unit Phone Number: [phone]    Emergency Contact:   Extended Emergency Contact Information  Primary Emergency Contact: Fariba Mark  Address: [address] [place] [address]  Work Phone: [phone]  Mobile Phone: [phone]  Relation: Spouse  Preferred language: English   needed? No    Past Surgical History:  Past Surgical History:   Procedure Laterality Date    BACK SURGERY  10/2019    COLONOSCOPY      CORONARY STENT PLACEMENT      CT PERC CHOLECYSTOSTOMY  2025    CT PERC CHOLECYSTOSTOMY 2025 SEB CT    ERCP N/A 2025    ENDOSCOPIC RETROGRADE CHOLANGIOPANCREATOGRAPHY SPHINCTER/PAPILLOTOMY performed by Reji Ansari DO at [facility] OR    ERCP  2025    ENDOSCOPIC RETROGRADE CHOLANGIOPANCREATOGRAPHY BALLOON SWEEP performed by Reji Ansari DO at [facility] OR    ERCP  2025    ENDOSCOPIC RETROGRADE CHOLANGIOPANCREATOGRAPHY STENT INSERTION performed by Reji Ansari DO at [facility] OR    HERNIA REPAIR      JOINT REPLACEMENT Left     left knee surgery x5    REVISION TOTAL KNEE ARTHROPLASTY Right 2018       Immunization History:   Immunization History   Administered Date(s) Administered    COVID-19, PFIZER PURPLE top, DILUTE for use, (age 12 y+), 30mcg/0.3mL 2021, 2021, 12/10/2021    Influenza Vaccine, unspecified formulation 10/01/2012, 2013, 2014, 10/06/2015, 2016, 10/15/2017    Influenza Virus Vaccine 10/01/2012, 2013, 2014, 10/01/2015, 2016, 10/09/2017, 10/18/2018    Influenza, AFLURIA (age 3 y+), FLUZONE, (age 6 mo+),

## 2025-06-17 NOTE — H&P
[facility]   History and Physical       Subjective:  Pleasant gentleman laying comfortably in bed in no acute distress  Telemetry sitter at bedside  Per nursing he has been intermittently confused  He is able to answer general questions appropriately any chest pain shortness of breath or motor deficit    Objective:    /69   Pulse 63   Temp 96.9 °F (36.1 °C) (Temporal)   Resp 16   SpO2 95%     In: 548.8 [I.V.:511.8]  Out: 850   In: 548.8   Out: 850 [Urine:850]    General appearance: NAD, conversant, pleasant, resting comfortably  HEENT: AT/NC, MMM  Neck: FROM, supple  Lungs: Clear to auscultation  CV: RRR, no MRGs  Vasc: Radial pulses 2+  Abdomen: Soft, non-tender; no masses or HSM  Extremities: No peripheral edema or digital cyanosis  Skin: no rash, lesions or ulcers  Psych: Alert and oriented to person, place and time  Neuro: Alert and interactive. B/l tremor      Recent Labs     06/16/25  0535 06/16/25  1345 06/17/25  0528   WBC 5.0 4.6 6.2   HGB 8.1* 8.4* 8.2*   HCT 26.6* 26.5* 26.8*    402 377       Recent Labs     06/16/25  0535 06/16/25  1345 06/17/25  0528    144 146*   K 3.9 4.0 3.4*   * 109* 112*   CO2 22 24 23   BUN 22 21 16   CREATININE 1.0 0.9 0.8   CALCIUM 8.9 8.9 8.6*       DATA:           CTA TRIPHASIC LIVER  Result Date: 6/14/2025  1. Mild enlargement of the liver, which otherwise demonstrates normal CT appearance. 2. Cholelithiasis with persistent findings suggesting cholecystitis at least in the fundus. 3. Unchanged plastic biliary drain in expected position with no intrahepatic nor extrahepatic biliary dilation. 4. Cardiovascular findings that can be seen with portal hypertension. 5. Incidental findings as above for which no dedicated follow-up is recommended.     US ABDOMEN LIMITED  Result Date: 6/13/2025  Hepatomegaly at 18.9 cm with heterogeneous liver parenchyma. Cholelithiasis with gallbladder wall thickening pericholecystic fluid. Common bile

## 2025-06-17 NOTE — PATIENT CARE CONFERENCE
[facility] Quality Flow/Interdisciplinary Rounds Progress Note        Quality Flow Rounds held on June 17, 2025    Disciplines Attending:  Bedside Nurse, , , and Nursing Unit Leadership    Stewart Mark was admitted on 6/16/2025 10:50 PM    Anticipated Discharge Date:       Disposition:    Bienvenido Score:  Bienvenido Scale Score: 14    [facility] RISK OF UNPLANNED READMISSION 2.0             23.9 Total Score        Discussed patient goal for the day, patient clinical progression, and barriers to discharge.  The following Goal(s) of the Day/Commitment(s) have been identified: Report labs/diagnostics        Dora Gaines RN  June 17, 2025         Message   Recorded as Task   Date: 04/29/2018 11:18 AM, Created By: Addy Tello   Task Name: 1. Rx Request   Assigned To: Cara Stoner   Regarding Patient: RONALD GIORDANO, Status: In Progress   Comment:    Addy Tello - 29 Apr 2018 11:18 AM     Rx Refill  \"Active Medication Request.    ProAir  (90 Base) MCG/ACT Inhalation Aerosol Solution #1 8.5 GM Inhaler, Aerosol Solution, INHALE 2 PUFFS BY MOUTH EVERY 4 TO 6 HOURS AS NEEDED, 1 refill, Evaluate: 17-Jun-2018  VERIFIED MEDICATION NAME, DOSAGE, INSTRUCTIONS ARE UNCHANGED:  yes   PRESCRIBING PROVIDER: JAYNA GOOD    QUESTION REGARDING A MEDICATION: no    Pharmacy:  IS THIS A MAIL ORDER PHARMACY?: n/a  PHARMACY NAME: [facility]   NEW PHARMACY LOCATION: [facility] PHONE NUMBER: [phone]    CALLER'S RELATIONSHIP TO PATIENT: Wife  IF OTHER, NAME AND RELATIONSHIP: [place]    BEST NUMBER TO BE CONTACTED: [phone]  ALTERNATIVE PHONE NUMBER: n/a    Turnaround time given to caller:  ?THIS MESSAGE WILL BE SENT TO  ,,, (state provider's first and last name) ,,,,; THE CLINICAL TEAM WILL RETURN YOUR CALL AS SOON AS THEY READ YOUR MESSAGE.    READ BACK MESSAGE TO PATIENT\"   Ammy Moura - 30 Apr 2018 7:52 AM     TASK IN PROGRESS   Cara Stoner - 30 Apr 2018 9:02 AM     TASK REASSIGNED: Previously Assigned To JAYNA GOOD Genevieve - 30 Apr 2018 10:40 AM     TASK REPLIED TO: Previously Assigned To Cara Stoner  Patient picked up an inhaler on 4/23/18 and then picked up the additional refill on 4/27/18. Now requesting another refill, should patient be using this much? If so, is it ok to send a larger supply?   JAYNA GOOD - 30 Apr 2018 5:54 PM     TASK REASSIGNED: Previously Assigned To JAYNA GOOD  no, it is for the same inhaler he should not be getting refills that quickly. Either  the patient is trying to stock up on this medication, or getting it refilled for some other reason? Please call the pharmacist and  the patient, to get further information and find out what's wrong. Let me know if needed. thanks.   Cara Stoner - 01 May 2018 11:08 AM     TASK EDITED  PT was not using inhaler through his trach tube so was using up faster due to not getting full effect. Pulmonologist showed him how to use properly through his trach tube so needs new inhaler. Sent refill.        Signatures   Electronically signed by : Cara Stoner CMA; May  1 2018 11:08AM CST

## 2025-06-17 NOTE — CARE COORDINATION
6/17/25  Spoke with patient regarding transition of care.  Patient is a transfer from [facility] for new onset of seizures and chronic cholecystitis.  Patient is on keppra awaititng a neurology eval.  Patient is pending an MRI of the brain. Patient needs a cholecystectomy which is on hold presently.  Patient currently has a transfer order in for [facility] pending bed avail. Transport started and on the soft chart.  Patient was originally admitted from [facility] where he is a bed hold and able to return when medically stable. No pre-cert or PASRR required to return to [facility]. PERFECTO, destiantion complete with transport started. SW/CM to follow.    Electronically signed by ELFEGO Stone on 6/17/2025 at 3:54 PM     Case Management Assessment  Initial Evaluation    Date/Time of Evaluation: 6/17/2025 3:56 PM  Assessment Completed by: ELFEGO Stone    If patient is discharged prior to next notation, then this note serves as note for discharge by case management.    Patient Name: Stewart Mark                   YOB: 1950  Diagnosis: Seizures (HCC) [R56.9]                   Date / Time: 6/16/2025 10:50 PM    Patient Admission Status: Inpatient   Readmission Risk (Low < 19, Mod (19-27), High > 27): Readmission Risk Score: 26    Current PCP: Javid Connelly MD  PCP verified by ?      Chart Reviewed: Yes      History Provided by:    Patient Orientation:      Patient Cognition:      Hospitalization in the last 30 days (Readmission):  No  hospital to hospital transfer  If yes, Readmission Assessment in  Navigator will be completed.    Advance Directives:      Code Status: DNR-CCA   Patient's Primary Decision Maker is:      Primary Decision Maker (Active): Fariba Mark - Spouse - [phone]    Discharge Planning:    Patient lives with: Other (Comment) ([facility]) Type of Home: Skilled Nursing Facility  Primary Care Giver:    Patient Support Systems include: Spouse/Significant Other

## 2025-06-17 NOTE — PROGRESS NOTES
[facility]   Progress note      Subjective:      Patient was RRT'd due to nonresponsive. Patient was evaluated by tele-neurologist. Decision made to give patient 4.5 g of keppra and have continuous EEG monitor.     On initial assessment, patient was not able to answer question, remained wide awake and alert     Became aware that no EEG technician was available at [facility], decision made to transfer patient to [facility].    Patient accepted to [facility] pending bed availability.     No neurology at [facility], decision was made to transfer patient to [place] for neurologist bartolo while he waits for a bed at [facility].    On reassessment, patient able to talk. Was conversing with his wife and stated he would like to have previously scheduled cholecystectomy surgery. Per nursing staff, patient started talking when his wife explained to him that staff are trying to help him.       He was able to answer questions appropriately     Medications Prior to Admission:    Medications Prior to Admission: bisacodyl (DULCOLAX) 10 MG suppository, Place 1 suppository rectally daily as needed for Constipation  mirtazapine (REMERON) 15 MG tablet, Take 0.5 tablets by mouth nightly  miconazole (MICOTIN) 2 % powder, Apply 2 g topically 2 times daily -RIGHT GROIN-  acetaminophen (TYLENOL) 325 MG tablet, Take 2 tablets by mouth every 4 hours as needed for Pain or Fever (TEMP >100F) Indications: NTE: 3G/24HRS  divalproex (DEPAKOTE ER) 250 MG extended release tablet, Take 2 tablets by mouth 2 times daily  aspirin 81 MG EC tablet, Take 1 tablet by mouth nightly  atorvastatin (LIPITOR) 40 MG tablet, Take 1 tablet by mouth nightly  budesonide-formoterol (SYMBICORT) 160-4.5 MCG/ACT AERO, Inhale 2 puffs into the lungs 2 times daily as needed (SOB)  vitamin D (VITAMIN D3) 50 MCG (2000 UT) CAPS capsule, Take 1 capsule by mouth 2 times daily (Patient taking differently: Take 1 capsule by mouth  restart home medications as appropriate   -Patient is DNR-CCA    6/15/2025  -Vitals stable  -GIUSEPPE resolved.  Creatinine improved from 1.7-1.1  - Robotic cholecystectomy with biliary tree reconstruction planned  -Continue Zosyn  - Holding Plavix due to possible procedure  -He is continued on IV fluid at 100 cc/hr  - Monitor daily labs, daily vitals   - Check and replete electrolytes       6/16/2025  - seizure-like episode this AM  - keppra 4.5 mg per tele-neurology rec  - on Depakote 250 mg   -GIUSEPPE resolved.    - patient is transferred to [place] for neurology eval pending bed availability at [facility]  - Robotic cholecystectomy with biliary tree reconstruction planned per hepatobiliary surgery  - He is continued on Zosyn  - Holding Plavix due to possible procedure  - He is continued on IV fluid at 100 cc/hr  - Monitor daily labs, daily vitals   - Check and replete electrolytes       IP CONSULT TO GENERAL SURGERY  IP CONSULT TO GI  IP CONSULT TO INTERNAL MEDICINE  IP CONSULT TO HEPATOLOGY  IP CONSULT TO VASCULAR ACCESS TEAM    DVT prophylaxis: SCD  PT/OT  CODE: DNR-CCA    Discharge plan/Disposition: awaits clinical improvement      Bridger Castelan, DO  9:08 PM  6/16/2025    Note this report, in part in full, may have been transcribed using voice recognition software.  Considerable effort was made to ensure accuracy, however inadvertent computerized transcription errors may occur.  Please excuse any transcription error grammatical or spelling errors that may have escaped my editorial review and correction

## 2025-06-17 NOTE — PLAN OF CARE
Problem: Chronic Conditions and Co-morbidities  Goal: Patient's chronic conditions and co-morbidity symptoms are monitored and maintained or improved  Outcome: Progressing  Flowsheets (Taken 6/16/2025 2300 by Sarah Atkinson RN)  Care Plan - Patient's Chronic Conditions and Co-Morbidity Symptoms are Monitored and Maintained or Improved: Monitor and assess patient's chronic conditions and comorbid symptoms for stability, deterioration, or improvement     Problem: Discharge Planning  Goal: Discharge to home or other facility with appropriate resources  Outcome: Progressing  Flowsheets  Taken 6/16/2025 2324 by Sarah Atkinson RN  Discharge to home or other facility with appropriate resources: Identify barriers to discharge with patient and caregiver  Taken 6/16/2025 2300 by Sarah Atkinson, RN  Discharge to home or other facility with appropriate resources: Identify barriers to discharge with patient and caregiver     Problem: Safety - Adult  Goal: Free from fall injury  Outcome: Progressing     Problem: ABCDS Injury Assessment  Goal: Absence of physical injury  Outcome: Progressing

## 2025-06-17 NOTE — PLAN OF CARE
Problem: Chronic Conditions and Co-morbidities  Goal: Patient's chronic conditions and co-morbidity symptoms are monitored and maintained or improved  6/16/2025 1839 by Real Anguiano RN  Outcome: Progressing

## 2025-06-18 ENCOUNTER — APPOINTMENT (OUTPATIENT)
Dept: MRI IMAGING | Age: 75
DRG: 417 | End: 2025-06-18
Attending: INTERNAL MEDICINE
Payer: MEDICARE

## 2025-06-18 LAB
ANION GAP SERPL CALCULATED.3IONS-SCNC: 10 MMOL/L (ref 7–16)
BASOPHILS # BLD: 0.03 K/UL (ref 0–0.2)
BASOPHILS NFR BLD: 1 % (ref 0–2)
BUN SERPL-MCNC: 14 MG/DL (ref 8–23)
CALCIUM SERPL-MCNC: 8.4 MG/DL (ref 8.8–10.2)
CHLORIDE SERPL-SCNC: 112 MMOL/L (ref 98–107)
CO2 SERPL-SCNC: 23 MMOL/L (ref 22–29)
CREAT SERPL-MCNC: 0.8 MG/DL (ref 0.7–1.2)
DATE LAST DOSE: ABNORMAL
EOSINOPHIL # BLD: 0.42 K/UL (ref 0.05–0.5)
EOSINOPHILS RELATIVE PERCENT: 8 % (ref 0–6)
ERYTHROCYTE [DISTWIDTH] IN BLOOD BY AUTOMATED COUNT: 16.5 % (ref 11.5–15)
GFR, ESTIMATED: >90 ML/MIN/1.73M2
GLUCOSE BLD-MCNC: 108 MG/DL (ref 74–99)
GLUCOSE BLD-MCNC: 112 MG/DL (ref 74–99)
GLUCOSE BLD-MCNC: 134 MG/DL (ref 74–99)
GLUCOSE BLD-MCNC: 84 MG/DL (ref 74–99)
GLUCOSE SERPL-MCNC: 72 MG/DL (ref 74–99)
HCT VFR BLD AUTO: 23.7 % (ref 37–54)
HGB BLD-MCNC: 7.4 G/DL (ref 12.5–16.5)
IMM GRANULOCYTES # BLD AUTO: <0.03 K/UL (ref 0–0.58)
IMM GRANULOCYTES NFR BLD: 0 % (ref 0–5)
LYMPHOCYTES NFR BLD: 1.54 K/UL (ref 1.5–4)
LYMPHOCYTES RELATIVE PERCENT: 28 % (ref 20–42)
MAGNESIUM SERPL-MCNC: 1.5 MG/DL (ref 1.6–2.4)
MCH RBC QN AUTO: 27 PG (ref 26–35)
MCHC RBC AUTO-ENTMCNC: 31.2 G/DL (ref 32–34.5)
MCV RBC AUTO: 86.5 FL (ref 80–99.9)
MICROORGANISM SPEC CULT: NORMAL
MICROORGANISM SPEC CULT: NORMAL
MONOCYTES NFR BLD: 0.42 K/UL (ref 0.1–0.95)
MONOCYTES NFR BLD: 8 % (ref 2–12)
NEUTROPHILS NFR BLD: 56 % (ref 43–80)
NEUTS SEG NFR BLD: 3.04 K/UL (ref 1.8–7.3)
PLATELET # BLD AUTO: 373 K/UL (ref 130–450)
PMV BLD AUTO: 9.4 FL (ref 7–12)
POTASSIUM SERPL-SCNC: 3.4 MMOL/L (ref 3.5–5.1)
RBC # BLD AUTO: 2.74 M/UL (ref 3.8–5.8)
SERVICE CMNT-IMP: NORMAL
SERVICE CMNT-IMP: NORMAL
SODIUM SERPL-SCNC: 145 MMOL/L (ref 136–145)
SPECIMEN DESCRIPTION: NORMAL
SPECIMEN DESCRIPTION: NORMAL
TME LAST DOSE: ABNORMAL H
VALPROATE SERPL-MCNC: 9 UG/ML (ref 50–100)
VANCOMYCIN DOSE: ABNORMAL MG
WBC OTHER # BLD: 5.5 K/UL (ref 4.5–11.5)

## 2025-06-18 PROCEDURE — 2580000003 HC RX 258

## 2025-06-18 PROCEDURE — 80048 BASIC METABOLIC PNL TOTAL CA: CPT

## 2025-06-18 PROCEDURE — 99232 SBSQ HOSP IP/OBS MODERATE 35: CPT | Performed by: STUDENT IN AN ORGANIZED HEALTH CARE EDUCATION/TRAINING PROGRAM

## 2025-06-18 PROCEDURE — 2500000003 HC RX 250 WO HCPCS

## 2025-06-18 PROCEDURE — APPSS45 APP SPLIT SHARED TIME 31-45 MINUTES: Performed by: CLINICAL NURSE SPECIALIST

## 2025-06-18 PROCEDURE — 6370000000 HC RX 637 (ALT 250 FOR IP)

## 2025-06-18 PROCEDURE — 85025 COMPLETE CBC W/AUTO DIFF WBC: CPT

## 2025-06-18 PROCEDURE — 70553 MRI BRAIN STEM W/O & W/DYE: CPT

## 2025-06-18 PROCEDURE — A9579 GAD-BASE MR CONTRAST NOS,1ML: HCPCS | Performed by: RADIOLOGY

## 2025-06-18 PROCEDURE — 36415 COLL VENOUS BLD VENIPUNCTURE: CPT

## 2025-06-18 PROCEDURE — 6360000002 HC RX W HCPCS

## 2025-06-18 PROCEDURE — 82962 GLUCOSE BLOOD TEST: CPT

## 2025-06-18 PROCEDURE — 6360000004 HC RX CONTRAST MEDICATION: Performed by: RADIOLOGY

## 2025-06-18 PROCEDURE — 6370000000 HC RX 637 (ALT 250 FOR IP): Performed by: FAMILY MEDICINE

## 2025-06-18 PROCEDURE — 2140000000 HC CCU INTERMEDIATE R&B

## 2025-06-18 PROCEDURE — 80164 ASSAY DIPROPYLACETIC ACD TOT: CPT

## 2025-06-18 PROCEDURE — 83735 ASSAY OF MAGNESIUM: CPT

## 2025-06-18 PROCEDURE — 6360000002 HC RX W HCPCS: Performed by: FAMILY MEDICINE

## 2025-06-18 PROCEDURE — 94640 AIRWAY INHALATION TREATMENT: CPT

## 2025-06-18 RX ORDER — SODIUM CHLORIDE 0.9 % (FLUSH) 0.9 %
5-40 SYRINGE (ML) INJECTION EVERY 12 HOURS SCHEDULED
Status: DISCONTINUED | OUTPATIENT
Start: 2025-06-18 | End: 2025-06-19 | Stop reason: HOSPADM

## 2025-06-18 RX ORDER — SODIUM CHLORIDE 9 MG/ML
INJECTION, SOLUTION INTRAVENOUS PRN
Status: DISCONTINUED | OUTPATIENT
Start: 2025-06-18 | End: 2025-06-19 | Stop reason: HOSPADM

## 2025-06-18 RX ORDER — MAGNESIUM SULFATE IN WATER 40 MG/ML
2000 INJECTION, SOLUTION INTRAVENOUS ONCE
Status: COMPLETED | OUTPATIENT
Start: 2025-06-18 | End: 2025-06-18

## 2025-06-18 RX ORDER — INDOCYANINE GREEN AND WATER 25 MG
5 KIT INJECTION
Status: ACTIVE | OUTPATIENT
Start: 2025-06-18 | End: 2025-06-19

## 2025-06-18 RX ORDER — SODIUM CHLORIDE 0.9 % (FLUSH) 0.9 %
5-40 SYRINGE (ML) INJECTION PRN
Status: DISCONTINUED | OUTPATIENT
Start: 2025-06-18 | End: 2025-06-19 | Stop reason: HOSPADM

## 2025-06-18 RX ORDER — GADOTERIDOL 279.3 MG/ML
20 INJECTION INTRAVENOUS
Status: COMPLETED | OUTPATIENT
Start: 2025-06-18 | End: 2025-06-18

## 2025-06-18 RX ORDER — POTASSIUM CHLORIDE 1500 MG/1
40 TABLET, EXTENDED RELEASE ORAL ONCE
Status: COMPLETED | OUTPATIENT
Start: 2025-06-18 | End: 2025-06-18

## 2025-06-18 RX ADMIN — MAGNESIUM SULFATE HEPTAHYDRATE 2000 MG: 40 INJECTION, SOLUTION INTRAVENOUS at 08:49

## 2025-06-18 RX ADMIN — PIPERACILLIN AND TAZOBACTAM 3375 MG: 3; .375 INJECTION, POWDER, LYOPHILIZED, FOR SOLUTION INTRAVENOUS at 08:46

## 2025-06-18 RX ADMIN — MEMANTINE 5 MG: 5 TABLET ORAL at 20:19

## 2025-06-18 RX ADMIN — GABAPENTIN 600 MG: 300 CAPSULE ORAL at 20:19

## 2025-06-18 RX ADMIN — METOPROLOL TARTRATE 100 MG: 50 TABLET, FILM COATED ORAL at 08:41

## 2025-06-18 RX ADMIN — ARFORMOTEROL TARTRATE 15 MCG: 15 SOLUTION RESPIRATORY (INHALATION) at 08:51

## 2025-06-18 RX ADMIN — BUDESONIDE 500 MCG: 0.5 SUSPENSION RESPIRATORY (INHALATION) at 21:15

## 2025-06-18 RX ADMIN — SODIUM CHLORIDE, PRESERVATIVE FREE 10 ML: 5 INJECTION INTRAVENOUS at 08:42

## 2025-06-18 RX ADMIN — MEMANTINE 5 MG: 5 TABLET ORAL at 08:42

## 2025-06-18 RX ADMIN — LEVETIRACETAM 500 MG: 100 INJECTION INTRAVENOUS at 12:16

## 2025-06-18 RX ADMIN — PANTOPRAZOLE SODIUM 40 MG: 40 TABLET, DELAYED RELEASE ORAL at 08:42

## 2025-06-18 RX ADMIN — MICONAZOLE NITRATE: 20 POWDER TOPICAL at 08:47

## 2025-06-18 RX ADMIN — SODIUM CHLORIDE: 0.9 INJECTION, SOLUTION INTRAVENOUS at 05:39

## 2025-06-18 RX ADMIN — Medication 2000 UNITS: at 08:41

## 2025-06-18 RX ADMIN — DIVALPROEX SODIUM 250 MG: 250 TABLET, EXTENDED RELEASE ORAL at 20:19

## 2025-06-18 RX ADMIN — DIVALPROEX SODIUM 250 MG: 250 TABLET, EXTENDED RELEASE ORAL at 08:42

## 2025-06-18 RX ADMIN — PETROLATUM: 420 OINTMENT TOPICAL at 20:20

## 2025-06-18 RX ADMIN — ARFORMOTEROL TARTRATE 15 MCG: 15 SOLUTION RESPIRATORY (INHALATION) at 21:14

## 2025-06-18 RX ADMIN — TAMSULOSIN HYDROCHLORIDE 0.8 MG: 0.4 CAPSULE ORAL at 08:41

## 2025-06-18 RX ADMIN — SODIUM CHLORIDE, PRESERVATIVE FREE 10 ML: 5 INJECTION INTRAVENOUS at 20:20

## 2025-06-18 RX ADMIN — POTASSIUM CHLORIDE 40 MEQ: 1500 TABLET, EXTENDED RELEASE ORAL at 08:41

## 2025-06-18 RX ADMIN — MICONAZOLE NITRATE: 20 POWDER TOPICAL at 20:20

## 2025-06-18 RX ADMIN — GADOTERIDOL 20 ML: 279.3 INJECTION, SOLUTION INTRAVENOUS at 19:40

## 2025-06-18 RX ADMIN — SODIUM CHLORIDE: 0.9 INJECTION, SOLUTION INTRAVENOUS at 16:11

## 2025-06-18 RX ADMIN — CETIRIZINE HYDROCHLORIDE 10 MG: 10 TABLET, FILM COATED ORAL at 08:41

## 2025-06-18 RX ADMIN — DULOXETINE 60 MG: 30 CAPSULE, DELAYED RELEASE ORAL at 08:41

## 2025-06-18 RX ADMIN — PIPERACILLIN AND TAZOBACTAM 3375 MG: 3; .375 INJECTION, POWDER, LYOPHILIZED, FOR SOLUTION INTRAVENOUS at 16:12

## 2025-06-18 RX ADMIN — GABAPENTIN 600 MG: 300 CAPSULE ORAL at 08:42

## 2025-06-18 RX ADMIN — ATORVASTATIN CALCIUM 40 MG: 40 TABLET, FILM COATED ORAL at 20:19

## 2025-06-18 RX ADMIN — GABAPENTIN 600 MG: 300 CAPSULE ORAL at 12:16

## 2025-06-18 RX ADMIN — PETROLATUM: 420 OINTMENT TOPICAL at 08:47

## 2025-06-18 RX ADMIN — GABAPENTIN 600 MG: 300 CAPSULE ORAL at 16:12

## 2025-06-18 RX ADMIN — FENOFIBRATE 54 MG: 54 TABLET ORAL at 08:42

## 2025-06-18 RX ADMIN — BUDESONIDE 500 MCG: 0.5 SUSPENSION RESPIRATORY (INHALATION) at 08:51

## 2025-06-18 RX ADMIN — METOPROLOL TARTRATE 100 MG: 50 TABLET, FILM COATED ORAL at 20:19

## 2025-06-18 ASSESSMENT — PAIN SCALES - GENERAL: PAINLEVEL_OUTOF10: 0

## 2025-06-18 NOTE — PLAN OF CARE
Problem: Chronic Conditions and Co-morbidities  Goal: Patient's chronic conditions and co-morbidity symptoms are monitored and maintained or improved  Outcome: Progressing  Flowsheets (Taken 6/17/2025 2100)  Care Plan - Patient's Chronic Conditions and Co-Morbidity Symptoms are Monitored and Maintained or Improved: Monitor and assess patient's chronic conditions and comorbid symptoms for stability, deterioration, or improvement     Problem: Discharge Planning  Goal: Discharge to home or other facility with appropriate resources  Outcome: Progressing  Flowsheets (Taken 6/17/2025 2100)  Discharge to home or other facility with appropriate resources: Identify barriers to discharge with patient and caregiver     Problem: Safety - Adult  Goal: Free from fall injury  Outcome: Progressing     Problem: Skin/Tissue Integrity  Goal: Skin integrity remains intact  Description: 1.  Monitor for areas of redness and/or skin breakdown  2.  Assess vascular access sites hourly  3.  Every 4-6 hours minimum:  Change oxygen saturation probe site  4.  Every 4-6 hours:  If on nasal continuous positive airway pressure, respiratory therapy assess nares and determine need for appliance change or resting period  Outcome: Progressing  Flowsheets (Taken 6/17/2025 2100)  Skin Integrity Remains Intact: Monitor for areas of redness and/or skin breakdown     Problem: ABCDS Injury Assessment  Goal: Absence of physical injury  Outcome: Progressing

## 2025-06-18 NOTE — PATIENT CARE CONFERENCE
P Quality Flow/Interdisciplinary Rounds Progress Note        Quality Flow Rounds held on June 18, 2025    Disciplines Attending:  Bedside Nurse, , , and Nursing Unit Leadership    Stewart Mark was admitted on 6/16/2025 10:50 PM    Anticipated Discharge Date:       Disposition:    Bienvenido Score:  Bienvenido Scale Score: 14    [facility] RISK OF UNPLANNED READMISSION 2.0             25.9 Total Score        Discussed patient goal for the day, patient clinical progression, and barriers to discharge.  The following Goal(s) of the Day/Commitment(s) have been identified:  report labs/diagnostics      Dora Gaines RN  June 18, 2025

## 2025-06-18 NOTE — CARE COORDINATION
6/18/25  Transition of Care update. Patient  pending neurology consult to see if patient will need transferred to [facility] for EEG or if able to be cleared by neurology for robotic cholecystectomy with possible biliary reconstruction scheduled for tomorrow. Patient currently still has a [facility] transfer pending. With transport pending on the soft chart.  Patient was originally admitted from [facility] where he is a bed hold and able to return when medically stable. No pre-cert or PASRR required to return to [facility]. PERFECTO, destiantion complete with transport started. JADIEL/CONNIE to follow.     Electronically signed by ELFEGO Stone on 6/18/2025 at 2:39 PM

## 2025-06-19 ENCOUNTER — ANESTHESIA (OUTPATIENT)
Dept: OPERATING ROOM | Age: 75
End: 2025-06-19
Payer: MEDICARE

## 2025-06-19 ENCOUNTER — ANESTHESIA EVENT (OUTPATIENT)
Dept: OPERATING ROOM | Age: 75
End: 2025-06-19
Payer: MEDICARE

## 2025-06-19 LAB
ABO + RH BLD: NORMAL
ANION GAP SERPL CALCULATED.3IONS-SCNC: 10 MMOL/L (ref 7–16)
ARM BAND NUMBER: NORMAL
BASOPHILS # BLD: 0.03 K/UL (ref 0–0.2)
BASOPHILS NFR BLD: 1 % (ref 0–2)
BLOOD BANK SAMPLE EXPIRATION: NORMAL
BLOOD GROUP ANTIBODIES SERPL: NEGATIVE
BUN SERPL-MCNC: 11 MG/DL (ref 8–23)
CALCIUM SERPL-MCNC: 8.5 MG/DL (ref 8.8–10.2)
CHLORIDE SERPL-SCNC: 113 MMOL/L (ref 98–107)
CO2 SERPL-SCNC: 22 MMOL/L (ref 22–29)
CREAT SERPL-MCNC: 0.9 MG/DL (ref 0.7–1.2)
DATE LAST DOSE: ABNORMAL
EOSINOPHIL # BLD: 0.4 K/UL (ref 0.05–0.5)
EOSINOPHILS RELATIVE PERCENT: 8 % (ref 0–6)
ERYTHROCYTE [DISTWIDTH] IN BLOOD BY AUTOMATED COUNT: 16.6 % (ref 11.5–15)
GFR, ESTIMATED: >90 ML/MIN/1.73M2
GLUCOSE BLD-MCNC: 102 MG/DL (ref 74–99)
GLUCOSE BLD-MCNC: 127 MG/DL (ref 74–99)
GLUCOSE BLD-MCNC: 86 MG/DL (ref 74–99)
GLUCOSE SERPL-MCNC: 89 MG/DL (ref 74–99)
HCT VFR BLD AUTO: 26 % (ref 37–54)
HGB BLD-MCNC: 7.9 G/DL (ref 12.5–16.5)
IMM GRANULOCYTES # BLD AUTO: 0.03 K/UL (ref 0–0.58)
IMM GRANULOCYTES NFR BLD: 1 % (ref 0–5)
LYMPHOCYTES NFR BLD: 1.6 K/UL (ref 1.5–4)
LYMPHOCYTES RELATIVE PERCENT: 30 % (ref 20–42)
MAGNESIUM SERPL-MCNC: 1.8 MG/DL (ref 1.6–2.4)
MCH RBC QN AUTO: 26.4 PG (ref 26–35)
MCHC RBC AUTO-ENTMCNC: 30.4 G/DL (ref 32–34.5)
MCV RBC AUTO: 87 FL (ref 80–99.9)
MONOCYTES NFR BLD: 0.5 K/UL (ref 0.1–0.95)
MONOCYTES NFR BLD: 9 % (ref 2–12)
NEUTROPHILS NFR BLD: 52 % (ref 43–80)
NEUTS SEG NFR BLD: 2.79 K/UL (ref 1.8–7.3)
PHOSPHATE SERPL-MCNC: 3.1 MG/DL (ref 2.5–4.5)
PLATELET # BLD AUTO: 354 K/UL (ref 130–450)
PMV BLD AUTO: 9.3 FL (ref 7–12)
POTASSIUM SERPL-SCNC: 3.6 MMOL/L (ref 3.5–5.1)
RBC # BLD AUTO: 2.99 M/UL (ref 3.8–5.8)
SODIUM SERPL-SCNC: 145 MMOL/L (ref 136–145)
TME LAST DOSE: ABNORMAL H
VALPROATE SERPL-MCNC: 11 UG/ML (ref 50–100)
VANCOMYCIN DOSE: ABNORMAL MG
WBC OTHER # BLD: 5.4 K/UL (ref 4.5–11.5)

## 2025-06-19 PROCEDURE — 2500000003 HC RX 250 WO HCPCS: Performed by: STUDENT IN AN ORGANIZED HEALTH CARE EDUCATION/TRAINING PROGRAM

## 2025-06-19 PROCEDURE — 86900 BLOOD TYPING SEROLOGIC ABO: CPT

## 2025-06-19 PROCEDURE — 2709999900 HC NON-CHARGEABLE SUPPLY: Performed by: STUDENT IN AN ORGANIZED HEALTH CARE EDUCATION/TRAINING PROGRAM

## 2025-06-19 PROCEDURE — 86850 RBC ANTIBODY SCREEN: CPT

## 2025-06-19 PROCEDURE — 8E0W4CZ ROBOTIC ASSISTED PROCEDURE OF TRUNK REGION, PERCUTANEOUS ENDOSCOPIC APPROACH: ICD-10-PCS | Performed by: STUDENT IN AN ORGANIZED HEALTH CARE EDUCATION/TRAINING PROGRAM

## 2025-06-19 PROCEDURE — 94640 AIRWAY INHALATION TREATMENT: CPT

## 2025-06-19 PROCEDURE — 80164 ASSAY DIPROPYLACETIC ACD TOT: CPT

## 2025-06-19 PROCEDURE — 2580000003 HC RX 258: Performed by: STUDENT IN AN ORGANIZED HEALTH CARE EDUCATION/TRAINING PROGRAM

## 2025-06-19 PROCEDURE — 3600000009 HC SURGERY ROBOT BASE: Performed by: STUDENT IN AN ORGANIZED HEALTH CARE EDUCATION/TRAINING PROGRAM

## 2025-06-19 PROCEDURE — 80048 BASIC METABOLIC PNL TOTAL CA: CPT

## 2025-06-19 PROCEDURE — 2500000003 HC RX 250 WO HCPCS: Performed by: CLINICAL NURSE SPECIALIST

## 2025-06-19 PROCEDURE — 2140000000 HC CCU INTERMEDIATE R&B

## 2025-06-19 PROCEDURE — 2720000010 HC SURG SUPPLY STERILE: Performed by: STUDENT IN AN ORGANIZED HEALTH CARE EDUCATION/TRAINING PROGRAM

## 2025-06-19 PROCEDURE — 6360000002 HC RX W HCPCS: Performed by: STUDENT IN AN ORGANIZED HEALTH CARE EDUCATION/TRAINING PROGRAM

## 2025-06-19 PROCEDURE — 82962 GLUCOSE BLOOD TEST: CPT

## 2025-06-19 PROCEDURE — 88304 TISSUE EXAM BY PATHOLOGIST: CPT

## 2025-06-19 PROCEDURE — 83735 ASSAY OF MAGNESIUM: CPT

## 2025-06-19 PROCEDURE — BF50200 OTHER IMAGING OF BILE DUCTS USING FLUORESCING AGENT, INDOCYANINE GREEN DYE, INTRAOPERATIVE: ICD-10-PCS | Performed by: STUDENT IN AN ORGANIZED HEALTH CARE EDUCATION/TRAINING PROGRAM

## 2025-06-19 PROCEDURE — 3700000000 HC ANESTHESIA ATTENDED CARE: Performed by: STUDENT IN AN ORGANIZED HEALTH CARE EDUCATION/TRAINING PROGRAM

## 2025-06-19 PROCEDURE — 7100000001 HC PACU RECOVERY - ADDTL 15 MIN: Performed by: STUDENT IN AN ORGANIZED HEALTH CARE EDUCATION/TRAINING PROGRAM

## 2025-06-19 PROCEDURE — 6360000002 HC RX W HCPCS

## 2025-06-19 PROCEDURE — 85025 COMPLETE CBC W/AUTO DIFF WBC: CPT

## 2025-06-19 PROCEDURE — 6370000000 HC RX 637 (ALT 250 FOR IP)

## 2025-06-19 PROCEDURE — 0FT44ZZ RESECTION OF GALLBLADDER, PERCUTANEOUS ENDOSCOPIC APPROACH: ICD-10-PCS | Performed by: STUDENT IN AN ORGANIZED HEALTH CARE EDUCATION/TRAINING PROGRAM

## 2025-06-19 PROCEDURE — C1889 IMPLANT/INSERT DEVICE, NOC: HCPCS | Performed by: STUDENT IN AN ORGANIZED HEALTH CARE EDUCATION/TRAINING PROGRAM

## 2025-06-19 PROCEDURE — 2580000003 HC RX 258

## 2025-06-19 PROCEDURE — 86901 BLOOD TYPING SEROLOGIC RH(D): CPT

## 2025-06-19 PROCEDURE — 3600000019 HC SURGERY ROBOT ADDTL 15MIN: Performed by: STUDENT IN AN ORGANIZED HEALTH CARE EDUCATION/TRAINING PROGRAM

## 2025-06-19 PROCEDURE — 36415 COLL VENOUS BLD VENIPUNCTURE: CPT

## 2025-06-19 PROCEDURE — 3700000001 HC ADD 15 MINUTES (ANESTHESIA): Performed by: STUDENT IN AN ORGANIZED HEALTH CARE EDUCATION/TRAINING PROGRAM

## 2025-06-19 PROCEDURE — S2900 ROBOTIC SURGICAL SYSTEM: HCPCS | Performed by: STUDENT IN AN ORGANIZED HEALTH CARE EDUCATION/TRAINING PROGRAM

## 2025-06-19 PROCEDURE — 7100000000 HC PACU RECOVERY - FIRST 15 MIN: Performed by: STUDENT IN AN ORGANIZED HEALTH CARE EDUCATION/TRAINING PROGRAM

## 2025-06-19 PROCEDURE — 6370000000 HC RX 637 (ALT 250 FOR IP): Performed by: STUDENT IN AN ORGANIZED HEALTH CARE EDUCATION/TRAINING PROGRAM

## 2025-06-19 PROCEDURE — C1757 CATH, THROMBECTOMY/EMBOLECT: HCPCS | Performed by: STUDENT IN AN ORGANIZED HEALTH CARE EDUCATION/TRAINING PROGRAM

## 2025-06-19 PROCEDURE — 84100 ASSAY OF PHOSPHORUS: CPT

## 2025-06-19 DEVICE — CLIP INT M L POLYMER LOK LIG HEM O LOK: Type: IMPLANTABLE DEVICE | Site: ABDOMEN | Status: FUNCTIONAL

## 2025-06-19 DEVICE — CLIP INT L POLYMER LOK LIG HEM O LOK (6EA/PK): Type: IMPLANTABLE DEVICE | Site: ABDOMEN | Status: FUNCTIONAL

## 2025-06-19 RX ORDER — BUPIVACAINE HYDROCHLORIDE 5 MG/ML
INJECTION, SOLUTION EPIDURAL; INTRACAUDAL; PERINEURAL PRN
Status: DISCONTINUED | OUTPATIENT
Start: 2025-06-19 | End: 2025-06-19 | Stop reason: ALTCHOICE

## 2025-06-19 RX ORDER — HYDROMORPHONE HYDROCHLORIDE 1 MG/ML
0.5 INJECTION, SOLUTION INTRAMUSCULAR; INTRAVENOUS; SUBCUTANEOUS EVERY 5 MIN PRN
Status: DISCONTINUED | OUTPATIENT
Start: 2025-06-19 | End: 2025-06-19 | Stop reason: HOSPADM

## 2025-06-19 RX ORDER — SODIUM CHLORIDE 9 MG/ML
INJECTION, SOLUTION INTRAVENOUS CONTINUOUS
Status: ACTIVE | OUTPATIENT
Start: 2025-06-19 | End: 2025-06-20

## 2025-06-19 RX ORDER — MIDAZOLAM HYDROCHLORIDE 1 MG/ML
INJECTION, SOLUTION INTRAMUSCULAR; INTRAVENOUS
Status: DISCONTINUED | OUTPATIENT
Start: 2025-06-19 | End: 2025-06-19 | Stop reason: SDUPTHER

## 2025-06-19 RX ORDER — SODIUM CHLORIDE 9 MG/ML
INJECTION, SOLUTION INTRAVENOUS PRN
Status: DISCONTINUED | OUTPATIENT
Start: 2025-06-19 | End: 2025-06-19 | Stop reason: HOSPADM

## 2025-06-19 RX ORDER — DEXAMETHASONE SODIUM PHOSPHATE 10 MG/ML
INJECTION, SOLUTION INTRA-ARTICULAR; INTRALESIONAL; INTRAMUSCULAR; INTRAVENOUS; SOFT TISSUE
Status: DISCONTINUED | OUTPATIENT
Start: 2025-06-19 | End: 2025-06-19 | Stop reason: SDUPTHER

## 2025-06-19 RX ORDER — ROCURONIUM BROMIDE 10 MG/ML
INJECTION, SOLUTION INTRAVENOUS
Status: DISCONTINUED | OUTPATIENT
Start: 2025-06-19 | End: 2025-06-19 | Stop reason: SDUPTHER

## 2025-06-19 RX ORDER — SODIUM CHLORIDE 0.9 % (FLUSH) 0.9 %
5-40 SYRINGE (ML) INJECTION PRN
Status: DISCONTINUED | OUTPATIENT
Start: 2025-06-19 | End: 2025-06-19 | Stop reason: HOSPADM

## 2025-06-19 RX ORDER — SODIUM CHLORIDE 0.9 % (FLUSH) 0.9 %
5-40 SYRINGE (ML) INJECTION EVERY 12 HOURS SCHEDULED
Status: DISCONTINUED | OUTPATIENT
Start: 2025-06-19 | End: 2025-06-19 | Stop reason: HOSPADM

## 2025-06-19 RX ORDER — INDOCYANINE GREEN AND WATER 25 MG
5 KIT INJECTION
Status: COMPLETED | OUTPATIENT
Start: 2025-06-19 | End: 2025-06-19

## 2025-06-19 RX ORDER — PROPOFOL 10 MG/ML
INJECTION, EMULSION INTRAVENOUS
Status: DISCONTINUED | OUTPATIENT
Start: 2025-06-19 | End: 2025-06-19 | Stop reason: SDUPTHER

## 2025-06-19 RX ORDER — ALBUMIN HUMAN 50 G/1000ML
SOLUTION INTRAVENOUS
Status: DISCONTINUED | OUTPATIENT
Start: 2025-06-19 | End: 2025-06-19 | Stop reason: SDUPTHER

## 2025-06-19 RX ORDER — FENTANYL CITRATE 50 UG/ML
INJECTION, SOLUTION INTRAMUSCULAR; INTRAVENOUS
Status: DISCONTINUED | OUTPATIENT
Start: 2025-06-19 | End: 2025-06-19 | Stop reason: SDUPTHER

## 2025-06-19 RX ORDER — LIDOCAINE 4 G/G
1 PATCH TOPICAL DAILY
Status: DISCONTINUED | OUTPATIENT
Start: 2025-06-19 | End: 2025-06-23 | Stop reason: HOSPADM

## 2025-06-19 RX ORDER — NALOXONE HYDROCHLORIDE 0.4 MG/ML
INJECTION, SOLUTION INTRAMUSCULAR; INTRAVENOUS; SUBCUTANEOUS PRN
Status: DISCONTINUED | OUTPATIENT
Start: 2025-06-19 | End: 2025-06-19 | Stop reason: HOSPADM

## 2025-06-19 RX ORDER — OXYCODONE HYDROCHLORIDE 5 MG/1
5 TABLET ORAL EVERY 4 HOURS PRN
Status: DISCONTINUED | OUTPATIENT
Start: 2025-06-19 | End: 2025-06-23 | Stop reason: HOSPADM

## 2025-06-19 RX ORDER — LIDOCAINE HYDROCHLORIDE 20 MG/ML
INJECTION, SOLUTION INTRAVENOUS
Status: DISCONTINUED | OUTPATIENT
Start: 2025-06-19 | End: 2025-06-19 | Stop reason: SDUPTHER

## 2025-06-19 RX ORDER — MEPERIDINE HYDROCHLORIDE 25 MG/ML
12.5 INJECTION INTRAMUSCULAR; INTRAVENOUS; SUBCUTANEOUS EVERY 5 MIN PRN
Status: DISCONTINUED | OUTPATIENT
Start: 2025-06-19 | End: 2025-06-19 | Stop reason: HOSPADM

## 2025-06-19 RX ORDER — ONDANSETRON 2 MG/ML
INJECTION INTRAMUSCULAR; INTRAVENOUS
Status: DISCONTINUED | OUTPATIENT
Start: 2025-06-19 | End: 2025-06-19 | Stop reason: SDUPTHER

## 2025-06-19 RX ORDER — CEFAZOLIN SODIUM 1 G/3ML
INJECTION, POWDER, FOR SOLUTION INTRAMUSCULAR; INTRAVENOUS
Status: DISCONTINUED | OUTPATIENT
Start: 2025-06-19 | End: 2025-06-19 | Stop reason: SDUPTHER

## 2025-06-19 RX ORDER — GLYCOPYRROLATE 0.2 MG/ML
INJECTION INTRAMUSCULAR; INTRAVENOUS
Status: DISCONTINUED | OUTPATIENT
Start: 2025-06-19 | End: 2025-06-19 | Stop reason: SDUPTHER

## 2025-06-19 RX ORDER — OXYCODONE HYDROCHLORIDE 10 MG/1
10 TABLET ORAL EVERY 4 HOURS PRN
Status: DISCONTINUED | OUTPATIENT
Start: 2025-06-19 | End: 2025-06-23 | Stop reason: HOSPADM

## 2025-06-19 RX ADMIN — MIDAZOLAM HYDROCHLORIDE 2 MG: 1 INJECTION, SOLUTION INTRAMUSCULAR; INTRAVENOUS at 14:21

## 2025-06-19 RX ADMIN — TAMSULOSIN HYDROCHLORIDE 0.8 MG: 0.4 CAPSULE ORAL at 09:08

## 2025-06-19 RX ADMIN — DULOXETINE 60 MG: 30 CAPSULE, DELAYED RELEASE ORAL at 09:08

## 2025-06-19 RX ADMIN — BUDESONIDE 500 MCG: 0.5 SUSPENSION RESPIRATORY (INHALATION) at 08:14

## 2025-06-19 RX ADMIN — ALBUMIN HUMAN 500 ML: 50 SOLUTION INTRAVENOUS at 15:15

## 2025-06-19 RX ADMIN — GABAPENTIN 600 MG: 300 CAPSULE ORAL at 21:25

## 2025-06-19 RX ADMIN — ROCURONIUM BROMIDE 40 MG: 10 INJECTION, SOLUTION INTRAVENOUS at 14:30

## 2025-06-19 RX ADMIN — SODIUM CHLORIDE, PRESERVATIVE FREE 10 ML: 5 INJECTION INTRAVENOUS at 21:25

## 2025-06-19 RX ADMIN — ARFORMOTEROL TARTRATE 15 MCG: 15 SOLUTION RESPIRATORY (INHALATION) at 08:14

## 2025-06-19 RX ADMIN — DIVALPROEX SODIUM 250 MG: 250 TABLET, EXTENDED RELEASE ORAL at 09:10

## 2025-06-19 RX ADMIN — GABAPENTIN 600 MG: 300 CAPSULE ORAL at 09:09

## 2025-06-19 RX ADMIN — DIVALPROEX SODIUM 250 MG: 250 TABLET, EXTENDED RELEASE ORAL at 21:25

## 2025-06-19 RX ADMIN — LEVETIRACETAM 500 MG: 100 INJECTION INTRAVENOUS at 23:27

## 2025-06-19 RX ADMIN — ATORVASTATIN CALCIUM 40 MG: 40 TABLET, FILM COATED ORAL at 21:25

## 2025-06-19 RX ADMIN — MICONAZOLE NITRATE: 20 POWDER TOPICAL at 21:17

## 2025-06-19 RX ADMIN — DEXAMETHASONE SODIUM PHOSPHATE 10 MG: 10 INJECTION, SOLUTION INTRA-ARTICULAR; INTRALESIONAL; INTRAMUSCULAR; INTRAVENOUS; SOFT TISSUE at 14:51

## 2025-06-19 RX ADMIN — ROCURONIUM BROMIDE 10 MG: 10 INJECTION, SOLUTION INTRAVENOUS at 14:51

## 2025-06-19 RX ADMIN — PETROLATUM: 420 OINTMENT TOPICAL at 21:27

## 2025-06-19 RX ADMIN — PIPERACILLIN AND TAZOBACTAM 3375 MG: 3; .375 INJECTION, POWDER, LYOPHILIZED, FOR SOLUTION INTRAVENOUS at 23:31

## 2025-06-19 RX ADMIN — INDOCYANINE GREEN AND WATER 5 MG: KIT at 13:27

## 2025-06-19 RX ADMIN — SODIUM CHLORIDE: 0.9 INJECTION, SOLUTION INTRAVENOUS at 21:26

## 2025-06-19 RX ADMIN — GLYCOPYRROLATE 0.2 MG: 0.2 INJECTION INTRAMUSCULAR; INTRAVENOUS at 14:42

## 2025-06-19 RX ADMIN — SODIUM CHLORIDE, PRESERVATIVE FREE 10 ML: 5 INJECTION INTRAVENOUS at 09:10

## 2025-06-19 RX ADMIN — OXYCODONE HYDROCHLORIDE 10 MG: 10 TABLET ORAL at 21:36

## 2025-06-19 RX ADMIN — FENOFIBRATE 54 MG: 54 TABLET ORAL at 09:10

## 2025-06-19 RX ADMIN — FENTANYL CITRATE 50 MCG: 50 INJECTION, SOLUTION INTRAMUSCULAR; INTRAVENOUS at 15:52

## 2025-06-19 RX ADMIN — MEMANTINE 5 MG: 5 TABLET ORAL at 21:25

## 2025-06-19 RX ADMIN — PROPOFOL 150 MG: 10 INJECTION, EMULSION INTRAVENOUS at 14:30

## 2025-06-19 RX ADMIN — FENTANYL CITRATE 50 MCG: 50 INJECTION, SOLUTION INTRAMUSCULAR; INTRAVENOUS at 15:22

## 2025-06-19 RX ADMIN — METOPROLOL TARTRATE 100 MG: 50 TABLET, FILM COATED ORAL at 09:11

## 2025-06-19 RX ADMIN — ONDANSETRON 4 MG: 2 INJECTION INTRAMUSCULAR; INTRAVENOUS at 16:12

## 2025-06-19 RX ADMIN — PIPERACILLIN AND TAZOBACTAM 3375 MG: 3; .375 INJECTION, POWDER, LYOPHILIZED, FOR SOLUTION INTRAVENOUS at 00:56

## 2025-06-19 RX ADMIN — MEMANTINE 5 MG: 5 TABLET ORAL at 09:09

## 2025-06-19 RX ADMIN — PETROLATUM: 420 OINTMENT TOPICAL at 09:16

## 2025-06-19 RX ADMIN — PIPERACILLIN AND TAZOBACTAM 3375 MG: 3; .375 INJECTION, POWDER, LYOPHILIZED, FOR SOLUTION INTRAVENOUS at 09:18

## 2025-06-19 RX ADMIN — METOPROLOL TARTRATE 100 MG: 50 TABLET, FILM COATED ORAL at 21:25

## 2025-06-19 RX ADMIN — FENTANYL CITRATE 50 MCG: 50 INJECTION, SOLUTION INTRAMUSCULAR; INTRAVENOUS at 14:49

## 2025-06-19 RX ADMIN — FENTANYL CITRATE 100 MCG: 50 INJECTION, SOLUTION INTRAMUSCULAR; INTRAVENOUS at 14:30

## 2025-06-19 RX ADMIN — LEVETIRACETAM 500 MG: 100 INJECTION INTRAVENOUS at 00:54

## 2025-06-19 RX ADMIN — CEFAZOLIN SODIUM 2 G: 1 INJECTION, POWDER, FOR SOLUTION INTRAMUSCULAR; INTRAVENOUS at 14:33

## 2025-06-19 RX ADMIN — CETIRIZINE HYDROCHLORIDE 10 MG: 10 TABLET, FILM COATED ORAL at 09:09

## 2025-06-19 RX ADMIN — Medication 2000 UNITS: at 09:08

## 2025-06-19 RX ADMIN — ROCURONIUM BROMIDE 10 MG: 10 INJECTION, SOLUTION INTRAVENOUS at 15:38

## 2025-06-19 RX ADMIN — MICONAZOLE NITRATE: 20 POWDER TOPICAL at 09:16

## 2025-06-19 RX ADMIN — PANTOPRAZOLE SODIUM 40 MG: 40 TABLET, DELAYED RELEASE ORAL at 09:08

## 2025-06-19 RX ADMIN — LIDOCAINE HYDROCHLORIDE 100 MG: 20 INJECTION, SOLUTION INTRAVENOUS at 14:30

## 2025-06-19 ASSESSMENT — PAIN - FUNCTIONAL ASSESSMENT
PAIN_FUNCTIONAL_ASSESSMENT: PREVENTS OR INTERFERES SOME ACTIVE ACTIVITIES AND ADLS
PAIN_FUNCTIONAL_ASSESSMENT: ADULT NONVERBAL PAIN SCALE (NPVS)

## 2025-06-19 ASSESSMENT — PAIN DESCRIPTION - ORIENTATION: ORIENTATION: RIGHT;LEFT;MID

## 2025-06-19 ASSESSMENT — PAIN DESCRIPTION - DESCRIPTORS: DESCRIPTORS: ACHING;DISCOMFORT;SORE

## 2025-06-19 ASSESSMENT — PAIN DESCRIPTION - LOCATION: LOCATION: ABDOMEN

## 2025-06-19 ASSESSMENT — LIFESTYLE VARIABLES: SMOKING_STATUS: 0

## 2025-06-19 NOTE — PLAN OF CARE
Problem: Chronic Conditions and Co-morbidities  Goal: Patient's chronic conditions and co-morbidity symptoms are monitored and maintained or improved  Outcome: Progressing  Flowsheets (Taken 6/18/2025 2020)  Care Plan - Patient's Chronic Conditions and Co-Morbidity Symptoms are Monitored and Maintained or Improved: Monitor and assess patient's chronic conditions and comorbid symptoms for stability, deterioration, or improvement     Problem: Discharge Planning  Goal: Discharge to home or other facility with appropriate resources  Outcome: Progressing  Flowsheets (Taken 6/18/2025 2020)  Discharge to home or other facility with appropriate resources: Identify barriers to discharge with patient and caregiver     Problem: Safety - Adult  Goal: Free from fall injury  Outcome: Progressing     Problem: Skin/Tissue Integrity  Goal: Skin integrity remains intact  Description: 1.  Monitor for areas of redness and/or skin breakdown  2.  Assess vascular access sites hourly  3.  Every 4-6 hours minimum:  Change oxygen saturation probe site  4.  Every 4-6 hours:  If on nasal continuous positive airway pressure, respiratory therapy assess nares and determine need for appliance change or resting period  Outcome: Progressing  Flowsheets (Taken 6/18/2025 2030)  Skin Integrity Remains Intact: Monitor for areas of redness and/or skin breakdown     Problem: ABCDS Injury Assessment  Goal: Absence of physical injury  Outcome: Progressing

## 2025-06-19 NOTE — CARE COORDINATION
6/19/25  Transition of Care update. Patients transfer to [facility] has been cancelled. Valproic acid levels being monitored.   Patient has had no seizures the past 2 days and patient planned for surgery today for robotic cholecystectomy with possible biliary reconstruction. Discharge goal is to return to [facility] where patient is a long term care bed hold. No pre-cert or PASRR required to return to [facility]. PERFECTO, destiantion complete with transport started. SW/CONNIE to follow.     Electronically signed by ELFEGO Stone on 6/19/2025 at 11:30 AM

## 2025-06-19 NOTE — ANESTHESIA PRE PROCEDURE
Department of Anesthesiology  Preprocedure Note       Name:  Stewart Mark   Age:  74 y.o.  :  1950                                          MRN:  [id number]         Date:  2025      Surgeon: Surgeon(s):  Barb Wilson MD    Procedure: Procedure(s):  CHOLECYSTECTOMY LAPAROSCOPIC ROBOTIC XI/POSSIBLE BILE DUCT RECONSTRUCTION/POSSIBLE OPEN/INTRAOPERATIVE ULTRASOUND/TRANSFER FROM [facility]    Medications prior to admission:   Prior to Admission medications    Medication Sig Start Date End Date Taking? Authorizing Provider   bisacodyl (DULCOLAX) 10 MG suppository Place 1 suppository rectally daily as needed for Constipation    ProviderCaryn MD   mirtazapine (REMERON) 15 MG tablet Take 0.5 tablets by mouth nightly    Caryn Ko MD   miconazole (MICOTIN) 2 % powder Apply 2 g topically 2 times daily -RIGHT GROIN- 25   Bridger Castelan DO   acetaminophen (TYLENOL) 325 MG tablet Take 2 tablets by mouth every 4 hours as needed for Pain or Fever (TEMP >100F) Indications: NTE: 3G/24HRS    Caryn Ko MD   divalproex (DEPAKOTE ER) 250 MG extended release tablet Take 2 tablets by mouth 2 times daily    Caryn Ko MD   aspirin 81 MG EC tablet Take 1 tablet by mouth nightly 24   Marina Son APRN - CNP   atorvastatin (LIPITOR) 40 MG tablet Take 1 tablet by mouth nightly 24   Marina Son APRN - CNP   budesonide-formoterol (SYMBICORT) 160-4.5 MCG/ACT AERO Inhale 2 puffs into the lungs 2 times daily as needed (SOB) 24   Marina Son APRN - CNP   vitamin D (VITAMIN D3) 50 MCG ( UT) CAPS capsule Take 1 capsule by mouth 2 times daily  Patient taking differently: Take 1 capsule by mouth daily 24   Marina Son APRN - CNP   clopidogrel (PLAVIX) 75 MG tablet Take 1 tablet by mouth every morning 24   Marina Son APRN - CNP   Coenzyme Q10 (CO Q-10) 200 MG CAPS Take 1 capsule by mouth every morning

## 2025-06-19 NOTE — PATIENT CARE CONFERENCE
P Quality Flow/Interdisciplinary Rounds Progress Note        Quality Flow Rounds held on June 19, 2025    Disciplines Attending:  Bedside Nurse, , , and Nursing Unit Leadership    Stewart Mark was admitted on 6/16/2025 10:50 PM    Anticipated Discharge Date:       Disposition:    Bienvenido Score:  Bienvenido Scale Score: 13    [facility] RISK OF UNPLANNED READMISSION 2.0             25.7 Total Score        Discussed patient goal for the day, patient clinical progression, and barriers to discharge.  The following Goal(s) of the Day/Commitment(s) have been identified:  for OR      Dora Gaines RN  June 19, 2025

## 2025-06-19 NOTE — PLAN OF CARE
Problem: Chronic Conditions and Co-morbidities  Goal: Patient's chronic conditions and co-morbidity symptoms are monitored and maintained or improved  6/19/2025 1024 by Macy Madrigal RN  Outcome: Progressing  6/19/2025 0516 by Sarah Atkinson RN  Outcome: Progressing  Flowsheets (Taken 6/18/2025 2020)  Care Plan - Patient's Chronic Conditions and Co-Morbidity Symptoms are Monitored and Maintained or Improved: Monitor and assess patient's chronic conditions and comorbid symptoms for stability, deterioration, or improvement     Problem: Discharge Planning  Goal: Discharge to home or other facility with appropriate resources  6/19/2025 1024 by Macy Madrigal RN  Outcome: Progressing  6/19/2025 0516 by Sarah Atkinson RN  Outcome: Progressing  Flowsheets (Taken 6/18/2025 2020)  Discharge to home or other facility with appropriate resources: Identify barriers to discharge with patient and caregiver     Problem: Safety - Adult  Goal: Free from fall injury  6/19/2025 1024 by Macy Madrigal RN  Outcome: Progressing  6/19/2025 0516 by Sarah Atkinson RN  Outcome: Progressing     Problem: Skin/Tissue Integrity  Goal: Skin integrity remains intact  Description: 1.  Monitor for areas of redness and/or skin breakdown  2.  Assess vascular access sites hourly  3.  Every 4-6 hours minimum:  Change oxygen saturation probe site  4.  Every 4-6 hours:  If on nasal continuous positive airway pressure, respiratory therapy assess nares and determine need for appliance change or resting period  6/19/2025 1024 by Macy Madrigal RN  Outcome: Progressing  6/19/2025 0516 by Sarah Atkinson RN  Outcome: Progressing  Flowsheets (Taken 6/18/2025 2030)  Skin Integrity Remains Intact: Monitor for areas of redness and/or skin breakdown     Problem: ABCDS Injury Assessment  Goal: Absence of physical injury  6/19/2025 1024 by Macy Madrigal RN  Outcome: Progressing  6/19/2025 0516 by Sarah Atkinson

## 2025-06-19 NOTE — OP NOTE
Operative Note      Patient: Stewart Mark  YOB: 1950  MRN: [id number]    Date of Procedure: 6/19/2025    Preop Diagnosis:    75 yo M with hx of chronic  cholecystitis and choledocholithiasis status post multiple cholecystostomy tube drains and cholecysto cutaneous fistula, status post ERCP x 2 with biliary stent placement and recurrent choledocholithiasis    Post-Op Diagnosis: Same, umbilical hernia       Procedure:  Robotic assisted laparoscopic cholecystectomy with ICG cholangiography, modifier 22  Intraoperative ultrasound    Surgeon(s):  Barb Wilson MD    Assistant:   Resident: Kevin Cr MD    Anesthesia: General    Estimated Blood Loss (mL): Minimal    Complications: None    Specimens:   ID Type Source Tests Collected by Time Destination   A : GALLBLADDER Tissue Tissue SURGICAL PATHOLOGY Barb Wilson MD 6/19/2025 1629        Implants:  Implant Name Type Inv. Item Serial No.  Lot No. LRB No. Used Action   CLIP INT M L POLYMER SONA LIG HEM O SONA - [id number]  CLIP INT M L POLYMER SONA LIG HEM O SONA  [employer] [id number] N/A 1 Implanted   CLIP INT M L POLYMER SONA LIG HEM O SONA - [id number]  CLIP INT M L POLYMER SONA LIG HEM O SONA  [employer] [id number] N/A 1 Implanted   CLIP INT L POLYMER SONA LIG HEM O SONA (6EA/PK) - [id number]  CLIP INT L POLYMER SONA LIG HEM O SONA (6EA/PK)  [employer] [id number] N/A 1 Implanted         Drains:   Urinary Catheter 06/13/25 Santos (Active)   Catheter Indications Urinary retention (acute or chronic), continuous bladder irrigation or bladder outlet obstruction 06/19/25 0815   Site Assessment Pink 06/19/25 0815   Urine Color Yellow 06/19/25 0815   Urine Appearance Clear 06/19/25 0815   Urine Odor Other (Comment) 06/19/25 0800   Collection Container Standard 06/19/25 0815   Securement Method Securing device (Describe) 06/19/25 0815   Catheter Care  Soap and water 06/19/25 1147   Catheter Best Practices

## 2025-06-19 NOTE — ANESTHESIA POSTPROCEDURE EVALUATION
Department of Anesthesiology  Postprocedure Note    Patient: Stewart Mark  MRN: [id number]  YOB: 1950  Date of evaluation: 6/19/2025    Procedure Summary       Date: 06/19/25 Room / Location: [address]    Anesthesia Start:  Anesthesia Stop:     Procedure: CHOLECYSTECTOMY LAPAROSCOPIC ROBOTIC XI/POSSIBLE BILE DUCT RECONSTRUCTION/POSSIBLE OPEN/INTRAOPERATIVE ULTRASOUND/TRANSFER FROM [facility] Diagnosis:       Chronic cholecystitis      (Chronic cholecystitis [K81.1])    Surgeons: Barb Wilson MD Responsible Provider:     Anesthesia Type: general ASA Status: 4            Anesthesia Type: No value filed.    Tammy Phase I:      Tammy Phase II:      Anesthesia Post Evaluation    Patient location during evaluation: PACU  Patient participation: complete - patient participated  Level of consciousness: awake  Pain score: 3  Airway patency: patent  Nausea & Vomiting: no nausea and no vomiting  Cardiovascular status: blood pressure returned to baseline  Respiratory status: acceptable  Hydration status: euvolemic    No notable events documented.

## 2025-06-20 LAB
ALBUMIN SERPL-MCNC: 3.3 G/DL (ref 3.5–5.2)
ALP SERPL-CCNC: 136 U/L (ref 40–129)
ALT SERPL-CCNC: 38 U/L (ref 0–50)
ANION GAP SERPL CALCULATED.3IONS-SCNC: 14 MMOL/L (ref 7–16)
AST SERPL-CCNC: 61 U/L (ref 0–50)
BASOPHILS # BLD: 0.04 K/UL (ref 0–0.2)
BASOPHILS NFR BLD: 0 % (ref 0–2)
BILIRUB SERPL-MCNC: 0.3 MG/DL (ref 0–1.2)
BUN SERPL-MCNC: 18 MG/DL (ref 8–23)
CALCIUM SERPL-MCNC: 8.9 MG/DL (ref 8.8–10.2)
CHLORIDE SERPL-SCNC: 106 MMOL/L (ref 98–107)
CO2 SERPL-SCNC: 20 MMOL/L (ref 22–29)
CREAT SERPL-MCNC: 1 MG/DL (ref 0.7–1.2)
EOSINOPHIL # BLD: 0.02 K/UL (ref 0.05–0.5)
EOSINOPHILS RELATIVE PERCENT: 0 % (ref 0–6)
ERYTHROCYTE [DISTWIDTH] IN BLOOD BY AUTOMATED COUNT: 17.4 % (ref 11.5–15)
GFR, ESTIMATED: 84 ML/MIN/1.73M2
GLUCOSE BLD-MCNC: 124 MG/DL (ref 74–99)
GLUCOSE BLD-MCNC: 136 MG/DL (ref 74–99)
GLUCOSE BLD-MCNC: 162 MG/DL (ref 74–99)
GLUCOSE BLD-MCNC: 174 MG/DL (ref 74–99)
GLUCOSE SERPL-MCNC: 170 MG/DL (ref 74–99)
HCT VFR BLD AUTO: 27.3 % (ref 37–54)
HGB BLD-MCNC: 8.2 G/DL (ref 12.5–16.5)
IMM GRANULOCYTES # BLD AUTO: 0.06 K/UL (ref 0–0.58)
IMM GRANULOCYTES NFR BLD: 1 % (ref 0–5)
LYMPHOCYTES NFR BLD: 1.43 K/UL (ref 1.5–4)
LYMPHOCYTES RELATIVE PERCENT: 13 % (ref 20–42)
MCH RBC QN AUTO: 27.1 PG (ref 26–35)
MCHC RBC AUTO-ENTMCNC: 30 G/DL (ref 32–34.5)
MCV RBC AUTO: 90.1 FL (ref 80–99.9)
MONOCYTES NFR BLD: 1.23 K/UL (ref 0.1–0.95)
MONOCYTES NFR BLD: 11 % (ref 2–12)
NEUTROPHILS NFR BLD: 75 % (ref 43–80)
NEUTS SEG NFR BLD: 8.24 K/UL (ref 1.8–7.3)
PLATELET # BLD AUTO: 381 K/UL (ref 130–450)
PMV BLD AUTO: 9.1 FL (ref 7–12)
POTASSIUM SERPL-SCNC: 3.9 MMOL/L (ref 3.5–5.1)
PROT SERPL-MCNC: 6.5 G/DL (ref 6.4–8.3)
RBC # BLD AUTO: 3.03 M/UL (ref 3.8–5.8)
SODIUM SERPL-SCNC: 141 MMOL/L (ref 136–145)
WBC OTHER # BLD: 11 K/UL (ref 4.5–11.5)

## 2025-06-20 PROCEDURE — 2500000003 HC RX 250 WO HCPCS: Performed by: STUDENT IN AN ORGANIZED HEALTH CARE EDUCATION/TRAINING PROGRAM

## 2025-06-20 PROCEDURE — APPSS45 APP SPLIT SHARED TIME 31-45 MINUTES: Performed by: CLINICAL NURSE SPECIALIST

## 2025-06-20 PROCEDURE — 6370000000 HC RX 637 (ALT 250 FOR IP): Performed by: STUDENT IN AN ORGANIZED HEALTH CARE EDUCATION/TRAINING PROGRAM

## 2025-06-20 PROCEDURE — 80053 COMPREHEN METABOLIC PANEL: CPT

## 2025-06-20 PROCEDURE — 82962 GLUCOSE BLOOD TEST: CPT

## 2025-06-20 PROCEDURE — 2580000003 HC RX 258: Performed by: STUDENT IN AN ORGANIZED HEALTH CARE EDUCATION/TRAINING PROGRAM

## 2025-06-20 PROCEDURE — 36415 COLL VENOUS BLD VENIPUNCTURE: CPT

## 2025-06-20 PROCEDURE — 2140000000 HC CCU INTERMEDIATE R&B

## 2025-06-20 PROCEDURE — 6360000002 HC RX W HCPCS: Performed by: STUDENT IN AN ORGANIZED HEALTH CARE EDUCATION/TRAINING PROGRAM

## 2025-06-20 PROCEDURE — 85025 COMPLETE CBC W/AUTO DIFF WBC: CPT

## 2025-06-20 RX ORDER — MECOBALAMIN 5000 MCG
10 TABLET,DISINTEGRATING ORAL NIGHTLY PRN
Status: DISCONTINUED | OUTPATIENT
Start: 2025-06-20 | End: 2025-06-23 | Stop reason: HOSPADM

## 2025-06-20 RX ADMIN — METOPROLOL TARTRATE 100 MG: 50 TABLET, FILM COATED ORAL at 21:09

## 2025-06-20 RX ADMIN — MEMANTINE 5 MG: 5 TABLET ORAL at 21:10

## 2025-06-20 RX ADMIN — GABAPENTIN 600 MG: 300 CAPSULE ORAL at 08:53

## 2025-06-20 RX ADMIN — MEMANTINE 5 MG: 5 TABLET ORAL at 08:54

## 2025-06-20 RX ADMIN — MICONAZOLE NITRATE: 20 POWDER TOPICAL at 08:58

## 2025-06-20 RX ADMIN — METOPROLOL TARTRATE 100 MG: 50 TABLET, FILM COATED ORAL at 08:53

## 2025-06-20 RX ADMIN — SODIUM CHLORIDE, PRESERVATIVE FREE 10 ML: 5 INJECTION INTRAVENOUS at 21:11

## 2025-06-20 RX ADMIN — ATORVASTATIN CALCIUM 40 MG: 40 TABLET, FILM COATED ORAL at 21:10

## 2025-06-20 RX ADMIN — OXYCODONE HYDROCHLORIDE 10 MG: 10 TABLET ORAL at 17:52

## 2025-06-20 RX ADMIN — Medication 2000 UNITS: at 08:53

## 2025-06-20 RX ADMIN — PIPERACILLIN AND TAZOBACTAM 3375 MG: 3; .375 INJECTION, POWDER, LYOPHILIZED, FOR SOLUTION INTRAVENOUS at 08:52

## 2025-06-20 RX ADMIN — PIPERACILLIN AND TAZOBACTAM 3375 MG: 3; .375 INJECTION, POWDER, LYOPHILIZED, FOR SOLUTION INTRAVENOUS at 15:56

## 2025-06-20 RX ADMIN — GABAPENTIN 600 MG: 300 CAPSULE ORAL at 12:21

## 2025-06-20 RX ADMIN — TAMSULOSIN HYDROCHLORIDE 0.8 MG: 0.4 CAPSULE ORAL at 08:52

## 2025-06-20 RX ADMIN — LOSARTAN POTASSIUM 100 MG: 50 TABLET, FILM COATED ORAL at 08:53

## 2025-06-20 RX ADMIN — OXYCODONE 5 MG: 5 TABLET ORAL at 12:27

## 2025-06-20 RX ADMIN — DIVALPROEX SODIUM 250 MG: 250 TABLET, EXTENDED RELEASE ORAL at 21:10

## 2025-06-20 RX ADMIN — MICONAZOLE NITRATE: 20 POWDER TOPICAL at 21:11

## 2025-06-20 RX ADMIN — LEVETIRACETAM 500 MG: 100 INJECTION INTRAVENOUS at 12:21

## 2025-06-20 RX ADMIN — PETROLATUM: 420 OINTMENT TOPICAL at 08:58

## 2025-06-20 RX ADMIN — FENOFIBRATE 54 MG: 54 TABLET ORAL at 08:54

## 2025-06-20 RX ADMIN — SODIUM CHLORIDE, PRESERVATIVE FREE 10 ML: 5 INJECTION INTRAVENOUS at 08:57

## 2025-06-20 RX ADMIN — GABAPENTIN 600 MG: 300 CAPSULE ORAL at 21:10

## 2025-06-20 RX ADMIN — PANTOPRAZOLE SODIUM 40 MG: 40 TABLET, DELAYED RELEASE ORAL at 08:54

## 2025-06-20 RX ADMIN — GABAPENTIN 600 MG: 300 CAPSULE ORAL at 17:52

## 2025-06-20 RX ADMIN — ACETAMINOPHEN 650 MG: 325 TABLET ORAL at 08:59

## 2025-06-20 RX ADMIN — DIVALPROEX SODIUM 250 MG: 250 TABLET, EXTENDED RELEASE ORAL at 08:54

## 2025-06-20 RX ADMIN — HYDROCHLOROTHIAZIDE 25 MG: 25 TABLET ORAL at 08:56

## 2025-06-20 RX ADMIN — PETROLATUM: 420 OINTMENT TOPICAL at 21:10

## 2025-06-20 RX ADMIN — DULOXETINE 60 MG: 30 CAPSULE, DELAYED RELEASE ORAL at 08:52

## 2025-06-20 RX ADMIN — CETIRIZINE HYDROCHLORIDE 10 MG: 10 TABLET, FILM COATED ORAL at 08:54

## 2025-06-20 ASSESSMENT — PAIN DESCRIPTION - LOCATION
LOCATION: ABDOMEN

## 2025-06-20 ASSESSMENT — PAIN SCALES - GENERAL: PAINLEVEL_OUTOF10: 0

## 2025-06-20 ASSESSMENT — PAIN DESCRIPTION - DESCRIPTORS
DESCRIPTORS: ACHING;TENDER;SORE;DISCOMFORT
DESCRIPTORS: ACHING;SORE;TENDER;DISCOMFORT
DESCRIPTORS: ACHING;DISCOMFORT;SORE;TENDER

## 2025-06-20 ASSESSMENT — PAIN SCALES - WONG BAKER
WONGBAKER_NUMERICALRESPONSE: HURTS LITTLE MORE
WONGBAKER_NUMERICALRESPONSE: HURTS A LITTLE BIT
WONGBAKER_NUMERICALRESPONSE: HURTS EVEN MORE
WONGBAKER_NUMERICALRESPONSE: HURTS A LITTLE BIT
WONGBAKER_NUMERICALRESPONSE: NO HURT

## 2025-06-20 ASSESSMENT — PAIN DESCRIPTION - ORIENTATION
ORIENTATION: MID
ORIENTATION: DISTAL;MID
ORIENTATION: MID;LOWER

## 2025-06-20 NOTE — DISCHARGE SUMMARY
[facility]   Discharge summary   Patient ID:  Stewart Mark  [id number]  74 y.o.  1950    Admit date: 6/13/2025    Discharge date and time: 6/16/2025    Admission Diagnoses:   Patient Active Problem List   Diagnosis    Obstructive sleep apnea syndrome    Shortness of breath    Moderate persistent asthma without complication    Uncontrolled type 2 diabetes mellitus    CAD in native artery    HLD (hyperlipidemia)    Morbid obesity (HCC)    Altered mental state    Chronic cholecystitis    Obstructive jaundice (HCC)    Altered mental status    UTI (urinary tract infection)    GIUSEPPE (acute kidney injury)    Primary hypertension    Controlled type 2 diabetes mellitus without complication (HCC)    Seizure (HCC)    Choledocholithiasis    Dementia (HCC)    Seizures (HCC)       Discharge Diagnoses: Cholecystitis, chronic [K81.1]  GIUSEPPE (acute kidney injury) [N17.9]  Acute cystitis without hematuria [N30.00]  Cholecystostomy tube dysfunction, initial encounter [T85.518A]  Dementia, unspecified dementia severity, unspecified dementia type, unspecified whether behavioral, psychotic, or mood disturbance or anxiety (HCC) [F03.90]    Consults: IP CONSULT TO GENERAL SURGERY  IP CONSULT TO GI  IP CONSULT TO INTERNAL MEDICINE  IP CONSULT TO HEPATOLOGY  IP CONSULT TO VASCULAR ACCESS TEAM    Procedures: None    Hospital Course: The patient is a 74 y.o. male of Javid Connelly MD  has a past medical history ischemic stroke, anxiety, CAD, depression, diabetes, hyperlipidemia, hypertension, asthma, recently hospitalized 02/14 - 02/23/25 for cholangitis requiring biliary stent/drain.  Also evaluated on ER in March for a fall but was not admitted.  He was admitted 05/17 -05/19/2025 for altered mental status and UTI and questionable UTI at that time, it was determined that antibiotic treatment was warranted, patient was discharged back to [facility].  He now presents with shortness of breath, was satting at 60% on room  cervical carotid and vertebral arteries. 2. Stable changes of anterior cervical fusion from C3 through T1 with solid osseous fusion from C4 through T1 and intact appearance of the anterior metal plate and anchoring screws. CT PERFUSION: Normal CT perfusion.     CT BRAIN PERFUSION  Result Date: 6/16/2025  EXAMINATION: CT OF THE HEAD WITHOUT CONTRAST; CTA OF THE HEAD WITH CONTRAST; CTA OF THE NECK; CTA OF THE HEAD WITH CONTRAST WITH PERFUSION 6/16/2025 12:53 pm; 6/16/2025 12:59 pm: TECHNIQUE: CT of the head was performed without the administration of intravenous contrast. CTA of the head/brain was performed with the administration of intravenous contrast. Multiplanar reformatted images are provided for review.  MIP images are provided for review. CTA of the neck was performed with the administration of intravenous contrast. Multiplanar reformatted images are provided for review.  MIP images are provided for review. Stenosis of the internal carotid arteries measured using NASCET criteria. Automated exposure control, iterative reconstruction, and/or weight based adjustment of the mA/kV was utilized to reduce the radiation dose to as low as reasonably achievable. CT perfusion calculations are performed on a separate workstation. COMPARISON: CT of the head from 06/13/2025.  CT of the head, CT angiogram of the head and neck, and CT perfusion from 07/26/2023 HISTORY: ORDERING SYSTEM PROVIDED HISTORY: AMS r/o CVA TECHNOLOGIST PROVIDED HISTORY: Reason for exam:->AMS r/o CVA Has a \"code stroke\" or \"stroke alert\" been called?->Yes FINDINGS: CT HEAD: BRAIN/VENTRICLES:  No acute intracranial hemorrhage or extraaxial fluid collection. Grey-white differentiation is maintained.  No evidence of mass, mass effect or midline shift.  No evidence of hydrocephalus. There is stable moderate dilatation of the ventricles and sulci representing age-appropriate atrophy. There is stable mild periventricular and subcortical white matter

## 2025-06-20 NOTE — PLAN OF CARE
Problem: Chronic Conditions and Co-morbidities  Goal: Patient's chronic conditions and co-morbidity symptoms are monitored and maintained or improved  6/19/2025 2111 by Scarlett Moreno RN  Outcome: Progressing

## 2025-06-20 NOTE — PLAN OF CARE
Problem: Chronic Conditions and Co-morbidities  Goal: Patient's chronic conditions and co-morbidity symptoms are monitored and maintained or improved  6/19/2025 2111 by Scarlett Moreno, RN  Outcome: Progressing     Problem: Discharge Planning  Goal: Discharge to home or other facility with appropriate resources  6/19/2025 2111 by Scarlett Moreno, RN  Outcome: Progressing

## 2025-06-20 NOTE — ANESTHESIA POSTPROCEDURE EVALUATION
Department of Anesthesiology  Postprocedure Note    Patient: Stewart Mark  MRN: [id number]  YOB: 1950  Date of evaluation: 6/19/2025    Procedure Summary       Date: 06/19/25 Room / Location: [address]    Anesthesia Start: 1412 Anesthesia Stop: 1647    Procedure: CHOLECYSTECTOMY LAPAROSCOPIC ROBOTIC XI, INTRAOPERATIVE ULTRASOUND (Abdomen) Diagnosis:       Chronic cholecystitis      (Chronic cholecystitis [K81.1])    Surgeons: Barb Wilson MD Responsible Provider: Vijay Peralta MD    Anesthesia Type: general ASA Status: 4            Anesthesia Type: No value filed.    Tammy Phase I: Tammy Score: 8    Tammy Phase II:      Anesthesia Post Evaluation    Patient location during evaluation: PACU  Patient participation: complete - patient participated  Level of consciousness: awake and alert  Pain score: 2  Airway patency: patent  Nausea & Vomiting: no nausea  Cardiovascular status: blood pressure returned to baseline and hemodynamically stable  Respiratory status: acceptable  Hydration status: euvolemic  Pain management: adequate        No notable events documented.

## 2025-06-20 NOTE — PATIENT CARE CONFERENCE
[facility] Quality Flow/Interdisciplinary Rounds Progress Note        Quality Flow Rounds held on June 20, 2025    Disciplines Attending:  Bedside Nurse, , , and Nursing Unit Leadership    Stewart Mark was admitted on 6/16/2025 10:50 PM    Anticipated Discharge Date:       Disposition:    Bienvenido Score:  Bienvenido Scale Score: 16    [facility] RISK OF UNPLANNED READMISSION 2.0             23.7 Total Score        Discussed patient goal for the day, patient clinical progression, and barriers to discharge.  The following Goal(s) of the Day/Commitment(s) have been identified:  downgrade/discharge plan      Dora Gaines RN  June 20, 2025

## 2025-06-20 NOTE — PLAN OF CARE
Problem: Chronic Conditions and Co-morbidities  Goal: Patient's chronic conditions and co-morbidity symptoms are monitored and maintained or improved  6/20/2025 0805 by Abida Sherman RN  Outcome: Progressing  6/19/2025 2111 by Scarlett Moreno RN  Outcome: Progressing  6/19/2025 2111 by Scarlett Moreno RN  Outcome: Progressing     Problem: Discharge Planning  Goal: Discharge to home or other facility with appropriate resources  6/20/2025 0805 by Abida Sherman RN  Outcome: Progressing  6/19/2025 2111 by Scarlett Moreno RN  Outcome: Progressing  6/19/2025 2111 by Scarlett Moreno RN  Outcome: Progressing     Problem: Safety - Adult  Goal: Free from fall injury  Outcome: Progressing     Problem: Skin/Tissue Integrity  Goal: Skin integrity remains intact  Description: 1.  Monitor for areas of redness and/or skin breakdown  2.  Assess vascular access sites hourly  3.  Every 4-6 hours minimum:  Change oxygen saturation probe site  4.  Every 4-6 hours:  If on nasal continuous positive airway pressure, respiratory therapy assess nares and determine need for appliance change or resting period  Outcome: Progressing     Problem: ABCDS Injury Assessment  Goal: Absence of physical injury  Outcome: Progressing     Problem: Nutrition Deficit:  Goal: Optimize nutritional status  Outcome: Progressing     Problem: Pain  Goal: Verbalizes/displays adequate comfort level or baseline comfort level  Outcome: Progressing

## 2025-06-20 NOTE — CARE COORDINATION
6/20/25  Transition of Care update. Patient is s/p laparoscopic robotic cholecystectomy with ICG cholangiography and intraoperative US from 6/19/25 . Patient pending neurology recommendations for seizure med management prior to return to SNF. Discharge goal is to return to [facility] where patient is a long term care bed hold. No pre-cert or PASRR required to return to [facility]. PERFECTO, destiantion complete with transport started. SW/CM to follow.     Electronically signed by ELFEGO Stone on 6/20/2025 at 11:42 AM

## 2025-06-20 NOTE — DISCHARGE INSTRUCTIONS
No lifting >10# x 4 weeks. Ambulate at least 3-4 times a day. Up to chair. Shower with dial soap and water, pat dry. Do not pick off surgical glue.

## 2025-06-21 ENCOUNTER — APPOINTMENT (OUTPATIENT)
Dept: GENERAL RADIOLOGY | Age: 75
DRG: 417 | End: 2025-06-21
Attending: INTERNAL MEDICINE
Payer: MEDICARE

## 2025-06-21 ENCOUNTER — APPOINTMENT (OUTPATIENT)
Dept: CT IMAGING | Age: 75
DRG: 417 | End: 2025-06-21
Attending: INTERNAL MEDICINE
Payer: MEDICARE

## 2025-06-21 LAB
ALBUMIN SERPL-MCNC: 3.1 G/DL (ref 3.5–5.2)
ALP SERPL-CCNC: 122 U/L (ref 40–129)
ALT SERPL-CCNC: 34 U/L (ref 0–50)
AMMONIA PLAS-SCNC: 51 UMOL/L (ref 16–60)
ANION GAP SERPL CALCULATED.3IONS-SCNC: 11 MMOL/L (ref 7–16)
AST SERPL-CCNC: 46 U/L (ref 0–50)
B.E.: -2.3 MMOL/L (ref -3–3)
BASOPHILS # BLD: 0 K/UL (ref 0–0.2)
BASOPHILS NFR BLD: 0 % (ref 0–2)
BILIRUB SERPL-MCNC: 0.3 MG/DL (ref 0–1.2)
BUN SERPL-MCNC: 23 MG/DL (ref 8–23)
CALCIUM SERPL-MCNC: 8.8 MG/DL (ref 8.8–10.2)
CHLORIDE SERPL-SCNC: 107 MMOL/L (ref 98–107)
CO2 SERPL-SCNC: 23 MMOL/L (ref 22–29)
COHB: 0.2 % (ref 0–1.5)
CREAT SERPL-MCNC: 1 MG/DL (ref 0.7–1.2)
CRITICAL: ABNORMAL
DATE ANALYZED: ABNORMAL
DATE LAST DOSE: ABNORMAL
DATE OF COLLECTION: ABNORMAL
EOSINOPHIL # BLD: 0.18 K/UL (ref 0.05–0.5)
EOSINOPHILS RELATIVE PERCENT: 2 % (ref 0–6)
ERYTHROCYTE [DISTWIDTH] IN BLOOD BY AUTOMATED COUNT: 17.5 % (ref 11.5–15)
GFR, ESTIMATED: 76 ML/MIN/1.73M2
GLUCOSE BLD-MCNC: 159 MG/DL (ref 74–99)
GLUCOSE BLD-MCNC: 161 MG/DL (ref 74–99)
GLUCOSE BLD-MCNC: 162 MG/DL (ref 74–99)
GLUCOSE BLD-MCNC: 181 MG/DL (ref 74–99)
GLUCOSE SERPL-MCNC: 172 MG/DL (ref 74–99)
HCO3: 22.5 MMOL/L (ref 22–26)
HCT VFR BLD AUTO: 23.9 % (ref 37–54)
HGB BLD-MCNC: 7.5 G/DL (ref 12.5–16.5)
HHB: 10.6 % (ref 0–5)
LAB: ABNORMAL
LACTATE BLDV-SCNC: 0.6 MMOL/L (ref 0.5–2.2)
LACTATE BLDV-SCNC: 0.7 MMOL/L (ref 0.5–2.2)
LACTATE BLDV-SCNC: 0.9 MMOL/L (ref 0.5–2.2)
LYMPHOCYTES NFR BLD: 1.72 K/UL (ref 1.5–4)
LYMPHOCYTES RELATIVE PERCENT: 17 % (ref 20–42)
Lab: 602
MAGNESIUM SERPL-MCNC: 1.7 MG/DL (ref 1.6–2.4)
MCH RBC QN AUTO: 27.3 PG (ref 26–35)
MCHC RBC AUTO-ENTMCNC: 31.4 G/DL (ref 32–34.5)
MCV RBC AUTO: 86.9 FL (ref 80–99.9)
METHB: 0.6 % (ref 0–1.5)
MODE: ABNORMAL
MONOCYTES NFR BLD: 0.72 K/UL (ref 0.1–0.95)
MONOCYTES NFR BLD: 7 % (ref 2–12)
NEUTROPHILS NFR BLD: 75 % (ref 43–80)
NEUTS SEG NFR BLD: 7.78 K/UL (ref 1.8–7.3)
O2 SATURATION: 89.3 % (ref 92–98.5)
O2HB: 88.6 % (ref 94–97)
OPERATOR ID: 2860
PATIENT TEMP: 37 C
PCO2: 38.5 MMHG (ref 35–45)
PH BLOOD GAS: 7.38 (ref 7.35–7.45)
PHOSPHATE SERPL-MCNC: 2.8 MG/DL (ref 2.5–4.5)
PLATELET # BLD AUTO: 354 K/UL (ref 130–450)
PMV BLD AUTO: 9.3 FL (ref 7–12)
PO2: 60.5 MMHG (ref 75–100)
POTASSIUM SERPL-SCNC: 3.6 MMOL/L (ref 3.5–5)
POTASSIUM SERPL-SCNC: 3.7 MMOL/L (ref 3.5–5.1)
PROLACTIN SERPL-MCNC: 16.3 NG/ML
PROT SERPL-MCNC: 6.2 G/DL (ref 6.4–8.3)
RBC # BLD AUTO: 2.75 M/UL (ref 3.8–5.8)
RBC # BLD: ABNORMAL 10*6/UL
SODIUM SERPL-SCNC: 140 MMOL/L (ref 136–145)
SOURCE, BLOOD GAS: ABNORMAL
THB: 8.3 G/DL (ref 11.5–16.5)
TIME ANALYZED: 606
TME LAST DOSE: ABNORMAL H
VALPROATE SERPL-MCNC: 12 UG/ML (ref 50–100)
VANCOMYCIN DOSE: ABNORMAL MG
WBC OTHER # BLD: 10.4 K/UL (ref 4.5–11.5)

## 2025-06-21 PROCEDURE — 84100 ASSAY OF PHOSPHORUS: CPT

## 2025-06-21 PROCEDURE — 84146 ASSAY OF PROLACTIN: CPT

## 2025-06-21 PROCEDURE — 80164 ASSAY DIPROPYLACETIC ACD TOT: CPT

## 2025-06-21 PROCEDURE — 6360000002 HC RX W HCPCS: Performed by: STUDENT IN AN ORGANIZED HEALTH CARE EDUCATION/TRAINING PROGRAM

## 2025-06-21 PROCEDURE — 6360000002 HC RX W HCPCS: Performed by: FAMILY MEDICINE

## 2025-06-21 PROCEDURE — 2500000003 HC RX 250 WO HCPCS: Performed by: FAMILY MEDICINE

## 2025-06-21 PROCEDURE — 71045 X-RAY EXAM CHEST 1 VIEW: CPT

## 2025-06-21 PROCEDURE — 85025 COMPLETE CBC W/AUTO DIFF WBC: CPT

## 2025-06-21 PROCEDURE — 82962 GLUCOSE BLOOD TEST: CPT

## 2025-06-21 PROCEDURE — 2140000000 HC CCU INTERMEDIATE R&B

## 2025-06-21 PROCEDURE — 82140 ASSAY OF AMMONIA: CPT

## 2025-06-21 PROCEDURE — 2500000003 HC RX 250 WO HCPCS: Performed by: STUDENT IN AN ORGANIZED HEALTH CARE EDUCATION/TRAINING PROGRAM

## 2025-06-21 PROCEDURE — 84132 ASSAY OF SERUM POTASSIUM: CPT

## 2025-06-21 PROCEDURE — 82805 BLOOD GASES W/O2 SATURATION: CPT

## 2025-06-21 PROCEDURE — 80053 COMPREHEN METABOLIC PANEL: CPT

## 2025-06-21 PROCEDURE — 2700000000 HC OXYGEN THERAPY PER DAY

## 2025-06-21 PROCEDURE — 6370000000 HC RX 637 (ALT 250 FOR IP): Performed by: STUDENT IN AN ORGANIZED HEALTH CARE EDUCATION/TRAINING PROGRAM

## 2025-06-21 PROCEDURE — 2580000003 HC RX 258: Performed by: STUDENT IN AN ORGANIZED HEALTH CARE EDUCATION/TRAINING PROGRAM

## 2025-06-21 PROCEDURE — 36415 COLL VENOUS BLD VENIPUNCTURE: CPT

## 2025-06-21 PROCEDURE — 2580000003 HC RX 258

## 2025-06-21 PROCEDURE — 83605 ASSAY OF LACTIC ACID: CPT

## 2025-06-21 PROCEDURE — 83735 ASSAY OF MAGNESIUM: CPT

## 2025-06-21 PROCEDURE — 94640 AIRWAY INHALATION TREATMENT: CPT

## 2025-06-21 PROCEDURE — 70450 CT HEAD/BRAIN W/O DYE: CPT

## 2025-06-21 RX ORDER — LEVETIRACETAM 500 MG/5ML
1000 INJECTION, SOLUTION, CONCENTRATE INTRAVENOUS EVERY 12 HOURS
Status: DISCONTINUED | OUTPATIENT
Start: 2025-06-22 | End: 2025-06-23 | Stop reason: HOSPADM

## 2025-06-21 RX ORDER — 0.9 % SODIUM CHLORIDE 0.9 %
500 INTRAVENOUS SOLUTION INTRAVENOUS ONCE
Status: COMPLETED | OUTPATIENT
Start: 2025-06-21 | End: 2025-06-21

## 2025-06-21 RX ORDER — METOPROLOL TARTRATE 1 MG/ML
5 INJECTION, SOLUTION INTRAVENOUS EVERY 6 HOURS
Status: DISCONTINUED | OUTPATIENT
Start: 2025-06-21 | End: 2025-06-22

## 2025-06-21 RX ORDER — HYDRALAZINE HYDROCHLORIDE 20 MG/ML
10 INJECTION INTRAMUSCULAR; INTRAVENOUS EVERY 6 HOURS PRN
Status: DISCONTINUED | OUTPATIENT
Start: 2025-06-21 | End: 2025-06-23 | Stop reason: HOSPADM

## 2025-06-21 RX ADMIN — BUDESONIDE 500 MCG: 0.5 SUSPENSION RESPIRATORY (INHALATION) at 20:54

## 2025-06-21 RX ADMIN — PIPERACILLIN AND TAZOBACTAM 3375 MG: 3; .375 INJECTION, POWDER, LYOPHILIZED, FOR SOLUTION INTRAVENOUS at 16:53

## 2025-06-21 RX ADMIN — MEMANTINE 5 MG: 5 TABLET ORAL at 20:32

## 2025-06-21 RX ADMIN — LEVETIRACETAM 500 MG: 100 INJECTION INTRAVENOUS at 12:00

## 2025-06-21 RX ADMIN — ATORVASTATIN CALCIUM 40 MG: 40 TABLET, FILM COATED ORAL at 20:33

## 2025-06-21 RX ADMIN — HYDRALAZINE HYDROCHLORIDE 10 MG: 20 INJECTION INTRAMUSCULAR; INTRAVENOUS at 16:50

## 2025-06-21 RX ADMIN — SODIUM CHLORIDE, PRESERVATIVE FREE 10 ML: 5 INJECTION INTRAVENOUS at 08:32

## 2025-06-21 RX ADMIN — PETROLATUM: 420 OINTMENT TOPICAL at 20:34

## 2025-06-21 RX ADMIN — GABAPENTIN 600 MG: 300 CAPSULE ORAL at 20:33

## 2025-06-21 RX ADMIN — METOROPROLOL TARTRATE 5 MG: 5 INJECTION, SOLUTION INTRAVENOUS at 16:50

## 2025-06-21 RX ADMIN — PIPERACILLIN AND TAZOBACTAM 3375 MG: 3; .375 INJECTION, POWDER, LYOPHILIZED, FOR SOLUTION INTRAVENOUS at 08:35

## 2025-06-21 RX ADMIN — MICONAZOLE NITRATE: 20 POWDER TOPICAL at 20:33

## 2025-06-21 RX ADMIN — ARFORMOTEROL TARTRATE 15 MCG: 15 SOLUTION RESPIRATORY (INHALATION) at 20:54

## 2025-06-21 RX ADMIN — SODIUM CHLORIDE, PRESERVATIVE FREE 10 ML: 5 INJECTION INTRAVENOUS at 20:34

## 2025-06-21 RX ADMIN — INSULIN LISPRO 1 UNITS: 100 INJECTION, SOLUTION INTRAVENOUS; SUBCUTANEOUS at 08:31

## 2025-06-21 RX ADMIN — MICONAZOLE NITRATE: 20 POWDER TOPICAL at 08:37

## 2025-06-21 RX ADMIN — LEVETIRACETAM 500 MG: 100 INJECTION INTRAVENOUS at 00:32

## 2025-06-21 RX ADMIN — PIPERACILLIN AND TAZOBACTAM 3375 MG: 3; .375 INJECTION, POWDER, LYOPHILIZED, FOR SOLUTION INTRAVENOUS at 00:40

## 2025-06-21 RX ADMIN — DIVALPROEX SODIUM 250 MG: 250 TABLET, EXTENDED RELEASE ORAL at 20:33

## 2025-06-21 RX ADMIN — METOPROLOL TARTRATE 100 MG: 50 TABLET, FILM COATED ORAL at 20:33

## 2025-06-21 RX ADMIN — SODIUM CHLORIDE 500 ML: 9 INJECTION, SOLUTION INTRAVENOUS at 06:14

## 2025-06-21 RX ADMIN — METOROPROLOL TARTRATE 5 MG: 5 INJECTION, SOLUTION INTRAVENOUS at 10:24

## 2025-06-21 RX ADMIN — PETROLATUM: 420 OINTMENT TOPICAL at 08:37

## 2025-06-21 NOTE — PLAN OF CARE
Problem: Chronic Conditions and Co-morbidities  Goal: Patient's chronic conditions and co-morbidity symptoms are monitored and maintained or improved  6/20/2025 2303 by Khai Brizuela RN  Outcome: Progressing  6/20/2025 2259 by Khai Brizuela RN  Outcome: Progressing     Problem: Discharge Planning  Goal: Discharge to home or other facility with appropriate resources  6/20/2025 2303 by Khai Brizuela RN  Outcome: Progressing  6/20/2025 2259 by Khai Brizuela RN  Outcome: Progressing     Problem: Safety - Adult  Goal: Free from fall injury  6/20/2025 2303 by Khai Brizuela RN  Outcome: Progressing  6/20/2025 2259 by Khai Brizuela RN  Outcome: Progressing     Problem: Skin/Tissue Integrity  Goal: Skin integrity remains intact  Description: 1.  Monitor for areas of redness and/or skin breakdown  2.  Assess vascular access sites hourly  3.  Every 4-6 hours minimum:  Change oxygen saturation probe site  4.  Every 4-6 hours:  If on nasal continuous positive airway pressure, respiratory therapy assess nares and determine need for appliance change or resting period  6/20/2025 2303 by Khai Brizuela RN  Outcome: Progressing  6/20/2025 2259 by Khai Brizuela RN  Outcome: Progressing     Problem: ABCDS Injury Assessment  Goal: Absence of physical injury  6/20/2025 2303 by Khai Brizuela RN  Outcome: Progressing  6/20/2025 2259 by Khai Brizuela RN  Outcome: Progressing     Problem: Nutrition Deficit:  Goal: Optimize nutritional status  6/20/2025 2303 by Khai Brizuela RN  Outcome: Progressing  6/20/2025 2259 by Khai Brizuela RN  Outcome: Progressing     Problem: Pain  Goal: Verbalizes/displays adequate comfort level or baseline comfort level  6/20/2025 2303 by Khai Brizuela RN  Outcome: Progressing  6/20/2025 2259 by Khai Brizuela RN  Outcome: Progressing

## 2025-06-21 NOTE — SIGNIFICANT EVENT
Critical Care - Rapid Response Team Note      Date of event: 6/21/2025   Time of event: 5:57 AM    Stewart Mark 74 y.o. year old male   YOB: 1950     PCP:  Javid Connelly MD   Location: [address] [place] [address]-A   Witnessed? : [x]Yes  [] No  Initial Code status: [] Full  [x] DNR-CCA  []DNR-CC    []Limited  ______________________________________________________________________  Reason for RRT:   Other: Decreased responsiveness.    Chief Complaint for this admission: No chief complaint on file.      Admit date:  6/16/2025     Admitting Diagnosis: Seizures (HCC) [R56.9]      Current Diagnosis: The primary encounter diagnosis was Choledocholithiasis. Diagnoses of Obstructive jaundice (HCC) and Chronic cholecystitis were also pertinent to this visit.     Subjective: Patient is a 74-year-old male with a past medical history of dementia initially admitted  at the hospital for chronic cholecystitis and seizure.  Patient is s/p multiple percutaneous cholelithiasis ostomy tube placement and s/p laparoscopic robotic cholecystectomy with ICG cholangiography and intraoperative US 6/19/2025.  Neurology has been following for seizure, patient currently on Keppra and Depakote.      An RRT was called for decreased responsiveness this morning. According to bedside nurse, patient had intermittent confusion and lethargy yesterday whole day which is not new for him, was given Percocet last night.  On our evaluation, patient was responding to painful stimuli, was able to squeeze hands but was not opening his eyes, but patient was protecting his airway, he was saturating fine on low-flow nasal cannula.  Stat ABG did not show any acidosis or alkalosis.  His blood pressure was 90/50 initially, 500 cc normal saline bolus was given.  We did a stat CT head, which did not show any intra cranial activity.  His blood pressure improved to 115/65 later after fluid bolus.  We communicated with nursing staff to reconsult neurology and

## 2025-06-21 NOTE — PLAN OF CARE
Problem: Chronic Conditions and Co-morbidities  Goal: Patient's chronic conditions and co-morbidity symptoms are monitored and maintained or improved  6/21/2025 0704 by Claudia Martinez RN  Outcome: Progressing     Problem: Discharge Planning  Goal: Discharge to home or other facility with appropriate resources  6/21/2025 0704 by Claudia Martinez RN  Outcome: Progressing     Problem: Safety - Adult  Goal: Free from fall injury  6/21/2025 0704 by Claudia Martinez RN  Outcome: Progressing     Problem: Skin/Tissue Integrity  Goal: Skin integrity remains intact  Description: 1.  Monitor for areas of redness and/or skin breakdown  2.  Assess vascular access sites hourly  3.  Every 4-6 hours minimum:  Change oxygen saturation probe site  4.  Every 4-6 hours:  If on nasal continuous positive airway pressure, respiratory therapy assess nares and determine need for appliance change or resting period  6/21/2025 0704 by Claudia Martinez RN  Outcome: Progressing     Problem: ABCDS Injury Assessment  Goal: Absence of physical injury  6/21/2025 0704 by Claudia Matrinez RN  Outcome: Progressing     Problem: Nutrition Deficit:  Goal: Optimize nutritional status  6/21/2025 0704 by Claudia Martinez RN  Outcome: Progressing     Problem: Pain  Goal: Verbalizes/displays adequate comfort level or baseline comfort level  6/21/2025 0704 by Claudia Martinez RN  Outcome: Progressing

## 2025-06-22 LAB
ALBUMIN SERPL-MCNC: 3 G/DL (ref 3.5–5.2)
ALP SERPL-CCNC: 126 U/L (ref 40–129)
ALT SERPL-CCNC: 24 U/L (ref 0–50)
ANION GAP SERPL CALCULATED.3IONS-SCNC: 11 MMOL/L (ref 7–16)
AST SERPL-CCNC: 41 U/L (ref 0–50)
BASOPHILS # BLD: 0.03 K/UL (ref 0–0.2)
BASOPHILS NFR BLD: 0 % (ref 0–2)
BILIRUB SERPL-MCNC: 0.4 MG/DL (ref 0–1.2)
BUN SERPL-MCNC: 17 MG/DL (ref 8–23)
CALCIUM SERPL-MCNC: 8.8 MG/DL (ref 8.8–10.2)
CHLORIDE SERPL-SCNC: 106 MMOL/L (ref 98–107)
CO2 SERPL-SCNC: 23 MMOL/L (ref 22–29)
CREAT SERPL-MCNC: 0.9 MG/DL (ref 0.7–1.2)
EOSINOPHIL # BLD: 0.25 K/UL (ref 0.05–0.5)
EOSINOPHILS RELATIVE PERCENT: 3 % (ref 0–6)
ERYTHROCYTE [DISTWIDTH] IN BLOOD BY AUTOMATED COUNT: 17.6 % (ref 11.5–15)
GFR, ESTIMATED: >90 ML/MIN/1.73M2
GLUCOSE BLD-MCNC: 146 MG/DL (ref 74–99)
GLUCOSE BLD-MCNC: 149 MG/DL (ref 74–99)
GLUCOSE BLD-MCNC: 156 MG/DL (ref 74–99)
GLUCOSE BLD-MCNC: 164 MG/DL (ref 74–99)
GLUCOSE SERPL-MCNC: 148 MG/DL (ref 74–99)
HCT VFR BLD AUTO: 23.2 % (ref 37–54)
HGB BLD-MCNC: 7.4 G/DL (ref 12.5–16.5)
IMM GRANULOCYTES # BLD AUTO: 0.03 K/UL (ref 0–0.58)
IMM GRANULOCYTES NFR BLD: 0 % (ref 0–5)
LYMPHOCYTES NFR BLD: 1.31 K/UL (ref 1.5–4)
LYMPHOCYTES RELATIVE PERCENT: 16 % (ref 20–42)
MAGNESIUM SERPL-MCNC: 1.7 MG/DL (ref 1.6–2.4)
MCH RBC QN AUTO: 27.5 PG (ref 26–35)
MCHC RBC AUTO-ENTMCNC: 31.9 G/DL (ref 32–34.5)
MCV RBC AUTO: 86.2 FL (ref 80–99.9)
MONOCYTES NFR BLD: 0.89 K/UL (ref 0.1–0.95)
MONOCYTES NFR BLD: 11 % (ref 2–12)
NEUTROPHILS NFR BLD: 69 % (ref 43–80)
NEUTS SEG NFR BLD: 5.68 K/UL (ref 1.8–7.3)
PHOSPHATE SERPL-MCNC: 2.4 MG/DL (ref 2.5–4.5)
PLATELET # BLD AUTO: 338 K/UL (ref 130–450)
PMV BLD AUTO: 9.3 FL (ref 7–12)
POTASSIUM SERPL-SCNC: 3.4 MMOL/L (ref 3.5–5.1)
PROT SERPL-MCNC: 6.1 G/DL (ref 6.4–8.3)
RBC # BLD AUTO: 2.69 M/UL (ref 3.8–5.8)
SODIUM SERPL-SCNC: 141 MMOL/L (ref 136–145)
WBC OTHER # BLD: 8.2 K/UL (ref 4.5–11.5)

## 2025-06-22 PROCEDURE — 6370000000 HC RX 637 (ALT 250 FOR IP): Performed by: STUDENT IN AN ORGANIZED HEALTH CARE EDUCATION/TRAINING PROGRAM

## 2025-06-22 PROCEDURE — 83735 ASSAY OF MAGNESIUM: CPT

## 2025-06-22 PROCEDURE — 82962 GLUCOSE BLOOD TEST: CPT

## 2025-06-22 PROCEDURE — 94640 AIRWAY INHALATION TREATMENT: CPT

## 2025-06-22 PROCEDURE — 99232 SBSQ HOSP IP/OBS MODERATE 35: CPT | Performed by: NURSE PRACTITIONER

## 2025-06-22 PROCEDURE — 6360000002 HC RX W HCPCS: Performed by: PSYCHIATRY & NEUROLOGY

## 2025-06-22 PROCEDURE — 36415 COLL VENOUS BLD VENIPUNCTURE: CPT

## 2025-06-22 PROCEDURE — 2500000003 HC RX 250 WO HCPCS: Performed by: FAMILY MEDICINE

## 2025-06-22 PROCEDURE — 2140000000 HC CCU INTERMEDIATE R&B

## 2025-06-22 PROCEDURE — 6360000002 HC RX W HCPCS: Performed by: STUDENT IN AN ORGANIZED HEALTH CARE EDUCATION/TRAINING PROGRAM

## 2025-06-22 PROCEDURE — 84100 ASSAY OF PHOSPHORUS: CPT

## 2025-06-22 PROCEDURE — 2700000000 HC OXYGEN THERAPY PER DAY

## 2025-06-22 PROCEDURE — 80053 COMPREHEN METABOLIC PANEL: CPT

## 2025-06-22 PROCEDURE — 85025 COMPLETE CBC W/AUTO DIFF WBC: CPT

## 2025-06-22 PROCEDURE — 2580000003 HC RX 258: Performed by: STUDENT IN AN ORGANIZED HEALTH CARE EDUCATION/TRAINING PROGRAM

## 2025-06-22 PROCEDURE — 2500000003 HC RX 250 WO HCPCS: Performed by: STUDENT IN AN ORGANIZED HEALTH CARE EDUCATION/TRAINING PROGRAM

## 2025-06-22 RX ORDER — OXYCODONE HYDROCHLORIDE 5 MG/1
5 TABLET ORAL EVERY 6 HOURS PRN
Qty: 12 TABLET | Refills: 0 | Status: SHIPPED | OUTPATIENT
Start: 2025-06-22 | End: 2025-06-25

## 2025-06-22 RX ORDER — DOCUSATE SODIUM 100 MG/1
100 CAPSULE, LIQUID FILLED ORAL 2 TIMES DAILY
Qty: 60 CAPSULE | Refills: 0 | Status: SHIPPED | OUTPATIENT
Start: 2025-06-22 | End: 2025-07-22

## 2025-06-22 RX ADMIN — PETROLATUM: 420 OINTMENT TOPICAL at 08:22

## 2025-06-22 RX ADMIN — GABAPENTIN 600 MG: 300 CAPSULE ORAL at 12:25

## 2025-06-22 RX ADMIN — PETROLATUM: 420 OINTMENT TOPICAL at 22:34

## 2025-06-22 RX ADMIN — DIVALPROEX SODIUM 250 MG: 250 TABLET, EXTENDED RELEASE ORAL at 22:34

## 2025-06-22 RX ADMIN — MICONAZOLE NITRATE: 20 POWDER TOPICAL at 22:34

## 2025-06-22 RX ADMIN — ARFORMOTEROL TARTRATE 15 MCG: 15 SOLUTION RESPIRATORY (INHALATION) at 20:03

## 2025-06-22 RX ADMIN — ATORVASTATIN CALCIUM 40 MG: 40 TABLET, FILM COATED ORAL at 22:34

## 2025-06-22 RX ADMIN — BUDESONIDE 500 MCG: 0.5 SUSPENSION RESPIRATORY (INHALATION) at 20:03

## 2025-06-22 RX ADMIN — MEMANTINE 5 MG: 5 TABLET ORAL at 22:34

## 2025-06-22 RX ADMIN — SODIUM CHLORIDE, PRESERVATIVE FREE 10 ML: 5 INJECTION INTRAVENOUS at 22:35

## 2025-06-22 RX ADMIN — METOPROLOL TARTRATE 100 MG: 50 TABLET, FILM COATED ORAL at 08:17

## 2025-06-22 RX ADMIN — GABAPENTIN 600 MG: 300 CAPSULE ORAL at 22:34

## 2025-06-22 RX ADMIN — METOPROLOL TARTRATE 100 MG: 50 TABLET, FILM COATED ORAL at 22:34

## 2025-06-22 RX ADMIN — METOROPROLOL TARTRATE 5 MG: 5 INJECTION, SOLUTION INTRAVENOUS at 04:14

## 2025-06-22 RX ADMIN — DIVALPROEX SODIUM 250 MG: 250 TABLET, EXTENDED RELEASE ORAL at 08:17

## 2025-06-22 RX ADMIN — PIPERACILLIN AND TAZOBACTAM 3375 MG: 3; .375 INJECTION, POWDER, LYOPHILIZED, FOR SOLUTION INTRAVENOUS at 23:49

## 2025-06-22 RX ADMIN — GABAPENTIN 600 MG: 300 CAPSULE ORAL at 08:17

## 2025-06-22 RX ADMIN — MICONAZOLE NITRATE: 20 POWDER TOPICAL at 08:22

## 2025-06-22 RX ADMIN — PIPERACILLIN AND TAZOBACTAM 3375 MG: 3; .375 INJECTION, POWDER, LYOPHILIZED, FOR SOLUTION INTRAVENOUS at 08:26

## 2025-06-22 RX ADMIN — SODIUM CHLORIDE, PRESERVATIVE FREE 10 ML: 5 INJECTION INTRAVENOUS at 08:27

## 2025-06-22 RX ADMIN — PIPERACILLIN AND TAZOBACTAM 3375 MG: 3; .375 INJECTION, POWDER, LYOPHILIZED, FOR SOLUTION INTRAVENOUS at 00:37

## 2025-06-22 RX ADMIN — LEVETIRACETAM 1000 MG: 100 INJECTION INTRAVENOUS at 12:25

## 2025-06-22 RX ADMIN — BUDESONIDE 500 MCG: 0.5 SUSPENSION RESPIRATORY (INHALATION) at 06:59

## 2025-06-22 RX ADMIN — PIPERACILLIN AND TAZOBACTAM 3375 MG: 3; .375 INJECTION, POWDER, LYOPHILIZED, FOR SOLUTION INTRAVENOUS at 17:03

## 2025-06-22 RX ADMIN — LEVETIRACETAM 1000 MG: 100 INJECTION INTRAVENOUS at 23:48

## 2025-06-22 RX ADMIN — LEVETIRACETAM 1000 MG: 100 INJECTION INTRAVENOUS at 00:36

## 2025-06-22 RX ADMIN — ARFORMOTEROL TARTRATE 15 MCG: 15 SOLUTION RESPIRATORY (INHALATION) at 06:59

## 2025-06-22 NOTE — PLAN OF CARE
Problem: Chronic Conditions and Co-morbidities  Goal: Patient's chronic conditions and co-morbidity symptoms are monitored and maintained or improved  6/21/2025 2142 by Juice Mcdermott RN  Outcome: Progressing     Problem: Discharge Planning  Goal: Discharge to home or other facility with appropriate resources  6/21/2025 2142 by Juice Mcdermott RN  Outcome: Progressing     Problem: Safety - Adult  Goal: Free from fall injury  Outcome: Progressing     Problem: Skin/Tissue Integrity  Goal: Skin integrity remains intact  Description: 1.  Monitor for areas of redness and/or skin breakdown  2.  Assess vascular access sites hourly  3.  Every 4-6 hours minimum:  Change oxygen saturation probe site  4.  Every 4-6 hours:  If on nasal continuous positive airway pressure, respiratory therapy assess nares and determine need for appliance change or resting period  Outcome: Progressing     Problem: ABCDS Injury Assessment  Goal: Absence of physical injury  Outcome: Progressing     Problem: Nutrition Deficit:  Goal: Optimize nutritional status  Outcome: Progressing     Problem: Pain  Goal: Verbalizes/displays adequate comfort level or baseline comfort level  Outcome: Progressing

## 2025-06-22 NOTE — PLAN OF CARE
Problem: Chronic Conditions and Co-morbidities  Goal: Patient's chronic conditions and co-morbidity symptoms are monitored and maintained or improved  6/22/2025 0843 by Claudia Martinez RN  Outcome: Progressing     Problem: Discharge Planning  Goal: Discharge to home or other facility with appropriate resources  6/22/2025 0843 by Claudia Martinez RN  Outcome: Progressing     Problem: Safety - Adult  Goal: Free from fall injury  6/22/2025 0843 by Claudia Martinez RN  Outcome: Progressing     Problem: Skin/Tissue Integrity  Goal: Skin integrity remains intact  Description: 1.  Monitor for areas of redness and/or skin breakdown  2.  Assess vascular access sites hourly  3.  Every 4-6 hours minimum:  Change oxygen saturation probe site  4.  Every 4-6 hours:  If on nasal continuous positive airway pressure, respiratory therapy assess nares and determine need for appliance change or resting period  6/22/2025 0843 by Claudia Martinez RN  Outcome: Progressing     Problem: ABCDS Injury Assessment  Goal: Absence of physical injury  6/22/2025 0843 by Claudia Martinez RN  Outcome: Progressing     Problem: Nutrition Deficit:  Goal: Optimize nutritional status  6/22/2025 0843 by Claudia Martinez RN  Outcome: Progressing     Problem: Pain  Goal: Verbalizes/displays adequate comfort level or baseline comfort level  6/22/2025 0843 by Claudia Martinez RN  Outcome: Progressing

## 2025-06-22 NOTE — PLAN OF CARE
Problem: Chronic Conditions and Co-morbidities  Goal: Patient's chronic conditions and co-morbidity symptoms are monitored and maintained or improved  6/21/2025 2039 by Scarlett Moreno, RN  Outcome: Progressing     Problem: Discharge Planning  Goal: Discharge to home or other facility with appropriate resources  6/21/2025 2039 by Scarlett Moreno, RN  Outcome: Progressing

## 2025-06-23 VITALS
HEART RATE: 78 BPM | DIASTOLIC BLOOD PRESSURE: 78 MMHG | OXYGEN SATURATION: 94 % | TEMPERATURE: 97.1 F | SYSTOLIC BLOOD PRESSURE: 165 MMHG | RESPIRATION RATE: 12 BRPM

## 2025-06-23 LAB
GLUCOSE BLD-MCNC: 159 MG/DL (ref 74–99)
GLUCOSE BLD-MCNC: 161 MG/DL (ref 74–99)
GLUCOSE BLD-MCNC: 169 MG/DL (ref 74–99)

## 2025-06-23 PROCEDURE — 6370000000 HC RX 637 (ALT 250 FOR IP): Performed by: STUDENT IN AN ORGANIZED HEALTH CARE EDUCATION/TRAINING PROGRAM

## 2025-06-23 PROCEDURE — 2580000003 HC RX 258: Performed by: STUDENT IN AN ORGANIZED HEALTH CARE EDUCATION/TRAINING PROGRAM

## 2025-06-23 PROCEDURE — 99232 SBSQ HOSP IP/OBS MODERATE 35: CPT | Performed by: PHYSICIAN ASSISTANT

## 2025-06-23 PROCEDURE — 2700000000 HC OXYGEN THERAPY PER DAY

## 2025-06-23 PROCEDURE — 82962 GLUCOSE BLOOD TEST: CPT

## 2025-06-23 PROCEDURE — 2500000003 HC RX 250 WO HCPCS: Performed by: STUDENT IN AN ORGANIZED HEALTH CARE EDUCATION/TRAINING PROGRAM

## 2025-06-23 PROCEDURE — 6360000002 HC RX W HCPCS: Performed by: STUDENT IN AN ORGANIZED HEALTH CARE EDUCATION/TRAINING PROGRAM

## 2025-06-23 PROCEDURE — 6360000002 HC RX W HCPCS: Performed by: PSYCHIATRY & NEUROLOGY

## 2025-06-23 RX ORDER — DIVALPROEX SODIUM 250 MG/1
250 TABLET, FILM COATED, EXTENDED RELEASE ORAL 2 TIMES DAILY
Qty: 30 TABLET | Refills: 3 | Status: SHIPPED | OUTPATIENT
Start: 2025-06-23

## 2025-06-23 RX ORDER — LEVETIRACETAM 500 MG/1
1000 TABLET ORAL 2 TIMES DAILY
Qty: 120 TABLET | Refills: 3 | Status: SHIPPED | OUTPATIENT
Start: 2025-06-23

## 2025-06-23 RX ADMIN — PETROLATUM: 420 OINTMENT TOPICAL at 08:07

## 2025-06-23 RX ADMIN — FENOFIBRATE 54 MG: 54 TABLET ORAL at 08:03

## 2025-06-23 RX ADMIN — DIVALPROEX SODIUM 250 MG: 250 TABLET, EXTENDED RELEASE ORAL at 08:03

## 2025-06-23 RX ADMIN — DULOXETINE 60 MG: 30 CAPSULE, DELAYED RELEASE ORAL at 08:02

## 2025-06-23 RX ADMIN — MICONAZOLE NITRATE: 20 POWDER TOPICAL at 08:07

## 2025-06-23 RX ADMIN — CETIRIZINE HYDROCHLORIDE 10 MG: 10 TABLET, FILM COATED ORAL at 08:01

## 2025-06-23 RX ADMIN — LOSARTAN POTASSIUM 100 MG: 50 TABLET, FILM COATED ORAL at 08:01

## 2025-06-23 RX ADMIN — GABAPENTIN 600 MG: 300 CAPSULE ORAL at 12:44

## 2025-06-23 RX ADMIN — SODIUM CHLORIDE, PRESERVATIVE FREE 10 ML: 5 INJECTION INTRAVENOUS at 08:05

## 2025-06-23 RX ADMIN — PIPERACILLIN AND TAZOBACTAM 3375 MG: 3; .375 INJECTION, POWDER, LYOPHILIZED, FOR SOLUTION INTRAVENOUS at 08:12

## 2025-06-23 RX ADMIN — PANTOPRAZOLE SODIUM 40 MG: 40 TABLET, DELAYED RELEASE ORAL at 08:03

## 2025-06-23 RX ADMIN — GABAPENTIN 600 MG: 300 CAPSULE ORAL at 17:45

## 2025-06-23 RX ADMIN — HYDROCHLOROTHIAZIDE 25 MG: 25 TABLET ORAL at 08:00

## 2025-06-23 RX ADMIN — METOPROLOL TARTRATE 100 MG: 50 TABLET, FILM COATED ORAL at 08:02

## 2025-06-23 RX ADMIN — LEVETIRACETAM 1000 MG: 100 INJECTION INTRAVENOUS at 12:43

## 2025-06-23 RX ADMIN — TAMSULOSIN HYDROCHLORIDE 0.8 MG: 0.4 CAPSULE ORAL at 08:03

## 2025-06-23 RX ADMIN — Medication 2000 UNITS: at 08:01

## 2025-06-23 RX ADMIN — MEMANTINE 5 MG: 5 TABLET ORAL at 08:02

## 2025-06-23 RX ADMIN — GABAPENTIN 600 MG: 300 CAPSULE ORAL at 08:07

## 2025-06-23 ASSESSMENT — PAIN SCALES - GENERAL: PAINLEVEL_OUTOF10: 0

## 2025-06-23 ASSESSMENT — PAIN SCALES - WONG BAKER: WONGBAKER_NUMERICALRESPONSE: NO HURT

## 2025-06-23 NOTE — CARE COORDINATION
6/23/25  Transition of Care of Care update. Discharge anticipated for today per nursing. Transport set for 5pm for return to [facility] with [facility]. Update to nursing, patient, facility liaison, and patients wife. No pre-cert or PASRR required to return to [facility]. PERFECTO, destiantion complete. Transport complete and on the soft chart. JADIEL/CONNIE to follow.    Electronically signed by ELFEGO Stone on 6/23/2025 at 2:55 PM

## 2025-06-23 NOTE — PLAN OF CARE
Problem: Chronic Conditions and Co-morbidities  Goal: Patient's chronic conditions and co-morbidity symptoms are monitored and maintained or improved  6/23/2025 1214 by Abida Sherman RN  Outcome: Progressing  6/23/2025 0225 by Juice Mcdermott RN  Outcome: Progressing     Problem: Discharge Planning  Goal: Discharge to home or other facility with appropriate resources  6/23/2025 1214 by Abida Sherman RN  Outcome: Progressing  6/23/2025 0225 by Juice Mcdermott RN  Outcome: Progressing     Problem: Safety - Adult  Goal: Free from fall injury  6/23/2025 1214 by Abida Sherman RN  Outcome: Progressing  6/23/2025 0225 by Juice Mcdermott RN  Outcome: Progressing     Problem: Skin/Tissue Integrity  Goal: Skin integrity remains intact  Description: 1.  Monitor for areas of redness and/or skin breakdown  2.  Assess vascular access sites hourly  3.  Every 4-6 hours minimum:  Change oxygen saturation probe site  4.  Every 4-6 hours:  If on nasal continuous positive airway pressure, respiratory therapy assess nares and determine need for appliance change or resting period  6/23/2025 1214 by Abida Sherman RN  Outcome: Progressing  6/23/2025 0225 by Juice Mcdermott RN  Outcome: Progressing     Problem: ABCDS Injury Assessment  Goal: Absence of physical injury  6/23/2025 1214 by Abida Sherman RN  Outcome: Progressing  6/23/2025 0225 by Juice Mcdermott RN  Outcome: Progressing     Problem: Nutrition Deficit:  Goal: Optimize nutritional status  Outcome: Progressing     Problem: Pain  Goal: Verbalizes/displays adequate comfort level or baseline comfort level  6/23/2025 1214 by Abida Sherman RN  Outcome: Progressing  6/23/2025 0225 by Juice Mcdermott RN  Outcome: Progressing

## 2025-06-24 ENCOUNTER — TELEPHONE (OUTPATIENT)
Age: 75
End: 2025-06-24

## 2025-06-24 NOTE — TELEPHONE ENCOUNTER
Neil from [facility] called and had to reschedule patients appt due to them not doing any transport after 12:00pm.  I rescheduled patient 7/9/25 at 11:00am at the [facility] clinic at [facility].  She confirmed this appt.    Electronically signed by Zeinab Sanz RN on 6/24/2025 at 10:39 AM

## 2025-06-26 LAB — SURGICAL PATHOLOGY REPORT: NORMAL

## 2025-06-26 NOTE — PROGRESS NOTES
[facility]         Subjective:    Patient is lying in bed following commands.  Patient is alert and he answer questions appropriately.  No acute events overnight.    Objective:    BP (!) 154/65   Pulse 69   Temp 98 °F (36.7 °C) (Temporal)   Resp 18   SpO2 96%     In: 369.8 [P.O.:150]  Out: 1050   In: 369.8   Out: 1050 [Urine:1050]    General appearance: NAD, somewhat somnolent pleasant, resting comfortably  HEENT: AT/NC, MMM  Neck: FROM, supple  Lungs: Clear to auscultation  CV: RRR, no MRGs  Vasc: Radial pulses 2+  Abdomen: Benign abdomen, some redness henok-incisional he-surgical team aware, watch for fevers, leukocytosis  Extremities: No peripheral edema or digital cyanosis  Skin: no rash, lesions or ulcers  Psych: Alert and oriented to person, place and time  Neuro: Alert and interactive. B/l tremor reduced strength in left upper extremity     Recent Labs     06/20/25  1742 06/21/25  0510 06/22/25  0520   WBC 11.0 10.4 8.2   HGB 8.2* 7.5* 7.4*   HCT 27.3* 23.9* 23.2*    354 338       Recent Labs     06/20/25  1742 06/21/25  0510 06/21/25  0602 06/22/25  0520    140  --  141   K 3.9 3.7 3.60 3.4*    107  --  106   CO2 20* 23  --  23   BUN 18 23  --  17   CREATININE 1.0 1.0  --  0.9   CALCIUM 8.9 8.8  --  8.8       DATA:           CTA TRIPHASIC LIVER  Result Date: 6/14/2025  1. Mild enlargement of the liver, which otherwise demonstrates normal CT appearance. 2. Cholelithiasis with persistent findings suggesting cholecystitis at least in the fundus. 3. Unchanged plastic biliary drain in expected position with no intrahepatic nor extrahepatic biliary dilation. 4. Cardiovascular findings that can be seen with portal hypertension. 5. Incidental findings as above for which no dedicated follow-up is recommended.     US ABDOMEN LIMITED  Result Date: 6/13/2025  Hepatomegaly at 18.9 cm with heterogeneous liver parenchyma. Cholelithiasis with gallbladder wall thickening 
    [facility]         Subjective:    Patient i awake and alert and answering questions appropriately.  No acute events overnight.  Patient denies any chest pain.  Patient currently on 2 L.    Objective:    BP (!) 161/70   Pulse 76   Temp 97.1 °F (36.2 °C) (Temporal)   Resp 18   SpO2 96%     In: 220 [P.O.:120]  Out: 900   In: 220   Out: 900 [Urine:900]    General appearance: NAD, somewhat somnolent pleasant, resting comfortably  HEENT: AT/NC, MMM  Neck: FROM, supple  Lungs: Clear to auscultation  CV: RRR, no MRGs  Vasc: Radial pulses 2+  Abdomen: Benign abdomen, some redness henok-incisional he-surgical team aware, watch for fevers, leukocytosis  Extremities: No peripheral edema or digital cyanosis  Skin: no rash, lesions or ulcers  Psych: Alert and oriented to person, place and time  Neuro: Alert and interactive. B/l tremor reduced strength in left upper extremity     Recent Labs     06/20/25  1742 06/21/25  0510 06/22/25  0520   WBC 11.0 10.4 8.2   HGB 8.2* 7.5* 7.4*   HCT 27.3* 23.9* 23.2*    354 338       Recent Labs     06/20/25  1742 06/21/25  0510 06/21/25  0602 06/22/25  0520    140  --  141   K 3.9 3.7 3.60 3.4*    107  --  106   CO2 20* 23  --  23   BUN 18 23  --  17   CREATININE 1.0 1.0  --  0.9   CALCIUM 8.9 8.8  --  8.8       DATA:           CTA TRIPHASIC LIVER  Result Date: 6/14/2025  1. Mild enlargement of the liver, which otherwise demonstrates normal CT appearance. 2. Cholelithiasis with persistent findings suggesting cholecystitis at least in the fundus. 3. Unchanged plastic biliary drain in expected position with no intrahepatic nor extrahepatic biliary dilation. 4. Cardiovascular findings that can be seen with portal hypertension. 5. Incidental findings as above for which no dedicated follow-up is recommended.     US ABDOMEN LIMITED  Result Date: 6/13/2025  Hepatomegaly at 18.9 cm with heterogeneous liver parenchyma. Cholelithiasis with gallbladder wall 
    [facility]         Subjective:  Resting comfortably in no acute distress  Does not answer as many questions today not as interactive    Objective:    BP (!) 137/59   Pulse 79   Temp 98.4 °F (36.9 °C) (Temporal)   Resp 20   SpO2 94%     In: 150 [I.V.:150]  Out: 1825   In: 150   Out: 1825 [Urine:1800]    General appearance: NAD, somewhat somnolent pleasant, resting comfortably  HEENT: AT/NC, MMM  Neck: FROM, supple  Lungs: Clear to auscultation  CV: RRR, no MRGs  Vasc: Radial pulses 2+  Abdomen: Benign abdomen, some redness henok-incisional he-surgical team aware, watch for fevers, leukocytosis  Extremities: No peripheral edema or digital cyanosis  Skin: no rash, lesions or ulcers  Psych: Alert and oriented to person, place and time  Neuro: Alert and interactive. B/l tremor reduced strength in left upper extremity     Recent Labs     06/18/25  0606 06/19/25  0602   WBC 5.5 5.4   HGB 7.4* 7.9*   HCT 23.7* 26.0*    354       Recent Labs     06/18/25  0606 06/19/25  0602    145   K 3.4* 3.6   * 113*   CO2 23 22   BUN 14 11   CREATININE 0.8 0.9   CALCIUM 8.4* 8.5*       DATA:           CTA TRIPHASIC LIVER  Result Date: 6/14/2025  1. Mild enlargement of the liver, which otherwise demonstrates normal CT appearance. 2. Cholelithiasis with persistent findings suggesting cholecystitis at least in the fundus. 3. Unchanged plastic biliary drain in expected position with no intrahepatic nor extrahepatic biliary dilation. 4. Cardiovascular findings that can be seen with portal hypertension. 5. Incidental findings as above for which no dedicated follow-up is recommended.     US ABDOMEN LIMITED  Result Date: 6/13/2025  Hepatomegaly at 18.9 cm with heterogeneous liver parenchyma. Cholelithiasis with gallbladder wall thickening pericholecystic fluid. Common bile duct at the upper limits of normal.     CT ABDOMEN PELVIS W IV CONTRAST Additional Contrast? None  Result Date: 6/13/2025  1. 
    [facility]         Subjective:    Patient is lying in bed difficult to arouse.  Patient had RRT this a.m. patient remained on unit patient is currently on 6 L O2    Objective:    BP (!) 131/51   Pulse 74   Temp 98.2 °F (36.8 °C) (Temporal)   Resp 18   SpO2 93%     In: 211   Out: 650   In: 211   Out: 650 [Urine:650]    General appearance: NAD, somewhat somnolent pleasant, resting comfortably  HEENT: AT/NC, MMM  Neck: FROM, supple  Lungs: Clear to auscultation  CV: RRR, no MRGs  Vasc: Radial pulses 2+  Abdomen: Benign abdomen, some redness henok-incisional he-surgical team aware, watch for fevers, leukocytosis  Extremities: No peripheral edema or digital cyanosis  Skin: no rash, lesions or ulcers  Psych: Alert and oriented to person, place and time  Neuro: Alert and interactive. B/l tremor reduced strength in left upper extremity     Recent Labs     06/19/25  0602 06/20/25  1742 06/21/25  0510   WBC 5.4 11.0 10.4   HGB 7.9* 8.2* 7.5*   HCT 26.0* 27.3* 23.9*    381 354       Recent Labs     06/19/25  0602 06/20/25  1742 06/21/25  0510 06/21/25  0602    141 140  --    K 3.6 3.9 3.7 3.60   * 106 107  --    CO2 22 20* 23  --    BUN 11 18 23  --    CREATININE 0.9 1.0 1.0  --    CALCIUM 8.5* 8.9 8.8  --        DATA:           CTA TRIPHASIC LIVER  Result Date: 6/14/2025  1. Mild enlargement of the liver, which otherwise demonstrates normal CT appearance. 2. Cholelithiasis with persistent findings suggesting cholecystitis at least in the fundus. 3. Unchanged plastic biliary drain in expected position with no intrahepatic nor extrahepatic biliary dilation. 4. Cardiovascular findings that can be seen with portal hypertension. 5. Incidental findings as above for which no dedicated follow-up is recommended.     US ABDOMEN LIMITED  Result Date: 6/13/2025  Hepatomegaly at 18.9 cm with heterogeneous liver parenchyma. Cholelithiasis with gallbladder wall thickening pericholecystic fluid. 
  [facility] Quality Flow/Interdisciplinary Rounds Progress Note        Quality Flow Rounds held on June 21, 2025    Disciplines Attending:  Bedside Nurse, , , and Nursing Unit Leadership    Stewart Mark was admitted on 6/16/2025 10:50 PM    Anticipated Discharge Date:       Disposition:    Bienvenido Score:  Bienvenido Scale Score: 14    [facility] RISK OF UNPLANNED READMISSION 2.0             23.1 Total Score        Discussed patient goal for the day, patient clinical progression, and barriers to discharge.  The following Goal(s) of the Day/Commitment(s) have been identified:  Report labs/ diagnostics       Ibrahima Jim RN  June 21, 2025        
  Physician Progress Note      PATIENT:               MARIANNA PLATA  CSN #:                  [id number]  :                       1950  ADMIT DATE:       2025 10:50 PM  DISCH DATE:        2025 7:40 PM  RESPONDING  PROVIDER #:        Roz Gamez          QUERY TEXT:    Acute respiratory failure is documented in the medical record Neurology   Consult  Dr. Cadena. Please provide additional clinical indicators   supportive of your documentation. Or please document if the diagnosis of acute   respiratory failure has been ruled out after study.    The clinical indicators include:  Per Neurology Consult  Dr. Cadena:  - Patient originally seen at [facility] after staff at the patient NH noticed   hypoxia with altered mentation  - At the hospital an RRT was called  with suspected seizure activity.  - Acute respiratory failure  - Seizure like activity  - Acute encephalopathy  - Lungs: clear to auscultation  bilaterally; normal respiratory effort  -                     BP Temp      Pulse Resp SpO2  25 1530 118/79 97.6 ?F 84 18 94 %  25 1057 149/73 97.8 ?F  63 18 96 %  25 0711 129/69 96.9 ?F 63 16 95 %  25 0319 (!) 152/72 98.1 ?F  62 ? 95 %  25 2253 129/71 98.1 ?F 63 20 93 %    Per H&P  Dr. Kaufman:  - NAD, conversant, pleasant, resting comfortably  - Lungs: Clear to auscultation  Options provided:  -- Acute Respiratory Failure ruled out after study  -- Acute Respiratory Failure as evidenced by, Please document evidence.  -- Other - I will add my own diagnosis  -- Disagree - Not applicable / Not valid  -- Disagree - Clinically unable to determine / Unknown  -- Refer to Clinical Documentation Reviewer    PROVIDER RESPONSE TEXT:    This patient is in acute respiratory failure as evidenced by hypoxia    Query created by: Odessa Fan on 2025 11:03 AM      Electronically signed by:  Roz Gamez 2025 11:11 AM          
  Physician Progress Note      PATIENT:               MARIANNA PLATA  CSN #:                  [id number]  :                       1950  ADMIT DATE:       2025 10:50 PM  DISCH DATE:        2025 7:40 PM  RESPONDING  PROVIDER #:        Roz Gamez          QUERY TEXT:    Encephalopathy is documented in the medical record Neurology Consult Dr Bridger Cadena .  Please specify type or if encephalopathy was ruled out:    The clinical indicators include:  Per Neuro consult  Dr. Bridger Cadena:  - NH noticed hypoxia with altered mentation. At the hospital an RRT was called    with suspected seizure activity.  - Patient was transferred to [facility] due to concerns for status epilepticus in   patient who was poorly responsive after suspected seizure activity.  - Report of forced right  head deviation associated with tonic movement of   both arm and non verabal  - Patient symptoms improved when he was more awake and tracking, but he   remained nonverbal and not following commands and directions.  - Consider for trial antiseizure medication despite lack of clinical evidence   of seizure activity.    Per Neurology Progress Note  Roz Gamez CRNP:  - MRI of the brain on  that showed nothing acute  -  Mental status has fluctuated  -  At the nursing home he is on Depakote 500 mg twice daily presumably for   mood.  - He is lying in bed and somnolent but will follow a few simple commands and   will intermittently answer questions.  - : RRT for altered mental status and questionable seizure activity.  - Unable to complete the review of systems as he is not speaking for me  - Altered mental status with seizure-like activity: In the setting of   respiratory failure, chronic cholecystitis, GIUSEPPE, and dementia.  - No evidence of acute abnormalities on MRI of the brain, but new onset   seizure cannot be ruled out and with his dementia and history of stroke, he is   at risk.  - Mental status has improved 
  [facility] Quality Flow/Interdisciplinary Rounds Progress Note        Quality Flow Rounds held on June 23, 2025    Disciplines Attending:  Bedside Nurse, , , and Nursing Unit Leadership    Stewart Mark was admitted on 6/16/2025 10:50 PM    Anticipated Discharge Date:       Disposition:    Bienvenido Score:  Bienvenido Scale Score: 15    [facility] RISK OF UNPLANNED READMISSION 2.0             23 Total Score        Discussed patient goal for the day, patient clinical progression, and barriers to discharge.  The following Goal(s) of the Day/Commitment(s) have been identified:  Diagnostics - Report Results and Labs - Report Results      Cleopatra Rodriguez RN  June 23, 2025        
4 Eyes Skin Assessment     NAME:  Stewart Mark  YOB: 1950  MEDICAL RECORD NUMBER:  [id number]    The patient is being assessed for  Admission    I agree that at least one RN has performed a thorough Head to Toe Skin Assessment on the patient. ALL assessment sites listed below have been assessed.      Areas assessed by both nurses:    Head, Face, Ears, Shoulders, Back, Chest, Arms, Elbows, Hands, Sacrum. Buttock, Coccyx, Ischium, Legs. Feet and Heels, and Under Medical Devices         Does the Patient have a Wound? Yes wound(s) were present on assessment. LDA wound assessment was Initiated and completed by RN       Bienvenido Prevention initiated by RN: Yes  Wound Care Orders initiated by RN: Yes    Pressure Injury (Stage 3,4, Unstageable, DTI, NWPT, and Complex wounds) if present, place Wound referral order by RN under : Yes    New Ostomies, if present place, Ostomy referral order under : No     Nurse 1 eSignature: Electronically signed by Sarah Atkinson RN on 6/17/25 at 1:46 AM EDT    **SHARE this note so that the co-signing nurse can place an eSignature**    Nurse 2 eSignature: Electronically signed by Catina Cardona RN on 6/17/25 at 1:46 AM EDT   
B SURGERY  DAILY PROGRESS NOTE  6/22/2025    CHIEF COMPLAINT:  Abdominal pain    SUBJECTIVE:  Feeling ok, still somnolent. No nausea, vomiting or pain.     OBJECTIVE:  BP (!) 159/72   Pulse 82   Temp 97.2 °F (36.2 °C) (Temporal)   Resp 20   SpO2 94%     GENERAL:  NAD. Somnolent.  HEENT: normocephalic  LUNGS:  on 4L NC. No acute respiratory distress  CARDIOVASCULAR: hemodynamically stable  SKIN: warm and dry  ABDOMEN:  Soft, non-distended, incisions C/D/I. No guarding, rigidity, rebound.  EXT: ROM intact all 4 extremities, no obvious deformities    CBC  Recent Labs     06/21/25  0510 06/22/25  0520   WBC 10.4 8.2   HGB 7.5* 7.4*   HCT 23.9* 23.2*   MCV 86.9 86.2    338   MPV 9.3 9.3       CMP  Recent Labs     06/21/25  0510 06/21/25  0602 06/22/25  0520     --  141   K 3.7 3.60 3.4*     --  106   CO2 23  --  23   BUN 23  --  17   CREATININE 1.0  --  0.9   GLUCOSE 172*  --  148*   CALCIUM 8.8  --  8.8   BILITOT 0.3  --  0.4   ALKPHOS 122  --  126   AST 46  --  41   ALT 34  --  24         ASSESSMENT/PLAN:  74 y.o. male with chronic valeriano s/p Robo valeriano 6/19    Plan  -ok for dc  -dc med rec reconcilled  -ok for diet  -please recall with questions     Kevin Cr MD  Surgery Resident PGY-4  6/22/2025  7:30 AM     Attending Physician Statement:    Chief Complaint: Chronic cholecystitis    I have examined the patient and performed the key aspects of physical exam, reviewed the record (including all pertinent and new radiology images and laboratory findings), and discussed the case with the surgical team.  I agree with the assessment and plan with the following additions, corrections, and changes. 14pt review of symptoms completed and negative except as mentioned.    No issues from a gallbladder perspective.  More awake today but still refusing to participate.  Denies pain.  [facility] surgery will sign off at this point.  Please call if any questions or concerns.    Barb Wilson MD  06/22/25  12:53 
Called [facility] and spoke to OSCAR Wolfe, Nurse to nurse report given.   
Called patient's wife, Fariba, about RRT and initial results of CT scan and Xray.    Khai Brizuela RN    
Comprehensive Nutrition Assessment    Type and Reason for Visit:  Initial, Consult, Wound    Nutrition Recommendations/Plan:   Continue NPO, ADAT once med appropriate.    Will Start ONS once diet adv and monitor.      Malnutrition Assessment:  Malnutrition Status:  At risk for malnutrition (06/19/25 1529)    Context:  Acute Illness     Findings of the 6 clinical characteristics of malnutrition:  Energy Intake:  Mild decrease in energy intake (since adm)  Weight Loss:  Unable to assess (2/2 possible fluid shifts)     Body Fat Loss:  Unable to assess (2/2 EPIFANIO for OR at this time)     Muscle Mass Loss:  Unable to assess    Fluid Accumulation:  Unable to assess (2/2 multifactorial at this time)     Strength:  Not Performed    Nutrition Assessment:    Pt initially adm from SNF to [facility] d/t hypoxia, weakness, GIUSEPPE, GLF and self-removed PCT ~2d pta, s/p SEY tx for neuro eval d/t noted RRT for Sz followed by repeat RRT for unresponsiveness/AMS 6/16. PMHx JOSÉ MIGUEL/asthma, DM, CAD, HTN, HLD, CVA, dementia, cholangitis/obstructive jaundice s/p ERCP/stent/PCT (2/2025). Adm w/ choledocholithiasis/cholecystitis, hypoxia and Sz; pt currently EPIFANIO for planned robotic valeriano w/ possible CBDE/biliary recon today. At risk d/t poor appetite/intake w/ increased needs for healing of multiple wounds. Will Start ONS once diet advanced and monitor.    Nutrition Related Findings:    A&Ox3, confused at times, poor dentition, Abd/BS WDL, +2 edema, I/O's WNL, hypocalcemia, elevated Cl/BGL Wound Type: Multiple, Pressure Injury, Surgical Incision, Wound Consult Pending       Current Nutrition Intake & Therapies:    Average Meal Intake: NPO, 26-50%  Average Supplements Intake: NPO  Diet NPO Exceptions are: Sips of Water with Meds    Anthropometric Measures:  Height:    Ideal Body Weight (IBW):   ( )    Admission Body Weight: 125.2 kg (276 lb) (actual 5/18 recent measured wt used d/t lack of measured wt's since adm currently)  Current Body Weight: 125.2 
Consult placed again to Neurology via perfect serve.  
[facility] surgery note:     No issues overnight. No seizures last 2 days since being on kreppra. Plan for OR today for robotic cholecystectomy with possible CBDE possible biliary recon. Risks and benefits discussed with the patient and his wife in detail and they agree to proceed.     Barb Wilson MD    
Hepatobiliary and Pancreatic Surgery Progress Note    CC: chronic cholecystitis, choledocholithiasis    Subjective: Patient awake, eating breakfast, appetite good. Denies abdominal pain, nausea, or vomiting. Afebrile. Asking when he will have surgery, notified not planned today in lieu of seizures, he agrees.     OBJECTIVE      Physical    /69   Pulse 63   Temp 96.9 °F (36.1 °C) (Temporal)   Resp 16   SpO2 95%       General appearance: appears in no acute distress, awake, sitting up in bed eating breakfast, ate 99% of tray, Oriented to person and place but not time  Lungs:respiratory effort normal without accessory numbers  Heart: no pedal edema  Abdomen: soft, nondistended, nontender, nontympanic, no guarding, no peritoneal signs, normoactive bowel sounds  Extremities: ROM diminished     ASSESSMENT: 75 yo M with chronic cholecystitis with choledocholithiasis s/p multiple Percutaneous cholecystostomy tube placements and ERCPs with biliary stent.    PLAN:    - Recommendation is for robotic cholecystectomy with possible biliary reconstruction if Mirizzi syndrome is identified intra-op.   - Patient is now more alert and oriented.    - Was cleared by medicine for surgery Thursday.  Was low on his Depakote levels and this was resumed.      Thank you for the consultation and allowing me to take part in Mr. Mark's care.    Greater than or equal to 35 minutes was spent providing face-to-face patient care, including:  and coordinating care, reviewing the chart, labs, and diagnostics, as well as medical decision making. Greater than 50% of this time was spent instructing and counseling the patient face to face regarding findings and recommendations.    Case discussed and plan developed with Dr. Katie Ingram, RAÚL - CNP   6/17/2025 8:47 AM     Attending Physician Statement:    Chief Complaint: Chronic cholecystitis    I have examined the patient and performed the key aspects of physical exam, 
Hepatobiliary and Pancreatic Surgery Progress Note    CC: chronic cholecystitis, choledocholithiasis    Subjective: Patient awake, tolerating diet. Denies abdominal pain, nausea, or vomiting. Afebrile.      OBJECTIVE      Physical    /66   Pulse 62   Temp 97.8 °F (36.6 °C) (Temporal)   Resp 20   SpO2 92%       General appearance: appears in no acute distress, awake, sitting up in bed, Oriented to person and place but not time  Lungs:respiratory effort normal without accessory numbers  Heart: no pedal edema  Abdomen: soft, nondistended, nontender, nontympanic, no guarding, no peritoneal signs, normoactive bowel sounds  Extremities: ROM diminished     ASSESSMENT: 73 yo M with chronic cholecystitis with choledocholithiasis s/p multiple Percutaneous cholecystostomy tube placements and ERCPs with biliary stent.    PLAN:    - Recommendation is for robotic cholecystectomy with possible biliary reconstruction if Mirizzi syndrome is identified intra-op.   - Patient is now more alert and oriented.    - Was cleared by medicine for surgery Thursday.  Was low on his Depakote levels and this was resumed.    - Valproic acid level subtherapeutic today at 9.    - Neurology has also been consulted.    - There was question of whether he needed an EEG.    - If can bleed cleared by neurology and anesthesia we will proceed with his surgery on Thursday otherwise Dr. Wilson's hands are tied.     Thank you for the consultation and allowing me to take part in Mr. Mark's care.    Greater than or equal to 35 minutes was spent providing face-to-face patient care, including:  and coordinating care, reviewing the chart, labs, and diagnostics, as well as medical decision making. Greater than 50% of this time was spent instructing and counseling the patient face to face regarding findings and recommendations.    Case discussed and plan developed with Dr. Katie Ingram, APRN - CNP   6/18/2025 11:06 AM 
Hepatobiliary and Pancreatic Surgery Progress Note    CC: chronic cholecystitis, choledocholithiasis    Subjective: Patient awake, tolerating diet. States he has no abd pain. Has been up ambulating, up in chair currently. Denies abdominal pain, nausea, or vomiting. Afebrile.  + flatus. - BM.     OBJECTIVE      Physical    /65   Pulse 66   Temp 97.2 °F (36.2 °C) (Temporal)   Resp 16   SpO2 93%       General appearance: appears in no acute distress, awake, sitting up in bed, Oriented to person and place but not time  Lungs:respiratory effort normal without accessory numbers  Heart: no pedal edema  Abdomen: soft, distended, lap incisions c/d/I, surgical glue intact, nontender, nontympanic, no guarding, no peritoneal signs, normoactive bowel sounds  Extremities: ROM diminished     ASSESSMENT: 73 yo M with chronic cholecystitis with choledocholithiasis s/p multiple Percutaneous cholecystostomy tube placements and ERCPs with biliary stent s/p laparoscopic robotic cholecystectomy with ICG cholangiography and intraoperative US 6/19/25.    PLAN:    - Patient is now more alert and oriented.    - Neurology has also been consulted, pt seizure meds will need managed per neurology for discharge.    -  ok to discharge back to facility from [facility] standpoint    Thank you for the consultation and allowing me to take part in Mr. Mark's care.    Greater than or equal to 35 minutes was spent providing face-to-face patient care, including:  and coordinating care, reviewing the chart, labs, and diagnostics, as well as medical decision making. Greater than 50% of this time was spent instructing and counseling the patient face to face regarding findings and recommendations.    Case discussed and plan developed with Dr. Katie Ingram, APRN - CNP   6/20/2025 9:32 AM             
Hepatobiliary and Pancreatic Surgery Progress Note    CC: chronic cholecystitis, choledocholithiasis    Subjective: Patient somnolent this morning. RRT called due to increased lethargy. ABG with some hypoxia, now on 6L NC. CT head negative. Patient minimally arousable on exam after his return from CT. Denies any abdominal pain.     OBJECTIVE    Physical    BP (!) 140/66   Pulse 72   Temp 97.9 °F (36.6 °C)   Resp 18   SpO2 94%       General appearance: appears in no acute distress, somnolent, lethargic. PERRLA  Lungs:respiratory effort normal without accessory numbers  Heart: no pedal edema  Abdomen: soft, minimally distended, lap incisions c/d/I, surgical glue intact, no grimace to palpation, no rebound or guarding  Extremities: ROM diminished     ASSESSMENT: 75 yo M with chronic cholecystitis with choledocholithiasis s/p multiple Percutaneous cholecystostomy tube placements and ERCPs with biliary stent s/p laparoscopic robotic cholecystectomy with ICG cholangiography and intraoperative US 6/19/25.    PLAN:    - Please re-consult neurology, seizure meds need managed on discharge  - there are no acute surgical needs at this time  - okay for discharge from [facility] standpoint once neurology has made their recommendations    Caitlin Donohue MD  6/21/2025 8:12 AM     Attending Physician Statement:    Chief Complaint: Chronic cholecystitis    I have examined the patient and performed the key aspects of physical exam, reviewed the record (including all pertinent and new radiology images and laboratory findings), and discussed the case with the surgical team.  I agree with the assessment and plan with the following additions, corrections, and changes. 14pt review of symptoms completed and negative except as mentioned.    Postop incisions healing well.  Had an episode of unresponsiveness yesterday RRT called CT head was negative.  Started on oxygen.  Patient wakes up and opens eyes and nods to questioning.  He has been doing 
IM progress note.  Patient off floor for cholecystectomy. See tmw.  Head MRI neg. Cont VPA. Cont keppra.  No seizures since admit.  Labs stable. Neurology consult pending.  
MRI screening needs to be completed.    Please make sure your patient can lay flat onto their back. (not kyphotic/SOB/contracted, etc) -if not patient cannot come down to MRI.    If it is stated on screening that they need medicated for claustrophobia/pain/holding still -have the doctor put med orders in.    If patient is a prisoner- check with patients security they have the correct MRI accommodations done as in correct flex/plastic cuffs. Also let MRI know on screening patient is a prisoner.    If patient is on a IV drip that they cannot come off of for MRI- please inform MRI so we can coordinate with the RN that will come down to switch to MRI safe pump.    Thank you from your MRI team.    
Message sent to neuro regarding clearance for surgery tomorrow and notified of valproic acid of 9.    Jovanna Contreras RN  
Messaged Neurology regarding follow up for original seizure consult.  
Neuro consult sent via [employer].  
Notified Dr Moore re: Patient  incision by umbilicus is red, Patient given oxycodone for pain and Ice placed on abdomen, patient is still c/o of pain. VS are stable. His scrotum is very large/swollen also.  No labs were drawn for today.   
Notified Pt spouse, Fariba : re: Patient is being discharged today to [facility]   
Notified attending that patient is refusing to wake up to take oral medications. Will re-attempt later.   
Paperwork faxed to [facility] [phone]     Cleopatra Rodriguez RN    
Patient arrived from [facility] with clothing.  
Patient being transported to surgery wife notified no acute distress noted at this time.  
Per Mitzy Son NP, transfer to [facility] cancelled and [facility] access center notified.    Jovanna Contreras RN  
Sent the following message to Ximena Tate:    Patient is becoming more difficult to arouse. He has been very tired and sleeping all night but it took multiple attempts with pain stimulus to wake him this morning. Furthermore, he mostly flinches to pain and verbalizes rarely. He did follow the command to squeeze with fingers but does not open his eyes. His pupils were reactive when we opened them manually. The notes seem to indicate that this is not entirely new behavior and it is not clear if he is purposefully non-compliant. I want to be careful, though. Is it ok if I order ABGs for him?    Khai Brizuela RN    
Spiritual Health History and Assessment/Progress Note  [facility]    Initial Encounter,  ,  ,      Name: Stewart Mark MRN: [id number]    Age: 74 y.o.     Sex: male   Language: English   Pentecostal: Restorationism   Seizures (HCC)     Date: 6/18/2025                           Spiritual Assessment began in [facility]        Referral/Consult From: Rounding   Encounter Overview/Reason: Initial Encounter  Service Provided For: Patient    Eugenie, Belief, Meaning:   Patient identifies as spiritual and is connected with a eugenie tradition or spiritual practice  Family/Friends No family/friends present      Importance and Influence:  Patient has spiritual/personal beliefs that influence decisions regarding their health  Family/Friends No family/friends present    Community:  Patient is connected with a spiritual community and feels well-supported. Support system includes: Spouse/Partner  Family/Friends No family/friends present    Assessment and Plan of Care:     Patient Interventions include: Facilitated expression of thoughts and feelings, Explored spiritual coping/struggle/distress, Engaged in theological reflection, and Affirmed coping skills/support systems  Family/Friends Interventions include: No family/friends present    Patient Plan of Care: Spiritual Care available upon further referral  Family/Friends Plan of Care: No family/friends present    Electronically signed by MURIEL COTA on 6/18/2025 at 6:26 PM   
Spoke with patient wife about procedure cancelled pending EEG to be completed at [facility], states she do not understand why they moved him if he is expected to go to [place] and not have procedure.Wife spoke with the patient to review the plan for the day patient is confused this nurse not sure if he completely understands.  
[facility]  Neuro Inpatient Follow Up        Stewart Mark is a 74 y.o. male     Neuro is following for seizures    Significant PMH: Type 2 diabetes, JOSÉ MIGUEL, obesity, HTN, HLD, left temporal stroke,CAD with stent, dementia, cervical fusion    HPI:  The patient presented to [place] ER on 6/13 from a nursing home with hypoxia, hypotension, and chronic cholecystitis.  On 6/16 he had seizure-like activity and was given Keppra and transferred here for neurologic consultation    He had an MRI of the brain on 6/18 that showed nothing acute--there was mention of ventricular dilatation which has been present on previous scans, however this was felt to be more consistent with hydrocephalus ex vacuo and not NPH    6/20 underwent robotic assisted lap valeriano.  Mental status has fluctuated. At the nursing home he is on Depakote 500 mg twice daily presumably for mood.  It appears he is ordered 250 mg twice daily here and during his hospital stay his valproic acid levels were declining.  6/21: RRT for altered mental status and questionable seizure activity.  Repeat CT negative Dr. Cadena started Keppra 1000 mg twice daily.     Subjective:  He is lying in bed and somnolent but will follow a few simple commands and will intermittently answer questions.  Chart reviewed, no significant events overnight. No family at bedside. Nursing and telesitter present. No seizure like activity per nursing.      Unable to complete the review of systems as he is not speaking for me    Current Facility-Administered Medications   Medication Dose Route Frequency Provider Last Rate Last Admin    hydrALAZINE (APRESOLINE) injection 10 mg  10 mg IntraVENous Q6H PRN Marina Son APRN - CNP   10 mg at 06/21/25 1650    levETIRAcetam (KEPPRA) injection 1,000 mg  1,000 mg IntraVENous Q12H Bridger Cadena MD   1,000 mg at 06/22/25 2348    melatonin disintegrating tablet 10 mg  10 mg Oral Nightly PRN Kevin Cr 
[facility]  Neuro Inpatient Follow Up     Requesting Physician: Liv Kaufman MD     History of Present Illness:  Stewart Mark is a 74 y.o. male admitted on 6/16/2025 seen on neurology consultation for altered mental status and suspicion of seizure activity.  Patient transferred from [facility] after telestroke consult for hypoxia and weakness.  Patient was extremely lethargic with reported of episode of head deviation with tonic movement in both arms and legs with nonverbal status prompting stroke alert.  CT head was negative with NIHSS of 20 with initial ED presentation.  Patient did become more awake and responsive in ED with movement of the extremities.    6/21/2025  Patient seen on follow up with report of RRT called this a.m. due to decreased responsiveness.  Patient had been admitted with suspected seizure activity.  He had significant improvement since admission to where he was sitting up and had been responsive primarily to his wife.  There was no obvious seizure activity since admission, but patient was started on Keppra empirically.  With mild but improved functioning.  On evaluation today, patient was responsive even though report was that he was nonverbal this a.m.  He did answer yes/no questions and was able to follow commands.  There was no abnormal movements observed.  He was laying slightly on his right side with head turn to the right but there was no abnormal movements.  He did open his eyes to voice and track.  He did have a head CT scan done after RRT called early this a.m. with report of negative study for any acute intracranial abnormalities.    Allergies:    Allergies   Allergen Reactions    Environmental/Seasonal Other (See Comments)     HAY FEVER    Prinivil [Lisinopril] Cough        Current Medications:   metoprolol (LOPRESSOR) injection 5 mg, Q6H  hydrALAZINE (APRESOLINE) injection 10 mg, Q6H PRN  melatonin disintegrating tablet 10 
Voicemail left with Marina Son NP, to notify that patient has arrived from [facility] and needs admitting orders.  
Wound care consult sent for coccyx wound and left heel wound via [employer].  
Ximena Adamson, ANGELA, notified regarding blood sugar of 134. Patient NPO for procedure tomorrow tentatively for 4PM. Notified regarding patient's typical morning blood sugars after HS lantus. Per stvee Mullen to hold lantus tonight.  
upper limits of normal.     CT ABDOMEN PELVIS W IV CONTRAST Additional Contrast? None  Result Date: 6/13/2025  1. Cholelithiasis with suggestion of gallbladder wall thickening and pericholecystic edema. A classic stent is seen in the common bile duct, which appears to be of a normal caliber. 2. Subtle fat stranding around the bladder, possibly signifying cystitis. Clinical and laboratory correlation recommended.     CTA PULMONARY W CONTRAST  Result Date: 6/13/2025  No evidence of pulmonary embolism or acute pulmonary abnormality. Cholelithiasis with gallbladder wall thickening.  Correlate with right upper quadrant pain.     CT HEAD WO CONTRAST  Result Date: 6/13/2025  No acute intracranial abnormality.     XR CHEST PORTABLE  Result Date: 6/13/2025  No acute process.         ASSESSMENT:      74 y.o. patient of Javid Connelly MD admitted with     Principal Problem:    Seizures (HCC)  Active Problems:    Chronic cholecystitis  Resolved Problems:    * No resolved hospital problems. *      Plan:    -Consult hepatobiliary surgery.  Recommending robotic cholecystectomy with possible biliary reconstruction if Mirizzi syndrome is found  -Patient is known to Dr. Ansari, GI is consulted  -Continue IV Zosyn  -Hold antihypertensive medication hydrochlorothiazide and Cozaar  -Continue Lantus 60 units and correctional insulin for management of diabetes  -Continue Cymbalta  for mood   - Nemanda for memory loss   - continue IVF resuscitation   -Monitor daily labs, daily vitals   -Check and replete electrolytes   -Review and restart home medications as appropriate   -Patient is DNR-CCA    6/15/2025  -Vitals stable  -GIUSEPPE resolved.  Creatinine improved from 1.7-1.1  - Robotic cholecystectomy with biliary tree reconstruction planned  -Continue Zosyn  - Holding Plavix due to possible procedure  -He is continued on IV fluid at 100 cc/hr  - Monitor daily labs, daily vitals   - Check and replete electrolytes       6/16/2025  - 
IntraVENous PRN Kevin Cr MD        sodium chloride flush 0.9 % injection 10 mL  10 mL IntraVENous PRN Kevin Cr MD        sodium chloride flush 0.9 % injection 5-40 mL  5-40 mL IntraVENous 2 times per day Kevin Cr MD   10 mL at 06/22/25 0827    acetaminophen (TYLENOL) tablet 650 mg  650 mg Oral Q6H PRN Kevin Cr MD   650 mg at 06/20/25 0859    albuterol (PROVENTIL) (2.5 MG/3ML) 0.083% nebulizer solution 2.5 mg  2.5 mg Nebulization Q6H PRN Kevin Cr MD        [Held by provider] aspirin EC tablet 81 mg  81 mg Oral Nightly Kevin Cr MD        atorvastatin (LIPITOR) tablet 40 mg  40 mg Oral Nightly Kevin Cr MD   40 mg at 06/21/25 2033    [Held by provider] clopidogrel (PLAVIX) tablet 75 mg  75 mg Oral QAKevin Billings MD        divalproex (DEPAKOTE ER) extended release tablet 250 mg  250 mg Oral BID Kevin Cr MD   250 mg at 06/22/25 0817    DULoxetine (CYMBALTA) extended release capsule 60 mg  60 mg Oral QA Kevin Cr MD   60 mg at 06/20/25 0852    fenofibrate (TRICOR) tablet 54 mg  54 mg Oral Daily Kevin Cr MD   54 mg at 06/20/25 0854    gabapentin (NEURONTIN) capsule 600 mg  600 mg Oral 4x Daily Kevin Cr MD   600 mg at 06/22/25 0817    [Held by provider] insulin glargine (LANTUS) injection vial 60 Units  60 Units SubCUTAneous BID Kevin Cr MD   60 Units at 06/17/25 2145    cetirizine (ZYRTEC) tablet 10 mg  10 mg Oral Daily Kevin Cr MD   10 mg at 06/20/25 0854    memantine (NAMENDA) tablet 5 mg  5 mg Oral BID Kevin Cr MD   5 mg at 06/21/25 2032    metoprolol tartrate (LOPRESSOR) tablet 100 mg  100 mg Oral BID Kevin Cr MD   100 mg at 06/22/25 0817    miconazole (MICOTIN) 2 % powder   Topical BID Kevin Cr MD   Given at 06/22/25 0822    pantoprazole (PROTONIX) tablet 40 mg  40 mg Oral QAM Kevin Cr MD   40 mg at 06/20/25 0854    tamsulosin (FLOMAX) capsule 0.8 mg  0.8 mg Oral Daily Kevin Cr MD   0.8 mg at 06/20/25 0852

## 2025-07-09 ENCOUNTER — OFFICE VISIT (OUTPATIENT)
Age: 75
End: 2025-07-09

## 2025-07-09 VITALS
OXYGEN SATURATION: 98 % | WEIGHT: 247 LBS | HEART RATE: 87 BPM | DIASTOLIC BLOOD PRESSURE: 70 MMHG | SYSTOLIC BLOOD PRESSURE: 111 MMHG | BODY MASS INDEX: 32.74 KG/M2 | HEIGHT: 73 IN | TEMPERATURE: 98 F

## 2025-07-09 DIAGNOSIS — K81.1 CHRONIC CHOLECYSTITIS: ICD-10-CM

## 2025-07-09 DIAGNOSIS — R56.9 SEIZURE (HCC): ICD-10-CM

## 2025-07-09 DIAGNOSIS — K80.50 CHOLEDOCHOLITHIASIS: Primary | ICD-10-CM

## 2025-07-14 PROBLEM — N39.0 UTI (URINARY TRACT INFECTION): Status: RESOLVED | Noted: 2025-05-17 | Resolved: 2025-07-14

## (undated) DEVICE — DRAPE,UTILTY,TAPE,15X26, 4EA/PK: Brand: MEDLINE

## (undated) DEVICE — MEDIUM-LARGE CLIP APPLIER: Brand: ENDOWRIST

## (undated) DEVICE — GLOVE SURG SZ 7 L12IN FNGR THK79MIL GRN LTX FREE

## (undated) DEVICE — MONOPOLAR CURVED SCISSORS: Brand: ENDOWRIST

## (undated) DEVICE — RX DELIVERY SYSTEM: Brand: NAVIFLEX™ RX DELIVERY SYSTEM

## (undated) DEVICE — ROBOTIC: Brand: MEDLINE INDUSTRIES, INC.

## (undated) DEVICE — FENESTRATED BIPOLAR FORCEPS: Brand: ENDOWRIST

## (undated) DEVICE — AGENT HEMOSTATIC SURGIFLOW MATRIX KIT W/THROMBIN

## (undated) DEVICE — LIQUIBAND RAPID ADHESIVE 36/CS 0.8ML: Brand: MEDLINE

## (undated) DEVICE — PROGRASP FORCEPS: Brand: ENDOWRIST

## (undated) DEVICE — SPONGE GZ W4XL4IN RAYON POLY CVR W/NONWOVEN FAB STRL 2/PK

## (undated) DEVICE — TIP COVER ACCESSORY

## (undated) DEVICE — LARGE HEM-O-LOK CLIP APPLIER: Brand: ENDOWRIST

## (undated) DEVICE — PUMP SUC IRR TBNG L10FT W/ HNDPC ASSEMB STRYKEFLOW 2

## (undated) DEVICE — BLOCK BITE 60FR RUBBER ADLT DENTAL

## (undated) DEVICE — SPHINCTEROTOME: Brand: DREAMTOME™ RX 44

## (undated) DEVICE — ELECTRODE PT RET AD L9FT HI MOIST COND ADH HYDRGEL CORDED

## (undated) DEVICE — SEALER LATCHING 37CM LIGASURE MARYLAND XP

## (undated) DEVICE — FOGARTY ARTERIAL EMBOLECTOMY CATHETER 4F 80CM: Brand: FOGARTY

## (undated) DEVICE — ANCHOR TISSUE RETRIEVAL SYSTEM, BAG SIZE 125 ML, PORT SIZE 8 MM: Brand: ANCHOR TISSUE RETRIEVAL SYSTEM

## (undated) DEVICE — SOLUTION IRRIG 1000ML H2O PIC PLAS SHATTERPROOF CONTAINER

## (undated) DEVICE — 3M™ IOBAN™ 2 ANTIMICROBIAL INCISE DRAPE 6650EZ: Brand: IOBAN™ 2

## (undated) DEVICE — SYSTEM BX CAP BILI RAP EXCHG CAP LOK DEV COMPATIBLE W/ OLY

## (undated) DEVICE — TROCARS: Brand: KII® OPTICAL ACCESS SYSTEM

## (undated) DEVICE — INSUFFLATION NEEDLE TO ESTABLISH PNEUMOPERITONEUM.: Brand: INSUFFLATION NEEDLE

## (undated) DEVICE — ARM DRAPE

## (undated) DEVICE — SYRINGE MED 10ML LUERLOCK TIP W/O SFTY DISP

## (undated) DEVICE — RETRIEVAL BALLOON CATHETER: Brand: EXTRACTOR™ PRO RX

## (undated) DEVICE — SCISSORS SURG DIA8MM MPLR CRV ENDOWRIST

## (undated) DEVICE — GLOVE SURG SZ 65 THK91MIL LTX FREE SYN POLYISOPRENE

## (undated) DEVICE — KIT,ANTI FOG,W/SPONGE & FLUID,SOFT PACK: Brand: MEDLINE

## (undated) DEVICE — SET INSUF TUBE HEAT ISO CONN DISP

## (undated) DEVICE — APPLICATOR ENDOSCP L34CM W/ S STL CANN PLAS OBT STYL FOR

## (undated) DEVICE — NEEDLE CLOSURE OMNICLOSE

## (undated) DEVICE — BLADELESS OBTURATOR: Brand: WECK VISTA

## (undated) DEVICE — COLUMN DRAPE

## (undated) DEVICE — PLUMEPORT ACTIV LAPAROSCOPIC SMOKE FILTRATION DEVICE: Brand: PLUMEPORT ACTIVE

## (undated) DEVICE — SEAL

## (undated) DEVICE — CLEANER LENS C-CLEAR

## (undated) DEVICE — GRADUATE TRIANG MEASURE 1000ML BLK PRNT